# Patient Record
Sex: FEMALE | Race: WHITE | NOT HISPANIC OR LATINO | Employment: OTHER | URBAN - NONMETROPOLITAN AREA
[De-identification: names, ages, dates, MRNs, and addresses within clinical notes are randomized per-mention and may not be internally consistent; named-entity substitution may affect disease eponyms.]

---

## 2018-08-16 ENCOUNTER — TRANSCRIBE ORDERS (OUTPATIENT)
Dept: ADMINISTRATIVE | Facility: HOSPITAL | Age: 48
End: 2018-08-16

## 2018-08-16 DIAGNOSIS — R11.0 NAUSEA: Primary | ICD-10-CM

## 2018-08-16 DIAGNOSIS — R63.4 ABNORMAL WEIGHT LOSS: ICD-10-CM

## 2018-08-16 DIAGNOSIS — R10.10 UPPER ABDOMINAL PAIN: ICD-10-CM

## 2018-08-24 ENCOUNTER — HOSPITAL ENCOUNTER (OUTPATIENT)
Dept: ULTRASOUND IMAGING | Facility: HOSPITAL | Age: 48
Discharge: HOME | End: 2018-08-24
Attending: INTERNAL MEDICINE | Admitting: INTERNAL MEDICINE

## 2018-08-24 DIAGNOSIS — R63.4 ABNORMAL WEIGHT LOSS: ICD-10-CM

## 2018-08-24 DIAGNOSIS — R11.0 NAUSEA: ICD-10-CM

## 2018-08-24 DIAGNOSIS — R10.10 UPPER ABDOMINAL PAIN: ICD-10-CM

## 2018-08-24 PROCEDURE — 76705 ECHO EXAM OF ABDOMEN: CPT

## 2018-08-24 PROCEDURE — 76705 ECHO EXAM OF ABDOMEN: CPT | Performed by: RADIOLOGY

## 2018-10-12 ENCOUNTER — HOSPITAL ENCOUNTER (EMERGENCY)
Facility: HOSPITAL | Age: 48
Discharge: HOME | End: 2018-10-12
Attending: EMERGENCY MEDICINE | Admitting: EMERGENCY MEDICINE

## 2018-10-12 VITALS
BODY MASS INDEX: 23.7 KG/M2 | SYSTOLIC BLOOD PRESSURE: 154 MMHG | HEIGHT: 69 IN | RESPIRATION RATE: 16 BRPM | HEART RATE: 98 BPM | DIASTOLIC BLOOD PRESSURE: 91 MMHG | TEMPERATURE: 98.2 F | OXYGEN SATURATION: 98 % | WEIGHT: 160 LBS

## 2018-10-12 DIAGNOSIS — E87.6 HYPOKALEMIA: ICD-10-CM

## 2018-10-12 DIAGNOSIS — E86.0 DEHYDRATION: Primary | ICD-10-CM

## 2018-10-12 LAB
ALBUMIN SERPL-MCNC: 3.8 G/DL (ref 3.5–5)
ALBUMIN/GLOB SERPL: 1.2 G/DL (ref 1.5–2.5)
ALP SERPL-CCNC: 84 U/L (ref 35–104)
ALT SERPL W P-5'-P-CCNC: 25 U/L (ref 10–36)
ANION GAP SERPL CALCULATED.3IONS-SCNC: 16.6 MMOL/L (ref 3.6–11.2)
AST SERPL-CCNC: 26 U/L (ref 10–30)
BASOPHILS # BLD AUTO: 0.04 10*3/MM3 (ref 0–0.3)
BASOPHILS NFR BLD AUTO: 0.4 % (ref 0–2)
BILIRUB SERPL-MCNC: 0.8 MG/DL (ref 0.2–1.8)
BUN BLD-MCNC: 12 MG/DL (ref 7–21)
BUN/CREAT SERPL: 15.6 (ref 7–25)
CALCIUM SPEC-SCNC: 9.3 MG/DL (ref 7.7–10)
CHLORIDE SERPL-SCNC: 97 MMOL/L (ref 99–112)
CO2 SERPL-SCNC: 25.4 MMOL/L (ref 24.3–31.9)
CREAT BLD-MCNC: 0.77 MG/DL (ref 0.43–1.29)
DEPRECATED RDW RBC AUTO: 43.5 FL (ref 37–54)
EOSINOPHIL # BLD AUTO: 0.05 10*3/MM3 (ref 0–0.7)
EOSINOPHIL NFR BLD AUTO: 0.5 % (ref 0–5)
ERYTHROCYTE [DISTWIDTH] IN BLOOD BY AUTOMATED COUNT: 13.4 % (ref 11.5–14.5)
GFR SERPL CREATININE-BSD FRML MDRD: 80 ML/MIN/1.73
GLOBULIN UR ELPH-MCNC: 3.2 GM/DL
GLUCOSE BLD-MCNC: 115 MG/DL (ref 70–110)
HCT VFR BLD AUTO: 44.5 % (ref 37–47)
HGB BLD-MCNC: 15.4 G/DL (ref 12–16)
IMM GRANULOCYTES # BLD: 0.08 10*3/MM3 (ref 0–0.03)
IMM GRANULOCYTES NFR BLD: 0.8 % (ref 0–0.5)
LYMPHOCYTES # BLD AUTO: 2.13 10*3/MM3 (ref 1–3)
LYMPHOCYTES NFR BLD AUTO: 21.7 % (ref 21–51)
MCH RBC QN AUTO: 31.2 PG (ref 27–33)
MCHC RBC AUTO-ENTMCNC: 34.6 G/DL (ref 33–37)
MCV RBC AUTO: 90.3 FL (ref 80–94)
MONOCYTES # BLD AUTO: 0.9 10*3/MM3 (ref 0.1–0.9)
MONOCYTES NFR BLD AUTO: 9.2 % (ref 0–10)
NEUTROPHILS # BLD AUTO: 6.6 10*3/MM3 (ref 1.4–6.5)
NEUTROPHILS NFR BLD AUTO: 67.4 % (ref 30–70)
NRBC BLD MANUAL-RTO: 0 /100 WBC (ref 0–0)
OSMOLALITY SERPL CALC.SUM OF ELEC: 278.2 MOSM/KG (ref 273–305)
PLATELET # BLD AUTO: 305 10*3/MM3 (ref 130–400)
PMV BLD AUTO: 11.5 FL (ref 6–10)
POTASSIUM BLD-SCNC: 2.9 MMOL/L (ref 3.5–5.3)
PROT SERPL-MCNC: 7 G/DL (ref 6–8)
RBC # BLD AUTO: 4.93 10*6/MM3 (ref 4.2–5.4)
SODIUM BLD-SCNC: 139 MMOL/L (ref 135–153)
WBC NRBC COR # BLD: 9.8 10*3/MM3 (ref 4.5–12.5)

## 2018-10-12 PROCEDURE — 25010000002 ONDANSETRON PER 1 MG: Performed by: EMERGENCY MEDICINE

## 2018-10-12 PROCEDURE — 99284 EMERGENCY DEPT VISIT MOD MDM: CPT

## 2018-10-12 PROCEDURE — 96375 TX/PRO/DX INJ NEW DRUG ADDON: CPT

## 2018-10-12 PROCEDURE — 25010000002 PROCHLORPERAZINE EDISYLATE PER 10 MG: Performed by: EMERGENCY MEDICINE

## 2018-10-12 PROCEDURE — 96374 THER/PROPH/DIAG INJ IV PUSH: CPT

## 2018-10-12 PROCEDURE — 85025 COMPLETE CBC W/AUTO DIFF WBC: CPT | Performed by: EMERGENCY MEDICINE

## 2018-10-12 PROCEDURE — 80053 COMPREHEN METABOLIC PANEL: CPT | Performed by: EMERGENCY MEDICINE

## 2018-10-12 RX ORDER — POTASSIUM CHLORIDE 750 MG/1
10 TABLET, EXTENDED RELEASE ORAL 2 TIMES DAILY
Qty: 12 TABLET | Refills: 0 | Status: SHIPPED | OUTPATIENT
Start: 2018-10-12 | End: 2018-11-07 | Stop reason: HOSPADM

## 2018-10-12 RX ORDER — POTASSIUM CHLORIDE 20 MEQ/1
40 TABLET, EXTENDED RELEASE ORAL ONCE
Status: COMPLETED | OUTPATIENT
Start: 2018-10-12 | End: 2018-10-12

## 2018-10-12 RX ORDER — PROCHLORPERAZINE MALEATE 10 MG
10 TABLET ORAL EVERY 6 HOURS PRN
Qty: 16 TABLET | Refills: 0 | Status: SHIPPED | OUTPATIENT
Start: 2018-10-12 | End: 2018-11-07 | Stop reason: HOSPADM

## 2018-10-12 RX ORDER — ONDANSETRON 2 MG/ML
4 INJECTION INTRAMUSCULAR; INTRAVENOUS ONCE
Status: COMPLETED | OUTPATIENT
Start: 2018-10-12 | End: 2018-10-12

## 2018-10-12 RX ORDER — ONDANSETRON 4 MG/1
4 TABLET, FILM COATED ORAL EVERY 8 HOURS PRN
Qty: 12 TABLET | Refills: 0 | Status: SHIPPED | OUTPATIENT
Start: 2018-10-12

## 2018-10-12 RX ADMIN — SODIUM CHLORIDE 1000 ML: 9 INJECTION, SOLUTION INTRAVENOUS at 17:46

## 2018-10-12 RX ADMIN — ONDANSETRON 4 MG: 2 INJECTION, SOLUTION INTRAMUSCULAR; INTRAVENOUS at 17:46

## 2018-10-12 RX ADMIN — POTASSIUM CHLORIDE 40 MEQ: 1500 TABLET, EXTENDED RELEASE ORAL at 18:59

## 2018-10-12 RX ADMIN — PROCHLORPERAZINE EDISYLATE 5 MG: 5 INJECTION INTRAMUSCULAR; INTRAVENOUS at 19:24

## 2018-10-12 NOTE — ED NOTES
MD at bedside speaking to patient at this time, DAVEY [place]. Resps even and unlabored. Denies any needs. Updated on POC, spouse remains at bedside. Call light and fall precautions in place.      Aidee Escalante RN  10/12/18 1923

## 2018-10-12 NOTE — ED NOTES
Report received from CUONG Perez RN at bedside at this time. Updated patient on POC.      Aidee Escalante, GUERO  10/12/18 4505

## 2018-10-12 NOTE — ED NOTES
Patient states that she is unable to provide a urine specimen, states that she is not able to urinate, made patient aware that this is the only test that we are awaiting, labs to this point were WNL except for K, states that she forgot to urinate in cup, MD made aware.      Aidee Escalante, GUERO  10/12/18 1920

## 2018-10-12 NOTE — ED NOTES
Patient ambulatory to restroom at this time, requests to provide us with a urine specimen. Verbalizes understanding, cup and dana sani wipes provided at this time. Spouse accompanying patient.      Aidee Escalante RN  10/12/18 1920

## 2018-10-14 ENCOUNTER — HOSPITAL ENCOUNTER (INPATIENT)
Facility: HOSPITAL | Age: 48
LOS: 23 days | Discharge: NURSING, REHAB OR LONG-TERM CARE | End: 2018-11-07
Attending: EMERGENCY MEDICINE | Admitting: EMERGENCY MEDICINE

## 2018-10-14 DIAGNOSIS — E87.6 ACUTE HYPOKALEMIA: Primary | ICD-10-CM

## 2018-10-14 DIAGNOSIS — R11.15 INTRACTABLE CYCLICAL VOMITING WITH NAUSEA: ICD-10-CM

## 2018-10-14 DIAGNOSIS — E83.42 HYPOMAGNESEMIA: ICD-10-CM

## 2018-10-14 DIAGNOSIS — R63.4 UNINTENTIONAL WEIGHT LOSS: ICD-10-CM

## 2018-10-14 DIAGNOSIS — R93.3 ABNORMAL CT SCAN, STOMACH: ICD-10-CM

## 2018-10-14 DIAGNOSIS — Z78.9 IMPAIRED MOBILITY AND ADLS: ICD-10-CM

## 2018-10-14 DIAGNOSIS — Z74.09 IMPAIRED FUNCTIONAL MOBILITY, BALANCE, GAIT, AND ENDURANCE: ICD-10-CM

## 2018-10-14 DIAGNOSIS — R11.2 INTRACTABLE VOMITING WITH NAUSEA, UNSPECIFIED VOMITING TYPE: ICD-10-CM

## 2018-10-14 DIAGNOSIS — Z74.09 IMPAIRED MOBILITY AND ADLS: ICD-10-CM

## 2018-10-14 PROCEDURE — 96361 HYDRATE IV INFUSION ADD-ON: CPT

## 2018-10-14 PROCEDURE — 93005 ELECTROCARDIOGRAM TRACING: CPT

## 2018-10-14 PROCEDURE — 99284 EMERGENCY DEPT VISIT MOD MDM: CPT

## 2018-10-14 PROCEDURE — 93005 ELECTROCARDIOGRAM TRACING: CPT | Performed by: EMERGENCY MEDICINE

## 2018-10-14 PROCEDURE — 96365 THER/PROPH/DIAG IV INF INIT: CPT

## 2018-10-14 PROCEDURE — 96366 THER/PROPH/DIAG IV INF ADDON: CPT

## 2018-10-14 PROCEDURE — P9612 CATHETERIZE FOR URINE SPEC: HCPCS

## 2018-10-14 PROCEDURE — 96368 THER/DIAG CONCURRENT INF: CPT

## 2018-10-15 ENCOUNTER — APPOINTMENT (OUTPATIENT)
Dept: CT IMAGING | Facility: HOSPITAL | Age: 48
End: 2018-10-15

## 2018-10-15 ENCOUNTER — APPOINTMENT (OUTPATIENT)
Dept: GENERAL RADIOLOGY | Facility: HOSPITAL | Age: 48
End: 2018-10-15

## 2018-10-15 PROBLEM — E83.42 HYPOMAGNESEMIA: Status: ACTIVE | Noted: 2018-10-15

## 2018-10-15 PROBLEM — E78.5 HYPERLIPIDEMIA: Status: ACTIVE | Noted: 2018-10-15

## 2018-10-15 PROBLEM — F41.9 ANXIETY AND DEPRESSION: Status: ACTIVE | Noted: 2018-10-15

## 2018-10-15 PROBLEM — K21.9 GERD WITHOUT ESOPHAGITIS: Status: ACTIVE | Noted: 2018-10-15

## 2018-10-15 PROBLEM — E87.6 ACUTE HYPOKALEMIA: Status: ACTIVE | Noted: 2018-10-15

## 2018-10-15 PROBLEM — E87.6 HYPOKALEMIA: Status: ACTIVE | Noted: 2018-10-15

## 2018-10-15 PROBLEM — R11.2 INTRACTABLE NAUSEA AND VOMITING: Status: ACTIVE | Noted: 2018-10-15

## 2018-10-15 PROBLEM — I10 ESSENTIAL HYPERTENSION: Status: ACTIVE | Noted: 2018-10-15

## 2018-10-15 PROBLEM — G43.909 MIGRAINES: Status: ACTIVE | Noted: 2018-10-15

## 2018-10-15 PROBLEM — F32.A ANXIETY AND DEPRESSION: Status: ACTIVE | Noted: 2018-10-15

## 2018-10-15 PROBLEM — A69.20 LYME DISEASE: Status: ACTIVE | Noted: 2018-10-15

## 2018-10-15 LAB
ALBUMIN SERPL-MCNC: 3.82 G/DL (ref 3.2–4.8)
ALBUMIN/GLOB SERPL: 1.6 G/DL (ref 1.5–2.5)
ALP SERPL-CCNC: 78 U/L (ref 25–100)
ALT SERPL W P-5'-P-CCNC: 29 U/L (ref 7–40)
AMPHET+METHAMPHET UR QL: NEGATIVE
AMPHETAMINES UR QL: NEGATIVE
ANION GAP SERPL CALCULATED.3IONS-SCNC: 14 MMOL/L (ref 3–11)
ANION GAP SERPL CALCULATED.3IONS-SCNC: 15 MMOL/L (ref 3–11)
AST SERPL-CCNC: 20 U/L (ref 0–33)
BACTERIA UR QL AUTO: ABNORMAL /HPF
BARBITURATES UR QL SCN: NEGATIVE
BASOPHILS # BLD AUTO: 0.02 10*3/MM3 (ref 0–0.2)
BASOPHILS NFR BLD AUTO: 0.2 % (ref 0–1)
BENZODIAZ UR QL SCN: NEGATIVE
BILIRUB SERPL-MCNC: 0.8 MG/DL (ref 0.3–1.2)
BILIRUB UR QL STRIP: ABNORMAL
BUN BLD-MCNC: 13 MG/DL (ref 9–23)
BUN BLD-MCNC: 15 MG/DL (ref 9–23)
BUN/CREAT SERPL: 19.7 (ref 7–25)
BUN/CREAT SERPL: 21.4 (ref 7–25)
BUPRENORPHINE SERPL-MCNC: NEGATIVE NG/ML
CALCIUM SPEC-SCNC: 8.3 MG/DL (ref 8.7–10.4)
CALCIUM SPEC-SCNC: 9.3 MG/DL (ref 8.7–10.4)
CANNABINOIDS SERPL QL: NEGATIVE
CHLORIDE SERPL-SCNC: 102 MMOL/L (ref 99–109)
CHLORIDE SERPL-SCNC: 96 MMOL/L (ref 99–109)
CHLORIDE UR-SCNC: 124 MMOL/L
CK SERPL-CCNC: 34 U/L (ref 26–174)
CLARITY UR: ABNORMAL
CO2 SERPL-SCNC: 26 MMOL/L (ref 20–31)
CO2 SERPL-SCNC: 28 MMOL/L (ref 20–31)
COCAINE UR QL: NEGATIVE
COLOR UR: ABNORMAL
CORTIS SERPL-MCNC: 14.2 MCG/DL
CREAT BLD-MCNC: 0.66 MG/DL (ref 0.6–1.3)
CREAT BLD-MCNC: 0.7 MG/DL (ref 0.6–1.3)
CRP SERPL-MCNC: 0.22 MG/DL (ref 0–1)
D-LACTATE SERPL-SCNC: 0.8 MMOL/L (ref 0.5–2)
D-LACTATE SERPL-SCNC: 1.5 MMOL/L (ref 0.5–2)
DEPRECATED RDW RBC AUTO: 44 FL (ref 37–54)
DEPRECATED RDW RBC AUTO: 44.5 FL (ref 37–54)
EOSINOPHIL # BLD AUTO: 0.02 10*3/MM3 (ref 0–0.3)
EOSINOPHIL NFR BLD AUTO: 0.2 % (ref 0–3)
ERYTHROCYTE [DISTWIDTH] IN BLOOD BY AUTOMATED COUNT: 13.4 % (ref 11.3–14.5)
ERYTHROCYTE [DISTWIDTH] IN BLOOD BY AUTOMATED COUNT: 13.5 % (ref 11.3–14.5)
ERYTHROCYTE [SEDIMENTATION RATE] IN BLOOD: 8 MM/HR (ref 0–20)
GFR SERPL CREATININE-BSD FRML MDRD: 89 ML/MIN/1.73
GFR SERPL CREATININE-BSD FRML MDRD: 96 ML/MIN/1.73
GLOBULIN UR ELPH-MCNC: 2.4 GM/DL
GLUCOSE BLD-MCNC: 94 MG/DL (ref 70–100)
GLUCOSE BLD-MCNC: 97 MG/DL (ref 70–100)
GLUCOSE UR STRIP-MCNC: NEGATIVE MG/DL
HCT VFR BLD AUTO: 41.3 % (ref 34.5–44)
HCT VFR BLD AUTO: 41.5 % (ref 34.5–44)
HGB BLD-MCNC: 14.1 G/DL (ref 11.5–15.5)
HGB BLD-MCNC: 14.5 G/DL (ref 11.5–15.5)
HGB UR QL STRIP.AUTO: NEGATIVE
HYALINE CASTS UR QL AUTO: ABNORMAL /LPF
IMM GRANULOCYTES # BLD: 0.09 10*3/MM3 (ref 0–0.03)
IMM GRANULOCYTES NFR BLD: 0.9 % (ref 0–0.6)
KETONES UR QL STRIP: ABNORMAL
LEUKOCYTE ESTERASE UR QL STRIP.AUTO: ABNORMAL
LIPASE SERPL-CCNC: 21 U/L (ref 6–51)
LYMPHOCYTES # BLD AUTO: 2.02 10*3/MM3 (ref 0.6–4.8)
LYMPHOCYTES NFR BLD AUTO: 19.6 % (ref 24–44)
MAGNESIUM SERPL-MCNC: 1 MG/DL (ref 1.3–2.7)
MAGNESIUM SERPL-MCNC: 1.4 MG/DL (ref 1.3–2.7)
MCH RBC QN AUTO: 31.2 PG (ref 27–31)
MCH RBC QN AUTO: 31.2 PG (ref 27–31)
MCHC RBC AUTO-ENTMCNC: 34.1 G/DL (ref 32–36)
MCHC RBC AUTO-ENTMCNC: 34.9 G/DL (ref 32–36)
MCV RBC AUTO: 89.2 FL (ref 80–99)
MCV RBC AUTO: 91.4 FL (ref 80–99)
METHADONE UR QL SCN: NEGATIVE
MONOCYTES # BLD AUTO: 0.85 10*3/MM3 (ref 0–1)
MONOCYTES NFR BLD AUTO: 8.2 % (ref 0–12)
NEUTROPHILS # BLD AUTO: 7.42 10*3/MM3 (ref 1.5–8.3)
NEUTROPHILS NFR BLD AUTO: 71.8 % (ref 41–71)
NITRITE UR QL STRIP: NEGATIVE
OPIATES UR QL: POSITIVE
OSMOLALITY UR: 674 MOSM/KG (ref 300–1100)
OXYCODONE UR QL SCN: NEGATIVE
PCP UR QL SCN: NEGATIVE
PH UR STRIP.AUTO: 6 [PH] (ref 5–8)
PLATELET # BLD AUTO: 319 10*3/MM3 (ref 150–450)
PLATELET # BLD AUTO: 326 10*3/MM3 (ref 150–450)
PMV BLD AUTO: 10.8 FL (ref 6–12)
PMV BLD AUTO: 11.7 FL (ref 6–12)
POTASSIUM BLD-SCNC: 2.6 MMOL/L (ref 3.5–5.5)
POTASSIUM BLD-SCNC: 3.3 MMOL/L (ref 3.5–5.5)
POTASSIUM BLD-SCNC: 3.6 MMOL/L (ref 3.5–5.5)
POTASSIUM UR-SCNC: 42.9 MMOL/L
PROCALCITONIN SERPL-MCNC: <0.05 NG/ML
PROPOXYPH UR QL: NEGATIVE
PROT SERPL-MCNC: 6.2 G/DL (ref 5.7–8.2)
PROT UR QL STRIP: ABNORMAL
RBC # BLD AUTO: 4.52 10*6/MM3 (ref 3.89–5.14)
RBC # BLD AUTO: 4.65 10*6/MM3 (ref 3.89–5.14)
RBC # UR: ABNORMAL /HPF
REF LAB TEST METHOD: ABNORMAL
SODIUM BLD-SCNC: 139 MMOL/L (ref 132–146)
SODIUM BLD-SCNC: 142 MMOL/L (ref 132–146)
SODIUM UR-SCNC: 85 MMOL/L (ref 30–90)
SP GR UR STRIP: 1.02 (ref 1–1.03)
SQUAMOUS #/AREA URNS HPF: ABNORMAL /HPF
TRICYCLICS UR QL SCN: NEGATIVE
TSH SERPL DL<=0.05 MIU/L-ACNC: 2.66 MIU/ML (ref 0.35–5.35)
UROBILINOGEN UR QL STRIP: ABNORMAL
WBC NRBC COR # BLD: 10.33 10*3/MM3 (ref 3.5–10.8)
WBC NRBC COR # BLD: 9.42 10*3/MM3 (ref 3.5–10.8)
WBC UR QL AUTO: ABNORMAL /HPF

## 2018-10-15 PROCEDURE — 74177 CT ABD & PELVIS W/CONTRAST: CPT

## 2018-10-15 PROCEDURE — 25010000003 POTASSIUM CHLORIDE 10 MEQ/100ML SOLUTION

## 2018-10-15 PROCEDURE — 81001 URINALYSIS AUTO W/SCOPE: CPT | Performed by: NURSE PRACTITIONER

## 2018-10-15 PROCEDURE — 25010000002 METOCLOPRAMIDE PER 10 MG

## 2018-10-15 PROCEDURE — 96365 THER/PROPH/DIAG IV INF INIT: CPT

## 2018-10-15 PROCEDURE — 25010000003 POTASSIUM CHLORIDE 10 MEQ/100ML SOLUTION: Performed by: NURSE PRACTITIONER

## 2018-10-15 PROCEDURE — 25010000002 IOPAMIDOL 61 % SOLUTION: Performed by: INTERNAL MEDICINE

## 2018-10-15 PROCEDURE — 71045 X-RAY EXAM CHEST 1 VIEW: CPT

## 2018-10-15 PROCEDURE — 99220 PR INITIAL OBSERVATION CARE/DAY 70 MINUTES: CPT | Performed by: INTERNAL MEDICINE

## 2018-10-15 PROCEDURE — 82550 ASSAY OF CK (CPK): CPT | Performed by: NURSE PRACTITIONER

## 2018-10-15 PROCEDURE — 83690 ASSAY OF LIPASE: CPT | Performed by: NURSE PRACTITIONER

## 2018-10-15 PROCEDURE — 80050 GENERAL HEALTH PANEL: CPT | Performed by: NURSE PRACTITIONER

## 2018-10-15 PROCEDURE — 83935 ASSAY OF URINE OSMOLALITY: CPT | Performed by: EMERGENCY MEDICINE

## 2018-10-15 PROCEDURE — 96368 THER/DIAG CONCURRENT INF: CPT

## 2018-10-15 PROCEDURE — 82436 ASSAY OF URINE CHLORIDE: CPT | Performed by: INTERNAL MEDICINE

## 2018-10-15 PROCEDURE — G0378 HOSPITAL OBSERVATION PER HR: HCPCS

## 2018-10-15 PROCEDURE — 87040 BLOOD CULTURE FOR BACTERIA: CPT | Performed by: NURSE PRACTITIONER

## 2018-10-15 PROCEDURE — 25010000002 ONDANSETRON PER 1 MG: Performed by: PHYSICIAN ASSISTANT

## 2018-10-15 PROCEDURE — 84133 ASSAY OF URINE POTASSIUM: CPT | Performed by: INTERNAL MEDICINE

## 2018-10-15 PROCEDURE — 99254 IP/OBS CNSLTJ NEW/EST MOD 60: CPT | Performed by: PHYSICIAN ASSISTANT

## 2018-10-15 PROCEDURE — 84300 ASSAY OF URINE SODIUM: CPT | Performed by: INTERNAL MEDICINE

## 2018-10-15 PROCEDURE — 96366 THER/PROPH/DIAG IV INF ADDON: CPT

## 2018-10-15 PROCEDURE — 80048 BASIC METABOLIC PNL TOTAL CA: CPT | Performed by: PHYSICIAN ASSISTANT

## 2018-10-15 PROCEDURE — 83605 ASSAY OF LACTIC ACID: CPT | Performed by: INTERNAL MEDICINE

## 2018-10-15 PROCEDURE — 84145 PROCALCITONIN (PCT): CPT | Performed by: NURSE PRACTITIONER

## 2018-10-15 PROCEDURE — 83605 ASSAY OF LACTIC ACID: CPT | Performed by: NURSE PRACTITIONER

## 2018-10-15 PROCEDURE — 85027 COMPLETE CBC AUTOMATED: CPT | Performed by: PHYSICIAN ASSISTANT

## 2018-10-15 PROCEDURE — 85652 RBC SED RATE AUTOMATED: CPT | Performed by: NURSE PRACTITIONER

## 2018-10-15 PROCEDURE — 84132 ASSAY OF SERUM POTASSIUM: CPT | Performed by: INTERNAL MEDICINE

## 2018-10-15 PROCEDURE — 80306 DRUG TEST PRSMV INSTRMNT: CPT | Performed by: INTERNAL MEDICINE

## 2018-10-15 PROCEDURE — 86140 C-REACTIVE PROTEIN: CPT | Performed by: NURSE PRACTITIONER

## 2018-10-15 PROCEDURE — 25010000002 MAGNESIUM SULFATE 2 GM/50ML SOLUTION: Performed by: NURSE PRACTITIONER

## 2018-10-15 PROCEDURE — 25010000003 POTASSIUM CHLORIDE 10 MEQ/100ML SOLUTION: Performed by: PHYSICIAN ASSISTANT

## 2018-10-15 PROCEDURE — 82533 TOTAL CORTISOL: CPT | Performed by: INTERNAL MEDICINE

## 2018-10-15 PROCEDURE — 83735 ASSAY OF MAGNESIUM: CPT | Performed by: NURSE PRACTITIONER

## 2018-10-15 PROCEDURE — 0 DIATRIZOATE MEGLUMINE & SODIUM PER 1 ML: Performed by: INTERNAL MEDICINE

## 2018-10-15 PROCEDURE — 25010000002 MAGNESIUM SULFATE 2 GM/50ML SOLUTION: Performed by: PHYSICIAN ASSISTANT

## 2018-10-15 PROCEDURE — 96361 HYDRATE IV INFUSION ADD-ON: CPT

## 2018-10-15 PROCEDURE — 83735 ASSAY OF MAGNESIUM: CPT | Performed by: INTERNAL MEDICINE

## 2018-10-15 RX ORDER — DULOXETIN HYDROCHLORIDE 60 MG/1
60 CAPSULE, DELAYED RELEASE ORAL DAILY
Status: DISCONTINUED | OUTPATIENT
Start: 2018-10-15 | End: 2018-11-07 | Stop reason: HOSPADM

## 2018-10-15 RX ORDER — DOXYCYCLINE HYCLATE 100 MG/1
100 CAPSULE ORAL 2 TIMES DAILY
COMMUNITY
End: 2018-11-07 | Stop reason: HOSPADM

## 2018-10-15 RX ORDER — POTASSIUM CHLORIDE 7.45 MG/ML
10 INJECTION INTRAVENOUS ONCE
Status: COMPLETED | OUTPATIENT
Start: 2018-10-15 | End: 2018-10-15

## 2018-10-15 RX ORDER — ATORVASTATIN CALCIUM 10 MG/1
10 TABLET, FILM COATED ORAL DAILY
Status: DISCONTINUED | OUTPATIENT
Start: 2018-10-15 | End: 2018-11-07 | Stop reason: HOSPADM

## 2018-10-15 RX ORDER — MAGNESIUM SULFATE HEPTAHYDRATE 40 MG/ML
2 INJECTION, SOLUTION INTRAVENOUS
Status: COMPLETED | OUTPATIENT
Start: 2018-10-15 | End: 2018-10-16

## 2018-10-15 RX ORDER — ERGOCALCIFEROL 1.25 MG/1
50000 CAPSULE ORAL
COMMUNITY
End: 2018-11-07 | Stop reason: HOSPADM

## 2018-10-15 RX ORDER — METOPROLOL TARTRATE 5 MG/5ML
5 INJECTION INTRAVENOUS ONCE
Status: COMPLETED | OUTPATIENT
Start: 2018-10-15 | End: 2018-10-15

## 2018-10-15 RX ORDER — SODIUM CHLORIDE 0.9 % (FLUSH) 0.9 %
3-10 SYRINGE (ML) INJECTION AS NEEDED
Status: DISCONTINUED | OUTPATIENT
Start: 2018-10-15 | End: 2018-11-07 | Stop reason: HOSPADM

## 2018-10-15 RX ORDER — PROMETHAZINE HYDROCHLORIDE 25 MG/1
25 TABLET ORAL EVERY 6 HOURS PRN
Status: ON HOLD | COMMUNITY
End: 2023-03-01

## 2018-10-15 RX ORDER — PANTOPRAZOLE SODIUM 40 MG/1
40 TABLET, DELAYED RELEASE ORAL EVERY MORNING
Status: DISCONTINUED | OUTPATIENT
Start: 2018-10-15 | End: 2018-10-16

## 2018-10-15 RX ORDER — AMITRIPTYLINE HYDROCHLORIDE 10 MG/1
10 TABLET, FILM COATED ORAL NIGHTLY
COMMUNITY
End: 2018-11-07 | Stop reason: HOSPADM

## 2018-10-15 RX ORDER — ZONISAMIDE 25 MG/1
25 CAPSULE ORAL 2 TIMES DAILY
COMMUNITY
End: 2018-11-07 | Stop reason: HOSPADM

## 2018-10-15 RX ORDER — MAGNESIUM SULFATE HEPTAHYDRATE 40 MG/ML
4 INJECTION, SOLUTION INTRAVENOUS AS NEEDED
Status: DISCONTINUED | OUTPATIENT
Start: 2018-10-15 | End: 2018-11-07 | Stop reason: HOSPADM

## 2018-10-15 RX ORDER — SODIUM CHLORIDE 9 MG/ML
150 INJECTION, SOLUTION INTRAVENOUS CONTINUOUS
Status: DISCONTINUED | OUTPATIENT
Start: 2018-10-15 | End: 2018-10-15

## 2018-10-15 RX ORDER — MAGNESIUM SULFATE HEPTAHYDRATE 40 MG/ML
2 INJECTION, SOLUTION INTRAVENOUS AS NEEDED
Status: DISCONTINUED | OUTPATIENT
Start: 2018-10-15 | End: 2018-11-07 | Stop reason: HOSPADM

## 2018-10-15 RX ORDER — SODIUM CHLORIDE 0.9 % (FLUSH) 0.9 %
10 SYRINGE (ML) INJECTION AS NEEDED
Status: DISCONTINUED | OUTPATIENT
Start: 2018-10-15 | End: 2018-11-07 | Stop reason: HOSPADM

## 2018-10-15 RX ORDER — TIZANIDINE 4 MG/1
4 TABLET ORAL 3 TIMES DAILY
COMMUNITY
End: 2018-11-07 | Stop reason: HOSPADM

## 2018-10-15 RX ORDER — DOXYCYCLINE 100 MG/1
100 CAPSULE ORAL EVERY 12 HOURS SCHEDULED
Status: CANCELLED | OUTPATIENT
Start: 2018-10-15 | End: 2018-10-22

## 2018-10-15 RX ORDER — SODIUM CHLORIDE 0.9 % (FLUSH) 0.9 %
3 SYRINGE (ML) INJECTION EVERY 12 HOURS SCHEDULED
Status: DISCONTINUED | OUTPATIENT
Start: 2018-10-15 | End: 2018-11-07 | Stop reason: HOSPADM

## 2018-10-15 RX ORDER — POTASSIUM CHLORIDE 7.45 MG/ML
10 INJECTION INTRAVENOUS ONCE
Status: DISCONTINUED | OUTPATIENT
Start: 2018-10-15 | End: 2018-10-19

## 2018-10-15 RX ORDER — METOCLOPRAMIDE HYDROCHLORIDE 5 MG/ML
5 INJECTION INTRAMUSCULAR; INTRAVENOUS
Status: DISCONTINUED | OUTPATIENT
Start: 2018-10-15 | End: 2018-10-16

## 2018-10-15 RX ORDER — SUMATRIPTAN 50 MG/1
50 TABLET, FILM COATED ORAL
COMMUNITY
End: 2018-11-07 | Stop reason: HOSPADM

## 2018-10-15 RX ORDER — MAGNESIUM SULFATE HEPTAHYDRATE 40 MG/ML
2 INJECTION, SOLUTION INTRAVENOUS ONCE
Status: COMPLETED | OUTPATIENT
Start: 2018-10-15 | End: 2018-10-15

## 2018-10-15 RX ORDER — TIZANIDINE 4 MG/1
4 TABLET ORAL 3 TIMES DAILY
Status: DISCONTINUED | OUTPATIENT
Start: 2018-10-15 | End: 2018-10-16

## 2018-10-15 RX ORDER — SODIUM CHLORIDE 9 MG/ML
125 INJECTION, SOLUTION INTRAVENOUS CONTINUOUS
Status: DISCONTINUED | OUTPATIENT
Start: 2018-10-15 | End: 2018-10-17

## 2018-10-15 RX ORDER — PROMETHAZINE HYDROCHLORIDE 25 MG/ML
12.5 INJECTION, SOLUTION INTRAMUSCULAR; INTRAVENOUS EVERY 6 HOURS PRN
Status: DISCONTINUED | OUTPATIENT
Start: 2018-10-15 | End: 2018-11-07 | Stop reason: HOSPADM

## 2018-10-15 RX ORDER — POTASSIUM CHLORIDE 7.45 MG/ML
10 INJECTION INTRAVENOUS ONCE
Status: DISCONTINUED | OUTPATIENT
Start: 2018-10-15 | End: 2018-10-15

## 2018-10-15 RX ORDER — ATORVASTATIN CALCIUM 10 MG/1
10 TABLET, FILM COATED ORAL DAILY
Status: ON HOLD | COMMUNITY
End: 2023-03-01

## 2018-10-15 RX ORDER — OMEPRAZOLE 20 MG/1
20 CAPSULE, DELAYED RELEASE ORAL DAILY
Status: ON HOLD | COMMUNITY
End: 2023-03-01

## 2018-10-15 RX ORDER — POTASSIUM CHLORIDE 1.5 G/1.77G
40 POWDER, FOR SOLUTION ORAL AS NEEDED
Status: DISCONTINUED | OUTPATIENT
Start: 2018-10-15 | End: 2018-11-07 | Stop reason: HOSPADM

## 2018-10-15 RX ORDER — LEVOMEFOLATE CALCIUM 15 MG
15 TABLET ORAL DAILY
COMMUNITY
End: 2018-11-07 | Stop reason: HOSPADM

## 2018-10-15 RX ORDER — METOCLOPRAMIDE 5 MG/1
5 TABLET ORAL
COMMUNITY
End: 2018-11-07 | Stop reason: HOSPADM

## 2018-10-15 RX ORDER — AMITRIPTYLINE HYDROCHLORIDE 10 MG/1
10 TABLET, FILM COATED ORAL NIGHTLY
Status: DISCONTINUED | OUTPATIENT
Start: 2018-10-15 | End: 2018-10-19

## 2018-10-15 RX ORDER — CELECOXIB 200 MG/1
200 CAPSULE ORAL DAILY PRN
COMMUNITY
End: 2018-11-07 | Stop reason: HOSPADM

## 2018-10-15 RX ORDER — ACETAMINOPHEN 325 MG/1
650 TABLET ORAL EVERY 4 HOURS PRN
Status: DISCONTINUED | OUTPATIENT
Start: 2018-10-15 | End: 2018-11-07 | Stop reason: HOSPADM

## 2018-10-15 RX ORDER — ATENOLOL 25 MG/1
25 TABLET ORAL DAILY
COMMUNITY

## 2018-10-15 RX ORDER — POTASSIUM CHLORIDE 750 MG/1
40 CAPSULE, EXTENDED RELEASE ORAL AS NEEDED
Status: DISCONTINUED | OUTPATIENT
Start: 2018-10-15 | End: 2018-10-16

## 2018-10-15 RX ORDER — ONDANSETRON 2 MG/ML
4 INJECTION INTRAMUSCULAR; INTRAVENOUS EVERY 6 HOURS PRN
Status: DISCONTINUED | OUTPATIENT
Start: 2018-10-15 | End: 2018-11-07 | Stop reason: HOSPADM

## 2018-10-15 RX ORDER — LEVOCETIRIZINE DIHYDROCHLORIDE 5 MG/1
5 TABLET, FILM COATED ORAL DAILY PRN
COMMUNITY
End: 2018-11-07 | Stop reason: HOSPADM

## 2018-10-15 RX ORDER — ATENOLOL 50 MG/1
50 TABLET ORAL 2 TIMES DAILY
Status: DISCONTINUED | OUTPATIENT
Start: 2018-10-15 | End: 2018-10-16

## 2018-10-15 RX ORDER — METOCLOPRAMIDE 5 MG/1
5 TABLET ORAL
Status: DISCONTINUED | OUTPATIENT
Start: 2018-10-15 | End: 2018-10-15

## 2018-10-15 RX ORDER — DULOXETIN HYDROCHLORIDE 60 MG/1
60 CAPSULE, DELAYED RELEASE ORAL DAILY
Status: ON HOLD | COMMUNITY
End: 2023-03-02

## 2018-10-15 RX ORDER — POTASSIUM CHLORIDE 7.45 MG/ML
10 INJECTION INTRAVENOUS
Status: DISCONTINUED | OUTPATIENT
Start: 2018-10-15 | End: 2018-11-07 | Stop reason: HOSPADM

## 2018-10-15 RX ORDER — HYDROCODONE BITARTRATE AND ACETAMINOPHEN 5; 325 MG/1; MG/1
1 TABLET ORAL 2 TIMES DAILY PRN
COMMUNITY
End: 2018-11-07 | Stop reason: HOSPADM

## 2018-10-15 RX ORDER — POTASSIUM CHLORIDE 7.45 MG/ML
10 INJECTION INTRAVENOUS
Status: DISPENSED | OUTPATIENT
Start: 2018-10-15 | End: 2018-10-16

## 2018-10-15 RX ADMIN — Medication 15 ML: at 16:53

## 2018-10-15 RX ADMIN — POTASSIUM CHLORIDE 10 MEQ: 7.46 INJECTION, SOLUTION INTRAVENOUS at 05:57

## 2018-10-15 RX ADMIN — METOPROLOL TARTRATE 5 MG: 1 INJECTION, SOLUTION INTRAVENOUS at 06:57

## 2018-10-15 RX ADMIN — Medication 3 ML: at 20:48

## 2018-10-15 RX ADMIN — Medication 3 ML: at 08:06

## 2018-10-15 RX ADMIN — POTASSIUM CHLORIDE 10 MEQ: 7.46 INJECTION, SOLUTION INTRAVENOUS at 02:25

## 2018-10-15 RX ADMIN — POTASSIUM CHLORIDE 10 MEQ: 7.46 INJECTION, SOLUTION INTRAVENOUS at 20:28

## 2018-10-15 RX ADMIN — SODIUM CHLORIDE 100 ML/HR: 9 INJECTION, SOLUTION INTRAVENOUS at 16:41

## 2018-10-15 RX ADMIN — ATENOLOL 50 MG: 50 TABLET ORAL at 08:05

## 2018-10-15 RX ADMIN — ATORVASTATIN CALCIUM 10 MG: 10 TABLET, FILM COATED ORAL at 08:05

## 2018-10-15 RX ADMIN — POTASSIUM CHLORIDE 10 MEQ: 10 INJECTION, SOLUTION INTRAVENOUS at 18:27

## 2018-10-15 RX ADMIN — POTASSIUM CHLORIDE 10 MEQ: 7.46 INJECTION, SOLUTION INTRAVENOUS at 21:42

## 2018-10-15 RX ADMIN — POTASSIUM CHLORIDE 10 MEQ: 10 INJECTION, SOLUTION INTRAVENOUS at 22:51

## 2018-10-15 RX ADMIN — SODIUM CHLORIDE 1000 ML: 9 INJECTION, SOLUTION INTRAVENOUS at 12:00

## 2018-10-15 RX ADMIN — ONDANSETRON HYDROCHLORIDE 4 MG: 2 INJECTION, SOLUTION INTRAMUSCULAR; INTRAVENOUS at 06:56

## 2018-10-15 RX ADMIN — POTASSIUM CHLORIDE 10 MEQ: 7.46 INJECTION, SOLUTION INTRAVENOUS at 04:25

## 2018-10-15 RX ADMIN — MAGNESIUM SULFATE HEPTAHYDRATE 2 G: 40 INJECTION, SOLUTION INTRAVENOUS at 20:28

## 2018-10-15 RX ADMIN — MAGNESIUM SULFATE HEPTAHYDRATE 2 G: 40 INJECTION, SOLUTION INTRAVENOUS at 18:27

## 2018-10-15 RX ADMIN — TIZANIDINE 4 MG: 4 TABLET ORAL at 08:05

## 2018-10-15 RX ADMIN — IOPAMIDOL 85 ML: 612 INJECTION, SOLUTION INTRAVENOUS at 19:13

## 2018-10-15 RX ADMIN — POTASSIUM CHLORIDE 10 MEQ: 10 INJECTION, SOLUTION INTRAVENOUS at 10:01

## 2018-10-15 RX ADMIN — SODIUM CHLORIDE 1000 ML: 9 INJECTION, SOLUTION INTRAVENOUS at 10:40

## 2018-10-15 RX ADMIN — SODIUM CHLORIDE 150 ML/HR: 9 INJECTION, SOLUTION INTRAVENOUS at 03:39

## 2018-10-15 RX ADMIN — SODIUM CHLORIDE 1000 ML: 9 INJECTION, SOLUTION INTRAVENOUS at 00:46

## 2018-10-15 RX ADMIN — MAGNESIUM SULFATE IN WATER 2 G: 40 INJECTION, SOLUTION INTRAVENOUS at 02:31

## 2018-10-15 RX ADMIN — POTASSIUM CHLORIDE 10 MEQ: 10 INJECTION, SOLUTION INTRAVENOUS at 08:05

## 2018-10-15 RX ADMIN — SODIUM CHLORIDE 100 ML/HR: 9 INJECTION, SOLUTION INTRAVENOUS at 08:06

## 2018-10-15 RX ADMIN — SODIUM CHLORIDE 1000 ML: 9 INJECTION, SOLUTION INTRAVENOUS at 14:12

## 2018-10-15 RX ADMIN — METOCLOPRAMIDE 5 MG: 5 INJECTION, SOLUTION INTRAMUSCULAR; INTRAVENOUS at 08:05

## 2018-10-15 RX ADMIN — SODIUM CHLORIDE 1000 ML: 9 INJECTION, SOLUTION INTRAVENOUS at 02:02

## 2018-10-15 RX ADMIN — MAGNESIUM SULFATE HEPTAHYDRATE 2 G: 40 INJECTION, SOLUTION INTRAVENOUS at 22:47

## 2018-10-15 RX ADMIN — POTASSIUM CHLORIDE 10 MEQ: 10 INJECTION, SOLUTION INTRAVENOUS at 11:44

## 2018-10-15 RX ADMIN — DULOXETINE HYDROCHLORIDE 60 MG: 60 CAPSULE, DELAYED RELEASE ORAL at 08:05

## 2018-10-16 ENCOUNTER — ANESTHESIA EVENT (OUTPATIENT)
Dept: GASTROENTEROLOGY | Facility: HOSPITAL | Age: 48
End: 2018-10-16

## 2018-10-16 ENCOUNTER — ANESTHESIA (OUTPATIENT)
Dept: GASTROENTEROLOGY | Facility: HOSPITAL | Age: 48
End: 2018-10-16

## 2018-10-16 PROBLEM — R11.2 INTRACTABLE VOMITING WITH NAUSEA: Status: ACTIVE | Noted: 2018-10-14

## 2018-10-16 PROBLEM — R93.3 ABNORMAL CT SCAN, STOMACH: Status: ACTIVE | Noted: 2018-10-14

## 2018-10-16 LAB
ALBUMIN SERPL-MCNC: 2.94 G/DL (ref 3.2–4.8)
ALBUMIN/GLOB SERPL: 1.6 G/DL (ref 1.5–2.5)
ALP SERPL-CCNC: 61 U/L (ref 25–100)
ALT SERPL W P-5'-P-CCNC: 25 U/L (ref 7–40)
ANION GAP SERPL CALCULATED.3IONS-SCNC: 9 MMOL/L (ref 3–11)
AST SERPL-CCNC: 21 U/L (ref 0–33)
BASOPHILS # BLD AUTO: 0.02 10*3/MM3 (ref 0–0.2)
BASOPHILS NFR BLD AUTO: 0.3 % (ref 0–1)
BILIRUB SERPL-MCNC: 0.5 MG/DL (ref 0.3–1.2)
BUN BLD-MCNC: 9 MG/DL (ref 9–23)
BUN/CREAT SERPL: 20 (ref 7–25)
CALCIUM SPEC-SCNC: 7.1 MG/DL (ref 8.7–10.4)
CHLORIDE SERPL-SCNC: 106 MMOL/L (ref 99–109)
CO2 SERPL-SCNC: 23 MMOL/L (ref 20–31)
CREAT BLD-MCNC: 0.45 MG/DL (ref 0.6–1.3)
DEPRECATED RDW RBC AUTO: 46.8 FL (ref 37–54)
EOSINOPHIL # BLD AUTO: 0.05 10*3/MM3 (ref 0–0.3)
EOSINOPHIL NFR BLD AUTO: 0.6 % (ref 0–3)
ERYTHROCYTE [DISTWIDTH] IN BLOOD BY AUTOMATED COUNT: 13.9 % (ref 11.3–14.5)
GFR SERPL CREATININE-BSD FRML MDRD: 149 ML/MIN/1.73
GLOBULIN UR ELPH-MCNC: 1.9 GM/DL
GLUCOSE BLD-MCNC: 76 MG/DL (ref 70–100)
HCT VFR BLD AUTO: 34.2 % (ref 34.5–44)
HGB BLD-MCNC: 11.3 G/DL (ref 11.5–15.5)
IMM GRANULOCYTES # BLD: 0.09 10*3/MM3 (ref 0–0.03)
IMM GRANULOCYTES NFR BLD: 1.2 % (ref 0–0.6)
LYMPHOCYTES # BLD AUTO: 1.94 10*3/MM3 (ref 0.6–4.8)
LYMPHOCYTES NFR BLD AUTO: 24.8 % (ref 24–44)
MAGNESIUM SERPL-MCNC: 2.6 MG/DL (ref 1.3–2.7)
MCH RBC QN AUTO: 30.7 PG (ref 27–31)
MCHC RBC AUTO-ENTMCNC: 33 G/DL (ref 32–36)
MCV RBC AUTO: 92.9 FL (ref 80–99)
MONOCYTES # BLD AUTO: 0.54 10*3/MM3 (ref 0–1)
MONOCYTES NFR BLD AUTO: 6.9 % (ref 0–12)
NEUTROPHILS # BLD AUTO: 5.27 10*3/MM3 (ref 1.5–8.3)
NEUTROPHILS NFR BLD AUTO: 67.4 % (ref 41–71)
PLATELET # BLD AUTO: 245 10*3/MM3 (ref 150–450)
PMV BLD AUTO: 11.3 FL (ref 6–12)
POTASSIUM BLD-SCNC: 3.6 MMOL/L (ref 3.5–5.5)
POTASSIUM BLD-SCNC: 3.6 MMOL/L (ref 3.5–5.5)
PROT SERPL-MCNC: 4.8 G/DL (ref 5.7–8.2)
RBC # BLD AUTO: 3.68 10*6/MM3 (ref 3.89–5.14)
SODIUM BLD-SCNC: 138 MMOL/L (ref 132–146)
WBC NRBC COR # BLD: 7.82 10*3/MM3 (ref 3.5–10.8)

## 2018-10-16 PROCEDURE — 25010000002 PROPOFOL 10 MG/ML EMULSION: Performed by: ANESTHESIOLOGY

## 2018-10-16 PROCEDURE — G0378 HOSPITAL OBSERVATION PER HR: HCPCS

## 2018-10-16 PROCEDURE — 85025 COMPLETE CBC W/AUTO DIFF WBC: CPT | Performed by: INTERNAL MEDICINE

## 2018-10-16 PROCEDURE — 88305 TISSUE EXAM BY PATHOLOGIST: CPT | Performed by: INTERNAL MEDICINE

## 2018-10-16 PROCEDURE — 83735 ASSAY OF MAGNESIUM: CPT | Performed by: INTERNAL MEDICINE

## 2018-10-16 PROCEDURE — 80053 COMPREHEN METABOLIC PANEL: CPT | Performed by: PHYSICIAN ASSISTANT

## 2018-10-16 PROCEDURE — 0DB78ZX EXCISION OF STOMACH, PYLORUS, VIA NATURAL OR ARTIFICIAL OPENING ENDOSCOPIC, DIAGNOSTIC: ICD-10-PCS | Performed by: INTERNAL MEDICINE

## 2018-10-16 PROCEDURE — 0DB38ZX EXCISION OF LOWER ESOPHAGUS, VIA NATURAL OR ARTIFICIAL OPENING ENDOSCOPIC, DIAGNOSTIC: ICD-10-PCS | Performed by: INTERNAL MEDICINE

## 2018-10-16 PROCEDURE — 88342 IMHCHEM/IMCYTCHM 1ST ANTB: CPT | Performed by: INTERNAL MEDICINE

## 2018-10-16 PROCEDURE — 25010000002 METOCLOPRAMIDE PER 10 MG

## 2018-10-16 PROCEDURE — 93005 ELECTROCARDIOGRAM TRACING: CPT | Performed by: INTERNAL MEDICINE

## 2018-10-16 PROCEDURE — 93010 ELECTROCARDIOGRAM REPORT: CPT | Performed by: INTERNAL MEDICINE

## 2018-10-16 PROCEDURE — 99226 PR SBSQ OBSERVATION CARE/DAY 35 MINUTES: CPT | Performed by: INTERNAL MEDICINE

## 2018-10-16 PROCEDURE — 25010000003 POTASSIUM CHLORIDE 10 MEQ/100ML SOLUTION: Performed by: PHYSICIAN ASSISTANT

## 2018-10-16 PROCEDURE — 84132 ASSAY OF SERUM POTASSIUM: CPT | Performed by: INTERNAL MEDICINE

## 2018-10-16 PROCEDURE — 88312 SPECIAL STAINS GROUP 1: CPT | Performed by: INTERNAL MEDICINE

## 2018-10-16 RX ORDER — SODIUM CHLORIDE 0.9 % (FLUSH) 0.9 %
3-10 SYRINGE (ML) INJECTION AS NEEDED
Status: DISCONTINUED | OUTPATIENT
Start: 2018-10-16 | End: 2018-10-16 | Stop reason: HOSPADM

## 2018-10-16 RX ORDER — HYDROMORPHONE HYDROCHLORIDE 1 MG/ML
0.5 INJECTION, SOLUTION INTRAMUSCULAR; INTRAVENOUS; SUBCUTANEOUS
Status: DISCONTINUED | OUTPATIENT
Start: 2018-10-16 | End: 2018-10-16 | Stop reason: HOSPADM

## 2018-10-16 RX ORDER — LIDOCAINE HYDROCHLORIDE 10 MG/ML
0.5 INJECTION, SOLUTION EPIDURAL; INFILTRATION; INTRACAUDAL; PERINEURAL ONCE AS NEEDED
Status: DISCONTINUED | OUTPATIENT
Start: 2018-10-16 | End: 2018-10-16 | Stop reason: HOSPADM

## 2018-10-16 RX ORDER — LIDOCAINE HYDROCHLORIDE 10 MG/ML
INJECTION, SOLUTION INFILTRATION; PERINEURAL AS NEEDED
Status: DISCONTINUED | OUTPATIENT
Start: 2018-10-16 | End: 2018-10-16 | Stop reason: SURG

## 2018-10-16 RX ORDER — SODIUM CHLORIDE 0.9 % (FLUSH) 0.9 %
3 SYRINGE (ML) INJECTION EVERY 12 HOURS SCHEDULED
Status: DISCONTINUED | OUTPATIENT
Start: 2018-10-16 | End: 2018-10-16 | Stop reason: HOSPADM

## 2018-10-16 RX ORDER — PANTOPRAZOLE SODIUM 40 MG/10ML
40 INJECTION, POWDER, LYOPHILIZED, FOR SOLUTION INTRAVENOUS
Status: DISCONTINUED | OUTPATIENT
Start: 2018-10-16 | End: 2018-10-22

## 2018-10-16 RX ORDER — FENTANYL CITRATE 50 UG/ML
50 INJECTION, SOLUTION INTRAMUSCULAR; INTRAVENOUS
Status: DISCONTINUED | OUTPATIENT
Start: 2018-10-16 | End: 2018-10-16 | Stop reason: HOSPADM

## 2018-10-16 RX ORDER — SODIUM CHLORIDE, SODIUM LACTATE, POTASSIUM CHLORIDE, CALCIUM CHLORIDE 600; 310; 30; 20 MG/100ML; MG/100ML; MG/100ML; MG/100ML
9 INJECTION, SOLUTION INTRAVENOUS CONTINUOUS
Status: DISCONTINUED | OUTPATIENT
Start: 2018-10-16 | End: 2018-10-18

## 2018-10-16 RX ORDER — PROPOFOL 10 MG/ML
VIAL (ML) INTRAVENOUS AS NEEDED
Status: DISCONTINUED | OUTPATIENT
Start: 2018-10-16 | End: 2018-10-16 | Stop reason: SURG

## 2018-10-16 RX ADMIN — LIDOCAINE HYDROCHLORIDE 50 MG: 10 INJECTION, SOLUTION INFILTRATION; PERINEURAL at 10:30

## 2018-10-16 RX ADMIN — SODIUM CHLORIDE 1000 ML: 9 INJECTION, SOLUTION INTRAVENOUS at 17:19

## 2018-10-16 RX ADMIN — METOCLOPRAMIDE 5 MG: 5 INJECTION, SOLUTION INTRAMUSCULAR; INTRAVENOUS at 08:56

## 2018-10-16 RX ADMIN — PROPOFOL 25 MG: 10 INJECTION, EMULSION INTRAVENOUS at 10:34

## 2018-10-16 RX ADMIN — SODIUM CHLORIDE 100 ML/HR: 9 INJECTION, SOLUTION INTRAVENOUS at 15:48

## 2018-10-16 RX ADMIN — POTASSIUM CHLORIDE 10 MEQ: 10 INJECTION, SOLUTION INTRAVENOUS at 13:04

## 2018-10-16 RX ADMIN — SODIUM CHLORIDE, POTASSIUM CHLORIDE, SODIUM LACTATE AND CALCIUM CHLORIDE 9 ML/HR: 600; 310; 30; 20 INJECTION, SOLUTION INTRAVENOUS at 10:12

## 2018-10-16 RX ADMIN — SODIUM CHLORIDE 1000 ML: 9 INJECTION, SOLUTION INTRAVENOUS at 18:18

## 2018-10-16 RX ADMIN — TIZANIDINE 4 MG: 4 TABLET ORAL at 15:48

## 2018-10-16 RX ADMIN — POTASSIUM CHLORIDE 10 MEQ: 10 INJECTION, SOLUTION INTRAVENOUS at 08:57

## 2018-10-16 RX ADMIN — Medication 3 ML: at 21:39

## 2018-10-16 RX ADMIN — PANTOPRAZOLE SODIUM 40 MG: 40 TABLET, DELAYED RELEASE ORAL at 05:01

## 2018-10-16 RX ADMIN — PROPOFOL 25 MG: 10 INJECTION, EMULSION INTRAVENOUS at 10:32

## 2018-10-16 RX ADMIN — DULOXETINE HYDROCHLORIDE 60 MG: 60 CAPSULE, DELAYED RELEASE ORAL at 08:57

## 2018-10-16 RX ADMIN — POTASSIUM CHLORIDE 10 MEQ: 10 INJECTION, SOLUTION INTRAVENOUS at 06:04

## 2018-10-16 RX ADMIN — POTASSIUM CHLORIDE 10 MEQ: 10 INJECTION, SOLUTION INTRAVENOUS at 14:23

## 2018-10-16 RX ADMIN — PANTOPRAZOLE SODIUM 40 MG: 40 INJECTION, POWDER, FOR SOLUTION INTRAVENOUS at 16:48

## 2018-10-16 RX ADMIN — ATORVASTATIN CALCIUM 10 MG: 10 TABLET, FILM COATED ORAL at 08:57

## 2018-10-16 RX ADMIN — PROPOFOL 100 MG: 10 INJECTION, EMULSION INTRAVENOUS at 10:30

## 2018-10-16 RX ADMIN — SODIUM CHLORIDE 100 ML/HR: 9 INJECTION, SOLUTION INTRAVENOUS at 18:08

## 2018-10-16 NOTE — ANESTHESIA POSTPROCEDURE EVALUATION
Patient: Bryanna Schawrtz    Procedure Summary     Date:  10/16/18 Room / Location:   [facility] /  [facility]    Anesthesia Start:  1027 Anesthesia Stop:      Procedure:  ESOPHAGOGASTRODUODENOSCOPY (N/A ) Diagnosis:       Abnormal CT scan, stomach      Intractable vomiting with nausea, unspecified vomiting type      (Abnormal CT scan, stomach [R93.3])      (Intractable vomiting with nausea, unspecified vomiting type [R11.2])    Surgeon:  Brunner, Mark I, MD Provider:  Reese Veloz MD    Anesthesia Type:  general ASA Status:  2          Anesthesia Type: general  Last vitals  BP   176/95 (10/16/18 1009)   Temp   98.7 °F (37.1 °C) (10/16/18 1009)   Pulse   88 (10/16/18 1009)   Resp   16 (10/16/18 1009)     SpO2   98 % (10/16/18 1009)     Post Anesthesia Care and Evaluation    Patient location during evaluation: PACU  Patient participation: complete - patient participated  Level of consciousness: awake and alert  Pain score: 0  Pain management: adequate  Airway patency: patent  Anesthetic complications: No anesthetic complications  PONV Status: none  Cardiovascular status: hemodynamically stable and acceptable  Respiratory status: nonlabored ventilation, acceptable and nasal cannula  Hydration status: acceptable

## 2018-10-16 NOTE — ANESTHESIA PREPROCEDURE EVALUATION
Anesthesia Evaluation                  Airway   Mallampati: I  TM distance: >3 FB  Neck ROM: full  No difficulty expected  Dental      Pulmonary    Cardiovascular     ECG reviewed    (+) hypertension,       Neuro/Psych  (+) headaches, psychiatric history,     GI/Hepatic/Renal/Endo    (+)  GERD,      Musculoskeletal     Abdominal    Substance History      OB/GYN          Other                        Anesthesia Plan    ASA 2     general     intravenous induction   Anesthetic plan, all risks, benefits, and alternatives have been provided, discussed and informed consent has been obtained with: patient.

## 2018-10-17 LAB — POTASSIUM BLD-SCNC: 3.8 MMOL/L (ref 3.5–5.5)

## 2018-10-17 PROCEDURE — 84132 ASSAY OF SERUM POTASSIUM: CPT | Performed by: INTERNAL MEDICINE

## 2018-10-17 PROCEDURE — G0378 HOSPITAL OBSERVATION PER HR: HCPCS

## 2018-10-17 PROCEDURE — 97116 GAIT TRAINING THERAPY: CPT

## 2018-10-17 PROCEDURE — 97166 OT EVAL MOD COMPLEX 45 MIN: CPT

## 2018-10-17 PROCEDURE — 97110 THERAPEUTIC EXERCISES: CPT

## 2018-10-17 PROCEDURE — 99213 OFFICE O/P EST LOW 20 MIN: CPT | Performed by: PHYSICIAN ASSISTANT

## 2018-10-17 PROCEDURE — 25010000002 ONDANSETRON PER 1 MG: Performed by: PHYSICIAN ASSISTANT

## 2018-10-17 PROCEDURE — 99225 PR SBSQ OBSERVATION CARE/DAY 25 MINUTES: CPT | Performed by: INTERNAL MEDICINE

## 2018-10-17 PROCEDURE — 97162 PT EVAL MOD COMPLEX 30 MIN: CPT

## 2018-10-17 RX ORDER — HYDRALAZINE HYDROCHLORIDE 20 MG/ML
10 INJECTION INTRAMUSCULAR; INTRAVENOUS ONCE
Status: COMPLETED | OUTPATIENT
Start: 2018-10-17 | End: 2018-10-18

## 2018-10-17 RX ORDER — MAGNESIUM CARB/ALUMINUM HYDROX 105-160MG
296 TABLET,CHEWABLE ORAL ONCE
Status: COMPLETED | OUTPATIENT
Start: 2018-10-17 | End: 2018-10-17

## 2018-10-17 RX ORDER — ATENOLOL 25 MG/1
25 TABLET ORAL EVERY 12 HOURS SCHEDULED
Status: DISCONTINUED | OUTPATIENT
Start: 2018-10-17 | End: 2018-10-18

## 2018-10-17 RX ORDER — ATENOLOL 25 MG/1
25 TABLET ORAL EVERY 12 HOURS SCHEDULED
Status: DISCONTINUED | OUTPATIENT
Start: 2018-10-17 | End: 2018-10-17

## 2018-10-17 RX ORDER — BISACODYL 10 MG
10 SUPPOSITORY, RECTAL RECTAL ONCE
Status: COMPLETED | OUTPATIENT
Start: 2018-10-17 | End: 2018-10-17

## 2018-10-17 RX ADMIN — ONDANSETRON HYDROCHLORIDE 4 MG: 2 INJECTION, SOLUTION INTRAMUSCULAR; INTRAVENOUS at 10:58

## 2018-10-17 RX ADMIN — Medication 10 ML: at 21:10

## 2018-10-17 RX ADMIN — BISACODYL 10 MG: 10 SUPPOSITORY RECTAL at 22:47

## 2018-10-17 RX ADMIN — PANTOPRAZOLE SODIUM 40 MG: 40 INJECTION, POWDER, FOR SOLUTION INTRAVENOUS at 08:39

## 2018-10-17 RX ADMIN — PANTOPRAZOLE SODIUM 40 MG: 40 INJECTION, POWDER, FOR SOLUTION INTRAVENOUS at 05:21

## 2018-10-17 RX ADMIN — SODIUM CHLORIDE 125 ML/HR: 9 INJECTION, SOLUTION INTRAVENOUS at 08:16

## 2018-10-17 RX ADMIN — AMITRIPTYLINE HYDROCHLORIDE 10 MG: 10 TABLET, FILM COATED ORAL at 21:10

## 2018-10-17 RX ADMIN — PANTOPRAZOLE SODIUM 40 MG: 40 INJECTION, POWDER, FOR SOLUTION INTRAVENOUS at 18:15

## 2018-10-17 RX ADMIN — ACETAMINOPHEN 650 MG: 325 TABLET ORAL at 03:28

## 2018-10-17 RX ADMIN — Medication 3 ML: at 08:41

## 2018-10-17 RX ADMIN — DULOXETINE HYDROCHLORIDE 60 MG: 60 CAPSULE, DELAYED RELEASE ORAL at 08:40

## 2018-10-17 RX ADMIN — ATENOLOL 25 MG: 25 TABLET ORAL at 17:08

## 2018-10-17 RX ADMIN — ONDANSETRON HYDROCHLORIDE 4 MG: 2 INJECTION, SOLUTION INTRAMUSCULAR; INTRAVENOUS at 17:08

## 2018-10-17 RX ADMIN — Medication 296 ML: at 13:48

## 2018-10-17 RX ADMIN — ATORVASTATIN CALCIUM 10 MG: 10 TABLET, FILM COATED ORAL at 08:40

## 2018-10-18 ENCOUNTER — APPOINTMENT (OUTPATIENT)
Dept: MRI IMAGING | Facility: HOSPITAL | Age: 48
End: 2018-10-18

## 2018-10-18 LAB
BACTERIA UR QL AUTO: ABNORMAL /HPF
BILIRUB UR QL STRIP: NEGATIVE
CLARITY UR: CLEAR
COLOR UR: YELLOW
CREAT UR-MCNC: 48.6 MG/DL
CYTO UR: NORMAL
EOSINOPHIL SPEC QL MICRO: 0 % EOS/100 CELLS (ref 0–0)
GLUCOSE UR STRIP-MCNC: NEGATIVE MG/DL
HGB UR QL STRIP.AUTO: NEGATIVE
HYALINE CASTS UR QL AUTO: ABNORMAL /LPF
KETONES UR QL STRIP: ABNORMAL
LAB AP CASE REPORT: NORMAL
LAB AP CLINICAL INFORMATION: NORMAL
LEUKOCYTE ESTERASE UR QL STRIP.AUTO: ABNORMAL
NITRITE UR QL STRIP: NEGATIVE
PATH REPORT.ADDENDUM SPEC: NORMAL
PATH REPORT.FINAL DX SPEC: NORMAL
PATH REPORT.GROSS SPEC: NORMAL
PH UR STRIP.AUTO: 7 [PH] (ref 5–8)
PROT UR QL STRIP: ABNORMAL
PROT UR-MCNC: 28 MG/DL (ref 1–14)
RBC # UR: ABNORMAL /HPF
REF LAB TEST METHOD: ABNORMAL
SODIUM UR-SCNC: 177 MMOL/L (ref 30–90)
SP GR UR STRIP: 1.01 (ref 1–1.03)
SQUAMOUS #/AREA URNS HPF: ABNORMAL /HPF
UROBILINOGEN UR QL STRIP: ABNORMAL
WBC UR QL AUTO: ABNORMAL /HPF

## 2018-10-18 PROCEDURE — 97112 NEUROMUSCULAR REEDUCATION: CPT

## 2018-10-18 PROCEDURE — 99226 PR SBSQ OBSERVATION CARE/DAY 35 MINUTES: CPT | Performed by: INTERNAL MEDICINE

## 2018-10-18 PROCEDURE — 25010000002 HYDRALAZINE PER 20 MG: Performed by: NURSE PRACTITIONER

## 2018-10-18 PROCEDURE — 99213 OFFICE O/P EST LOW 20 MIN: CPT | Performed by: NURSE PRACTITIONER

## 2018-10-18 PROCEDURE — G0378 HOSPITAL OBSERVATION PER HR: HCPCS

## 2018-10-18 PROCEDURE — 87086 URINE CULTURE/COLONY COUNT: CPT | Performed by: INTERNAL MEDICINE

## 2018-10-18 PROCEDURE — 25010000002 CEFTRIAXONE PER 250 MG: Performed by: INTERNAL MEDICINE

## 2018-10-18 PROCEDURE — 87186 SC STD MICRODIL/AGAR DIL: CPT | Performed by: INTERNAL MEDICINE

## 2018-10-18 PROCEDURE — 87077 CULTURE AEROBIC IDENTIFY: CPT | Performed by: INTERNAL MEDICINE

## 2018-10-18 PROCEDURE — 84156 ASSAY OF PROTEIN URINE: CPT | Performed by: INTERNAL MEDICINE

## 2018-10-18 PROCEDURE — 81001 URINALYSIS AUTO W/SCOPE: CPT | Performed by: INTERNAL MEDICINE

## 2018-10-18 PROCEDURE — 72141 MRI NECK SPINE W/O DYE: CPT

## 2018-10-18 PROCEDURE — 87205 SMEAR GRAM STAIN: CPT | Performed by: INTERNAL MEDICINE

## 2018-10-18 PROCEDURE — 25010000002 PROMETHAZINE PER 50 MG: Performed by: INTERNAL MEDICINE

## 2018-10-18 PROCEDURE — 72146 MRI CHEST SPINE W/O DYE: CPT

## 2018-10-18 PROCEDURE — 84300 ASSAY OF URINE SODIUM: CPT | Performed by: INTERNAL MEDICINE

## 2018-10-18 PROCEDURE — 97110 THERAPEUTIC EXERCISES: CPT

## 2018-10-18 PROCEDURE — 99245 OFF/OP CONSLTJ NEW/EST HI 55: CPT | Performed by: PSYCHIATRY & NEUROLOGY

## 2018-10-18 PROCEDURE — 97116 GAIT TRAINING THERAPY: CPT

## 2018-10-18 PROCEDURE — 25010000002 ONDANSETRON PER 1 MG: Performed by: PHYSICIAN ASSISTANT

## 2018-10-18 PROCEDURE — 82570 ASSAY OF URINE CREATININE: CPT | Performed by: INTERNAL MEDICINE

## 2018-10-18 RX ORDER — HYDRALAZINE HYDROCHLORIDE 20 MG/ML
10 INJECTION INTRAMUSCULAR; INTRAVENOUS ONCE
Status: COMPLETED | OUTPATIENT
Start: 2018-10-18 | End: 2018-10-18

## 2018-10-18 RX ORDER — FOLIC ACID/VIT B COMPLEX AND C 0.8 MG
1 TABLET ORAL DAILY
Status: DISCONTINUED | OUTPATIENT
Start: 2018-10-19 | End: 2018-10-18

## 2018-10-18 RX ORDER — FOLIC ACID/VIT B COMPLEX AND C 0.8 MG
1 TABLET ORAL DAILY
Status: DISCONTINUED | OUTPATIENT
Start: 2018-10-19 | End: 2018-11-07 | Stop reason: HOSPADM

## 2018-10-18 RX ORDER — LISINOPRIL 10 MG/1
10 TABLET ORAL
Status: DISCONTINUED | OUTPATIENT
Start: 2018-10-18 | End: 2018-10-19

## 2018-10-18 RX ORDER — ATENOLOL 50 MG/1
50 TABLET ORAL EVERY 12 HOURS SCHEDULED
Status: DISCONTINUED | OUTPATIENT
Start: 2018-10-18 | End: 2018-11-07 | Stop reason: HOSPADM

## 2018-10-18 RX ORDER — CEFTRIAXONE SODIUM 1 G/50ML
1 INJECTION, SOLUTION INTRAVENOUS EVERY 24 HOURS
Status: COMPLETED | OUTPATIENT
Start: 2018-10-18 | End: 2018-10-22

## 2018-10-18 RX ADMIN — Medication 3 ML: at 08:30

## 2018-10-18 RX ADMIN — ATORVASTATIN CALCIUM 10 MG: 10 TABLET, FILM COATED ORAL at 08:26

## 2018-10-18 RX ADMIN — DULOXETINE HYDROCHLORIDE 60 MG: 60 CAPSULE, DELAYED RELEASE ORAL at 08:26

## 2018-10-18 RX ADMIN — ATENOLOL 25 MG: 25 TABLET ORAL at 08:26

## 2018-10-18 RX ADMIN — ONDANSETRON HYDROCHLORIDE 4 MG: 2 INJECTION, SOLUTION INTRAMUSCULAR; INTRAVENOUS at 13:27

## 2018-10-18 RX ADMIN — ATENOLOL 25 MG: 25 TABLET ORAL at 05:12

## 2018-10-18 RX ADMIN — Medication 3 ML: at 13:27

## 2018-10-18 RX ADMIN — PANTOPRAZOLE SODIUM 40 MG: 40 INJECTION, POWDER, FOR SOLUTION INTRAVENOUS at 17:18

## 2018-10-18 RX ADMIN — AMITRIPTYLINE HYDROCHLORIDE 10 MG: 10 TABLET, FILM COATED ORAL at 19:41

## 2018-10-18 RX ADMIN — Medication 3 ML: at 21:00

## 2018-10-18 RX ADMIN — CEFTRIAXONE SODIUM 1 G: 1 INJECTION, SOLUTION INTRAVENOUS at 17:18

## 2018-10-18 RX ADMIN — HYDRALAZINE HYDROCHLORIDE 10 MG: 20 INJECTION INTRAMUSCULAR; INTRAVENOUS at 00:09

## 2018-10-18 RX ADMIN — PANTOPRAZOLE SODIUM 40 MG: 40 INJECTION, POWDER, FOR SOLUTION INTRAVENOUS at 06:21

## 2018-10-18 RX ADMIN — Medication 3 ML: at 01:13

## 2018-10-18 RX ADMIN — HYDRALAZINE HYDROCHLORIDE 10 MG: 20 INJECTION INTRAMUSCULAR; INTRAVENOUS at 07:02

## 2018-10-18 RX ADMIN — LISINOPRIL 10 MG: 10 TABLET ORAL at 18:18

## 2018-10-18 RX ADMIN — ATENOLOL 50 MG: 50 TABLET ORAL at 19:41

## 2018-10-18 RX ADMIN — POLYETHYLENE GLYCOL 3350 17 G: 17 POWDER, FOR SOLUTION ORAL at 08:25

## 2018-10-18 RX ADMIN — Medication 3 ML: at 18:21

## 2018-10-18 RX ADMIN — ACETAMINOPHEN 650 MG: 325 TABLET ORAL at 10:52

## 2018-10-18 RX ADMIN — PROMETHAZINE HYDROCHLORIDE 12.5 MG: 25 INJECTION, SOLUTION INTRAMUSCULAR; INTRAVENOUS at 22:52

## 2018-10-19 ENCOUNTER — APPOINTMENT (OUTPATIENT)
Dept: NEUROLOGY | Facility: HOSPITAL | Age: 48
End: 2018-10-19
Attending: PSYCHIATRY & NEUROLOGY

## 2018-10-19 LAB
ALBUMIN SERPL-MCNC: 3.51 G/DL (ref 3.2–4.8)
ALBUMIN/GLOB SERPL: 1.7 G/DL (ref 1.5–2.5)
ALP SERPL-CCNC: 82 U/L (ref 25–100)
ALT SERPL W P-5'-P-CCNC: 27 U/L (ref 7–40)
ANION GAP SERPL CALCULATED.3IONS-SCNC: 16 MMOL/L (ref 3–11)
AST SERPL-CCNC: 25 U/L (ref 0–33)
BILIRUB SERPL-MCNC: 0.7 MG/DL (ref 0.3–1.2)
BUN BLD-MCNC: 5 MG/DL (ref 9–23)
BUN/CREAT SERPL: 8.9 (ref 7–25)
CALCIUM SPEC-SCNC: 8.9 MG/DL (ref 8.7–10.4)
CHLORIDE SERPL-SCNC: 98 MMOL/L (ref 99–109)
CO2 SERPL-SCNC: 24 MMOL/L (ref 20–31)
CREAT BLD-MCNC: 0.56 MG/DL (ref 0.6–1.3)
FOLATE SERPL-MCNC: 19.7 NG/ML (ref 3.2–20)
GFR SERPL CREATININE-BSD FRML MDRD: 116 ML/MIN/1.73
GLOBULIN UR ELPH-MCNC: 2.1 GM/DL
GLUCOSE BLD-MCNC: 92 MG/DL (ref 70–100)
POTASSIUM BLD-SCNC: 3.3 MMOL/L (ref 3.5–5.5)
POTASSIUM BLD-SCNC: 3.8 MMOL/L (ref 3.5–5.5)
PROT SERPL-MCNC: 5.6 G/DL (ref 5.7–8.2)
SODIUM BLD-SCNC: 138 MMOL/L (ref 132–146)
VIT B12 BLD-MCNC: 675 PG/ML (ref 211–911)

## 2018-10-19 PROCEDURE — 95912 NRV CNDJ TEST 11-12 STUDIES: CPT

## 2018-10-19 PROCEDURE — 80053 COMPREHEN METABOLIC PANEL: CPT | Performed by: PSYCHIATRY & NEUROLOGY

## 2018-10-19 PROCEDURE — G0378 HOSPITAL OBSERVATION PER HR: HCPCS

## 2018-10-19 PROCEDURE — 25010000002 CEFTRIAXONE PER 250 MG: Performed by: INTERNAL MEDICINE

## 2018-10-19 PROCEDURE — 84132 ASSAY OF SERUM POTASSIUM: CPT | Performed by: INTERNAL MEDICINE

## 2018-10-19 PROCEDURE — 95886 MUSC TEST DONE W/N TEST COMP: CPT

## 2018-10-19 PROCEDURE — 95911 NRV CNDJ TEST 9-10 STUDIES: CPT

## 2018-10-19 PROCEDURE — 25010000002 ONDANSETRON PER 1 MG: Performed by: PHYSICIAN ASSISTANT

## 2018-10-19 PROCEDURE — 86160 COMPLEMENT ANTIGEN: CPT | Performed by: INTERNAL MEDICINE

## 2018-10-19 PROCEDURE — 86225 DNA ANTIBODY NATIVE: CPT | Performed by: INTERNAL MEDICINE

## 2018-10-19 PROCEDURE — 25010000002 HYDRALAZINE PER 20 MG: Performed by: NURSE PRACTITIONER

## 2018-10-19 PROCEDURE — 99212 OFFICE O/P EST SF 10 MIN: CPT | Performed by: PSYCHIATRY & NEUROLOGY

## 2018-10-19 PROCEDURE — 99225 PR SBSQ OBSERVATION CARE/DAY 25 MINUTES: CPT | Performed by: INTERNAL MEDICINE

## 2018-10-19 PROCEDURE — 82607 VITAMIN B-12: CPT | Performed by: PSYCHIATRY & NEUROLOGY

## 2018-10-19 PROCEDURE — 82746 ASSAY OF FOLIC ACID SERUM: CPT | Performed by: PSYCHIATRY & NEUROLOGY

## 2018-10-19 PROCEDURE — 97530 THERAPEUTIC ACTIVITIES: CPT

## 2018-10-19 PROCEDURE — 97110 THERAPEUTIC EXERCISES: CPT

## 2018-10-19 PROCEDURE — 97116 GAIT TRAINING THERAPY: CPT

## 2018-10-19 RX ORDER — QUETIAPINE FUMARATE 25 MG/1
25 TABLET, FILM COATED ORAL NIGHTLY
Status: DISCONTINUED | OUTPATIENT
Start: 2018-10-19 | End: 2018-10-25

## 2018-10-19 RX ORDER — LISINOPRIL 10 MG/1
10 TABLET ORAL EVERY 12 HOURS SCHEDULED
Status: DISCONTINUED | OUTPATIENT
Start: 2018-10-19 | End: 2018-10-21

## 2018-10-19 RX ORDER — HYDRALAZINE HYDROCHLORIDE 20 MG/ML
10 INJECTION INTRAMUSCULAR; INTRAVENOUS ONCE
Status: COMPLETED | OUTPATIENT
Start: 2018-10-19 | End: 2018-10-19

## 2018-10-19 RX ADMIN — ATENOLOL 50 MG: 50 TABLET ORAL at 21:02

## 2018-10-19 RX ADMIN — LISINOPRIL 10 MG: 10 TABLET ORAL at 21:02

## 2018-10-19 RX ADMIN — LISINOPRIL 10 MG: 10 TABLET ORAL at 08:30

## 2018-10-19 RX ADMIN — PANTOPRAZOLE SODIUM 40 MG: 40 INJECTION, POWDER, FOR SOLUTION INTRAVENOUS at 05:35

## 2018-10-19 RX ADMIN — HYDRALAZINE HYDROCHLORIDE 10 MG: 20 INJECTION INTRAMUSCULAR; INTRAVENOUS at 00:12

## 2018-10-19 RX ADMIN — POTASSIUM CHLORIDE 40 MEQ: 1.5 POWDER, FOR SOLUTION ORAL at 17:10

## 2018-10-19 RX ADMIN — ATORVASTATIN CALCIUM 10 MG: 10 TABLET, FILM COATED ORAL at 08:30

## 2018-10-19 RX ADMIN — ONDANSETRON HYDROCHLORIDE 4 MG: 2 INJECTION, SOLUTION INTRAMUSCULAR; INTRAVENOUS at 05:35

## 2018-10-19 RX ADMIN — ACETAMINOPHEN 650 MG: 325 TABLET ORAL at 05:44

## 2018-10-19 RX ADMIN — POTASSIUM CHLORIDE 40 MEQ: 1.5 POWDER, FOR SOLUTION ORAL at 08:30

## 2018-10-19 RX ADMIN — Medication 3 ML: at 08:31

## 2018-10-19 RX ADMIN — ATENOLOL 50 MG: 50 TABLET ORAL at 08:30

## 2018-10-19 RX ADMIN — Medication 3 ML: at 21:04

## 2018-10-19 RX ADMIN — PANTOPRAZOLE SODIUM 40 MG: 40 INJECTION, POWDER, FOR SOLUTION INTRAVENOUS at 08:30

## 2018-10-19 RX ADMIN — Medication 1 TABLET: at 08:30

## 2018-10-19 RX ADMIN — ACETAMINOPHEN 650 MG: 325 TABLET ORAL at 22:00

## 2018-10-19 RX ADMIN — ACETAMINOPHEN 650 MG: 325 TABLET ORAL at 12:55

## 2018-10-19 RX ADMIN — Medication 5 MG: at 21:02

## 2018-10-19 RX ADMIN — CEFTRIAXONE SODIUM 1 G: 1 INJECTION, SOLUTION INTRAVENOUS at 17:10

## 2018-10-19 RX ADMIN — PANTOPRAZOLE SODIUM 40 MG: 40 INJECTION, POWDER, FOR SOLUTION INTRAVENOUS at 17:10

## 2018-10-19 RX ADMIN — POLYETHYLENE GLYCOL 3350 17 G: 17 POWDER, FOR SOLUTION ORAL at 08:30

## 2018-10-19 RX ADMIN — DULOXETINE HYDROCHLORIDE 60 MG: 60 CAPSULE, DELAYED RELEASE ORAL at 08:30

## 2018-10-19 RX ADMIN — QUETIAPINE FUMARATE 25 MG: 25 TABLET ORAL at 21:03

## 2018-10-20 LAB
BACTERIA SPEC AEROBE CULT: NORMAL
BACTERIA SPEC AEROBE CULT: NORMAL
C3 SERPL-MCNC: 98 MG/DL (ref 82–167)
C4 SERPL-MCNC: 25 MG/DL (ref 14–44)

## 2018-10-20 PROCEDURE — 25010000002 CEFTRIAXONE PER 250 MG: Performed by: INTERNAL MEDICINE

## 2018-10-20 PROCEDURE — G0378 HOSPITAL OBSERVATION PER HR: HCPCS

## 2018-10-20 PROCEDURE — 99226 PR SBSQ OBSERVATION CARE/DAY 35 MINUTES: CPT | Performed by: INTERNAL MEDICINE

## 2018-10-20 PROCEDURE — 97535 SELF CARE MNGMENT TRAINING: CPT

## 2018-10-20 RX ORDER — LATANOPROST 50 UG/ML
1 SOLUTION/ DROPS OPHTHALMIC NIGHTLY
Status: DISCONTINUED | OUTPATIENT
Start: 2018-10-20 | End: 2018-11-07 | Stop reason: HOSPADM

## 2018-10-20 RX ADMIN — DULOXETINE HYDROCHLORIDE 60 MG: 60 CAPSULE, DELAYED RELEASE ORAL at 08:58

## 2018-10-20 RX ADMIN — POLYETHYLENE GLYCOL 3350 17 G: 17 POWDER, FOR SOLUTION ORAL at 08:58

## 2018-10-20 RX ADMIN — Medication 3 ML: at 09:01

## 2018-10-20 RX ADMIN — LATANOPROST 1 DROP: 50 SOLUTION OPHTHALMIC at 21:50

## 2018-10-20 RX ADMIN — Medication 1 TABLET: at 09:01

## 2018-10-20 RX ADMIN — PANTOPRAZOLE SODIUM 40 MG: 40 INJECTION, POWDER, FOR SOLUTION INTRAVENOUS at 06:16

## 2018-10-20 RX ADMIN — LISINOPRIL 10 MG: 10 TABLET ORAL at 08:58

## 2018-10-20 RX ADMIN — QUETIAPINE FUMARATE 25 MG: 25 TABLET ORAL at 20:57

## 2018-10-20 RX ADMIN — ACETAMINOPHEN 650 MG: 325 TABLET ORAL at 13:00

## 2018-10-20 RX ADMIN — Medication 3 ML: at 21:50

## 2018-10-20 RX ADMIN — ATENOLOL 50 MG: 50 TABLET ORAL at 08:58

## 2018-10-20 RX ADMIN — LISINOPRIL 10 MG: 10 TABLET ORAL at 20:57

## 2018-10-20 RX ADMIN — ATORVASTATIN CALCIUM 10 MG: 10 TABLET, FILM COATED ORAL at 08:58

## 2018-10-20 RX ADMIN — ATENOLOL 50 MG: 50 TABLET ORAL at 20:57

## 2018-10-20 RX ADMIN — CEFTRIAXONE SODIUM 1 G: 1 INJECTION, SOLUTION INTRAVENOUS at 17:17

## 2018-10-20 RX ADMIN — Medication 5 MG: at 20:57

## 2018-10-20 RX ADMIN — PANTOPRAZOLE SODIUM 40 MG: 40 INJECTION, POWDER, FOR SOLUTION INTRAVENOUS at 17:17

## 2018-10-21 LAB
ANION GAP SERPL CALCULATED.3IONS-SCNC: 10 MMOL/L (ref 3–11)
BACTERIA SPEC AEROBE CULT: ABNORMAL
BACTERIA SPEC AEROBE CULT: ABNORMAL
BUN BLD-MCNC: 7 MG/DL (ref 9–23)
BUN/CREAT SERPL: 14 (ref 7–25)
CALCIUM SPEC-SCNC: 9.1 MG/DL (ref 8.7–10.4)
CHLORIDE SERPL-SCNC: 102 MMOL/L (ref 99–109)
CO2 SERPL-SCNC: 28 MMOL/L (ref 20–31)
CREAT BLD-MCNC: 0.5 MG/DL (ref 0.6–1.3)
GFR SERPL CREATININE-BSD FRML MDRD: 132 ML/MIN/1.73
GLUCOSE BLD-MCNC: 95 MG/DL (ref 70–100)
MAGNESIUM SERPL-MCNC: 1.4 MG/DL (ref 1.3–2.7)
PHOSPHATE SERPL-MCNC: 4.5 MG/DL (ref 2.4–5.1)
POTASSIUM BLD-SCNC: 3.4 MMOL/L (ref 3.5–5.5)
POTASSIUM BLD-SCNC: 4 MMOL/L (ref 3.5–5.5)
SODIUM BLD-SCNC: 140 MMOL/L (ref 132–146)

## 2018-10-21 PROCEDURE — 25010000002 CEFTRIAXONE PER 250 MG: Performed by: INTERNAL MEDICINE

## 2018-10-21 PROCEDURE — 80048 BASIC METABOLIC PNL TOTAL CA: CPT | Performed by: INTERNAL MEDICINE

## 2018-10-21 PROCEDURE — 82088 ASSAY OF ALDOSTERONE: CPT | Performed by: INTERNAL MEDICINE

## 2018-10-21 PROCEDURE — 84132 ASSAY OF SERUM POTASSIUM: CPT | Performed by: INTERNAL MEDICINE

## 2018-10-21 PROCEDURE — 25010000002 MAGNESIUM SULFATE 2 GM/50ML SOLUTION: Performed by: PHYSICIAN ASSISTANT

## 2018-10-21 PROCEDURE — G0378 HOSPITAL OBSERVATION PER HR: HCPCS

## 2018-10-21 PROCEDURE — 83735 ASSAY OF MAGNESIUM: CPT | Performed by: INTERNAL MEDICINE

## 2018-10-21 PROCEDURE — 84100 ASSAY OF PHOSPHORUS: CPT | Performed by: INTERNAL MEDICINE

## 2018-10-21 PROCEDURE — 99225 PR SBSQ OBSERVATION CARE/DAY 25 MINUTES: CPT | Performed by: INTERNAL MEDICINE

## 2018-10-21 PROCEDURE — 84244 ASSAY OF RENIN: CPT | Performed by: INTERNAL MEDICINE

## 2018-10-21 RX ORDER — AMLODIPINE BESYLATE 5 MG/1
5 TABLET ORAL
Status: DISCONTINUED | OUTPATIENT
Start: 2018-10-21 | End: 2018-10-21

## 2018-10-21 RX ORDER — LISINOPRIL 10 MG/1
10 TABLET ORAL ONCE
Status: COMPLETED | OUTPATIENT
Start: 2018-10-21 | End: 2018-10-21

## 2018-10-21 RX ORDER — LISINOPRIL 20 MG/1
20 TABLET ORAL EVERY 12 HOURS SCHEDULED
Status: DISCONTINUED | OUTPATIENT
Start: 2018-10-21 | End: 2018-10-22

## 2018-10-21 RX ADMIN — PANTOPRAZOLE SODIUM 40 MG: 40 INJECTION, POWDER, FOR SOLUTION INTRAVENOUS at 06:55

## 2018-10-21 RX ADMIN — PANTOPRAZOLE SODIUM 40 MG: 40 INJECTION, POWDER, FOR SOLUTION INTRAVENOUS at 18:00

## 2018-10-21 RX ADMIN — POTASSIUM CHLORIDE 40 MEQ: 1.5 POWDER, FOR SOLUTION ORAL at 18:19

## 2018-10-21 RX ADMIN — LATANOPROST 1 DROP: 50 SOLUTION OPHTHALMIC at 21:24

## 2018-10-21 RX ADMIN — ATORVASTATIN CALCIUM 10 MG: 10 TABLET, FILM COATED ORAL at 09:54

## 2018-10-21 RX ADMIN — Medication 1 TABLET: at 09:54

## 2018-10-21 RX ADMIN — POTASSIUM CHLORIDE 40 MEQ: 1.5 POWDER, FOR SOLUTION ORAL at 14:13

## 2018-10-21 RX ADMIN — DULOXETINE HYDROCHLORIDE 60 MG: 60 CAPSULE, DELAYED RELEASE ORAL at 09:54

## 2018-10-21 RX ADMIN — ATENOLOL 50 MG: 50 TABLET ORAL at 21:24

## 2018-10-21 RX ADMIN — QUETIAPINE FUMARATE 25 MG: 25 TABLET ORAL at 21:24

## 2018-10-21 RX ADMIN — ATENOLOL 50 MG: 50 TABLET ORAL at 05:37

## 2018-10-21 RX ADMIN — CEFTRIAXONE SODIUM 1 G: 1 INJECTION, SOLUTION INTRAVENOUS at 21:24

## 2018-10-21 RX ADMIN — LISINOPRIL 10 MG: 10 TABLET ORAL at 05:37

## 2018-10-21 RX ADMIN — LISINOPRIL 20 MG: 20 TABLET ORAL at 21:24

## 2018-10-21 RX ADMIN — MAGNESIUM SULFATE HEPTAHYDRATE 2 G: 40 INJECTION, SOLUTION INTRAVENOUS at 17:59

## 2018-10-21 RX ADMIN — POLYETHYLENE GLYCOL 3350 17 G: 17 POWDER, FOR SOLUTION ORAL at 09:54

## 2018-10-21 RX ADMIN — LISINOPRIL 10 MG: 10 TABLET ORAL at 09:54

## 2018-10-21 RX ADMIN — Medication 5 MG: at 21:24

## 2018-10-21 RX ADMIN — Medication 3 ML: at 21:50

## 2018-10-21 RX ADMIN — MAGNESIUM SULFATE HEPTAHYDRATE 2 G: 40 INJECTION, SOLUTION INTRAVENOUS at 16:03

## 2018-10-21 RX ADMIN — ACETAMINOPHEN 650 MG: 325 TABLET ORAL at 09:54

## 2018-10-21 RX ADMIN — MAGNESIUM SULFATE HEPTAHYDRATE 2 G: 40 INJECTION, SOLUTION INTRAVENOUS at 14:12

## 2018-10-22 LAB
DSDNA AB SER-ACNC: 3 IU/ML (ref 0–9)
MAGNESIUM SERPL-MCNC: 2 MG/DL (ref 1.3–2.7)

## 2018-10-22 PROCEDURE — 82384 ASSAY THREE CATECHOLAMINES: CPT | Performed by: INTERNAL MEDICINE

## 2018-10-22 PROCEDURE — 97530 THERAPEUTIC ACTIVITIES: CPT

## 2018-10-22 PROCEDURE — 99225 PR SBSQ OBSERVATION CARE/DAY 25 MINUTES: CPT | Performed by: INTERNAL MEDICINE

## 2018-10-22 PROCEDURE — 97116 GAIT TRAINING THERAPY: CPT

## 2018-10-22 PROCEDURE — 97110 THERAPEUTIC EXERCISES: CPT

## 2018-10-22 PROCEDURE — 99225 PR SBSQ OBSERVATION CARE/DAY 25 MINUTES: CPT | Performed by: PHYSICIAN ASSISTANT

## 2018-10-22 PROCEDURE — G0378 HOSPITAL OBSERVATION PER HR: HCPCS

## 2018-10-22 PROCEDURE — 99212 OFFICE O/P EST SF 10 MIN: CPT | Performed by: PSYCHIATRY & NEUROLOGY

## 2018-10-22 PROCEDURE — 83735 ASSAY OF MAGNESIUM: CPT | Performed by: INTERNAL MEDICINE

## 2018-10-22 PROCEDURE — 25010000002 CEFTRIAXONE PER 250 MG: Performed by: INTERNAL MEDICINE

## 2018-10-22 RX ORDER — PANTOPRAZOLE SODIUM 40 MG/1
40 TABLET, DELAYED RELEASE ORAL
Status: DISCONTINUED | OUTPATIENT
Start: 2018-10-22 | End: 2018-11-07 | Stop reason: HOSPADM

## 2018-10-22 RX ADMIN — ATORVASTATIN CALCIUM 10 MG: 10 TABLET, FILM COATED ORAL at 08:37

## 2018-10-22 RX ADMIN — ACETAMINOPHEN 650 MG: 325 TABLET ORAL at 16:06

## 2018-10-22 RX ADMIN — ACETAMINOPHEN 650 MG: 325 TABLET ORAL at 20:24

## 2018-10-22 RX ADMIN — Medication 3 ML: at 08:38

## 2018-10-22 RX ADMIN — Medication 1 TABLET: at 08:37

## 2018-10-22 RX ADMIN — QUETIAPINE FUMARATE 25 MG: 25 TABLET ORAL at 20:24

## 2018-10-22 RX ADMIN — PANTOPRAZOLE SODIUM 40 MG: 40 TABLET, DELAYED RELEASE ORAL at 18:18

## 2018-10-22 RX ADMIN — POLYETHYLENE GLYCOL 3350 17 G: 17 POWDER, FOR SOLUTION ORAL at 08:38

## 2018-10-22 RX ADMIN — LISINOPRIL 30 MG: 20 TABLET ORAL at 20:23

## 2018-10-22 RX ADMIN — ATENOLOL 50 MG: 50 TABLET ORAL at 05:48

## 2018-10-22 RX ADMIN — CEFTRIAXONE SODIUM 1 G: 1 INJECTION, SOLUTION INTRAVENOUS at 16:06

## 2018-10-22 RX ADMIN — PANTOPRAZOLE SODIUM 40 MG: 40 INJECTION, POWDER, FOR SOLUTION INTRAVENOUS at 06:42

## 2018-10-22 RX ADMIN — Medication 5 MG: at 20:23

## 2018-10-22 RX ADMIN — Medication 3 ML: at 21:45

## 2018-10-22 RX ADMIN — LATANOPROST 1 DROP: 50 SOLUTION OPHTHALMIC at 21:45

## 2018-10-22 RX ADMIN — LISINOPRIL 20 MG: 20 TABLET ORAL at 05:49

## 2018-10-22 RX ADMIN — ATENOLOL 50 MG: 50 TABLET ORAL at 20:24

## 2018-10-22 RX ADMIN — DULOXETINE HYDROCHLORIDE 60 MG: 60 CAPSULE, DELAYED RELEASE ORAL at 08:37

## 2018-10-23 PROCEDURE — 25010000002 ONDANSETRON PER 1 MG: Performed by: PHYSICIAN ASSISTANT

## 2018-10-23 PROCEDURE — G0378 HOSPITAL OBSERVATION PER HR: HCPCS

## 2018-10-23 PROCEDURE — 99225 PR SBSQ OBSERVATION CARE/DAY 25 MINUTES: CPT | Performed by: INTERNAL MEDICINE

## 2018-10-23 RX ORDER — LABETALOL HYDROCHLORIDE 5 MG/ML
10 INJECTION, SOLUTION INTRAVENOUS ONCE
Status: COMPLETED | OUTPATIENT
Start: 2018-10-23 | End: 2018-10-23

## 2018-10-23 RX ADMIN — POLYETHYLENE GLYCOL 3350 17 G: 17 POWDER, FOR SOLUTION ORAL at 08:55

## 2018-10-23 RX ADMIN — Medication 1 TABLET: at 08:54

## 2018-10-23 RX ADMIN — Medication 3 ML: at 08:54

## 2018-10-23 RX ADMIN — Medication 3 ML: at 20:39

## 2018-10-23 RX ADMIN — ATENOLOL 50 MG: 50 TABLET ORAL at 20:38

## 2018-10-23 RX ADMIN — PANTOPRAZOLE SODIUM 40 MG: 40 TABLET, DELAYED RELEASE ORAL at 06:32

## 2018-10-23 RX ADMIN — QUETIAPINE FUMARATE 25 MG: 25 TABLET ORAL at 20:37

## 2018-10-23 RX ADMIN — DULOXETINE HYDROCHLORIDE 60 MG: 60 CAPSULE, DELAYED RELEASE ORAL at 08:55

## 2018-10-23 RX ADMIN — Medication 5 MG: at 20:38

## 2018-10-23 RX ADMIN — LATANOPROST 1 DROP: 50 SOLUTION OPHTHALMIC at 20:38

## 2018-10-23 RX ADMIN — LISINOPRIL 30 MG: 20 TABLET ORAL at 20:37

## 2018-10-23 RX ADMIN — LABETALOL HYDROCHLORIDE 10 MG: 5 INJECTION, SOLUTION INTRAVENOUS at 05:08

## 2018-10-23 RX ADMIN — LISINOPRIL 30 MG: 20 TABLET ORAL at 08:55

## 2018-10-23 RX ADMIN — ATENOLOL 50 MG: 50 TABLET ORAL at 08:55

## 2018-10-23 RX ADMIN — PANTOPRAZOLE SODIUM 40 MG: 40 TABLET, DELAYED RELEASE ORAL at 17:23

## 2018-10-23 RX ADMIN — ONDANSETRON HYDROCHLORIDE 4 MG: 2 INJECTION, SOLUTION INTRAMUSCULAR; INTRAVENOUS at 21:58

## 2018-10-23 RX ADMIN — ATORVASTATIN CALCIUM 10 MG: 10 TABLET, FILM COATED ORAL at 08:54

## 2018-10-24 ENCOUNTER — APPOINTMENT (OUTPATIENT)
Dept: GENERAL RADIOLOGY | Facility: HOSPITAL | Age: 48
End: 2018-10-24

## 2018-10-24 PROBLEM — R29.6 FALLS: Status: ACTIVE | Noted: 2018-10-24
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
## 2018-10-24 PROCEDURE — 97530 THERAPEUTIC ACTIVITIES: CPT

## 2018-10-24 PROCEDURE — 25010000002 LORAZEPAM PER 2 MG: Performed by: NURSE PRACTITIONER

## 2018-10-24 PROCEDURE — G0378 HOSPITAL OBSERVATION PER HR: HCPCS

## 2018-10-24 PROCEDURE — 73630 X-RAY EXAM OF FOOT: CPT

## 2018-10-24 PROCEDURE — 97110 THERAPEUTIC EXERCISES: CPT

## 2018-10-24 PROCEDURE — 99226 PR SBSQ OBSERVATION CARE/DAY 35 MINUTES: CPT | Performed by: INTERNAL MEDICINE

## 2018-10-24 PROCEDURE — 71045 X-RAY EXAM CHEST 1 VIEW: CPT

## 2018-10-24 PROCEDURE — 97535 SELF CARE MNGMENT TRAINING: CPT

## 2018-10-24 RX ORDER — LORAZEPAM 2 MG/ML
0.5 INJECTION INTRAMUSCULAR ONCE
Status: COMPLETED | OUTPATIENT
Start: 2018-10-24 | End: 2018-10-24

## 2018-10-24 RX ADMIN — Medication 3 ML: at 20:01

## 2018-10-24 RX ADMIN — ACETAMINOPHEN 650 MG: 325 TABLET ORAL at 12:08

## 2018-10-24 RX ADMIN — LISINOPRIL 30 MG: 20 TABLET ORAL at 08:13

## 2018-10-24 RX ADMIN — Medication 3 ML: at 08:20

## 2018-10-24 RX ADMIN — PANTOPRAZOLE SODIUM 40 MG: 40 TABLET, DELAYED RELEASE ORAL at 17:55

## 2018-10-24 RX ADMIN — POLYETHYLENE GLYCOL 3350 17 G: 17 POWDER, FOR SOLUTION ORAL at 08:17

## 2018-10-24 RX ADMIN — LATANOPROST 1 DROP: 50 SOLUTION OPHTHALMIC at 20:01

## 2018-10-24 RX ADMIN — PANTOPRAZOLE SODIUM 40 MG: 40 TABLET, DELAYED RELEASE ORAL at 08:30

## 2018-10-24 RX ADMIN — DULOXETINE HYDROCHLORIDE 60 MG: 60 CAPSULE, DELAYED RELEASE ORAL at 08:13

## 2018-10-24 RX ADMIN — LORAZEPAM 0.5 MG: 2 INJECTION, SOLUTION INTRAMUSCULAR; INTRAVENOUS at 19:58

## 2018-10-24 RX ADMIN — ATENOLOL 50 MG: 50 TABLET ORAL at 08:17

## 2018-10-24 RX ADMIN — ATORVASTATIN CALCIUM 10 MG: 10 TABLET, FILM COATED ORAL at 08:13

## 2018-10-24 RX ADMIN — LISINOPRIL 30 MG: 20 TABLET ORAL at 20:00

## 2018-10-24 RX ADMIN — QUETIAPINE FUMARATE 25 MG: 25 TABLET ORAL at 17:54

## 2018-10-24 RX ADMIN — LORAZEPAM 0.5 MG: 2 INJECTION, SOLUTION INTRAMUSCULAR; INTRAVENOUS at 19:08

## 2018-10-24 RX ADMIN — Medication 1 TABLET: at 08:30

## 2018-10-24 RX ADMIN — ATENOLOL 50 MG: 50 TABLET ORAL at 20:01

## 2018-10-24 RX ADMIN — ACETAMINOPHEN 650 MG: 325 TABLET ORAL at 06:18

## 2018-10-24 RX ADMIN — Medication 5 MG: at 20:01

## 2018-10-25 LAB
ANION GAP SERPL CALCULATED.3IONS-SCNC: 9 MMOL/L (ref 3–11)
BILIRUB UR QL STRIP: NEGATIVE
BUN BLD-MCNC: 10 MG/DL (ref 9–23)
BUN/CREAT SERPL: 17.5 (ref 7–25)
CALCIUM SPEC-SCNC: 9.1 MG/DL (ref 8.7–10.4)
CHLORIDE SERPL-SCNC: 106 MMOL/L (ref 99–109)
CLARITY UR: CLEAR
CO2 SERPL-SCNC: 27 MMOL/L (ref 20–31)
COLOR UR: YELLOW
CREAT BLD-MCNC: 0.57 MG/DL (ref 0.6–1.3)
GFR SERPL CREATININE-BSD FRML MDRD: 113 ML/MIN/1.73
GLUCOSE BLD-MCNC: 128 MG/DL (ref 70–100)
GLUCOSE UR STRIP-MCNC: NEGATIVE MG/DL
HGB UR QL STRIP.AUTO: NEGATIVE
KETONES UR QL STRIP: NEGATIVE
LEUKOCYTE ESTERASE UR QL STRIP.AUTO: NEGATIVE
MAGNESIUM SERPL-MCNC: 1.5 MG/DL (ref 1.3–2.7)
NITRITE UR QL STRIP: NEGATIVE
PH UR STRIP.AUTO: 5.5 [PH] (ref 5–8)
POTASSIUM BLD-SCNC: 3.7 MMOL/L (ref 3.5–5.5)
PROT UR QL STRIP: NEGATIVE
RENIN PLAS-CCNC: 1.53 NG/ML/HR (ref 0.17–5.38)
SODIUM BLD-SCNC: 142 MMOL/L (ref 132–146)
SP GR UR STRIP: 1.02 (ref 1–1.03)
UROBILINOGEN UR QL STRIP: NORMAL

## 2018-10-25 PROCEDURE — G0378 HOSPITAL OBSERVATION PER HR: HCPCS

## 2018-10-25 PROCEDURE — 81003 URINALYSIS AUTO W/O SCOPE: CPT | Performed by: NURSE PRACTITIONER

## 2018-10-25 PROCEDURE — 97110 THERAPEUTIC EXERCISES: CPT

## 2018-10-25 PROCEDURE — 25010000002 MAGNESIUM SULFATE 2 GM/50ML SOLUTION: Performed by: PHYSICIAN ASSISTANT

## 2018-10-25 PROCEDURE — 83735 ASSAY OF MAGNESIUM: CPT | Performed by: NURSE PRACTITIONER

## 2018-10-25 PROCEDURE — 80048 BASIC METABOLIC PNL TOTAL CA: CPT | Performed by: NURSE PRACTITIONER

## 2018-10-25 PROCEDURE — 99225 PR SBSQ OBSERVATION CARE/DAY 25 MINUTES: CPT | Performed by: NURSE PRACTITIONER

## 2018-10-25 RX ORDER — QUETIAPINE FUMARATE 25 MG/1
50 TABLET, FILM COATED ORAL EVERY 12 HOURS SCHEDULED
Status: DISCONTINUED | OUTPATIENT
Start: 2018-10-25 | End: 2018-10-31

## 2018-10-25 RX ORDER — SODIUM CHLORIDE 9 MG/ML
75 INJECTION, SOLUTION INTRAVENOUS ONCE
Status: COMPLETED | OUTPATIENT
Start: 2018-10-25 | End: 2018-10-26

## 2018-10-25 RX ORDER — AMLODIPINE BESYLATE 5 MG/1
5 TABLET ORAL
Status: DISCONTINUED | OUTPATIENT
Start: 2018-10-25 | End: 2018-10-30

## 2018-10-25 RX ADMIN — SODIUM CHLORIDE 75 ML/HR: 9 INJECTION, SOLUTION INTRAVENOUS at 16:11

## 2018-10-25 RX ADMIN — ATENOLOL 50 MG: 50 TABLET ORAL at 20:43

## 2018-10-25 RX ADMIN — PANTOPRAZOLE SODIUM 40 MG: 40 TABLET, DELAYED RELEASE ORAL at 17:21

## 2018-10-25 RX ADMIN — LATANOPROST 1 DROP: 50 SOLUTION OPHTHALMIC at 20:42

## 2018-10-25 RX ADMIN — PANTOPRAZOLE SODIUM 40 MG: 40 TABLET, DELAYED RELEASE ORAL at 09:30

## 2018-10-25 RX ADMIN — ATENOLOL 50 MG: 50 TABLET ORAL at 09:31

## 2018-10-25 RX ADMIN — MAGNESIUM SULFATE HEPTAHYDRATE 2 G: 40 INJECTION, SOLUTION INTRAVENOUS at 12:05

## 2018-10-25 RX ADMIN — MAGNESIUM SULFATE HEPTAHYDRATE 2 G: 40 INJECTION, SOLUTION INTRAVENOUS at 16:11

## 2018-10-25 RX ADMIN — LISINOPRIL 30 MG: 20 TABLET ORAL at 20:43

## 2018-10-25 RX ADMIN — DULOXETINE HYDROCHLORIDE 60 MG: 60 CAPSULE, DELAYED RELEASE ORAL at 09:31

## 2018-10-25 RX ADMIN — MAGNESIUM SULFATE HEPTAHYDRATE 2 G: 40 INJECTION, SOLUTION INTRAVENOUS at 14:00

## 2018-10-25 RX ADMIN — POLYETHYLENE GLYCOL 3350 17 G: 17 POWDER, FOR SOLUTION ORAL at 09:30

## 2018-10-25 RX ADMIN — Medication 1 TABLET: at 09:31

## 2018-10-25 RX ADMIN — LISINOPRIL 30 MG: 20 TABLET ORAL at 09:30

## 2018-10-25 RX ADMIN — Medication 5 MG: at 20:43

## 2018-10-25 RX ADMIN — AMLODIPINE BESYLATE 5 MG: 5 TABLET ORAL at 11:59

## 2018-10-25 RX ADMIN — ATORVASTATIN CALCIUM 10 MG: 10 TABLET, FILM COATED ORAL at 09:30

## 2018-10-25 RX ADMIN — QUETIAPINE FUMARATE 50 MG: 25 TABLET ORAL at 20:43

## 2018-10-26 LAB
DOPAMINE SERPL-MCNC: 325 PG/ML (ref 0–48)
EPINEPH PLAS-MCNC: 77 PG/ML (ref 0–62)
MAGNESIUM SERPL-MCNC: 2.2 MG/DL (ref 1.3–2.7)
NOREPINEPH PLAS-MCNC: 2336 PG/ML (ref 0–874)

## 2018-10-26 PROCEDURE — 99225 PR SBSQ OBSERVATION CARE/DAY 25 MINUTES: CPT | Performed by: INTERNAL MEDICINE

## 2018-10-26 PROCEDURE — G0378 HOSPITAL OBSERVATION PER HR: HCPCS

## 2018-10-26 PROCEDURE — 83735 ASSAY OF MAGNESIUM: CPT | Performed by: INTERNAL MEDICINE

## 2018-10-26 RX ORDER — CETIRIZINE HYDROCHLORIDE 10 MG/1
10 TABLET ORAL DAILY
Status: DISCONTINUED | OUTPATIENT
Start: 2018-10-26 | End: 2018-11-07 | Stop reason: HOSPADM

## 2018-10-26 RX ORDER — MONTELUKAST SODIUM 5 MG/1
5 TABLET, CHEWABLE ORAL NIGHTLY
Status: DISCONTINUED | OUTPATIENT
Start: 2018-10-26 | End: 2018-11-07 | Stop reason: HOSPADM

## 2018-10-26 RX ADMIN — DULOXETINE HYDROCHLORIDE 60 MG: 60 CAPSULE, DELAYED RELEASE ORAL at 09:17

## 2018-10-26 RX ADMIN — ATENOLOL 50 MG: 50 TABLET ORAL at 20:52

## 2018-10-26 RX ADMIN — Medication 5 MG: at 20:52

## 2018-10-26 RX ADMIN — PANTOPRAZOLE SODIUM 40 MG: 40 TABLET, DELAYED RELEASE ORAL at 18:14

## 2018-10-26 RX ADMIN — ATENOLOL 50 MG: 50 TABLET ORAL at 09:18

## 2018-10-26 RX ADMIN — MONTELUKAST SODIUM 5 MG: 5 TABLET, CHEWABLE ORAL at 20:53

## 2018-10-26 RX ADMIN — AMLODIPINE BESYLATE 5 MG: 5 TABLET ORAL at 09:18

## 2018-10-26 RX ADMIN — CETIRIZINE HYDROCHLORIDE 10 MG: 10 TABLET, FILM COATED ORAL at 11:00

## 2018-10-26 RX ADMIN — LISINOPRIL 30 MG: 20 TABLET ORAL at 09:08

## 2018-10-26 RX ADMIN — Medication 3 ML: at 09:23

## 2018-10-26 RX ADMIN — LISINOPRIL 30 MG: 20 TABLET ORAL at 20:52

## 2018-10-26 RX ADMIN — ATORVASTATIN CALCIUM 10 MG: 10 TABLET, FILM COATED ORAL at 09:17

## 2018-10-26 RX ADMIN — PANTOPRAZOLE SODIUM 40 MG: 40 TABLET, DELAYED RELEASE ORAL at 09:17

## 2018-10-26 RX ADMIN — Medication 3 ML: at 20:55

## 2018-10-26 RX ADMIN — QUETIAPINE FUMARATE 50 MG: 25 TABLET ORAL at 09:18

## 2018-10-26 RX ADMIN — Medication 1 TABLET: at 09:18

## 2018-10-26 RX ADMIN — LATANOPROST 1 DROP: 50 SOLUTION OPHTHALMIC at 20:52

## 2018-10-26 RX ADMIN — QUETIAPINE FUMARATE 50 MG: 25 TABLET ORAL at 20:52

## 2018-10-27 PROBLEM — N39.0 ACUTE UTI (URINARY TRACT INFECTION): Status: ACTIVE | Noted: 2018-10-27

## 2018-10-27 PROCEDURE — 97116 GAIT TRAINING THERAPY: CPT

## 2018-10-27 PROCEDURE — G0378 HOSPITAL OBSERVATION PER HR: HCPCS

## 2018-10-27 PROCEDURE — 99225 PR SBSQ OBSERVATION CARE/DAY 25 MINUTES: CPT | Performed by: INTERNAL MEDICINE

## 2018-10-27 PROCEDURE — 97530 THERAPEUTIC ACTIVITIES: CPT

## 2018-10-27 RX ADMIN — QUETIAPINE FUMARATE 50 MG: 25 TABLET ORAL at 21:30

## 2018-10-27 RX ADMIN — MONTELUKAST SODIUM 5 MG: 5 TABLET, CHEWABLE ORAL at 21:31

## 2018-10-27 RX ADMIN — CETIRIZINE HYDROCHLORIDE 10 MG: 10 TABLET, FILM COATED ORAL at 08:02

## 2018-10-27 RX ADMIN — POLYETHYLENE GLYCOL 3350 17 G: 17 POWDER, FOR SOLUTION ORAL at 08:03

## 2018-10-27 RX ADMIN — Medication 3 ML: at 21:00

## 2018-10-27 RX ADMIN — PANTOPRAZOLE SODIUM 40 MG: 40 TABLET, DELAYED RELEASE ORAL at 17:10

## 2018-10-27 RX ADMIN — Medication 5 MG: at 21:30

## 2018-10-27 RX ADMIN — AMLODIPINE BESYLATE 5 MG: 5 TABLET ORAL at 08:01

## 2018-10-27 RX ADMIN — LISINOPRIL 30 MG: 20 TABLET ORAL at 21:30

## 2018-10-27 RX ADMIN — QUETIAPINE FUMARATE 50 MG: 25 TABLET ORAL at 08:02

## 2018-10-27 RX ADMIN — LATANOPROST 1 DROP: 50 SOLUTION OPHTHALMIC at 21:31

## 2018-10-27 RX ADMIN — LISINOPRIL 30 MG: 20 TABLET ORAL at 08:02

## 2018-10-27 RX ADMIN — Medication 3 ML: at 08:03

## 2018-10-27 RX ADMIN — Medication 1 TABLET: at 08:02

## 2018-10-27 RX ADMIN — ATENOLOL 50 MG: 50 TABLET ORAL at 21:30

## 2018-10-27 RX ADMIN — ATENOLOL 50 MG: 50 TABLET ORAL at 08:01

## 2018-10-27 RX ADMIN — PANTOPRAZOLE SODIUM 40 MG: 40 TABLET, DELAYED RELEASE ORAL at 08:02

## 2018-10-27 RX ADMIN — DULOXETINE HYDROCHLORIDE 60 MG: 60 CAPSULE, DELAYED RELEASE ORAL at 08:02

## 2018-10-27 RX ADMIN — ATORVASTATIN CALCIUM 10 MG: 10 TABLET, FILM COATED ORAL at 08:02

## 2018-10-28 LAB
ALDOST SERPL-MCNC: <1 NG/DL (ref 0–30)
ANION GAP SERPL CALCULATED.3IONS-SCNC: 8 MMOL/L (ref 3–11)
BUN BLD-MCNC: 6 MG/DL (ref 9–23)
BUN/CREAT SERPL: 10.3 (ref 7–25)
CALCIUM SPEC-SCNC: 9.3 MG/DL (ref 8.7–10.4)
CHLORIDE SERPL-SCNC: 103 MMOL/L (ref 99–109)
CO2 SERPL-SCNC: 30 MMOL/L (ref 20–31)
CREAT BLD-MCNC: 0.58 MG/DL (ref 0.6–1.3)
DEPRECATED RDW RBC AUTO: 49 FL (ref 37–54)
ERYTHROCYTE [DISTWIDTH] IN BLOOD BY AUTOMATED COUNT: 14.7 % (ref 11.3–14.5)
GFR SERPL CREATININE-BSD FRML MDRD: 111 ML/MIN/1.73
GLUCOSE BLD-MCNC: 107 MG/DL (ref 70–100)
HCT VFR BLD AUTO: 35.6 % (ref 34.5–44)
HGB BLD-MCNC: 11.8 G/DL (ref 11.5–15.5)
MCH RBC QN AUTO: 30.9 PG (ref 27–31)
MCHC RBC AUTO-ENTMCNC: 33.1 G/DL (ref 32–36)
MCV RBC AUTO: 93.2 FL (ref 80–99)
PLATELET # BLD AUTO: 280 10*3/MM3 (ref 150–450)
PMV BLD AUTO: 11 FL (ref 6–12)
POTASSIUM BLD-SCNC: 3 MMOL/L (ref 3.5–5.5)
RBC # BLD AUTO: 3.82 10*6/MM3 (ref 3.89–5.14)
SODIUM BLD-SCNC: 141 MMOL/L (ref 132–146)
WBC NRBC COR # BLD: 6.61 10*3/MM3 (ref 3.5–10.8)

## 2018-10-28 PROCEDURE — 97535 SELF CARE MNGMENT TRAINING: CPT

## 2018-10-28 PROCEDURE — 85027 COMPLETE CBC AUTOMATED: CPT | Performed by: INTERNAL MEDICINE

## 2018-10-28 PROCEDURE — 80048 BASIC METABOLIC PNL TOTAL CA: CPT | Performed by: INTERNAL MEDICINE

## 2018-10-28 PROCEDURE — 99225 PR SBSQ OBSERVATION CARE/DAY 25 MINUTES: CPT | Performed by: INTERNAL MEDICINE

## 2018-10-28 PROCEDURE — G0378 HOSPITAL OBSERVATION PER HR: HCPCS

## 2018-10-28 PROCEDURE — 97530 THERAPEUTIC ACTIVITIES: CPT

## 2018-10-28 PROCEDURE — 97164 PT RE-EVAL EST PLAN CARE: CPT

## 2018-10-28 RX ADMIN — Medication 3 ML: at 08:05

## 2018-10-28 RX ADMIN — CETIRIZINE HYDROCHLORIDE 10 MG: 10 TABLET, FILM COATED ORAL at 08:05

## 2018-10-28 RX ADMIN — PANTOPRAZOLE SODIUM 40 MG: 40 TABLET, DELAYED RELEASE ORAL at 17:43

## 2018-10-28 RX ADMIN — LISINOPRIL 30 MG: 20 TABLET ORAL at 08:05

## 2018-10-28 RX ADMIN — QUETIAPINE FUMARATE 50 MG: 25 TABLET ORAL at 20:00

## 2018-10-28 RX ADMIN — ATENOLOL 50 MG: 50 TABLET ORAL at 20:00

## 2018-10-28 RX ADMIN — ATORVASTATIN CALCIUM 10 MG: 10 TABLET, FILM COATED ORAL at 08:05

## 2018-10-28 RX ADMIN — LATANOPROST 1 DROP: 50 SOLUTION OPHTHALMIC at 23:14

## 2018-10-28 RX ADMIN — DULOXETINE HYDROCHLORIDE 60 MG: 60 CAPSULE, DELAYED RELEASE ORAL at 08:05

## 2018-10-28 RX ADMIN — POLYETHYLENE GLYCOL 3350 17 G: 17 POWDER, FOR SOLUTION ORAL at 08:04

## 2018-10-28 RX ADMIN — Medication 5 MG: at 19:59

## 2018-10-28 RX ADMIN — MONTELUKAST SODIUM 5 MG: 5 TABLET, CHEWABLE ORAL at 19:59

## 2018-10-28 RX ADMIN — QUETIAPINE FUMARATE 50 MG: 25 TABLET ORAL at 08:04

## 2018-10-28 RX ADMIN — AMLODIPINE BESYLATE 5 MG: 5 TABLET ORAL at 08:04

## 2018-10-28 RX ADMIN — Medication 1 TABLET: at 08:05

## 2018-10-28 RX ADMIN — Medication 3 ML: at 20:01

## 2018-10-28 RX ADMIN — PANTOPRAZOLE SODIUM 40 MG: 40 TABLET, DELAYED RELEASE ORAL at 08:05

## 2018-10-28 RX ADMIN — LISINOPRIL 30 MG: 20 TABLET ORAL at 20:03

## 2018-10-28 RX ADMIN — ATENOLOL 50 MG: 50 TABLET ORAL at 08:05

## 2018-10-29 ENCOUNTER — APPOINTMENT (OUTPATIENT)
Dept: MRI IMAGING | Facility: HOSPITAL | Age: 48
End: 2018-10-29

## 2018-10-29 LAB — POTASSIUM BLD-SCNC: 3.6 MMOL/L (ref 3.5–5.5)

## 2018-10-29 PROCEDURE — 0 GADOBENATE DIMEGLUMINE 529 MG/ML SOLUTION: Performed by: INTERNAL MEDICINE

## 2018-10-29 PROCEDURE — 74183 MRI ABD W/O CNTR FLWD CNTR: CPT

## 2018-10-29 PROCEDURE — G0378 HOSPITAL OBSERVATION PER HR: HCPCS

## 2018-10-29 PROCEDURE — 93005 ELECTROCARDIOGRAM TRACING: CPT | Performed by: INTERNAL MEDICINE

## 2018-10-29 PROCEDURE — 93010 ELECTROCARDIOGRAM REPORT: CPT | Performed by: INTERNAL MEDICINE

## 2018-10-29 PROCEDURE — 99225 PR SBSQ OBSERVATION CARE/DAY 25 MINUTES: CPT | Performed by: INTERNAL MEDICINE

## 2018-10-29 PROCEDURE — A9577 INJ MULTIHANCE: HCPCS | Performed by: INTERNAL MEDICINE

## 2018-10-29 PROCEDURE — 84132 ASSAY OF SERUM POTASSIUM: CPT | Performed by: INTERNAL MEDICINE

## 2018-10-29 RX ORDER — PHENOXYBENZAMINE HYDROCHLORIDE 10 MG/1
10 CAPSULE ORAL NIGHTLY
Status: DISCONTINUED | OUTPATIENT
Start: 2018-10-29 | End: 2018-10-31

## 2018-10-29 RX ADMIN — LISINOPRIL 30 MG: 20 TABLET ORAL at 09:03

## 2018-10-29 RX ADMIN — POTASSIUM CHLORIDE 40 MEQ: 1.5 POWDER, FOR SOLUTION ORAL at 10:46

## 2018-10-29 RX ADMIN — AMLODIPINE BESYLATE 5 MG: 5 TABLET ORAL at 09:04

## 2018-10-29 RX ADMIN — CETIRIZINE HYDROCHLORIDE 10 MG: 10 TABLET, FILM COATED ORAL at 09:03

## 2018-10-29 RX ADMIN — ATORVASTATIN CALCIUM 10 MG: 10 TABLET, FILM COATED ORAL at 09:01

## 2018-10-29 RX ADMIN — POLYETHYLENE GLYCOL 3350 17 G: 17 POWDER, FOR SOLUTION ORAL at 09:00

## 2018-10-29 RX ADMIN — GADOBENATE DIMEGLUMINE 15 ML: 529 INJECTION, SOLUTION INTRAVENOUS at 19:00

## 2018-10-29 RX ADMIN — ACETAMINOPHEN 650 MG: 325 TABLET ORAL at 09:51

## 2018-10-29 RX ADMIN — POTASSIUM CHLORIDE 40 MEQ: 1.5 POWDER, FOR SOLUTION ORAL at 00:02

## 2018-10-29 RX ADMIN — LATANOPROST 1 DROP: 50 SOLUTION OPHTHALMIC at 21:04

## 2018-10-29 RX ADMIN — DULOXETINE HYDROCHLORIDE 60 MG: 60 CAPSULE, DELAYED RELEASE ORAL at 09:04

## 2018-10-29 RX ADMIN — Medication 1 TABLET: at 09:01

## 2018-10-29 RX ADMIN — QUETIAPINE FUMARATE 50 MG: 25 TABLET ORAL at 09:04

## 2018-10-29 RX ADMIN — QUETIAPINE FUMARATE 50 MG: 25 TABLET ORAL at 21:03

## 2018-10-29 RX ADMIN — LIDOCAINE HYDROCHLORIDE: 20 SOLUTION ORAL; TOPICAL at 13:31

## 2018-10-29 RX ADMIN — MONTELUKAST SODIUM 5 MG: 5 TABLET, CHEWABLE ORAL at 21:03

## 2018-10-29 RX ADMIN — Medication 3 ML: at 09:04

## 2018-10-29 RX ADMIN — PANTOPRAZOLE SODIUM 40 MG: 40 TABLET, DELAYED RELEASE ORAL at 21:02

## 2018-10-29 RX ADMIN — LISINOPRIL 30 MG: 20 TABLET ORAL at 21:02

## 2018-10-29 RX ADMIN — PANTOPRAZOLE SODIUM 40 MG: 40 TABLET, DELAYED RELEASE ORAL at 09:02

## 2018-10-29 RX ADMIN — PHENOXYBENZAMINE HYDROCHLORIDE 10 MG: 10 CAPSULE ORAL at 21:03

## 2018-10-29 RX ADMIN — ATENOLOL 50 MG: 50 TABLET ORAL at 09:03

## 2018-10-29 RX ADMIN — Medication 5 MG: at 21:02

## 2018-10-29 RX ADMIN — POTASSIUM CHLORIDE 40 MEQ: 1.5 POWDER, FOR SOLUTION ORAL at 04:08

## 2018-10-29 RX ADMIN — Medication 3 ML: at 21:02

## 2018-10-30 LAB
ANION GAP SERPL CALCULATED.3IONS-SCNC: 8 MMOL/L (ref 3–11)
BUN BLD-MCNC: 7 MG/DL (ref 9–23)
BUN/CREAT SERPL: 10.3 (ref 7–25)
CALCIUM SPEC-SCNC: 9.7 MG/DL (ref 8.7–10.4)
CHLORIDE SERPL-SCNC: 101 MMOL/L (ref 99–109)
CO2 SERPL-SCNC: 30 MMOL/L (ref 20–31)
CREAT BLD-MCNC: 0.68 MG/DL (ref 0.6–1.3)
DEPRECATED RDW RBC AUTO: 49.1 FL (ref 37–54)
ERYTHROCYTE [DISTWIDTH] IN BLOOD BY AUTOMATED COUNT: 14.4 % (ref 11.3–14.5)
GFR SERPL CREATININE-BSD FRML MDRD: 92 ML/MIN/1.73
GLUCOSE BLD-MCNC: 92 MG/DL (ref 70–100)
HCT VFR BLD AUTO: 39.2 % (ref 34.5–44)
HGB BLD-MCNC: 13 G/DL (ref 11.5–15.5)
MAGNESIUM SERPL-MCNC: 1.8 MG/DL (ref 1.3–2.7)
MCH RBC QN AUTO: 31.3 PG (ref 27–31)
MCHC RBC AUTO-ENTMCNC: 33.2 G/DL (ref 32–36)
MCV RBC AUTO: 94.2 FL (ref 80–99)
PLATELET # BLD AUTO: 338 10*3/MM3 (ref 150–450)
PMV BLD AUTO: 11 FL (ref 6–12)
POTASSIUM BLD-SCNC: 4.2 MMOL/L (ref 3.5–5.5)
RBC # BLD AUTO: 4.16 10*6/MM3 (ref 3.89–5.14)
SODIUM BLD-SCNC: 139 MMOL/L (ref 132–146)
WBC NRBC COR # BLD: 8.1 10*3/MM3 (ref 3.5–10.8)

## 2018-10-30 PROCEDURE — 97530 THERAPEUTIC ACTIVITIES: CPT

## 2018-10-30 PROCEDURE — 99225 PR SBSQ OBSERVATION CARE/DAY 25 MINUTES: CPT | Performed by: NURSE PRACTITIONER

## 2018-10-30 PROCEDURE — G0378 HOSPITAL OBSERVATION PER HR: HCPCS

## 2018-10-30 PROCEDURE — 97535 SELF CARE MNGMENT TRAINING: CPT

## 2018-10-30 PROCEDURE — 83735 ASSAY OF MAGNESIUM: CPT | Performed by: INTERNAL MEDICINE

## 2018-10-30 PROCEDURE — 85027 COMPLETE CBC AUTOMATED: CPT | Performed by: INTERNAL MEDICINE

## 2018-10-30 PROCEDURE — 80048 BASIC METABOLIC PNL TOTAL CA: CPT | Performed by: INTERNAL MEDICINE

## 2018-10-30 RX ORDER — LISINOPRIL 10 MG/1
10 TABLET ORAL DAILY
Status: DISCONTINUED | OUTPATIENT
Start: 2018-10-31 | End: 2018-10-31

## 2018-10-30 RX ADMIN — Medication 3 ML: at 21:19

## 2018-10-30 RX ADMIN — CETIRIZINE HYDROCHLORIDE 10 MG: 10 TABLET, FILM COATED ORAL at 08:12

## 2018-10-30 RX ADMIN — PANTOPRAZOLE SODIUM 40 MG: 40 TABLET, DELAYED RELEASE ORAL at 08:12

## 2018-10-30 RX ADMIN — ATORVASTATIN CALCIUM 10 MG: 10 TABLET, FILM COATED ORAL at 08:12

## 2018-10-30 RX ADMIN — PHENOXYBENZAMINE HYDROCHLORIDE 10 MG: 10 CAPSULE ORAL at 21:19

## 2018-10-30 RX ADMIN — ACETAMINOPHEN 650 MG: 325 TABLET ORAL at 12:23

## 2018-10-30 RX ADMIN — MONTELUKAST SODIUM 5 MG: 5 TABLET, CHEWABLE ORAL at 21:18

## 2018-10-30 RX ADMIN — ATENOLOL 50 MG: 50 TABLET ORAL at 03:41

## 2018-10-30 RX ADMIN — DULOXETINE HYDROCHLORIDE 60 MG: 60 CAPSULE, DELAYED RELEASE ORAL at 08:12

## 2018-10-30 RX ADMIN — LATANOPROST 1 DROP: 50 SOLUTION OPHTHALMIC at 21:19

## 2018-10-30 RX ADMIN — MAGNESIUM SULFATE HEPTAHYDRATE 4 G: 40 INJECTION, SOLUTION INTRAVENOUS at 18:59

## 2018-10-30 RX ADMIN — Medication 5 MG: at 21:18

## 2018-10-30 RX ADMIN — PANTOPRAZOLE SODIUM 40 MG: 40 TABLET, DELAYED RELEASE ORAL at 17:54

## 2018-10-30 RX ADMIN — Medication 1 TABLET: at 12:23

## 2018-10-30 RX ADMIN — ATENOLOL 50 MG: 50 TABLET ORAL at 21:18

## 2018-10-30 RX ADMIN — QUETIAPINE FUMARATE 50 MG: 25 TABLET ORAL at 21:19

## 2018-10-31 LAB
ANION GAP SERPL CALCULATED.3IONS-SCNC: 12 MMOL/L (ref 3–11)
BUN BLD-MCNC: 13 MG/DL (ref 9–23)
BUN/CREAT SERPL: 18.8 (ref 7–25)
CALCIUM SPEC-SCNC: 8.9 MG/DL (ref 8.7–10.4)
CHLORIDE SERPL-SCNC: 101 MMOL/L (ref 99–109)
CO2 SERPL-SCNC: 26 MMOL/L (ref 20–31)
CREAT BLD-MCNC: 0.69 MG/DL (ref 0.6–1.3)
GFR SERPL CREATININE-BSD FRML MDRD: 91 ML/MIN/1.73
GLUCOSE BLD-MCNC: 103 MG/DL (ref 70–100)
POTASSIUM BLD-SCNC: 3.6 MMOL/L (ref 3.5–5.5)
SODIUM BLD-SCNC: 139 MMOL/L (ref 132–146)

## 2018-10-31 PROCEDURE — 25010000002 PROMETHAZINE PER 50 MG: Performed by: INTERNAL MEDICINE

## 2018-10-31 PROCEDURE — G0378 HOSPITAL OBSERVATION PER HR: HCPCS

## 2018-10-31 PROCEDURE — 99226 PR SBSQ OBSERVATION CARE/DAY 35 MINUTES: CPT | Performed by: INTERNAL MEDICINE

## 2018-10-31 PROCEDURE — 80048 BASIC METABOLIC PNL TOTAL CA: CPT | Performed by: INTERNAL MEDICINE

## 2018-10-31 RX ORDER — KETOROLAC TROMETHAMINE 30 MG/ML
15 INJECTION, SOLUTION INTRAMUSCULAR; INTRAVENOUS ONCE
Status: DISCONTINUED | OUTPATIENT
Start: 2018-10-31 | End: 2018-10-31

## 2018-10-31 RX ORDER — QUETIAPINE FUMARATE 25 MG/1
25 TABLET, FILM COATED ORAL ONCE
Status: COMPLETED | OUTPATIENT
Start: 2018-10-31 | End: 2018-11-01

## 2018-10-31 RX ORDER — LISINOPRIL 5 MG/1
2.5 TABLET ORAL DAILY
Status: DISCONTINUED | OUTPATIENT
Start: 2018-11-01 | End: 2018-11-04

## 2018-10-31 RX ORDER — SODIUM CHLORIDE 9 MG/ML
100 INJECTION, SOLUTION INTRAVENOUS CONTINUOUS
Status: DISCONTINUED | OUTPATIENT
Start: 2018-10-31 | End: 2018-11-07 | Stop reason: HOSPADM

## 2018-10-31 RX ADMIN — POTASSIUM CHLORIDE 40 MEQ: 1.5 POWDER, FOR SOLUTION ORAL at 09:54

## 2018-10-31 RX ADMIN — SODIUM CHLORIDE 1000 ML: 9 INJECTION, SOLUTION INTRAVENOUS at 11:54

## 2018-10-31 RX ADMIN — PROMETHAZINE HYDROCHLORIDE 12.5 MG: 25 INJECTION, SOLUTION INTRAMUSCULAR; INTRAVENOUS at 21:04

## 2018-10-31 RX ADMIN — DULOXETINE HYDROCHLORIDE 60 MG: 60 CAPSULE, DELAYED RELEASE ORAL at 09:53

## 2018-10-31 RX ADMIN — ACETAMINOPHEN 650 MG: 325 TABLET ORAL at 19:40

## 2018-10-31 RX ADMIN — PANTOPRAZOLE SODIUM 40 MG: 40 TABLET, DELAYED RELEASE ORAL at 09:53

## 2018-10-31 RX ADMIN — POLYETHYLENE GLYCOL 3350 17 G: 17 POWDER, FOR SOLUTION ORAL at 09:54

## 2018-10-31 RX ADMIN — ATORVASTATIN CALCIUM 10 MG: 10 TABLET, FILM COATED ORAL at 09:53

## 2018-10-31 RX ADMIN — Medication 5 MG: at 19:40

## 2018-10-31 RX ADMIN — QUETIAPINE FUMARATE 50 MG: 25 TABLET ORAL at 09:53

## 2018-10-31 RX ADMIN — ATENOLOL 50 MG: 50 TABLET ORAL at 19:40

## 2018-10-31 RX ADMIN — SODIUM CHLORIDE 1000 ML: 9 INJECTION, SOLUTION INTRAVENOUS at 14:27

## 2018-10-31 RX ADMIN — Medication 1 TABLET: at 09:53

## 2018-10-31 RX ADMIN — CETIRIZINE HYDROCHLORIDE 10 MG: 10 TABLET, FILM COATED ORAL at 09:53

## 2018-10-31 RX ADMIN — MONTELUKAST SODIUM 5 MG: 5 TABLET, CHEWABLE ORAL at 19:40

## 2018-10-31 RX ADMIN — PANTOPRAZOLE SODIUM 40 MG: 40 TABLET, DELAYED RELEASE ORAL at 17:26

## 2018-10-31 RX ADMIN — ATENOLOL 50 MG: 50 TABLET ORAL at 09:53

## 2018-10-31 RX ADMIN — Medication 3 ML: at 19:40

## 2018-11-01 LAB
ANION GAP SERPL CALCULATED.3IONS-SCNC: 7 MMOL/L (ref 3–11)
BUN BLD-MCNC: 9 MG/DL (ref 9–23)
BUN/CREAT SERPL: 14.8 (ref 7–25)
CALCIUM SPEC-SCNC: 8.9 MG/DL (ref 8.7–10.4)
CHLORIDE SERPL-SCNC: 107 MMOL/L (ref 99–109)
CO2 SERPL-SCNC: 25 MMOL/L (ref 20–31)
CREAT BLD-MCNC: 0.61 MG/DL (ref 0.6–1.3)
DEPRECATED RDW RBC AUTO: 48.7 FL (ref 37–54)
ERYTHROCYTE [DISTWIDTH] IN BLOOD BY AUTOMATED COUNT: 14.3 % (ref 11.3–14.5)
GFR SERPL CREATININE-BSD FRML MDRD: 105 ML/MIN/1.73
GLUCOSE BLD-MCNC: 96 MG/DL (ref 70–100)
HCT VFR BLD AUTO: 33.2 % (ref 34.5–44)
HGB BLD-MCNC: 11 G/DL (ref 11.5–15.5)
MCH RBC QN AUTO: 30.9 PG (ref 27–31)
MCHC RBC AUTO-ENTMCNC: 33.1 G/DL (ref 32–36)
MCV RBC AUTO: 93.3 FL (ref 80–99)
PLATELET # BLD AUTO: 336 10*3/MM3 (ref 150–450)
PMV BLD AUTO: 10.9 FL (ref 6–12)
POTASSIUM BLD-SCNC: 3.6 MMOL/L (ref 3.5–5.5)
RBC # BLD AUTO: 3.56 10*6/MM3 (ref 3.89–5.14)
SODIUM BLD-SCNC: 139 MMOL/L (ref 132–146)
WBC NRBC COR # BLD: 7.67 10*3/MM3 (ref 3.5–10.8)

## 2018-11-01 PROCEDURE — 97116 GAIT TRAINING THERAPY: CPT

## 2018-11-01 PROCEDURE — 99225 PR SBSQ OBSERVATION CARE/DAY 25 MINUTES: CPT | Performed by: HOSPITALIST

## 2018-11-01 PROCEDURE — 85027 COMPLETE CBC AUTOMATED: CPT | Performed by: INTERNAL MEDICINE

## 2018-11-01 PROCEDURE — 80048 BASIC METABOLIC PNL TOTAL CA: CPT | Performed by: INTERNAL MEDICINE

## 2018-11-01 PROCEDURE — G0378 HOSPITAL OBSERVATION PER HR: HCPCS

## 2018-11-01 RX ORDER — QUETIAPINE FUMARATE 25 MG/1
12.5 TABLET, FILM COATED ORAL ONCE
Status: COMPLETED | OUTPATIENT
Start: 2018-11-01 | End: 2018-11-01

## 2018-11-01 RX ORDER — LORAZEPAM 2 MG/ML
0.25 INJECTION INTRAMUSCULAR ONCE
Status: COMPLETED | OUTPATIENT
Start: 2018-11-02 | End: 2018-11-02

## 2018-11-01 RX ADMIN — Medication 3 ML: at 21:07

## 2018-11-01 RX ADMIN — DULOXETINE HYDROCHLORIDE 60 MG: 60 CAPSULE, DELAYED RELEASE ORAL at 09:28

## 2018-11-01 RX ADMIN — QUETIAPINE FUMARATE 25 MG: 25 TABLET ORAL at 01:50

## 2018-11-01 RX ADMIN — PANTOPRAZOLE SODIUM 40 MG: 40 TABLET, DELAYED RELEASE ORAL at 17:16

## 2018-11-01 RX ADMIN — POLYETHYLENE GLYCOL 3350 17 G: 17 POWDER, FOR SOLUTION ORAL at 09:28

## 2018-11-01 RX ADMIN — Medication 1 TABLET: at 11:12

## 2018-11-01 RX ADMIN — Medication 3 ML: at 09:29

## 2018-11-01 RX ADMIN — MONTELUKAST SODIUM 5 MG: 5 TABLET, CHEWABLE ORAL at 21:06

## 2018-11-01 RX ADMIN — ACETAMINOPHEN 650 MG: 325 TABLET ORAL at 01:50

## 2018-11-01 RX ADMIN — ATORVASTATIN CALCIUM 10 MG: 10 TABLET, FILM COATED ORAL at 09:29

## 2018-11-01 RX ADMIN — POTASSIUM CHLORIDE 40 MEQ: 1.5 POWDER, FOR SOLUTION ORAL at 17:15

## 2018-11-01 RX ADMIN — QUETIAPINE FUMARATE 12.5 MG: 25 TABLET ORAL at 16:19

## 2018-11-01 RX ADMIN — POTASSIUM CHLORIDE 40 MEQ: 1.5 POWDER, FOR SOLUTION ORAL at 09:28

## 2018-11-01 RX ADMIN — ATENOLOL 50 MG: 50 TABLET ORAL at 09:28

## 2018-11-01 RX ADMIN — PANTOPRAZOLE SODIUM 40 MG: 40 TABLET, DELAYED RELEASE ORAL at 09:29

## 2018-11-01 RX ADMIN — Medication 5 MG: at 21:06

## 2018-11-01 RX ADMIN — CETIRIZINE HYDROCHLORIDE 10 MG: 10 TABLET, FILM COATED ORAL at 09:29

## 2018-11-01 RX ADMIN — ATENOLOL 50 MG: 50 TABLET ORAL at 21:06

## 2018-11-01 RX ADMIN — LISINOPRIL 2.5 MG: 5 TABLET ORAL at 09:28

## 2018-11-01 NOTE — PROGRESS NOTES
"   LOS: 0 days    Patient Care Team:  Andrei Crisostomo MD as PCP - General (Gastroenterology)    Reason For Visit:  F/U HTN AND PROTEINURIA.  Subjective     She feels much better actually sounded a little paranoid as complaining that  is never here to see her     Review of Systems:    Pulm: No soa   CV:  No CP      Objective       atenolol 50 mg Oral Q12H   atorvastatin 10 mg Oral Daily   b complex-vitamin c-folic acid 1 tablet Oral Daily   cetirizine 10 mg Oral Daily   DULoxetine 60 mg Oral Daily   latanoprost 1 drop Both Eyes Nightly   lisinopril 2.5 mg Oral Daily   melatonin 5 mg Sublingual Nightly   montelukast 5 mg Oral Nightly   pantoprazole 40 mg Oral BID AC   polyethylene glycol 17 g Oral Daily   sodium chloride 3 mL Intravenous Q12H       sodium chloride 100 mL/hr         Vital Signs:  Blood pressure 123/73, pulse 110, temperature 98.7 °F (37.1 °C), temperature source Oral, resp. rate 18, height 175.3 cm (69\"), weight 75.5 kg (166 lb 6.4 oz), SpO2 99 %, not currently breastfeeding.    Flowsheet Rows      First Filed Value   Admission Height  175.3 cm (69\") Documented at 10/14/2018 2247   Admission Weight  72.6 kg (160 lb) Documented at 10/14/2018 2247          10/31 0701 - 11/01 0700  In: -   Out: 300 [Urine:300]    Physical Exam:    General Appearance: NAD, alert and cooperative, Ox3  Eyes: PER, conjunctivae and sclerae normal, no icterus  Lungs: respirations regular and unlabored, no crepitus, clear to auscultation  Heart/CV: regular rhythm & normal rate, no murmur, no gallop, no rub and no edema  Abdomen: not distended, soft, non-tender, no masses,  bowel sounds present  Skin: No rash, Warm and dry    Radiology:            Labs:    Results from last 7 days  Lab Units 11/01/18  0610 10/30/18  0732 10/28/18  0801   WBC 10*3/mm3 7.67 8.10 6.61   HEMOGLOBIN g/dL 11.0* 13.0 11.8   HEMATOCRIT % 33.2* 39.2 35.6   PLATELETS 10*3/mm3 336 338 280       Results from last 7 days  Lab Units 11/01/18  0610 " 10/31/18  0530 10/30/18  1333 10/30/18  0732 10/29/18  0835 10/28/18  0801  10/26/18  0703   SODIUM mmol/L 139 139  --  139  --  141  --   --    POTASSIUM mmol/L 3.6 3.6  --  4.2 3.6 3.0*  < >  --    CHLORIDE mmol/L 107 101  --  101  --  103  --   --    CO2 mmol/L 25.0 26.0  --  30.0  --  30.0  --   --    BUN mg/dL 9 13  --  7*  --  6*  --   --    CREATININE mg/dL 0.61 0.69  --  0.68  --  0.58*  --   --    CALCIUM mg/dL 8.9 8.9  --  9.7  --  9.3  --   --    MAGNESIUM mg/dL  --   --  1.8  --   --   --   --  2.2   < > = values in this interval not displayed.    Results from last 7 days  Lab Units 11/01/18  0610   GLUCOSE mg/dL 96                       Estimated Creatinine Clearance: 134.4 mL/min (by C-G formula based on SCr of 0.61 mg/dL).      Assessment       Intractable nausea and vomiting    GERD without esophagitis    Anxiety and depression    Hyperlipidemia    Essential hypertension    Migraines    Lyme disease    Hypokalemia    Hypomagnesemia    Acute hypokalemia    Abnormal CT scan, stomach    Intractable vomiting with nausea    Falls    Acute UTI (urinary tract infection)            Impression:     MILD PROTEINURIA.   HTN - good now but it dows go up and down-- very high metanephrine in urine- plan as under  CT abd does not say any thing about adrenals      Recommendations:   MRI- No adrenal adenoma- but did have high nor epinephrine on urin eassessment-   bp stable and better  If she is eating and drinking better ok to dc ivf       Daniel Casas MD  11/01/18  11:42 AM

## 2018-11-01 NOTE — DISCHARGE PLACEMENT REQUEST
"Rosa M Brown, RN    [phone]          Bryanna Merrill (48 y.o. Female)     Date of Birth Social Security Number Address Home Phone MRN    1970  [address] [facility] [phone] [id number]    Jain Marital Status          None Unknown       Admission Date Admission Type Admitting Provider Attending Provider Department, Room/Bed    10/14/18 Emergency Erin De La Torre MD Reddy, Mayuri V, MD [address], S455/1    Discharge Date Discharge Disposition Discharge Destination                       Attending Provider:  Erin De La Torre MD    Allergies:  No Known Allergies    Isolation:  None   Infection:  None   Code Status:  CPR    Ht:  175.3 cm (69\")   Wt:  75.5 kg (166 lb 6.4 oz)    Admission Cmt:  None   Principal Problem:  Intractable nausea and vomiting [R11.2]                 Active Insurance as of 10/14/2018     Primary Coverage     Payor Plan Insurance Group Employer/Plan Group    [employer] [place] [employer] [place]ICA[place]      Payor Plan Address Payor Plan Phone Number Effective From Effective To    [address] [address] [phone] 2018     [facility] [id number]       Subscriber Name Subscriber Birth Date Member ID       BRYANNA MERRILL 1970 [id number]                 Emergency Contacts      (Rel.) Home Phone Work Phone Mobile Phone    Michael Merrill (Spouse) [phone] -- --    Chanelle Caruso (Relative) [phone] -- --    Padma Villalta (Sister) [phone] -- --               History & Physical      Paul Plata MD at 10/15/2018  4:07 AM              [facility] Medicine Services  HISTORY AND PHYSICAL    Patient Name: Bryanna Merrill  : 1970  MRN: [id number]  Primary Care Physician: Andrei Crisostomo MD  Date of admission: 10/14/2018      Subjective   Subjective     Chief Complaint:   N/V    HPI:  Bryanna Merrill is a 48 y.o. female with a past medical history significant for GERD, " "anxiety/depression, HLD, HTN, and migraines who presents with complaints of intractable nausea and vomiting progressively worsening over the past 6 months. There is associated chronic lower abdominal pain, anorexia, and reported subsequent unintentional weight loss of 125 pounds since onset of symptoms. Patient volunteers that she is unable to keep medications down and is concerned she is malnourished. She denies illicit substance abuse but reports that her mother  in April at the near onset of symptoms. She has been evaluated multiple times, both inpatient and outpatient resulting in negative work up.    Patient has been receiving care at [facility] where in the past two months she has undergone negative EGD and evaluation per GI ( Faina BUTLER), cholecystectomy (Dr. Felipe Watt), and admission for intractable vomiting. She reports no change in symptoms s/p cholecystectomy. Patient reports that she was evaluated 10 days ago in ED where she was tested for both RMSF and Lyme disease. States these diagnostics were \"both positive as they have a bad tick problem in her area\". She was started on a round of Doxycycline which she had completed.    She is currently without complaints of bloody stool, fever, cough, congestion, chest pain, or SOB. Will admit for further evaluation and treatment.    Emergency Department Evaluation; hypertensive (168/101) and tachycardic (121). Potassium low at 2.6. Magnesium 1.0. EKG stable.    Review of Systems   Constitutional: Positive for unexpected weight change. Negative for chills and fever.   HENT: Negative for congestion and trouble swallowing.    Eyes: Negative for pain and redness.   Respiratory: Negative for cough and shortness of breath.    Cardiovascular: Negative for chest pain and leg swelling.   Gastrointestinal: Positive for abdominal pain, nausea and vomiting. Negative for diarrhea.   Endocrine: Negative for cold intolerance and heat intolerance. "   Genitourinary: Negative for dysuria and frequency.   Musculoskeletal: Negative for back pain and gait problem.   Skin: Negative for pallor and rash.   Allergic/Immunologic: Negative for immunocompromised state.   Neurological: Positive for weakness. Negative for light-headedness and numbness.   Hematological: Negative for adenopathy.   Psychiatric/Behavioral: Negative for agitation and confusion.          Otherwise 10-system ROS reviewed and is negative except as mentioned in the HPI.    Personal History     Past Medical History:   Diagnosis Date   • Arthritis    • Depression    • Environmental allergies    • GERD (gastroesophageal reflux disease)    • Hyperlipidemia    • Hypertension    • Lyme disease    • Migraine    • Kelvin Mountain spotted fever    • Vitamin B 12 deficiency        Past Surgical History:   Procedure Laterality Date   • ACHILLES TENDON SURGERY Right    • BREAST CYST EXCISION Left    • CHOLECYSTECTOMY     • HYSTERECTOMY         Family History: family history is not on file.     Social History:  reports that she has never smoked. She has never used smokeless tobacco. She reports that she does not drink alcohol or use drugs.  Social History     Social History Narrative   • No narrative on file       Medications:  Available home medication information reviewed     No Known Allergies    Objective   Objective     Vital Signs:   Temp:  [98 °F (36.7 °C)] 98 °F (36.7 °C)  Heart Rate:  [106-121] 112  Resp:  [18] 18  BP: (143-181)/() 168/101        Physical Exam   Constitutional: No acute distress, awake, alert  Eyes: PERRLA, sclerae anicteric, no conjunctival injection  HENT: NCAT, mucous membranes moist  Neck: Supple, no thyromegaly, no lymphadenopathy, trachea midline  Respiratory: Clear to auscultation bilaterally, nonlabored respirations   Cardiovascular: RRR, no murmurs, rubs, or gallops, palpable pedal pulses bilaterally  Gastrointestinal: Positive bowel sounds, soft, nontender,  nondistended  Musculoskeletal: No bilateral ankle edema, no clubbing or cyanosis to extremities  Psychiatric: Appropriate affect, cooperative  Neurologic: Oriented x 3, strength symmetric in all extremities, Cranial Nerves grossly intact to confrontation, speech clear  Skin: No rashes      Results Reviewed:  I have personally reviewed current lab, radiology, and data and agree.      Results from last 7 days  Lab Units 10/15/18  0029   WBC 10*3/mm3 10.33   HEMOGLOBIN g/dL 14.5   HEMATOCRIT % 41.5   PLATELETS 10*3/mm3 326       Results from last 7 days  Lab Units 10/15/18  0029   SODIUM mmol/L 139   POTASSIUM mmol/L 2.6*   CHLORIDE mmol/L 96*   CO2 mmol/L 28.0   BUN mg/dL 15   CREATININE mg/dL 0.70   GLUCOSE mg/dL 97   CALCIUM mg/dL 9.3   ALT (SGPT) U/L 29   AST (SGOT) U/L 20     Estimated Creatinine Clearance: 112.6 mL/min (by C-G formula based on SCr of 0.7 mg/dL).  Brief Urine Lab Results     None        No results found for: BNP  Imaging Results (last 24 hours)     ** No results found for the last 24 hours. **             Assessment/Plan   Assessment / Plan     Active Hospital Problems    Diagnosis   • **Intractable nausea and vomiting   • Hypokalemia   • Hypomagnesemia   • Essential hypertension   • GERD without esophagitis   • Anxiety and depression   • Hyperlipidemia   • Migraines   • Lyme disease         Assessment & Plan:  1. Intractable nausea and vomiting:  - ? Cyclical. Will obtain UDS, CT abdomen  - consider consult to GI  - supportive care with antiemetics and IVF  - repeat labs as needed    2. Hypokalemia and hypomagnesemia:  - EKG stable. Replace as needed per protocol    3. Hypertension:  - accelerated. Continue home meds. Add PRN as needed    4. Lyme/RMSF:  - obtained records from [facility]. Available in patient's chart  - western blot negative. Serology negative      DVT prophylaxis: mechanical    CODE STATUS:  Full code, full support  Code Status and Medical Interventions:   Ordered at:  10/15/18 0406     Level Of Support Discussed With:    Patient     Code Status:    CPR     Medical Interventions (Level of Support Prior to Arrest):    Full       Admission Status:  I believe this patient meets INPATIENT status due to the need for care which can only be reasonably provided in an hospital setting such as aggressive/expedited ancillary services and/or consultation services, the necessity for IV medications, close physician monitoring and/or the possible need for procedures.  In such, I feel patient’s risk for adverse outcomes and need for care warrant INPATIENT evaluation and predict the patient’s care encounter to likely last beyond 2 midnights.        Electronically signed by Jina Pro PA-C, 10/15/18, 4:07 AM.    PHYSICIAN NOTE(ADDENDUM)       Vitals:    10/15/18 0230   BP: (!) 168/101   Pulse: 110   Resp: 18    Temp: Afebrile    SpO2: 97% on room air        HPI.  48-year-old female presented to ED with intractable nausea and vomiting-going on since April 2018, had EGD and colonoscopy done which were negative.  Patient had cholecystectomy done on 9/29 secondary to abdominal pain, post cholecystectomy patient again was admitted to [facility] on 10/2 with negative CT abdomen.  Do not complain of overt abdominal pain, no BM for last 2 weeks, passes gas, has poor appetite, weight loss significant over past few months.  Do not complain of any overt reflux symptoms, do not complain of headache, no new medications.  Patient also complained of the bites as a result of which Lyme titers/RMSF titers were done-Western blot for Lyme titers were negative, IgM for Kelvin Mountain spotted fever were also negative-on records on [facility] paperwork.  Patient was given empirically doxycycline but she hadn't taken the medication consistently.  Patient do not complain of specifically any joint pain does complain of generalized aches, myalgia, some numbness and tingling in both arms and both feet.  Patient  does have significant electrolyte abnormalities-with heavy admission.    In ED she got potassium replacement, magnesium 2 g, IV fluids 2 L.  Exam  RS- CTA-BL, ,  No wheezing , no crackles, good effort.  CVS- s1s2 Regular, no murmur.  ABD- soft, mild epigastric tenderness not distended, no organomegaly.  EXT- no edema.  NEURO- AAO-3, no focal defecit.      Old records reviewed and summarized in PM hx.      PM hx  Hypertension-atenolol 25 mg by mouth daily  Hyperlipidemia-Lipitor 10 mg by mouth daily  Mood disorder-Cymbalta 60 mg by mouth daily  Chronic pain-hydrocodone 5 mg by mouth every 12,   Migraine headache-Imitrex 50 mg EO every 12, zonesamide-patient has been taking for her migraine for 2 years-one of the side effect is nausea and vomiting.  Recently diagnosed with Lyme disease-started on doxycycline 100 mg by mouth every 12-did not complete the course.    LABS  CPK of 34, BUN/creatinine 15/0.7, bicarbonate of 28, potential of 2.6, LFTs within normal limit, mentation 1, lactic acid of 1.5, CRP of 0.22, TSH of 2.6, pro-calcitonin of less than 0.05, albumin of 3.8  WBC of 10, hemoglobin of 14, platelet count-326, ESR of 8.      A/P  Intractable nausea and vomiting without abdominal pain  -Known diagnosis of fibromyalgia, symptoms are worse since patient lost her mother in April-but patient does not think she is depressed.  -We'll start with CT abdomen, GI consult, continue IV hydration.    Electrolyte abnormalities  -Replace the electrolytes, urine electrolytes sent.      I have independently seen and examined the patient with ARNP and the note above reflects my changes and contributions. I have discussed with findings, diagnosis and plans with the patient and family.     Paul Plata MD 10/15/18 3:33 AM               Electronically signed by Paul Plata MD at 10/15/2018  6:21 AM          Physician Progress Notes (most recent note)      Erin De La Torre MD at 11/1/2018  2:30 PM              [facility] Medicine Services  PROGRESS NOTE    Patient Name: Bryanna Schwartz  : 1970  MRN: [id number]    Date of Admission: 10/14/2018  Length of Stay: 0  Primary Care Physician: Andrei Crisostomo MD    Subjective   Subjective     CC: F/U N/V and bilateral LE weakness    HPI:  Patient seen this morning. Awake, alert. Wants to go home with her sister.     Review of Systems  Gen-no fevers, no chills  CV-no chest pain, no palpitations  Resp-no cough, no dyspnea  GI-no N/V/D, no abd pain      Otherwise ROS is negative except as mentioned in the HPI.    Objective   Objective     Vital Signs:   Temp:  [97.6 °F (36.4 °C)-98.7 °F (37.1 °C)] 98.7 °F (37.1 °C)  Heart Rate:  [] 104  Resp:  [16-18] 18  BP: ()/() 138/107        Physical Exam:  Constitutional: No acute distress, awake, alert, sitting up in bed  HENT: NCAT, mucous membranes moist  Respiratory: Clear to auscultation bilaterally, respiratory effort normal  Cardiovascular: RRR, no murmurs, rubs, or gallops, palpable pedal pulses bilaterally  Gastrointestinal: Positive bowel sounds, soft, nontender, nondistended  Musculoskeletal: No bilateral ankle edema   Psychiatric: appropriate mood  Neurologic: Oriented x 3, BLE weakness  Skin: No rashes    Results Reviewed:  I have personally reviewed current lab, radiology, and data and agree.      Results from last 7 days  Lab Units 18  0610 10/30/18  0732 10/28/18  0801   WBC 10*3/mm3 7.67 8.10 6.61   HEMOGLOBIN g/dL 11.0* 13.0 11.8   HEMATOCRIT % 33.2* 39.2 35.6   PLATELETS 10*3/mm3 336 338 280       Results from last 7 days  Lab Units 18  0610 10/31/18  0530 10/30/18  0732   SODIUM mmol/L 139 139 139   POTASSIUM mmol/L 3.6 3.6 4.2   CHLORIDE mmol/L 107 101 101   CO2 mmol/L 25.0 26.0 30.0   BUN mg/dL 9 13 7*   CREATININE mg/dL 0.61 0.69 0.68   GLUCOSE mg/dL 96 103* 92   CALCIUM mg/dL 8.9 8.9 9.7     Estimated Creatinine Clearance: 134.4 mL/min (by C-G formula based on  SCr of 0.61 mg/dL).  No results found for: BNP    Microbiology Results Abnormal     Procedure Component Value - Date/Time    Urine Culture - Urine, [[id number]]  (Abnormal)  (Susceptibility) Collected:  10/18/18 1603    Lab Status:  Final result Specimen:  Urine from Urine, Clean Catch Updated:  10/21/18 1055     Urine Culture >100,000 CFU/mL Proteus mirabilis (A)      40,000-50,000 CFU/mL Escherichia coli (A)    Susceptibility      Proteus mirabilis    Escherichia coli       ARELI    ARELI       Ampicillin <=8 ug/ml Susceptible    <=8 ug/ml Susceptible       Ampicillin + Sulbactam <=8/4 ug/ml Susceptible    <=8/4 ug/ml Susceptible       Aztreonam <=8 ug/ml Susceptible    <=8 ug/ml Susceptible       Cefepime <=8 ug/ml Susceptible    <=8 ug/ml Susceptible       Cefotaxime <=2 ug/ml Susceptible    <=2 ug/ml Susceptible       Ceftriaxone <=8 ug/ml Susceptible    <=8 ug/ml Susceptible       Cefuroxime sodium <=4 ug/ml Susceptible    <=4 ug/ml Susceptible       Cephalothin <=8 ug/ml Susceptible    <=8 ug/ml Susceptible       Ertapenem <=1 ug/ml Susceptible    <=1 ug/ml Susceptible       Gentamicin <=4 ug/ml Susceptible    <=4 ug/ml Susceptible       Levofloxacin <=2 ug/ml Susceptible    <=2 ug/ml Susceptible       Meropenem <=1 ug/ml Susceptible    <=1 ug/ml Susceptible       Nitrofurantoin       <=32 ug/ml Susceptible       Piperacillin + Tazobactam <=16 ug/ml Susceptible    <=16 ug/ml Susceptible       Tetracycline       <=4 ug/ml Susceptible       Tobramycin <=4 ug/ml Susceptible    <=4 ug/ml Susceptible       Trimethoprim + Sulfamethoxazole <=2/38 ug/ml Susceptible    <=2/38 ug/ml Susceptible                      Blood Culture - Blood, [[id number]]  (Normal) Collected:  10/15/18 0029    Lab Status:  Final result Specimen:  Blood from Arm, Right Updated:  10/20/18 0130     Blood Culture No growth at 5 days    Blood Culture - Blood, [[id number]]  (Normal) Collected:  10/15/18 0029    Lab Status:  Final result Specimen:  Blood  from Arm, Right Updated:  10/20/18 0130     Blood Culture No growth at 5 days    Eosinophil Smear - Urine, Urine, Clean Catch [[id number]]  (Normal) Collected:  10/18/18 1603    Lab Status:  Final result Specimen:  Urine from Urine, Clean Catch Updated:  10/18/18 1802     Eosinophil Smear 0 % EOS/100 Cells     Narrative:       No eosinophil seen          Imaging Results (last 24 hours)     ** No results found for the last 24 hours. **             I have reviewed the medications.      atenolol 50 mg Oral Q12H   atorvastatin 10 mg Oral Daily   b complex-vitamin c-folic acid 1 tablet Oral Daily   cetirizine 10 mg Oral Daily   DULoxetine 60 mg Oral Daily   latanoprost 1 drop Both Eyes Nightly   lisinopril 2.5 mg Oral Daily   melatonin 5 mg Sublingual Nightly   montelukast 5 mg Oral Nightly   pantoprazole 40 mg Oral BID AC   polyethylene glycol 17 g Oral Daily   sodium chloride 3 mL Intravenous Q12H         Assessment/Plan   Assessment / Plan     Active Hospital Problems    Diagnosis   • **Intractable nausea and vomiting   • Acute UTI (urinary tract infection)   • Falls   • GERD without esophagitis   • Anxiety and depression   • Hyperlipidemia   • Essential hypertension   • Migraines   • Lyme disease   • Hypokalemia   • Hypomagnesemia   • Acute hypokalemia   • Abnormal CT scan, stomach     Added automatically from request for surgery [id number]     • Intractable vomiting with nausea     Added automatically from request for surgery [id number]          Brief Hospital Course to date:  Bryanna Schwartz is a 48 y.o. female with a past medical history of anxiety, depression, hypertension, hyperlipidemia, migraines. Recent rx for RMSF and lyme. She presented with what she describes as 6 months of intractable nausea and vomiting that has been progressively worsening with reported weight loss of 120 lbs.     Assessment & Plan:    --Nausea/Vomiting-She has had significant workup at OSH this has included EGD, CCY. Her symptoms of anorexia,  early satiety with n/v and 120lb weight loss( she does not look like she has lost that much weight ).  CT abd/pelvis showed thickening of the proximal gastric mucosa, she is s/p EGD 10/16 showed antral gastritis with reflux esophagitis, currently on protonix 40 mg bid. Symptoms have improved markedly and now patient tolerates PO diet. Suspect functional etiology of her nausea.     --Multiple electrolyte abnormalities -hypokalemia, hypomagnesemia, etiology unknown? Adrenal insufficieny, however random cortisol is wnl, suspect from prolonged n/v, continue to monitor and replete.    --UTI- likely contributed to N/V on admission.  UCx with >100K proteus and 40-50K E coli,both of which were susceptible to Rocephin. Pt completed 5 total days of Rocephin (last dose 10/22).      --Chronic illness malnutrition, nutrition/dietecian following.    --HTN- not well controlled, concerning for Pheochromocytoma. Nephrology following.  Reviewed workup and is concerning for Pheo. Pt has had CT A/P on admission with no finding of adrenal incidentaloma. BP remains difficult to control with lows and extreme highs.  Abnormal urine metanephrines.  MRI unremarkable.  BP med adjustments made by NAL yesterday. Now hypotensive again despite Lisinopril being held. Reviewed earlier admission event when she was hypotensive and, at that time, she had taken her home dose of muscle relaxer and RN today is concerned that this is the case now. Pt eventually improved (during last event) with 4x 1L boluses. Continue fluids. Improved.    --- Significant bilateral LE weakness with ataxia, has had some ataxia in the past, previously seen by Dr. Syed at - PT/OT. Neurology has seen the patient here on this admission. Rehab placement PENDING.     --- Encephalopathy- likely multifactorial due to UTI, malnutrition, issues with BP.  Hold Seroquel.  I discussed non medication related treatments- keep busy during day.  No napping.  Up in chair, wheelchair  walks in hallway etc. IMPROVED.    DISPO: Pending home vs rehab.     DVT Prophylaxis: mechanical    CODE STATUS:   Code Status and Medical Interventions:   Ordered at: 10/15/18 0406     Level Of Support Discussed With:    Patient     Code Status:    CPR     Medical Interventions (Level of Support Prior to Arrest):    Full     Dispo: rehab placement PENDING    Electronically signed by Erin De La Torre MD, 11/01/18, 2:33 PM.          Electronically signed by Erin De La Torre MD at 11/1/2018  2:33 PM          Consult Notes (most recent note)      Riley Rangel MD at 10/19/2018  1:11 PM      Consult Orders:    1. Inpatient Nephrology Consult [[id number]] ordered by Ronny Cardozo MD at 10/18/18 1412                  Referring Provider: Dr. Velasquez  Reason for Consultation: Proteinuria    Subjective     Chief complaint : Nausea vomiting.    History of present illness:    Patient is a 48-year-old  female with no previous history of kidney disease, normal kidney function creatinine 0.5, has a UA done which was negative for blood but positive for protein.  Protein creatinine ratio 500 mg.  Renal is being consulted at this time.  Patient history includes loss of 120 pounds over the last 6 months due to intractable nausea vomiting, she complains of falling, with generalized weakness.  CT scan showed a T12 compression fracture.  Endoscopy showed gastric ulcer.  Patient denies any use of nonsteroidal, no epistaxis, hemoptysis, hematemesis.  She does state that she has noticed one time of hematuria recent UA is negative for any blood.    History  Past Medical History:   Diagnosis Date   • Arthritis    • Depression    • Environmental allergies    • GERD (gastroesophageal reflux disease)    • Hyperlipidemia    • Hypertension    • Lyme disease    • Migraine    • Kelvin Mountain spotted fever    • Vitamin B 12 deficiency    ,   Past Surgical History:   Procedure Laterality Date   • ACHILLES TENDON SURGERY Right    •  BREAST CYST EXCISION Left    • CHOLECYSTECTOMY     • ENDOSCOPY N/A 10/16/2018    Procedure: ESOPHAGOGASTRODUODENOSCOPY;  Surgeon: Brunner, Mark I, MD;  Location: [facility] ENDOSCOPY;  Service: Gastroenterology   • HYSTERECTOMY     , History reviewed. No pertinent family history.,   Social History   Substance Use Topics   • Smoking status: Never Smoker   • Smokeless tobacco: Never Used   • Alcohol use No   ,   Prescriptions Prior to Admission   Medication Sig Dispense Refill Last Dose   • amitriptyline (ELAVIL) 10 MG tablet Take 10 mg by mouth Every Night.      • atenolol (TENORMIN) 25 MG tablet Take 50 mg by mouth 2 (Two) Times a Day.      • atorvastatin (LIPITOR) 10 MG tablet Take 10 mg by mouth Daily.      • celecoxib (CeleBREX) 200 MG capsule Take 200 mg by mouth Daily As Needed for Mild Pain .      • doxycycline (VIBRAMYCIN) 100 MG capsule Take 100 mg by mouth 2 (Two) Times a Day.      • DULoxetine (CYMBALTA) 60 MG capsule Take 60 mg by mouth Daily.      • HYDROcodone-acetaminophen (NORCO) 5-325 MG per tablet Take 1 tablet by mouth 2 (Two) Times a Day As Needed.      • l-methylfolate 15 MG tablet tablet Take 15 mg by mouth Daily.      • levocetirizine (XYZAL) 5 MG tablet Take 5 mg by mouth Daily As Needed for Allergies.      • magnesium oxide (MAGOX) 400 (241.3 Mg) MG tablet tablet Take 400 mg by mouth 2 (Two) Times a Day.      • metoclopramide (REGLAN) 5 MG tablet Take 5 mg by mouth 3 (Three) Times a Day Before Meals.      • omeprazole (priLOSEC) 20 MG capsule Take 20 mg by mouth Daily.      • potassium chloride (K-DUR,KLOR-CON) 10 MEQ CR tablet Take 1 tablet by mouth 2 (Two) Times a Day. (Patient taking differently: Take 20 mEq by mouth Daily.) 12 tablet 0    • promethazine (PHENERGAN) 25 MG tablet Take 25 mg by mouth Every 6 (Six) Hours As Needed for Nausea or Vomiting.      • SUMAtriptan (IMITREX) 50 MG tablet Take 50 mg by mouth Every 2 (Two) Hours As Needed for Migraine. Take one tablet at onset of headache.  May repeat dose one time in 2 hours if headache not relieved.      • tiZANidine (ZANAFLEX) 4 MG tablet Take 4 mg by mouth 3 (Three) Times a Day.      • vitamin D (ERGOCALCIFEROL) 70570 units capsule capsule Take 50,000 Units by mouth Every 7 (Seven) Days.      • zonisamide (ZONEGRAN) 25 MG capsule Take 25 mg by mouth 2 (Two) Times a Day.      • ondansetron (ZOFRAN) 4 MG tablet Take 1 tablet by mouth Every 8 (Eight) Hours As Needed for Nausea or Vomiting. 12 tablet 0    • prochlorperazine (COMPAZINE) 10 MG tablet Take 1 tablet by mouth Every 6 (Six) Hours As Needed for Nausea or Vomiting. 16 tablet 0    , Scheduled Meds:    atenolol 50 mg Oral Q12H   atorvastatin 10 mg Oral Daily   b complex-vitamin c-folic acid 1 tablet Oral Daily   ceftriaxone 1 g Intravenous Q24H   DULoxetine 60 mg Oral Daily   lisinopril 10 mg Oral Q24H   melatonin 5 mg Sublingual Nightly   pantoprazole 40 mg Intravenous BID AC   polyethylene glycol 17 g Oral Daily   QUEtiapine 25 mg Oral Nightly   sodium chloride 3 mL Intravenous Q12H   , Continuous Infusions:   , PRN Meds:  •  acetaminophen  •  influenza vaccine  •  magnesium sulfate **OR** magnesium sulfate **OR** magnesium sulfate  •  ondansetron  •  [DISCONTINUED] potassium chloride **OR** potassium chloride **OR** potassium chloride  •  promethazine  •  Insert peripheral IV **AND** sodium chloride  •  sodium chloride and Allergies:  Patient has no known allergies.    Review of Systems  Pertinent items are noted in HPI, all other systems reviewed and negative    Objective     Vital Signs  Temp:  [97.3 °F (36.3 °C)-98.2 °F (36.8 °C)] 97.3 °F (36.3 °C)  Heart Rate:  [] 89  Resp:  [15-17] 16  BP: (153-191)/() 165/106    No intake/output data recorded.  I/O last 3 completed shifts:  In: 120 [P.O.:120]  Out: 2075 [Urine:2075]    Physical Exam:     General Appearance:    Alert, cooperative, in no acute distress   Head:    Normocephalic, without obvious abnormality, atraumatic   Eyes:             PERR EOMI sclerae normal, no   icterus, no pallor, corneas clear,    Ears:    Ears appear intact with no abnormalities noted   Throat:   No oral lesions, no thrush, oral mucosa moist   Neck:   No adenopathy, supple, trachea midline, no thyromegaly, no     carotid bruit, no JVD   Back:     No kyphosis present, no scoliosis present,    Lungs:     Clear to auscultation,respirations regular, even and                   unlabored    Heart:    Regular rhythm and normal rate, normal S1 and S2, no            murmur, no gallop, no rub, no click   Breast Exam:    Deferred   Abdomen:     Normal bowel sounds, no masses, no organomegaly, soft        non-tender, non-distended, no guarding, no rebound                 tenderness, morbid obesity    Genitalia:    Deferred   Extremities:   Moves all extremities well, no edema, no cyanosis, no              redness   Pulses:   Pulses palpable and equal bilaterally   Skin:   No bleeding, bruising or rash   Lymph nodes:   No palpable adenopathy   Neurologic:   Grossly intact, generalized weakness, alert 20×3.          Results Review:   I reviewed the patient's new clinical results.    WBC No results found for: WBC   HGB No results found for: HGB   HCT No results found for: HCT   Platlets No results found for: LABPLAT   MCV No results found for: MCV       Sodium Sodium   Date Value Ref Range Status   10/19/2018 138 132 - 146 mmol/L Final      Potassium Potassium   Date Value Ref Range Status   10/19/2018 3.3 (L) 3.5 - 5.5 mmol/L Final   10/17/2018 3.8 3.5 - 5.5 mmol/L Final   10/16/2018 3.6 3.5 - 5.5 mmol/L Final      Chloride Chloride   Date Value Ref Range Status   10/19/2018 98 (L) 99 - 109 mmol/L Final      CO2 CO2   Date Value Ref Range Status   10/19/2018 24.0 20.0 - 31.0 mmol/L Final      BUN BUN   Date Value Ref Range Status   10/19/2018 5 (L) 9 - 23 mg/dL Final      Creatinine Creatinine   Date Value Ref Range Status   10/19/2018 0.56 (L) 0.60 - 1.30 mg/dL Final       Calcium Calcium   Date Value Ref Range Status   10/19/2018 8.9 8.7 - 10.4 mg/dL Final      PO4 No results found for: CAPO4   Albumin Albumin   Date Value Ref Range Status   10/19/2018 3.51 3.20 - 4.80 g/dL Final      Magnesium No results found for: MG   Uric Acid No results found for: URICACID           atenolol 50 mg Oral Q12H   atorvastatin 10 mg Oral Daily   b complex-vitamin c-folic acid 1 tablet Oral Daily   ceftriaxone 1 g Intravenous Q24H   DULoxetine 60 mg Oral Daily   lisinopril 10 mg Oral Q24H   melatonin 5 mg Sublingual Nightly   pantoprazole 40 mg Intravenous BID AC   polyethylene glycol 17 g Oral Daily   potassium chloride 10 mEq Intravenous Once   QUEtiapine 25 mg Oral Nightly   sodium chloride 3 mL Intravenous Q12H          Assessment/Plan       Intractable nausea and vomiting    GERD without esophagitis    Anxiety and depression    Hyperlipidemia    Essential hypertension    Migraines    Lyme disease    Hypokalemia    Hypomagnesemia    Acute hypokalemia    Abnormal CT scan, stomach    Intractable vomiting with nausea    1.  Mild proteinuria without hematuria noted on the UA.  Patient does give a history of hematuria sometime ago, this happened only once.  She has no history of kidney stones, recent UA has been negative for blood.  She does have a history of hypertension of 4-5 years.  He has a loss of weight more than 100 when he pounds.  She has been obese.  2.  Hypokalemia secondary to decreased oral intake.  3.  Compression fracture.  Plan:  I have ordered the serologies revealed for the results.  There is no indication for kidney biopsy at this time.  Controlled blood pressure.  Avoid nephrotoxic medications.  Keep systolic blood pressure greater than 100.  Check volume status.  Check labs in the morning.       I discussed the patients findings and my recommendations with patient and family    Riley Rangel MD  10/19/18  @NOW             Electronically signed by Riley Rangel MD at  10/20/2018 10:31 AM          Physical Therapy Notes (most recent note)      Rosa M March, PT at 2018 11:52 AM  Version 1 of 1         Acute Care - Physical Therapy Treatment Note  [facility]     Patient Name: Bryanna Schwartz  : 1970  MRN: [id number]  Today's Date: 2018  Onset of Illness/Injury or Date of Surgery: 10/14/18  Date of Referral to PT: 10/16/18  Referring Physician: MD Velasquez    Admit Date: 10/14/2018    Visit Dx:    ICD-10-CM ICD-9-CM   1. Acute hypokalemia E87.6 276.8   2. Hypomagnesemia E83.42 275.2   3. Unintentional weight loss R63.4 783.21   4. Intractable cyclical vomiting with nausea G43.A1 536.2   5. Abnormal CT scan, stomach R93.3 793.4   6. Intractable vomiting with nausea, unspecified vomiting type R11.2 536.2   7. Impaired functional mobility, balance, gait, and endurance Z74.09 V49.89   8. Impaired mobility and ADLs Z74.09 799.89     Patient Active Problem List   Diagnosis   • GERD without esophagitis   • Anxiety and depression   • Hyperlipidemia   • Essential hypertension   • Migraines   • Lyme disease   • Intractable nausea and vomiting   • Hypokalemia   • Hypomagnesemia   • Acute hypokalemia   • Abnormal CT scan, stomach   • Intractable vomiting with nausea   • Falls   • Acute UTI (urinary tract infection)       Therapy Treatment          Rehabilitation Treatment Summary     Row Name 18 1105             Treatment Time/Intention    Discipline physical therapist  -SJ      Document Type therapy note (daily note)  -SJ      Subjective Information no complaints  -SJ      Mode of Treatment individual therapy  -SJ      Patient/Family Observations Pt seated EOB, awake, alert  -SJ      Care Plan Review care plan/treatment goals reviewed;risks/benefits reviewed;current/potential barriers reviewed;patient/other agree to care plan  -SJ      Therapy Frequency (PT Clinical Impression) daily  -SJ      Patient Effort excellent  -      Existing Precautions/Restrictions fall   -SJ      Treatment Considerations/Comments per report, feet hypersensitive to touch  -SJ      Recorded by [SJ] Rosa M March, PT 11/01/18 1150      Row Name 11/01/18 1105             Vital Signs    Pre Systolic BP Rehab --   VSS  -SJ      Recorded by [SJ] Rosa M March, PT 11/01/18 1150      Row Name 11/01/18 1105             Cognitive Assessment/Intervention- PT/OT    Affect/Mental Status (Cognitive) confused;confabulatory  -SJ      Orientation Status (Cognition) oriented to;person  -SJ      Follows Commands (Cognition) follows one step commands;75-90% accuracy  -SJ      Cognitive Function (Cognitive) safety deficit  -SJ      Personal Safety Interventions fall prevention program maintained;gait belt;muscle strengthening facilitated;nonskid shoes/slippers when out of bed  -SJ      Recorded by [SJ] Rosa M March, PT 11/01/18 1150      Row Name 11/01/18 1105             Safety Issues, Functional Mobility    Safety Issues Affecting Function (Mobility) awareness of need for assistance;insight into deficits/self awareness;sequencing abilities;positioning of assistive device  -SJ      Impairments Affecting Function (Mobility) balance;coordination;cognition;muscle tone abnormal;strength  -SJ      Comment, Safety Issues/Impairments (Mobility) ataxia  -SJ      Recorded by [SJ] Rosa M March, PT 11/01/18 1150      Row Name 11/01/18 1105             Bed Mobility Assessment/Treatment    Supine-Sit [place] (Bed Mobility) conditional independence  -SJ      Recorded by [SJ] Rosa M March, PT 11/01/18 1150      Row Name 11/01/18 1105             Transfer Assessment/Treatment    Comment (Transfers) cues for hand/foot placement and sequencing  -SJ      Recorded by [SJ] Rosa M March, PT 11/01/18 1150      Row Name 11/01/18 1105             Bed-Chair Transfer    Bed-Chair [place] (Transfers) moderate assist (50% patient effort);2 person assist;verbal cues  -SJ      Assistive Device (Bed-Chair Transfers)  walker, front-wheeled  -SJ      Recorded by [SJ] Rosa M March, PT 11/01/18 1150      Row Name 11/01/18 1105             Sit-Stand Transfer    Sit-Stand [place] (Transfers) moderate assist (50% patient effort);2 person assist  -SJ      Assistive Device (Sit-Stand Transfers) walker, front-wheeled  -SJ      Recorded by [SJ] Rosa M March, PT 11/01/18 1150      Row Name 11/01/18 1105             Stand-Sit Transfer    Stand-Sit [place] (Transfers) moderate assist (50% patient effort);2 person assist  -SJ      Assistive Device (Stand-Sit Transfers) walker, front-wheeled  -SJ      Recorded by [SJ] Rosa M March, PT 11/01/18 1150      Row Name 11/01/18 1105             Gait/Stairs Assessment/Training    51915 - Gait Training Minutes  23  -SJ      Gait/Stairs Assessment/Training gait/ambulation independence  -SJ      [place] Level (Gait) moderate assist (50% patient effort);1 person assist;1 person to manage equipment  -SJ      Assistive Device (Gait) walker, front-wheeled  -SJ      Distance in Feet (Gait) 55  -SJ      Pattern (Gait) step-to  -SJ      Deviations/Abnormal Patterns (Gait) ataxic;scissoring  -SJ      Bilateral Gait Deviations knee buckling, bilateral  -SJ      Comment (Gait/Stairs) Pt has very ataxic gait req max cues for safety and sequencing, cues for advancing RW. THerapist gave support throught gait belt directly behind patient and recliner in tow pushed by tech.   -SJ      Recorded by [SJ] Rosa M March, PT 11/01/18 1150      Row Name 11/01/18 1105             Therapeutic Exercise    Lower Extremity (Therapeutic Exercise) marching while seated;LAQ (long arc quad), bilateral  -SJ      Exercise Type (Therapeutic Exercise) AROM (active range of motion)  -SJ      Position (Therapeutic Exercise) seated  -SJ      Sets/Reps (Therapeutic Exercise) 15  -SJ      Recorded by [SJ] Rosa M March, PT 11/01/18 1150      Row Name 11/01/18 1105             Static Sitting Balance    Level of  [place] (Unsupported Sitting, Static Balance) supervision  -SJ      Sitting Position (Unsupported Sitting, Static Balance) sitting on edge of bed  -SJ      Recorded by [MICHAEL] Rosa M March, PT 11/01/18 1150      Row Name 11/01/18 1105             Static Standing Balance    Level of [place] (Supported Standing, Static Balance) moderate assist, 50 to 74% patient effort  -SJ      Assistive Device Utilized (Supported Standing, Static Balance) rolling walker  -SJ      Recorded by [MICHAEL] Rosa M March, PT 11/01/18 1150      Row Name 11/01/18 1105             Positioning and Restraints    Pre-Treatment Position in bed  -SJ      Post Treatment Position chair  -SJ      In Chair notified nsg;reclined;call light within reach;encouraged to call for assist;exit alarm on;waffle cushion;heels elevated  -SJ      Recorded by [MICHAEL] Rosa M March, PT 11/01/18 1150      Row Name 11/01/18 1105             Pain Scale: Numbers Pre/Post-Treatment    Pain Scale: Numbers, Pretreatment 0/10 - no pain  -SJ      Pain Scale: Numbers, Post-Treatment 0/10 - no pain  -SJ      Recorded by [MICHAEL] Rosa M March, PT 11/01/18 1150      Row Name 11/01/18 1105             Coping    Observed Emotional State calm;cooperative  -SJ      Verbalized Emotional State anxiety  -SJ      Recorded by [MICHAEL] Rosa M March, PT 11/01/18 1150      Row Name 11/01/18 1105             Plan of Care Review    Plan of Care Reviewed With patient  -SJ      Recorded by [MICHAEL] Rosa M March, PT 11/01/18 1150      Row Name 11/01/18 1105             Outcome Summary/Treatment Plan (PT)    Daily Summary of Progress (PT) progress toward functional goals is good  -SJ      Recorded by [MICHAEL] Rosa M March, PT 11/01/18 1150        User Key  (r) = Recorded By, (t) = Taken By, (c) = Cosigned By    Initials Name Effective Dates Discipline    Rosa M Prestno, PT 06/19/15 -  PT                     Physical Therapy Education     Title: PT OT SLP Therapies (Active)      Topic: Physical Therapy (Active)     Point: Mobility training (Active)    Learning Progress Summary     Learner Status Readiness Method Response Comment Documented by    Patient Active Acceptance E,D NR  SJ 11/01/18 1151     Active Eager E,D,H NR  DM 10/30/18 1759     Active Acceptance E NR  DEBRA 10/28/18 1310     Active Acceptance E NR  DEBRA 10/27/18 1330     Active Acceptance E NR  KR 10/25/18 1444     Active Acceptance E NR  AS 10/22/18 1012     Active Acceptance E NR  DEBRA 10/19/18 1515     Done Acceptance E,TB VU  LC 10/18/18 1738     Done Acceptance E,D NR,DU  BD 10/18/18 1049     Active Acceptance E,D NR  BD 10/17/18 1332    Family Done Acceptance E,TB VU  LC 10/18/18 1738     Active Acceptance E,D NR  BD 10/17/18 1332    Significant Other Active Eager E,D,H NR  DM 10/30/18 1759     Active Acceptance E NR  KR 10/25/18 1444     Active Acceptance E NR  DEBRA 10/19/18 1515     Done Acceptance E,D NR,DU  BD 10/18/18 1049          Point: Home exercise program (Active)    Learning Progress Summary     Learner Status Readiness Method Response Comment Documented by    Patient Active Acceptance E,D NR  SJ 11/01/18 1151     Active Eager E,D,H NR  DM 10/30/18 1759     Active Acceptance E NR  DEBRA 10/28/18 1310     Active Acceptance E NR  DEBRA 10/27/18 1330     Active Acceptance E NR  KR 10/25/18 1444     Active Acceptance E NR  AS 10/22/18 1012     Active Acceptance E NR  DEBRA 10/19/18 1515     Done Acceptance E,TB VU  LC 10/18/18 1738     Done Acceptance E,D NR,DU  BD 10/18/18 1049     Active Acceptance E,D NR  BD 10/17/18 1332    Family Done Acceptance E,TB VU  LC 10/18/18 1738     Active Acceptance E,D NR  BD 10/17/18 1332    Significant Other Active Eager E,D,H NR  DM 10/30/18 1759     Active Acceptance E NR  KR 10/25/18 1444     Active Acceptance E NR  DEBRA 10/19/18 1515     Done Acceptance E,D NR,DU  BD 10/18/18 1049          Point: Body mechanics (Active)    Learning Progress Summary     Learner Status Readiness Method Response  Comment Documented by    Patient Active Acceptance E,D NR  SJ 11/01/18 1151     Active Eager E,D,H NR  DM 10/30/18 1759     Active Acceptance E NR  DEBRA 10/28/18 1310     Active Acceptance E NR  DBERA 10/27/18 1330     Active Acceptance E NR  KR 10/25/18 1444     Active Acceptance E NR  AS 10/22/18 1012     Active Acceptance E NR  DEBRA 10/19/18 1515     Done Acceptance E,TB VU  LC 10/18/18 1738     Done Acceptance E,D NR,DU  BD 10/18/18 1049     Active Acceptance E,D NR  BD 10/17/18 1332    Family Done Acceptance E,TB VU  LC 10/18/18 1738     Active Acceptance E,D NR  BD 10/17/18 1332    Significant Other Active Eager E,D,H NR  DM 10/30/18 1759     Active Acceptance E NR  KR 10/25/18 1444     Active Acceptance E NR  DEBRA 10/19/18 1515     Done Acceptance E,D NR,DU  BD 10/18/18 1049          Point: Precautions (Active)    Learning Progress Summary     Learner Status Readiness Method Response Comment Documented by    Patient Active Acceptance E,D NR  SJ 11/01/18 1151     Active Eager E,D,H NR  DM 10/30/18 1759     Active Acceptance E NR  DEBRA 10/28/18 1310     Active Acceptance E NR  DEBRA 10/27/18 1330     Active Acceptance E NR  KR 10/25/18 1444     Active Acceptance E NR  AS 10/22/18 1012     Active Acceptance E NR  DEBRA 10/19/18 1515     Done Acceptance E,TB VU  LC 10/18/18 1738     Done Acceptance E,D NR,DU  BD 10/18/18 1049     Active Acceptance E,D NR  BD 10/17/18 1332    Family Done Acceptance E,TB VU  LC 10/18/18 1738     Active Acceptance E,D NR  BD 10/17/18 1332    Significant Other Active Eager E,D,H NR  DM 10/30/18 1759     Active Acceptance E NR  KR 10/25/18 1444     Active Acceptance E NR  DEBRA 10/19/18 1515     Done Acceptance E,D NR,DU  BD 10/18/18 1049                      User Key     Initials Effective Dates Name Provider Type Discipline    DEBRA 06/19/15 -  Cady Clemons PT Physical Therapist PT    SJ 06/19/15 -  Rosa M March, PT Physical Therapist PT    DM 06/19/15 -  Jaqui Jin, PT Physical Therapist  PT    AS 06/22/15 -  Vanessa Espino, PTA Physical Therapy Assistant PT    LC 06/16/16 -  Lori Kumari RN Registered Nurse Nurse    BD 06/08/18 -  Vanessa Greene, PT Physical Therapist PT    KR 04/03/18 -  Shayy Luo, PT Physical Therapist PT                    PT Recommendation and Plan  Therapy Frequency (PT Clinical Impression): daily  Outcome Summary/Treatment Plan (PT)  Daily Summary of Progress (PT): progress toward functional goals is good  Plan of Care Reviewed With: patient  Progress: improving  Outcome Summary: Pt able to t/f with modA x2, and ambulated 55ft with modA x1 +1 for recliner in tow. Pt continues to be limited by ataxia. Continue POC.          Outcome Measures     Row Name 11/01/18 1105 10/30/18 1703 10/30/18 1340       How much help from another person do you currently need...    Turning from your back to your side while in flat bed without using bedrails? 4  -SJ 3  -DM  --    Moving from lying on back to sitting on the side of a flat bed without bedrails? 4  -SJ 3  -DM  --    Moving to and from a bed to a chair (including a wheelchair)? 2  -SJ 3  -DM  --    Standing up from a chair using your arms (e.g., wheelchair, bedside chair)? 2  -SJ 2  -DM  --    Climbing 3-5 steps with a railing? 2  -SJ 1  -DM  --    To walk in hospital room? 2  -SJ 2  -DM  --    AM-PAC 6 Clicks Score 16  -SJ 14  -DM  --       How much help from another is currently needed...    Putting on and taking off regular lower body clothing?  --  -- 2  -DEBRA    Bathing (including washing, rinsing, and drying)  --  -- 2  -DEBRA    Toileting (which includes using toilet bed pan or urinal)  --  -- 2  -DEBRA    Putting on and taking off regular upper body clothing  --  -- 3  -DEBRA    Taking care of personal grooming (such as brushing teeth)  --  -- 3  -DEBRA    Eating meals  --  -- 3  -DEBRA    Score  --  -- 15  -DEBRA       Functional Assessment    Outcome Measure Options AM-PAC 6 Clicks Basic Mobility (PT)  - AM-PAC 6 Clicks Basic  Mobility (PT)  -DM AM-PAC 6 Clicks Daily Activity (OT)  -DEBRA      User Key  (r) = Recorded By, (t) = Taken By, (c) = Cosigned By    Initials Name Provider Type    Vania Sierra, OT Occupational Therapist    SJ Rosa M March, PT Physical Therapist    Jaqui Carlin, PT Physical Therapist           Time Calculation:         PT Charges     Row Name 11/01/18 1151 11/01/18 1105          Time Calculation    Start Time 1105  -SJ  --     PT Received On 11/01/18  -  --     PT Goal Re-Cert Due Date 11/07/18  -  --        Time Calculation- PT    Total Timed Code Minutes- PT 23 minute(s)  -  --        Timed Charges    39812 - Gait Training Minutes   -- 23  -SJ       User Key  (r) = Recorded By, (t) = Taken By, (c) = Cosigned By    Initials Name Provider Type    SJ Rosa M March, PT Physical Therapist        Therapy Suggested Charges     Code   Minutes Charges    04649 (CPT®) Hc Pt Neuromusc Re Education Ea 15 Min      84638 (CPT®) Hc Pt Ther Proc Ea 15 Min      04715 (CPT®) Hc Gait Training Ea 15 Min 23 2    42340 (CPT®) Hc Pt Therapeutic Act Ea 15 Min      88565 (CPT®) Hc Pt Manual Therapy Ea 15 Min      60000 (CPT®) Hc Pt Iontophoresis Ea 15 Min      86961 (CPT®) Hc Pt Elec Stim Ea-Per 15 Min      27291 (CPT®) Hc Pt Ultrasound Ea 15 Min      60371 (CPT®) Hc Pt Self Care/Mgmt/Train Ea 15 Min      13192 (CPT®) Hc Pt Prosthetic (S) Train Initial Encounter, Each 15 Min      67350 (CPT®) Hc Pt Orthotic(S)/Prosthetic(S) Encounter, Each 15 Min      45183 (CPT®) Hc Orthotic(S) Mgmt/Train Initial Encounter, Each 15min      Total  23 2        Therapy Charges for Today     Code Description Service Date Service Provider Modifiers Qty    88053664798 HC GAIT TRAINING EA 15 MIN 11/1/2018 Rosa M March, PT GP 2    44572133021 HC PT THER SUPP EA 15 MIN 11/1/2018 Rosa M March, PT GP 2          PT G-Codes  Outcome Measure Options: AM-PAC 6 Clicks Basic Mobility (PT)  AM-PAC 6 Clicks Score: 16  Score: 15    Rosa M  Joaquim, PT  2018         Electronically signed by Rosa M March, PT at 2018 11:52 AM          Occupational Therapy Notes (most recent note)      Vania Arceo, OT at 10/30/2018  2:38 PM          Acute Care - Occupational Therapy Treatment Note   [place]     Patient Name: Bryanna Schwartz  : 1970  MRN: [id number]  Today's Date: 10/30/2018  Onset of Illness/Injury or Date of Surgery: 10/14/18  Date of Referral to OT: 10/14/18  Referring Physician: MD Velasquez    Admit Date: 10/14/2018       ICD-10-CM ICD-9-CM   1. Acute hypokalemia E87.6 276.8   2. Hypomagnesemia E83.42 275.2   3. Unintentional weight loss R63.4 783.21   4. Intractable cyclical vomiting with nausea G43.A1 536.2   5. Abnormal CT scan, stomach R93.3 793.4   6. Intractable vomiting with nausea, unspecified vomiting type R11.2 536.2   7. Impaired functional mobility, balance, gait, and endurance Z74.09 V49.89   8. Impaired mobility and ADLs Z74.09 799.89     Patient Active Problem List   Diagnosis   • GERD without esophagitis   • Anxiety and depression   • Hyperlipidemia   • Essential hypertension   • Migraines   • Lyme disease   • Intractable nausea and vomiting   • Hypokalemia   • Hypomagnesemia   • Acute hypokalemia   • Abnormal CT scan, stomach   • Intractable vomiting with nausea   • Falls   • Acute UTI (urinary tract infection)     Past Medical History:   Diagnosis Date   • Arthritis    • Depression    • Environmental allergies    • Falls 10/24/2018   • GERD (gastroesophageal reflux disease)    • Hyperlipidemia    • Hypertension    • Lyme disease    • Migraine    • [place] spotted fever    • Vitamin B 12 deficiency      Past Surgical History:   Procedure Laterality Date   • ACHILLES TENDON SURGERY Right    • BREAST CYST EXCISION Left    • CHOLECYSTECTOMY     • ENDOSCOPY N/A 10/16/2018    Procedure: ESOPHAGOGASTRODUODENOSCOPY;  Surgeon: Brunner, Mark I, MD;  Location: [facility] ENDOSCOPY;  Service: Gastroenterology   •  HYSTERECTOMY         Therapy Treatment          Rehabilitation Treatment Summary     Row Name 10/30/18 1340             Treatment Time/Intention    Discipline occupational therapist  -DEBRA      Document Type therapy note (daily note)  -DEBRA      Subjective Information complains of;dizziness   dizziness like vertigo standing, burning hands feet with WB  -DEBRA      Mode of Treatment individual therapy;occupational therapy  -DEBRA      Patient/Family Observations Pt. in recliner on tele, spouse arrived mid session.  -DEBRA      Care Plan Review care plan/treatment goals reviewed;risks/benefits reviewed;current/potential barriers reviewed  -DEBRA      Patient Effort good  -DEBRA      Comment Pt. limited by burning in feet with WB and in hands with certain task.  Also vertigo in standing with room spining.   -DEBRA      Existing Precautions/Restrictions fall   Limited coordination of extremities, nueopathy extremities.  -DEBRA      Recorded by [DEBRA] Vania Arceo, OT 10/30/18 1432      Row Name 10/30/18 1340             Vital Signs    Pre Systolic BP Rehab 97  -DEBRA      Pre Treatment Diastolic BP 76  -DEBRA      Intra Systolic BP Rehab 93  -DEBRA      Intra Treatment Diastolic BP 72   pt. feeling like room spinning, but no BP drop.  -DEBRA      Pretreatment Heart Rate (beats/min) 105  -DEBRA      Intratreatment Heart Rate (beats/min) 117  -DEBRA      Posttreatment Heart Rate (beats/min) 107  -DEBRA      O2 Delivery Pre Treatment room air  -DEBRA      O2 Delivery Intra Treatment room air  -DEBRA      O2 Delivery Post Treatment room air  -DEBRA      Pre Patient Position Sitting  -DEBRA      Intra Patient Position Standing  -DEBRA      Post Patient Position Sitting  -DEBRA      Recorded by [DEBRA] Vania Arceo, OT 10/30/18 1432      Row Name 10/30/18 1340             Cognitive Assessment/Intervention- PT/OT    Affect/Mental Status (Cognitive) confused   per  limbs jerking all over and limited sleep.  -DEBRA      Behavioral Issues (Cognitive) --   question if confusion partially  from lack of sleep   -DEBRA      Orientation Status (Cognition) oriented to;person   Pt. stated Nov. 1, 2019 for date.  -DEBRA      Follows Commands (Cognition) follows one step commands;75-90% accuracy  -DEBRA      Cognitive Function (Cognitive) safety deficit;memory deficit  -DEBRA      Memory Deficit (Cognitive) moderate deficit  -DEBRA      Safety Deficit (Cognitive) severe deficit  -DEBRA      Personal Safety Interventions fall prevention program maintained;gait belt;nonskid shoes/slippers when out of bed  -DEBRA      Recorded by [DEBRA] Vania Arceo, OT 10/30/18 1432      Row Name 10/30/18 1340             Safety Issues, Functional Mobility    Safety Issues Affecting Function (Mobility) safety precautions follow-through/compliance;problem solving;judgment  -DEBRA      Impairments Affecting Function (Mobility) balance;cognition;coordination;pain   pain from neuropathy feet and hands.  -DEBRA      Comment, Safety Issues/Impairments (Mobility) ataxia  -DEBRA      Recorded by [DEBRA] Vania Arceo, OT 10/30/18 1432      Row Name 10/30/18 1340             Bed Mobility Assessment/Treatment    Comment (Bed Mobility) UIC  -DEBRA      Recorded by [DEBRA] Vania Arceo, OT 10/30/18 1432      Row Name 10/30/18 1340             Functional Mobility    Functional Mobility- Comment Focused on balance and standing tolerance.  -DEBRA      Recorded by [DEBRA] Vania Arceo, OT 10/30/18 1432      Row Name 10/30/18 1340             Transfer Assessment/Treatment    Transfer Assessment/Treatment sit-stand transfer;stand-sit transfer  -DEBRA      Recorded by [DEBRA] Vania Arceo, OT 10/30/18 1432      Row Name 10/30/18 1340             Sit-Stand Transfer    Sit-Stand [place] (Transfers) minimum assist (75% patient effort);2 person assist;verbal cues;nonverbal cues (demo/gesture)  -DEBRA      Assistive Device (Sit-Stand Transfers) other (see comments)   gait belt and UE support due limited hand control on walker  -DEBRA      Recorded by [DEBRA] Vania Arceo, OT 10/30/18 1434       Row Name 10/30/18 1349             Stand-Sit Transfer    Stand-Sit [place] (Transfers) moderate assist (50% patient effort);2 person assist;nonverbal cues (demo/gesture);verbal cues;maximum assist (25% patient effort)   first time pt. got nervous and max, mod second dizzy.  -DEBRA      Assistive Device (Stand-Sit Transfers) other (see comments)   UE support and gait belt.  -DEBRA      Recorded by [DEBRA] Vania Arceo, OT 10/30/18 1432      Row Name 10/30/18 1340             ADL Assessment/Intervention    52027 - OT Self Care/Mgmt Minutes 12  -DEBRA      BADL Assessment/Intervention upper body dressing;grooming  -DEBRA      Recorded by [DEBRA] Vania Arceo, OT 10/30/18 1432      Row Name 10/30/18 1340             Upper Body Dressing Assessment/Training    Upper Body Dressing [place] Level doff;don;pull-over garment;supervision;verbal cues  -DEBRA      Upper Body Dressing Position supported sitting  -DEBRA      Comment (Upper Body Dressing) pt. was able to turn shirt right side out minus assist one sleeve.  Pt. able to ramiro shirt with extra time and then doff with cues for technique and encouragment after having hand pain.  -DEBRA      Recorded by [DEBRA] Vania Arceo, OT 10/30/18 1432      Row Name 10/30/18 1340             Lower Body Dressing Assessment/Training    Comment (Lower Body Dressing) pt. declined.  -DEBRA      Recorded by [DEBRA] Vania Arceo, OT 10/30/18 1432      Row Name 10/30/18 1340             Grooming Assessment/Training    [place] Level (Grooming) oral care regimen;minimum assist (75% patient effort)  -DEBRA      Grooming Position supported sitting  -DEBRA      Comment (Grooming) Pt. was unable to open tooth paste lid even after loosened.  Pt. stating it burned her fingers to try.  Pt. placed toothpaste on brush.  OT encouraged pt. to hold UE's close to trunk for increased control.  Pt. brushed minimally feeling like toothpaste burning mouth.  Pt. rinsed mouth, put spilt water sitting cup back on table.  -DEBRA       Recorded by [DEBRA] Vania Arceo, OT 10/30/18 1432      Row Name 10/30/18 1340             BADL Safety/Performance    Impairments, BADL Safety/Performance balance;cognition;coordination;pain   burning from neuropathy  -DEBRA      Skilled BADL Treatment/Intervention cognitive/safety deficit modifications;compensatory training  -DEBRA      Progress in BADL Status use of compensatory strategies  -DEBRA      Recorded by [DEBRA] Vania Arceo, OT 10/30/18 1432      Row Name 10/30/18 1340             Therapeutic Exercise    36653 - OT Therapeutic Exercise Minutes 5  -DEBRA      06906 - OT Therapeutic Activity Minutes 16  -DEBRA      Recorded by [DEBRA] Vania Arceo, OT 10/30/18 1432      Row Name 10/30/18 1340             Upper Extremity Seated Therapeutic Exercise    Comment, Seated Upper Extremity (Therapeutic Exercise) worked with pt. turning her shirt right side out and using paper towel to wipe water from table.  Min assist with each.  -DEBRA      Recorded by [DEBRA] Vania Arceo, OT 10/30/18 1432      Row Name 10/30/18 1340             Static Sitting Balance    Level of [place] (Unsupported Sitting, Static Balance) supervision  -DEBRA      Sitting Position (Unsupported Sitting, Static Balance) --   edge of chair  -DEBRA      Time Able to Maintain Position (Unsupported Sitting, Static Balance) more than 5 minutes  -DEBRA      Recorded by [DEBRA] Vania Arceo, OT 10/30/18 1432      Row Name 10/30/18 1340             Dynamic Sitting Balance    Level of [place], Reaches Outside Midline (Sitting, Dynamic Balance) supervision  -DEBRA      Sitting Position, Reaches Outside Midline (Sitting, Dynamic Balance) --   edge of chair with grooming and ther. exer.   -DEBRA      Recorded by [DEBRA] Vania Arceo, OT 10/30/18 1432      Row Name 10/30/18 1340             Static Standing Balance    Level of [place] (Supported Standing, Static Balance) minimal assist, 75% patient effort   of 2.  -DEBRA      Time Able to Maintain Position (Supported  Standing, Static Balance) 2 to 3 minutes  -DEBRA      Assistive Device Utilized (Supported Standing, Static Balance) other (see comments)   UE support and gait belt use.  -DEBRA      Comment (Unsupported Standing, Static Balance) stood twice, second time got BP.  worked on pt. standing upright, looking at one object and keeping entire foot on floor.  -DEBRA      Recorded by [DEBRA] Vania Arceo, OT 10/30/18 2902      Row Name 10/30/18 7322             Positioning and Restraints    Pre-Treatment Position sitting in chair/recliner  -DEBRA      Post Treatment Position chair  -DEBRA      In Chair reclined;call light within reach;encouraged to call for assist;exit alarm on;with family/caregiver  -DEBRA      Recorded by [DEBRA] Vania Arceo, OT 10/30/18 1432      Row Name 10/30/18 2058             Pain Scale: Numbers Pre/Post-Treatment    Pain Scale: Numbers, Pretreatment 0/10 - no pain   up to 7-9 with certain touches or mvmt in hands and feet.  -DEBRA      Pain Scale: Numbers, Post-Treatment 0/10 - no pain  -DEBRA      Recorded by [DEBRA] Vania Arceo, OT 10/30/18 1432      Row Name 10/30/18 4717             Plan of Care Review    Plan of Care Reviewed With patient;spouse  -DEBRA      Recorded by [DEBRA] Vania Arceo, OT 10/30/18 3146      Row Name 10/30/18 3742             Outcome Summary/Treatment Plan (OT)    Daily Summary of Progress (OT) progress toward functional goals is good;progress towards functional goals is fair  -DEBRA      Barriers to Overall Progress (OT) Limited by ataxia, confusion, neuropathy and vertigo  -DEBRA      Plan for Continued Treatment (OT) cont OT POC  -DEBRA      Recorded by [DEBRA] Vania Arceo, OT 10/30/18 1229        User Key  (r) = Recorded By, (t) = Taken By, (c) = Cosigned By    Initials Name Effective Dates Discipline    Vania Sierra, OT 06/08/18 -  OT                   OT Rehab Goals     Row Name 10/30/18 7373             Transfer Goal 1 (OT)    Progress/Outcome (Transfer Goal 1, OT) goal partially met   min to  stand, but mod to max to sit today.  -DEBRA         Toileting Goal 1 (OT)    Progress/Outcome (Toileting Goal 1, OT) goal partially met   clothing only sim. EOB  -DEBRA         Grooming Goal 1 (OT)    Progress/Outcome (Grooming Goal 1, OT) progress slower than expected   pt. min to brush teeth, but fair thoroughness  -DEBRA        User Key  (r) = Recorded By, (t) = Taken By, (c) = Cosigned By    Initials Name Provider Type Discipline    Vania Sierra, OT Occupational Therapist OT        Occupational Therapy Education     Title: PT OT SLP Therapies (Active)     Topic: Occupational Therapy (Active)     Point: ADL training (Active)     Description: Instruct learner(s) on proper safety adaptation and remediation techniques during self care or transfers.   Instruct in proper use of assistive devices.   Learning Progress Summary     Learner Status Readiness Method Response Comment Documented by    Patient Active Acceptance E,D NR positioning of UE with task to help decrease ataxia, placement of eyes to help with vertigo, transfer and standing safety. DEBRA 10/30/18 1340     Done Acceptance E,D VU,NR sliding board use, transfer safety, toilet techniques. DEBRA 10/28/18 1254     Done Acceptance E,D VU,NR coordination FMGM can completed, safety. DEBRA 10/22/18 1311     Active Acceptance E NR Pt educated on AE to improve self feeding, compensatory strategies d/t poor sensation/GMC, role of OT, and benefits of therapy. CL 10/20/18 1536     Done Acceptance E,D VU,NR Educated on therapeutic activites to increase functional I. AN 10/19/18 1416     Done Acceptance E,TB VU   10/18/18 1738     Done Acceptance E VU   10/17/18 0940     Done Acceptance E NR,VU Pt educated on ADL retraining with LBD, safety precautions, and appropriate body mechanics. HK 10/17/18 0940    Family Active Acceptance E,D NR positioning of UE with task to help decrease ataxia, placement of eyes to help with vertigo, transfer and standing safety. DEBRA 10/30/18 1340      Done Acceptance E,D VU,NR coordination FM can completed, safety. DEBRA 10/22/18 1311     Done Acceptance E,D VU,NR Educated on therapeutic activites to increase functional I. AN 10/19/18 1416     Done Acceptance E,TB VU   10/18/18 1738          Point: Home exercise program (Active)     Description: Instruct learner(s) on appropriate technique for monitoring, assisting and/or progressing therapeutic exercises/activities.   Learning Progress Summary     Learner Status Readiness Method Response Comment Documented by    Patient Active Acceptance E,D NR positioning of UE with task to help decrease ataxia, placement of eyes to help with vertigo, transfer and standing safety. DEBRA 10/30/18 1340     Done Acceptance E,H,D VU,DU issued handout and pt [facility] [place] [place] 10/24/18 1113     Done Acceptance E,D VU,NR coordination FM can completed, safety. DEBRA 10/22/18 1311     Done Acceptance E,TB VU   10/18/18 1738    Family Active Acceptance E,D NR positioning of UE with task to help decrease ataxia, placement of eyes to help with vertigo, transfer and standing safety. DEBRA 10/30/18 1340     Done Acceptance E,H,D VU,DU issued handout and pt gave return demo  [facility] [place] [place] 10/24/18 1113     Done Acceptance E,D VU,NR coordination FM can completed, safety. DEBRA 10/22/18 1311     Done Acceptance E,TB VU   10/18/18 1738          Point: Precautions (Active)     Description: Instruct learner(s) on prescribed precautions during self-care and functional transfers.   Learning Progress Summary     Learner Status Readiness Method Response Comment Documented by    Patient Active Acceptance E,D NR positioning of UE with task to help decrease ataxia, placement of eyes to help with vertigo, transfer and standing safety. DEBRA 10/30/18 1340     Done Acceptance E,D VU,NR sliding board use, transfer safety, toilet techniques. DEBRA 10/28/18 1254     Done Acceptance E,D VU,NR coordination FM can completed, safety. DEBRA 10/22/18 1311      Active Acceptance E NR Pt educated on AE to improve self feeding, compensatory strategies d/t poor sensation/GMC, role of OT, and benefits of therapy. CL 10/20/18 1536     Done Acceptance E,TB VU  LC 10/18/18 1738     Done Acceptance E VU   10/17/18 0940     Done Acceptance E NR,VU Pt educated on ADL retraining with LBD, safety precautions, and appropriate body mechanics.  10/17/18 0940    Family Active Acceptance E,D NR positioning of UE with task to help decrease ataxia, placement of eyes to help with vertigo, transfer and standing safety. DEBRA 10/30/18 1340     Done Acceptance E,D VU,NR coordination FMGM can completed, safety. DEBRA 10/22/18 1311     Done Acceptance E,TB VU   10/18/18 1738          Point: Body mechanics (Active)     Description: Instruct learner(s) on proper positioning and spine alignment during self-care, functional mobility activities and/or exercises.   Learning Progress Summary     Learner Status Readiness Method Response Comment Documented by    Patient Active Acceptance E,D NR positioning of UE with task to help decrease ataxia, placement of eyes to help with vertigo, transfer and standing safety. DEBRA 10/30/18 1340     Done Acceptance E,TB VU   10/18/18 1738     Done Acceptance E VU   10/17/18 0940     Done Acceptance E NR,VU Pt educated on ADL retraining with LBD, safety precautions, and appropriate body mechanics.  10/17/18 0940    Family Active Acceptance E,D NR positioning of UE with task to help decrease ataxia, placement of eyes to help with vertigo, transfer and standing safety. DEBRA 10/30/18 1340     Done Acceptance E,TB VU   10/18/18 1738                      User Key     Initials Effective Dates Name Provider Type Discipline    DEBRA 06/08/18 -  Vania Arceo, OT Occupational Therapist OT    AC 06/23/15 -  Yas Lawrence, OT Occupational Therapist OT    AN 06/22/15 -  Bren Garcia, OT Occupational Therapist OT     06/16/16 -  Lori Kumari, RN Registered Nurse Nurse     CL 04/03/18 -  Cecy Tompkins, OT Occupational Therapist OT    HK 03/07/18 -  Rita Smith OT Occupational Therapist OT                OT Recommendation and Plan  Outcome Summary/Treatment Plan (OT)  Daily Summary of Progress (OT): progress toward functional goals is good, progress towards functional goals is fair  Barriers to Overall Progress (OT): Limited by ataxia, confusion, neuropathy and vertigo  Plan for Continued Treatment (OT): cont OT POC  Daily Summary of Progress (OT): progress toward functional goals is good, progress towards functional goals is fair  Plan of Care Review  Plan of Care Reviewed With: patient, spouse  Plan of Care Reviewed With: patient, spouse  Outcome Summary: Pt. more confused this OT session compaired to last session.  Pt. talking about switching flats with other couple and how theirs was being built.  Attempted focues on compensatory techniques and safety with standing and transfers. Limited by confusion and pain in feet and hands.  Cont. with vertigo in standing.  BP stable in standing today.        Outcome Measures     Row Name 10/30/18 1340 10/28/18 1310 10/28/18 1254       How much help from another person do you currently need...    Turning from your back to your side while in flat bed without using bedrails?  -- 3  -DEBRA  --    Moving from lying on back to sitting on the side of a flat bed without bedrails?  -- 3  -DEBRA  --    Moving to and from a bed to a chair (including a wheelchair)?  -- 3  -DEBRA  --    Standing up from a chair using your arms (e.g., wheelchair, bedside chair)?  -- 3  -DEBRA  --    Climbing 3-5 steps with a railing?  -- 1  -DEBRA  --    To walk in hospital room?  -- 2  -DEBRA  --    AM-PAC 6 Clicks Score  -- 15  -DEBRA  --       How much help from another is currently needed...    Putting on and taking off regular lower body clothing? 2  -JBA  -- 2  -JBA    Bathing (including washing, rinsing, and drying) 2  -JBA  -- 2  -JBA    Toileting (which includes using toilet bed pan  or urinal) 2  -JBA  -- 2  -JBA    Putting on and taking off regular upper body clothing 3  -JBA  -- 3  -JBA    Taking care of personal grooming (such as brushing teeth) 3  -JBA  -- 3  -JBA    Eating meals 3  -JBA  -- 3  -JBA    Score 15  -JBA  -- 15  -JBA       Functional Assessment    Outcome Measure Options AM-PAC 6 Clicks Daily Activity (OT)  -JBA  -- AM-PAC 6 Clicks Daily Activity (OT)  -JBA      User Key  (r) = Recorded By, (t) = Taken By, (c) = Cosigned By    Initials Name Provider Type    Cady Simon, PT Physical Therapist    Vania Watkins, OT Occupational Therapist           Time Calculation:         Time Calculation- OT     Row Name 10/30/18 1340             Time Calculation- OT    OT Start Time 1340  -DEBRA      OT Received On 10/30/18  -DEBRA      OT Goal Re-Cert Due Date 11/07/18  -DEBRA         Timed Charges    01676 - OT Therapeutic Exercise Minutes 5  -DEBRA      83616 - OT Therapeutic Activity Minutes 16  -DEBRA      33183 - OT Self Care/Mgmt Minutes 12  -DEBRA        User Key  (r) = Recorded By, (t) = Taken By, (c) = Cosigned By    Initials Name Provider Type    Vania Sierra, OT Occupational Therapist           Therapy Suggested Charges     Code   Minutes Charges    46750 (CPT®) Hc Ot Neuromusc Re Education Ea 15 Min      51821 (CPT®) Hc Ot Ther Proc Ea 15 Min 5     00505 (CPT®) Hc Ot Therapeutic Act Ea 15 Min 16 1    57114 (CPT®) Hc Ot Manual Therapy Ea 15 Min      81650 (CPT®) Hc Ot Iontophoresis Ea 15 Min      31761 (CPT®) Hc Ot Elec Stim Ea-Per 15 Min      09209 (CPT®) Hc Ot Ultrasound Ea 15 Min      90959 (CPT®) Hc Ot Self Care/Mgmt/Train Ea 15 Min 12 1    Total  33 2        Therapy Charges for Today     Code Description Service Date Service Provider Modifiers Qty    75383607740 HC OT THERAPEUTIC ACT EA 15 MIN 10/30/2018 Vania Arceo, OT GO 1    65746508131 HC OT SELF CARE/MGMT/TRAIN EA 15 MIN 10/30/2018 Vania Arceo OT GO 1               Vania Arceo  OT  10/30/2018    Electronically signed by Vania Arceo, OT at 10/30/2018  2:38 PM

## 2018-11-01 NOTE — PLAN OF CARE
Problem: Patient Care Overview  Goal: Plan of Care Review  Outcome: Ongoing (interventions implemented as appropriate)   11/01/18 4604   Coping/Psychosocial   Plan of Care Reviewed With patient   Plan of Care Review   Progress declining   OTHER   Outcome Summary pt upset, confused, asking to leave, frequently re-directed but continues to forget she is in [place]. sinus tach on monitor. fall risk, bed and chair alarms on at all times.very weak and unsteady gait, unbalanced, hand tremors .     Goal: Individualization and Mutuality  Outcome: Ongoing (interventions implemented as appropriate)    Goal: Discharge Needs Assessment  Outcome: Ongoing (interventions implemented as appropriate)    Goal: Interprofessional Rounds/Family Conf  Outcome: Ongoing (interventions implemented as appropriate)      Problem: Nutrition, Imbalanced: Excessive Oral Intake (Adult)  Goal: Acknowledges the Importance of Weight Loss/Maintenance and Overall Health  Outcome: Ongoing (interventions implemented as appropriate)    Goal: Expresses Desire and Motivation to Change  Outcome: Ongoing (interventions implemented as appropriate)

## 2018-11-01 NOTE — PLAN OF CARE
Problem: Patient Care Overview  Goal: Plan of Care Review  Outcome: Ongoing (interventions implemented as appropriate)   11/01/18 1150   Coping/Psychosocial   Plan of Care Reviewed With patient   Plan of Care Review   Progress improving   OTHER   Outcome Summary Pt able to t/f with modA x2, and ambulated 55ft with modA x1 +1 for recliner in Counselor. Pt continues to be limited by ataxia. Continue POC.

## 2018-11-01 NOTE — PROGRESS NOTES
[facility] Medicine Services  PROGRESS NOTE    Patient Name: Bryanna Schwartz  : 1970  MRN: [id number]    Date of Admission: 10/14/2018  Length of Stay: 0  Primary Care Physician: Andrei Crisostomo MD    Subjective   Subjective     CC: F/U N/V and bilateral LE weakness    HPI:  Patient seen this morning. Awake, alert. Wants to go home with her sister.     Review of Systems  Gen-no fevers, no chills  CV-no chest pain, no palpitations  Resp-no cough, no dyspnea  GI-no N/V/D, no abd pain      Otherwise ROS is negative except as mentioned in the HPI.    Objective   Objective     Vital Signs:   Temp:  [97.6 °F (36.4 °C)-98.7 °F (37.1 °C)] 98.7 °F (37.1 °C)  Heart Rate:  [] 104  Resp:  [16-18] 18  BP: ()/() 138/107        Physical Exam:  Constitutional: No acute distress, awake, alert, sitting up in bed  HENT: NCAT, mucous membranes moist  Respiratory: Clear to auscultation bilaterally, respiratory effort normal  Cardiovascular: RRR, no murmurs, rubs, or gallops, palpable pedal pulses bilaterally  Gastrointestinal: Positive bowel sounds, soft, nontender, nondistended  Musculoskeletal: No bilateral ankle edema   Psychiatric: appropriate mood  Neurologic: Oriented x 3, BLE weakness  Skin: No rashes    Results Reviewed:  I have personally reviewed current lab, radiology, and data and agree.      Results from last 7 days  Lab Units 18  0610 10/30/18  0732 10/28/18  0801   WBC 10*3/mm3 7.67 8.10 6.61   HEMOGLOBIN g/dL 11.0* 13.0 11.8   HEMATOCRIT % 33.2* 39.2 35.6   PLATELETS 10*3/mm3 336 338 280       Results from last 7 days  Lab Units 18  0610 10/31/18  0530 10/30/18  0732   SODIUM mmol/L 139 139 139   POTASSIUM mmol/L 3.6 3.6 4.2   CHLORIDE mmol/L 107 101 101   CO2 mmol/L 25.0 26.0 30.0   BUN mg/dL 9 13 7*   CREATININE mg/dL 0.61 0.69 0.68   GLUCOSE mg/dL 96 103* 92   CALCIUM mg/dL 8.9 8.9 9.7     Estimated Creatinine Clearance: 134.4 mL/min (by C-G  formula based on SCr of 0.61 mg/dL).  No results found for: BNP    Microbiology Results Abnormal     Procedure Component Value - Date/Time    Urine Culture - Urine, [[id number]]  (Abnormal)  (Susceptibility) Collected:  10/18/18 1603    Lab Status:  Final result Specimen:  Urine from Urine, Clean Catch Updated:  10/21/18 1055     Urine Culture >100,000 CFU/mL Proteus mirabilis (A)      40,000-50,000 CFU/mL Escherichia coli (A)    Susceptibility      Proteus mirabilis    Escherichia coli       ARELI    ARELI       Ampicillin <=8 ug/ml Susceptible    <=8 ug/ml Susceptible       Ampicillin + Sulbactam <=8/4 ug/ml Susceptible    <=8/4 ug/ml Susceptible       Aztreonam <=8 ug/ml Susceptible    <=8 ug/ml Susceptible       Cefepime <=8 ug/ml Susceptible    <=8 ug/ml Susceptible       Cefotaxime <=2 ug/ml Susceptible    <=2 ug/ml Susceptible       Ceftriaxone <=8 ug/ml Susceptible    <=8 ug/ml Susceptible       Cefuroxime sodium <=4 ug/ml Susceptible    <=4 ug/ml Susceptible       Cephalothin <=8 ug/ml Susceptible    <=8 ug/ml Susceptible       Ertapenem <=1 ug/ml Susceptible    <=1 ug/ml Susceptible       Gentamicin <=4 ug/ml Susceptible    <=4 ug/ml Susceptible       Levofloxacin <=2 ug/ml Susceptible    <=2 ug/ml Susceptible       Meropenem <=1 ug/ml Susceptible    <=1 ug/ml Susceptible       Nitrofurantoin       <=32 ug/ml Susceptible       Piperacillin + Tazobactam <=16 ug/ml Susceptible    <=16 ug/ml Susceptible       Tetracycline       <=4 ug/ml Susceptible       Tobramycin <=4 ug/ml Susceptible    <=4 ug/ml Susceptible       Trimethoprim + Sulfamethoxazole <=2/38 ug/ml Susceptible    <=2/38 ug/ml Susceptible                      Blood Culture - Blood, [[id number]]  (Normal) Collected:  10/15/18 0029    Lab Status:  Final result Specimen:  Blood from Arm, Right Updated:  10/20/18 0130     Blood Culture No growth at 5 days    Blood Culture - Blood, [[id number]]  (Normal) Collected:  10/15/18 0029    Lab Status:  Final result  Specimen:  Blood from Arm, Right Updated:  10/20/18 0130     Blood Culture No growth at 5 days    Eosinophil Smear - Urine, Urine, Clean Catch [[id number]]  (Normal) Collected:  10/18/18 1603    Lab Status:  Final result Specimen:  Urine from Urine, Clean Catch Updated:  10/18/18 1802     Eosinophil Smear 0 % EOS/100 Cells     Narrative:       No eosinophil seen          Imaging Results (last 24 hours)     ** No results found for the last 24 hours. **             I have reviewed the medications.      atenolol 50 mg Oral Q12H   atorvastatin 10 mg Oral Daily   b complex-vitamin c-folic acid 1 tablet Oral Daily   cetirizine 10 mg Oral Daily   DULoxetine 60 mg Oral Daily   latanoprost 1 drop Both Eyes Nightly   lisinopril 2.5 mg Oral Daily   melatonin 5 mg Sublingual Nightly   montelukast 5 mg Oral Nightly   pantoprazole 40 mg Oral BID AC   polyethylene glycol 17 g Oral Daily   sodium chloride 3 mL Intravenous Q12H         Assessment/Plan   Assessment / Plan     Active Hospital Problems    Diagnosis   • **Intractable nausea and vomiting   • Acute UTI (urinary tract infection)   • Falls   • GERD without esophagitis   • Anxiety and depression   • Hyperlipidemia   • Essential hypertension   • Migraines   • Lyme disease   • Hypokalemia   • Hypomagnesemia   • Acute hypokalemia   • Abnormal CT scan, stomach     Added automatically from request for surgery [id number]     • Intractable vomiting with nausea     Added automatically from request for surgery [id number]          Brief Hospital Course to date:  Bryanna Schwartz is a 48 y.o. female with a past medical history of anxiety, depression, hypertension, hyperlipidemia, migraines. Recent rx for RMSF and lyme. She presented with what she describes as 6 months of intractable nausea and vomiting that has been progressively worsening with reported weight loss of 120 lbs.     Assessment & Plan:    --Nausea/Vomiting-She has had significant workup at OSH this has included EGD, CCY. Her  symptoms of anorexia, early satiety with n/v and 120lb weight loss( she does not look like she has lost that much weight ).  CT abd/pelvis showed thickening of the proximal gastric mucosa, she is s/p EGD 10/16 showed antral gastritis with reflux esophagitis, currently on protonix 40 mg bid. Symptoms have improved markedly and now patient tolerates PO diet. Suspect functional etiology of her nausea.     --Multiple electrolyte abnormalities -hypokalemia, hypomagnesemia, etiology unknown? Adrenal insufficieny, however random cortisol is wnl, suspect from prolonged n/v, continue to monitor and replete.    --UTI- likely contributed to N/V on admission.  UCx with >100K proteus and 40-50K E coli,both of which were susceptible to Rocephin. Pt completed 5 total days of Rocephin (last dose 10/22).      --Chronic illness malnutrition, nutrition/dietecian following.    --HTN- not well controlled, concerning for Pheochromocytoma. Nephrology following.  Reviewed workup and is concerning for Pheo. Pt has had CT A/P on admission with no finding of adrenal incidentaloma. BP remains difficult to control with lows and extreme highs.  Abnormal urine metanephrines.  MRI unremarkable.  BP med adjustments made by NAL yesterday. Now hypotensive again despite Lisinopril being held. Reviewed earlier admission event when she was hypotensive and, at that time, she had taken her home dose of muscle relaxer and RN today is concerned that this is the case now. Pt eventually improved (during last event) with 4x 1L boluses. Continue fluids. Improved.    --- Significant bilateral LE weakness with ataxia, has had some ataxia in the past, previously seen by Dr. Syed at - [facility]. Neurology has seen the patient here on this admission. Rehab placement PENDING.     --- Encephalopathy- likely multifactorial due to UTI, malnutrition, issues with BP.  Hold Seroquel.  I discussed non medication related treatments- keep busy during day.  No napping.  Up in  chair, wheelchair walks in hallway etc. IMPROVED.    DISPO: Pending home vs rehab.     DVT Prophylaxis: mechanical    CODE STATUS:   Code Status and Medical Interventions:   Ordered at: 10/15/18 0406     Level Of Support Discussed With:    Patient     Code Status:    CPR     Medical Interventions (Level of Support Prior to Arrest):    Full     Dispo: rehab placement PENDING    Electronically signed by Erin De La Torre MD, 11/01/18, 2:33 PM.

## 2018-11-01 NOTE — PLAN OF CARE
Problem: Patient Care Overview  Goal: Plan of Care Review  Outcome: Ongoing (interventions implemented as appropriate)   11/01/18 0339   Coping/Psychosocial   Plan of Care Reviewed With patient   Plan of Care Review   Progress no change   OTHER   Outcome Summary Patient awake most of the night. BP better. Having episodes of agitation and crying. Confused       Problem: Nutrition, Imbalanced: Excessive Oral Intake (Adult)  Goal: Identify Related Risk Factors and Signs and Symptoms  Outcome: Ongoing (interventions implemented as appropriate)

## 2018-11-01 NOTE — THERAPY TREATMENT NOTE
Acute Care - Physical Therapy Treatment Note  [facility]     Patient Name: Bryanna Schwartz  : 1970  MRN: [id number]  Today's Date: 2018  Onset of Illness/Injury or Date of Surgery: 10/14/18  Date of Referral to PT: 10/16/18  Referring Physician: MD Velasquez    Admit Date: 10/14/2018    Visit Dx:    ICD-10-CM ICD-9-CM   1. Acute hypokalemia E87.6 276.8   2. Hypomagnesemia E83.42 275.2   3. Unintentional weight loss R63.4 783.21   4. Intractable cyclical vomiting with nausea G43.A1 536.2   5. Abnormal CT scan, stomach R93.3 793.4   6. Intractable vomiting with nausea, unspecified vomiting type R11.2 536.2   7. Impaired functional mobility, balance, gait, and endurance Z74.09 V49.89   8. Impaired mobility and ADLs Z74.09 799.89     Patient Active Problem List   Diagnosis   • GERD without esophagitis   • Anxiety and depression   • Hyperlipidemia   • Essential hypertension   • Migraines   • Lyme disease   • Intractable nausea and vomiting   • Hypokalemia   • Hypomagnesemia   • Acute hypokalemia   • Abnormal CT scan, stomach   • Intractable vomiting with nausea   • Falls   • Acute UTI (urinary tract infection)       Therapy Treatment          Rehabilitation Treatment Summary     Row Name 18 1105             Treatment Time/Intention    Discipline physical therapist  -SJ      Document Type therapy note (daily note)  -SJ      Subjective Information no complaints  -SJ      Mode of Treatment individual therapy  -SJ      Patient/Family Observations Pt seated EOB, awake, alert  -SJ      Care Plan Review care plan/treatment goals reviewed;risks/benefits reviewed;current/potential barriers reviewed;patient/other agree to care plan  -SJ      Therapy Frequency (PT Clinical Impression) daily  -SJ      Patient Effort excellent  -SJ      Existing Precautions/Restrictions fall  -SJ      Treatment Considerations/Comments per report, feet hypersensitive to touch  -SJ      Recorded by [SJ] Rosa M March, PT 18  1150      Row Name 11/01/18 1105             Vital Signs    Pre Systolic BP Rehab --   VSS  -SJ      Recorded by [SJ] Rosa M March, PT 11/01/18 1150      Row Name 11/01/18 1105             Cognitive Assessment/Intervention- PT/OT    Affect/Mental Status (Cognitive) confused;confabulatory  -SJ      Orientation Status (Cognition) oriented to;person  -SJ      Follows Commands (Cognition) follows one step commands;75-90% accuracy  -SJ      Cognitive Function (Cognitive) safety deficit  -SJ      Personal Safety Interventions fall prevention program maintained;gait belt;muscle strengthening facilitated;nonskid shoes/slippers when out of bed  -SJ      Recorded by [SJ] Rosa M March, PT 11/01/18 1150      Row Name 11/01/18 1105             Safety Issues, Functional Mobility    Safety Issues Affecting Function (Mobility) awareness of need for assistance;insight into deficits/self awareness;sequencing abilities;positioning of assistive device  -SJ      Impairments Affecting Function (Mobility) balance;coordination;cognition;muscle tone abnormal;strength  -SJ      Comment, Safety Issues/Impairments (Mobility) ataxia  -SJ      Recorded by [SJ] Rosa M March, PT 11/01/18 1150      Row Name 11/01/18 1105             Bed Mobility Assessment/Treatment    Supine-Sit [place] (Bed Mobility) conditional independence  -SJ      Recorded by [SJ] Rosa M March, PT 11/01/18 1150      Row Name 11/01/18 1105             Transfer Assessment/Treatment    Comment (Transfers) cues for hand/foot placement and sequencing  -SJ      Recorded by [SJ] Rosa M March, PT 11/01/18 1150      Row Name 11/01/18 1105             Bed-Chair Transfer    Bed-Chair [place] (Transfers) moderate assist (50% patient effort);2 person assist;verbal cues  -SJ      Assistive Device (Bed-Chair Transfers) walker, front-wheeled  -SJ      Recorded by [SJ] Rosa M March, PT 11/01/18 1150      Row Name 11/01/18 1105             Sit-Stand Transfer     Sit-Stand [place] (Transfers) moderate assist (50% patient effort);2 person assist  -SJ      Assistive Device (Sit-Stand Transfers) walker, front-wheeled  -SJ      Recorded by [SJ] Rosa M March, PT 11/01/18 1150      Row Name 11/01/18 1105             Stand-Sit Transfer    Stand-Sit [place] (Transfers) moderate assist (50% patient effort);2 person assist  -SJ      Assistive Device (Stand-Sit Transfers) walker, front-wheeled  -SJ      Recorded by [SJ] Rosa M March, PT 11/01/18 1150      Row Name 11/01/18 1105             Gait/Stairs Assessment/Training    85618 - Gait Training Minutes  23  -SJ      Gait/Stairs Assessment/Training gait/ambulation independence  -SJ      [place] Level (Gait) moderate assist (50% patient effort);1 person assist;1 person to manage equipment  -SJ      Assistive Device (Gait) walker, front-wheeled  -SJ      Distance in Feet (Gait) 55  -SJ      Pattern (Gait) step-to  -SJ      Deviations/Abnormal Patterns (Gait) ataxic;scissoring  -SJ      Bilateral Gait Deviations knee buckling, bilateral  -SJ      Comment (Gait/Stairs) Pt has very ataxic gait req max cues for safety and sequencing, cues for advancing RW. THerapist gave support throught gait belt directly behind patient and recliner in tow pushed by tech.   -SJ      Recorded by [SJ] Rosa M March, PT 11/01/18 1150      Row Name 11/01/18 1105             Therapeutic Exercise    Lower Extremity (Therapeutic Exercise) marching while seated;LAQ (long arc quad), bilateral  -SJ      Exercise Type (Therapeutic Exercise) AROM (active range of motion)  -SJ      Position (Therapeutic Exercise) seated  -SJ      Sets/Reps (Therapeutic Exercise) 15  -SJ      Recorded by [SJ] Rosa M March, PT 11/01/18 1150      Row Name 11/01/18 1105             Static Sitting Balance    Level of [place] (Unsupported Sitting, Static Balance) supervision  -SJ      Sitting Position (Unsupported Sitting, Static Balance) sitting on edge  of bed  -SJ      Recorded by [MICHAEL] Rosa M March, PT 11/01/18 1150      Row Name 11/01/18 1105             Static Standing Balance    Level of [place] (Supported Standing, Static Balance) moderate assist, 50 to 74% patient effort  -SJ      Assistive Device Utilized (Supported Standing, Static Balance) rolling walker  -SJ      Recorded by [MICHAEL] Rosa M March, PT 11/01/18 1150      Row Name 11/01/18 1105             Positioning and Restraints    Pre-Treatment Position in bed  -SJ      Post Treatment Position chair  -SJ      In Chair notified nsg;reclined;call light within reach;encouraged to call for assist;exit alarm on;waffle cushion;heels elevated  -SJ      Recorded by [MICHAEL] Rosa M March, PT 11/01/18 1150      Row Name 11/01/18 1105             Pain Scale: Numbers Pre/Post-Treatment    Pain Scale: Numbers, Pretreatment 0/10 - no pain  -SJ      Pain Scale: Numbers, Post-Treatment 0/10 - no pain  -SJ      Recorded by [MICHAEL] Rosa M March, PT 11/01/18 1150      Row Name 11/01/18 1105             Coping    Observed Emotional State calm;cooperative  -SJ      Verbalized Emotional State anxiety  -SJ      Recorded by [MICHAEL] Rosa M March, PT 11/01/18 1150      Row Name 11/01/18 1105             Plan of Care Review    Plan of Care Reviewed With patient  -SJ      Recorded by [MICHAEL] Rosa M March, PT 11/01/18 1150      Row Name 11/01/18 1105             Outcome Summary/Treatment Plan (PT)    Daily Summary of Progress (PT) progress toward functional goals is good  -SJ      Recorded by [MICHAEL] Rosa M March, PT 11/01/18 1150        User Key  (r) = Recorded By, (t) = Taken By, (c) = Cosigned By    Initials Name Effective Dates Discipline    Rosa M Preston, PT 06/19/15 -  PT                     Physical Therapy Education     Title: PT OT SLP Therapies (Active)     Topic: Physical Therapy (Active)     Point: Mobility training (Active)    Learning Progress Summary     Learner Status Readiness Method Response  Comment Documented by    Patient Active Acceptance E,D NR  SJ 11/01/18 1151     Active Eager E,D,H NR  DM 10/30/18 1759     Active Acceptance E NR  DEBRA 10/28/18 1310     Active Acceptance E NR  DEBRA 10/27/18 1330     Active Acceptance E NR  KR 10/25/18 1444     Active Acceptance E NR  AS 10/22/18 1012     Active Acceptance E NR  DEBRA 10/19/18 1515     Done Acceptance E,TB VU  LC 10/18/18 1738     Done Acceptance E,D NR,DU  BD 10/18/18 1049     Active Acceptance E,D NR  BD 10/17/18 1332    Family Done Acceptance E,TB VU  LC 10/18/18 1738     Active Acceptance E,D NR  BD 10/17/18 1332    Significant Other Active Eager E,D,H NR  DM 10/30/18 1759     Active Acceptance E NR  KR 10/25/18 1444     Active Acceptance E NR  DEBRA 10/19/18 1515     Done Acceptance E,D NR,DU  BD 10/18/18 1049          Point: Home exercise program (Active)    Learning Progress Summary     Learner Status Readiness Method Response Comment Documented by    Patient Active Acceptance E,D NR   11/01/18 1151     Active Eager E,D,H NR  DM 10/30/18 1759     Active Acceptance E NR  DEBRA 10/28/18 1310     Active Acceptance E NR  DEBRA 10/27/18 1330     Active Acceptance E NR  KR 10/25/18 1444     Active Acceptance E NR  AS 10/22/18 1012     Active Acceptance E NR  DEBRA 10/19/18 1515     Done Acceptance E,TB VU  LC 10/18/18 1738     Done Acceptance E,D NR,DU  BD 10/18/18 1049     Active Acceptance E,D NR  BD 10/17/18 1332    Family Done Acceptance E,TB VU  LC 10/18/18 1738     Active Acceptance E,D NR  BD 10/17/18 1332    Significant Other Active Eager E,D,H NR  DM 10/30/18 1759     Active Acceptance E NR  KR 10/25/18 1444     Active Acceptance E NR  DEBRA 10/19/18 1515     Done Acceptance E,D NR,DU  BD 10/18/18 1049          Point: Body mechanics (Active)    Learning Progress Summary     Learner Status Readiness Method Response Comment Documented by    Patient Active Acceptance E,D NR   11/01/18 1151     Active Eager E,D,H NR  DM 10/30/18 1759     Active Acceptance E NR   DEBRA 10/28/18 1310     Active Acceptance E NR  DEBRA 10/27/18 1330     Active Acceptance E NR  KR 10/25/18 1444     Active Acceptance E NR  AS 10/22/18 1012     Active Acceptance E NR  DERBA 10/19/18 1515     Done Acceptance E,TB VU  LC 10/18/18 1738     Done Acceptance E,D NR,DU  BD 10/18/18 1049     Active Acceptance E,D NR  BD 10/17/18 1332    Family Done Acceptance E,TB VU  LC 10/18/18 1738     Active Acceptance E,D NR  BD 10/17/18 1332    Significant Other Active Eager E,D,H NR  DM 10/30/18 1759     Active Acceptance E NR  KR 10/25/18 1444     Active Acceptance E NR  DEBRA 10/19/18 1515     Done Acceptance E,D NR,DU  BD 10/18/18 1049          Point: Precautions (Active)    Learning Progress Summary     Learner Status Readiness Method Response Comment Documented by    Patient Active Acceptance E,D NR  SJ 11/01/18 1151     Active Eager E,D,H NR  DM 10/30/18 1759     Active Acceptance E NR  DEBRA 10/28/18 1310     Active Acceptance E NR  DEBRA 10/27/18 1330     Active Acceptance E NR  KR 10/25/18 1444     Active Acceptance E NR  AS 10/22/18 1012     Active Acceptance E NR  DEBRA 10/19/18 1515     Done Acceptance E,TB VU  LC 10/18/18 1738     Done Acceptance E,D NR,DU  BD 10/18/18 1049     Active Acceptance E,D NR  BD 10/17/18 1332    Family Done Acceptance E,TB VU  LC 10/18/18 1738     Active Acceptance E,D NR  BD 10/17/18 1332    Significant Other Active Eager E,D,H NR  DM 10/30/18 1759     Active Acceptance E NR  KR 10/25/18 1444     Active Acceptance E NR  DEBRA 10/19/18 1515     Done Acceptance E,D NR,DU  BD 10/18/18 1049                      User Key     Initials Effective Dates Name Provider Type Discipline    DEBRA 06/19/15 -  Cady Clemons, PT Physical Therapist PT    SJ 06/19/15 -  Rosa M March, PT Physical Therapist PT    DM 06/19/15 -  Jaqui Jin, PT Physical Therapist PT    AS 06/22/15 -  Vanessa Espino, PTA Physical Therapy Assistant PT     06/16/16 -  Lori Kumari, RN Registered Nurse Nurse    BD  06/08/18 -  Vanessa Greene, PT Physical Therapist PT    KR 04/03/18 -  Shayy Luo, PT Physical Therapist PT                    PT Recommendation and Plan  Therapy Frequency (PT Clinical Impression): daily  Outcome Summary/Treatment Plan (PT)  Daily Summary of Progress (PT): progress toward functional goals is good  Plan of Care Reviewed With: patient  Progress: improving  Outcome Summary: Pt able to t/f with modA x2, and ambulated 55ft with modA x1 +1 for recliner in tow. Pt continues to be limited by ataxia. Continue POC.          Outcome Measures     Row Name 11/01/18 1105 10/30/18 1703 10/30/18 1340       How much help from another person do you currently need...    Turning from your back to your side while in flat bed without using bedrails? 4  -SJ 3  -DM  --    Moving from lying on back to sitting on the side of a flat bed without bedrails? 4  -SJ 3  -DM  --    Moving to and from a bed to a chair (including a wheelchair)? 2  -SJ 3  -DM  --    Standing up from a chair using your arms (e.g., wheelchair, bedside chair)? 2  -SJ 2  -DM  --    Climbing 3-5 steps with a railing? 2  -SJ 1  -DM  --    To walk in hospital room? 2  -SJ 2  -DM  --    AM-PAC 6 Clicks Score 16  -SJ 14  -DM  --       How much help from another is currently needed...    Putting on and taking off regular lower body clothing?  --  -- 2  -DEBRA    Bathing (including washing, rinsing, and drying)  --  -- 2  -DEBRA    Toileting (which includes using toilet bed pan or urinal)  --  -- 2  -DEBRA    Putting on and taking off regular upper body clothing  --  -- 3  -DEBRA    Taking care of personal grooming (such as brushing teeth)  --  -- 3  -DEBRA    Eating meals  --  -- 3  -DEBRA    Score  --  -- 15  -DEBRA       Functional Assessment    Outcome Measure Options AM-PAC 6 Clicks Basic Mobility (PT)  -SJ AM-PAC 6 Clicks Basic Mobility (PT)  -DM AM-PAC 6 Clicks Daily Activity (OT)  -DEBRA      User Key  (r) = Recorded By, (t) = Taken By, (c) = Cosigned By    Initials  Name Provider Type    Vania Sierra, OT Occupational Therapist    SJ Rosa M March, PT Physical Therapist    Jaqui Carlin, PT Physical Therapist           Time Calculation:         PT Charges     Row Name 11/01/18 1151 11/01/18 1105          Time Calculation    Start Time 1105  -SJ  --     PT Received On 11/01/18  -  --     PT Goal Re-Cert Due Date 11/07/18  -  --        Time Calculation- PT    Total Timed Code Minutes- PT 23 minute(s)  -  --        Timed Charges    13723 - Gait Training Minutes   -- 23  -SJ       User Key  (r) = Recorded By, (t) = Taken By, (c) = Cosigned By    Initials Name Provider Type    Rosa M Preston, PT Physical Therapist        Therapy Suggested Charges     Code   Minutes Charges    42257 (CPT®) Hc Pt Neuromusc Re Education Ea 15 Min      05655 (CPT®) Hc Pt Ther Proc Ea 15 Min      89394 (CPT®) Hc Gait Training Ea 15 Min 23 2    06532 (CPT®) Hc Pt Therapeutic Act Ea 15 Min      41770 (CPT®) Hc Pt Manual Therapy Ea 15 Min      89697 (CPT®) Hc Pt Iontophoresis Ea 15 Min      31398 (CPT®) Hc Pt Elec Stim Ea-Per 15 Min      67841 (CPT®) Hc Pt Ultrasound Ea 15 Min      43198 (CPT®) Hc Pt Self Care/Mgmt/Train Ea 15 Min      89170 (CPT®) Hc Pt Prosthetic (S) Train Initial Encounter, Each 15 Min      50839 (CPT®) Hc Pt Orthotic(S)/Prosthetic(S) Encounter, Each 15 Min      43414 (CPT®) Hc Orthotic(S) Mgmt/Train Initial Encounter, Each 15min      Total  23 2        Therapy Charges for Today     Code Description Service Date Service Provider Modifiers Qty    23352152860 HC GAIT TRAINING EA 15 MIN 11/1/2018 Rosa M March, PT GP 2    20534671869 HC PT THER SUPP EA 15 MIN 11/1/2018 Rosa M March, PT GP 2          PT G-Codes  Outcome Measure Options: AM-PAC 6 Clicks Basic Mobility (PT)  AM-PAC 6 Clicks Score: 16  Score: 15    Rosa M March PT  11/1/2018

## 2018-11-01 NOTE — PROGRESS NOTES
Continued Stay Note  [facility]     Patient Name: Bryanna Schwartz  MRN: [id number]  Today's Date: 11/1/2018    Admit Date: 10/14/2018          Discharge Plan     Row Name 11/01/18 1634       Plan    Plan ONGOING    Patient/Family in Agreement with Plan yes    Plan Comments CM left VM with Darya at [facility] acute rehab to f/u given pt's progress with PT today.  Also faxed referral to [facility].  Will need insurance precert.  CM will cont to follow.                Discharge Codes    No documentation.           Rosa M Brown

## 2018-11-02 LAB
ANION GAP SERPL CALCULATED.3IONS-SCNC: 10 MMOL/L (ref 3–11)
BUN BLD-MCNC: 6 MG/DL (ref 9–23)
BUN/CREAT SERPL: 9.8 (ref 7–25)
CALCIUM SPEC-SCNC: 9 MG/DL (ref 8.7–10.4)
CHLORIDE SERPL-SCNC: 105 MMOL/L (ref 99–109)
CO2 SERPL-SCNC: 24 MMOL/L (ref 20–31)
CREAT BLD-MCNC: 0.61 MG/DL (ref 0.6–1.3)
GFR SERPL CREATININE-BSD FRML MDRD: 105 ML/MIN/1.73
GLUCOSE BLD-MCNC: 95 MG/DL (ref 70–100)
POTASSIUM BLD-SCNC: 3.8 MMOL/L (ref 3.5–5.5)
SODIUM BLD-SCNC: 139 MMOL/L (ref 132–146)

## 2018-11-02 PROCEDURE — 97530 THERAPEUTIC ACTIVITIES: CPT

## 2018-11-02 PROCEDURE — 99225 PR SBSQ OBSERVATION CARE/DAY 25 MINUTES: CPT | Performed by: HOSPITALIST

## 2018-11-02 PROCEDURE — 25010000002 LORAZEPAM PER 2 MG: Performed by: NURSE PRACTITIONER

## 2018-11-02 PROCEDURE — 80048 BASIC METABOLIC PNL TOTAL CA: CPT | Performed by: INTERNAL MEDICINE

## 2018-11-02 PROCEDURE — G0378 HOSPITAL OBSERVATION PER HR: HCPCS

## 2018-11-02 RX ORDER — QUETIAPINE FUMARATE 25 MG/1
12.5 TABLET, FILM COATED ORAL EVERY 12 HOURS SCHEDULED
Status: DISCONTINUED | OUTPATIENT
Start: 2018-11-02 | End: 2018-11-07 | Stop reason: HOSPADM

## 2018-11-02 RX ADMIN — ATENOLOL 50 MG: 50 TABLET ORAL at 20:25

## 2018-11-02 RX ADMIN — QUETIAPINE FUMARATE 12.5 MG: 25 TABLET ORAL at 20:25

## 2018-11-02 RX ADMIN — QUETIAPINE FUMARATE 12.5 MG: 25 TABLET ORAL at 10:04

## 2018-11-02 RX ADMIN — Medication 3 ML: at 20:24

## 2018-11-02 RX ADMIN — ATENOLOL 50 MG: 50 TABLET ORAL at 10:04

## 2018-11-02 RX ADMIN — Medication 1 TABLET: at 10:03

## 2018-11-02 RX ADMIN — PANTOPRAZOLE SODIUM 40 MG: 40 TABLET, DELAYED RELEASE ORAL at 10:04

## 2018-11-02 RX ADMIN — POLYETHYLENE GLYCOL 3350 17 G: 17 POWDER, FOR SOLUTION ORAL at 10:05

## 2018-11-02 RX ADMIN — DULOXETINE HYDROCHLORIDE 60 MG: 60 CAPSULE, DELAYED RELEASE ORAL at 10:05

## 2018-11-02 RX ADMIN — LORAZEPAM 0.25 MG: 2 INJECTION INTRAMUSCULAR; INTRAVENOUS at 00:14

## 2018-11-02 RX ADMIN — LISINOPRIL 2.5 MG: 5 TABLET ORAL at 10:04

## 2018-11-02 RX ADMIN — ATORVASTATIN CALCIUM 10 MG: 10 TABLET, FILM COATED ORAL at 10:04

## 2018-11-02 RX ADMIN — Medication 5 MG: at 20:25

## 2018-11-02 RX ADMIN — CETIRIZINE HYDROCHLORIDE 10 MG: 10 TABLET, FILM COATED ORAL at 10:04

## 2018-11-02 RX ADMIN — MONTELUKAST SODIUM 5 MG: 5 TABLET, CHEWABLE ORAL at 20:25

## 2018-11-02 NOTE — THERAPY TREATMENT NOTE
Acute Care - Occupational Therapy Treatment Note  [facility]     Patient Name: Bryanna Schwartz  : 1970  MRN: [id number]  Today's Date: 2018  Onset of Illness/Injury or Date of Surgery: 10/14/18  Date of Referral to OT: 10/14/18  Referring Physician: MD Velasquez    Admit Date: 10/14/2018       ICD-10-CM ICD-9-CM   1. Acute hypokalemia E87.6 276.8   2. Hypomagnesemia E83.42 275.2   3. Unintentional weight loss R63.4 783.21   4. Intractable cyclical vomiting with nausea G43.A1 536.2   5. Abnormal CT scan, stomach R93.3 793.4   6. Intractable vomiting with nausea, unspecified vomiting type R11.2 536.2   7. Impaired functional mobility, balance, gait, and endurance Z74.09 V49.89   8. Impaired mobility and ADLs Z74.09 799.89     Patient Active Problem List   Diagnosis   • GERD without esophagitis   • Anxiety and depression   • Hyperlipidemia   • Essential hypertension   • Migraines   • Lyme disease   • Intractable nausea and vomiting   • Hypokalemia   • Hypomagnesemia   • Acute hypokalemia   • Abnormal CT scan, stomach   • Intractable vomiting with nausea   • Falls   • Acute UTI (urinary tract infection)     Past Medical History:   Diagnosis Date   • Arthritis    • Depression    • Environmental allergies    • Falls 10/24/2018   • GERD (gastroesophageal reflux disease)    • Hyperlipidemia    • Hypertension    • Lyme disease    • Migraine    • [place] spotted fever    • Vitamin B 12 deficiency      Past Surgical History:   Procedure Laterality Date   • ACHILLES TENDON SURGERY Right    • BREAST CYST EXCISION Left    • CHOLECYSTECTOMY     • ENDOSCOPY N/A 10/16/2018    Procedure: ESOPHAGOGASTRODUODENOSCOPY;  Surgeon: Brunner, Mark I, MD;  Location: [facility] ENDOSCOPY;  Service: Gastroenterology   • HYSTERECTOMY         Therapy Treatment          Rehabilitation Treatment Summary     Row Name 18 1515             Treatment Time/Intention    Discipline occupational therapist  -      Document Type therapy  note (daily note)  -KF      Subjective Information no complaints  -KF      Mode of Treatment individual therapy;occupational therapy  -KF      Patient/Family Observations seated EOB between R bottom rail and end of bed, demonstrates confusion, no family present  -KF      Care Plan Review evaluation/treatment results reviewed;care plan/treatment goals reviewed;risks/benefits reviewed;current/potential barriers reviewed;patient/other agree to care plan  -KF      Patient Effort good  -KF      Comment Pt demonstrates ataxia, increased confusion, disoriented to place with cues frequent  -KF      Existing Precautions/Restrictions fall  -KF      Treatment Considerations/Comments exit alarms on x2  -KF      Patient Response to Treatment no dizziness in standing today, tolerated  -KF      Recorded by [KF] Sola Pradhan, OT 11/02/18 1546      Row Name 11/02/18 1515             Vital Signs    Pre Systolic BP Rehab 145  -KF      Pre Treatment Diastolic BP 99   RN cleared vitals stable   -KF      Pretreatment Heart Rate (beats/min) 114  -KF      Posttreatment Heart Rate (beats/min) 108  -KF      O2 Delivery Pre Treatment room air  -KF      O2 Delivery Post Treatment room air  -KF      Pre Patient Position Sitting  -KF      Intra Patient Position Standing  -KF      Post Patient Position Supine  -KF      Recorded by [KF] Sola Pradhan, OT 11/02/18 1546      Row Name 11/02/18 1515             Cognitive Assessment/Intervention    Additional Documentation Cognitive Assessment/Intervention (Group)  -KF      Recorded by [KF] Sola Pradhan, OT 11/02/18 1546      Row Name 11/02/18 1515             Cognitive Assessment/Intervention- PT/OT    Affect/Mental Status (Cognitive) confused;confabulatory  -KF      Behavioral Issues (Cognitive) overwhelmed easily  -KF      Orientation Status (Cognition) oriented to;person;disoriented to;place;time  -KF      Follows Commands (Cognition) follows one step commands;50-74%  accuracy;repetition of directions required;verbal cues/prompting required  -KF      Cognitive Function (Cognitive) safety deficit  -KF      Memory Deficit (Cognitive) moderate deficit  -KF      Safety Deficit (Cognitive) severe deficit;insight into deficits/self awareness;awareness of need for assistance;judgment;safety precautions awareness;safety precautions follow-through/compliance  -KF      Cognitive Interventions (Cognitive) occupation/activity based interventions;process/task specific training  -KF      Personal Safety Interventions muscle strengthening facilitated;fall prevention program maintained;gait belt;nonskid shoes/slippers when out of bed  -KF      Recorded by [KF] Sola Pradhan, OT 11/02/18 1546      Row Name 11/02/18 1515             Safety Issues, Functional Mobility    Safety Issues Affecting Function (Mobility) awareness of need for assistance;impulsivity;insight into deficits/self awareness;judgment;safety precaution awareness;safety precautions follow-through/compliance  -KF      Impairments Affecting Function (Mobility) balance;coordination;cognition;strength  -KF      Comment, Safety Issues/Impairments (Mobility) ataxia  -KF      Recorded by [KF] Sola Pradhan OT 11/02/18 1546      Row Name 11/02/18 1515             Bed Mobility Assessment/Treatment    Bed Mobility Assessment/Treatment sit-supine  -KF      Sit-Supine [place] (Bed Mobility) moderate assist (50% patient effort);2 person assist  -KF      Bed Mobility, Safety Issues decreased use of arms for pushing/pulling;decreased use of legs for bridging/pushing  -KF      Assistive Device (Bed Mobility) bed rails;head of bed elevated  -KF      Recorded by [KF] Sola Pradhan OT 11/02/18 1546      Row Name 11/02/18 1515             Functional Mobility    Functional Mobility- Ind. Level moderate assist (50% patient effort);2 person assist required  -KF      Functional Mobility- Device rolling walker  -KF      Functional  Mobility-Distance (Feet) 18  -KF      Functional Mobility- Safety Issues step length decreased;weight-shifting ability decreased;balance decreased during turns;sequencing ability decreased  -KF      Recorded by [KF] Sola Pradhan, OT 11/02/18 1546      Row Name 11/02/18 1515             Transfer Assessment/Treatment    Transfer Assessment/Treatment sit-stand transfer;stand-sit transfer  -KF      Comment (Transfers) very limited in safety awareness and coordination, requires chair follow and two assist for functional mobility   -KF      Recorded by [KF] Sola Pradhan, OT 11/02/18 1546      Row Name 11/02/18 1515             Bed-Chair Transfer    Bed-Chair [place] (Transfers) moderate assist (50% patient effort);2 person assist;verbal cues  -KF      Assistive Device (Bed-Chair Transfers) walker, front-wheeled  -KF      Recorded by [KF] Sola Pradhan OT 11/02/18 1546      Row Name 11/02/18 1515             Sit-Stand Transfer    Sit-Stand [place] (Transfers) moderate assist (50% patient effort);2 person assist;verbal cues  -KF      Assistive Device (Sit-Stand Transfers) walker, front-wheeled  -KF      Recorded by [KF] Sola Pradhan, OT 11/02/18 1546      Row Name 11/02/18 1515             Stand-Sit Transfer    Stand-Sit [place] (Transfers) moderate assist (50% patient effort);2 person assist;verbal cues  -KF      Assistive Device (Stand-Sit Transfers) walker, front-wheeled  -KF      Recorded by [KF] Sola Pradhan OT 11/02/18 1546      Row Name 11/02/18 1515             ADL Assessment/Intervention    83063 - OT Self Care/Mgmt Minutes 4  -KF      BADL Assessment/Intervention upper body dressing  -KF      Recorded by [KF] Sola Pradhan OT 11/02/18 1546      Row Name 11/02/18 1515             Upper Body Dressing Assessment/Training    Upper Body Dressing [place] Level don;front opening garment;contact guard assist  -KF      Upper Body Dressing Position edge of bed  sitting;unsupported sitting  -KF      Comment (Upper Body Dressing) able to thread arms throughout sleeves with set up, CGA for balance in sitting  -KF      Recorded by [KF] Sola Pradhan, OT 11/02/18 1546      Row Name 11/02/18 1515             BADL Safety/Performance    Impairments, BADL Safety/Performance balance;cognition;coordination;endurance/activity tolerance;strength  -KF      Skilled BADL Treatment/Intervention cognitive/safety deficit modifications  -KF      Progress in BADL Status use of compensatory strategies;level of supervision required  -KF      Recorded by [KF] Sola Pradhan, OT 11/02/18 1546      Row Name 11/02/18 1515             Motor Skills Assessment/Interventions    Additional Documentation Balance (Group);Balance Interventions (Group);Therapeutic Exercise (Group);Therapeutic Exercise Interventions (Group)  -KF      Recorded by [KF] Sola Pradhan, OT 11/02/18 1546      Row Name 11/02/18 1515             Therapeutic Exercise    Therapeutic Exercise seated, upper extremities  -KF      Additional Documentation Therapeutic Exercise (Row)  -KF      61316 - OT Therapeutic Exercise Minutes 5  -KF      Recorded by [KF] Sola Pradhan, OT 11/02/18 1546      Row Name 11/02/18 1515             Upper Extremity Seated Therapeutic Exercise    Performed, Seated Upper Extremity (Therapeutic Exercise) shoulder flexion/extension;elbow flexion/extension  -KF      Exercise Type, Seated Upper Extremity (Therapeutic Exercise) AROM (active range of motion)  -KF      Sets/Reps Detail, Seated Upper Extremity (Therapeutic Exercise) 1/20  -KF      Recorded by [KF] Sola Pradhan, OT 11/02/18 1546      Row Name 11/02/18 1515             Balance    Balance static sitting balance;static standing balance;dynamic sitting balance;dynamic standing balance  -KF      Recorded by [KF] Sola Pradhan, OT 11/02/18 1546      Row Name 11/02/18 1515             Static Sitting Balance    Level of  [place] (Unsupported Sitting, Static Balance) supervision  -KF      Sitting Position (Unsupported Sitting, Static Balance) sitting on edge of bed  -KF      Time Able to Maintain Position (Unsupported Sitting, Static Balance) 3 to 4 minutes  -KF      Comment (Unsupported Sitting, Static Balance) sitting EOB between rails   -KF      Recorded by [KF] Sola Pradhan, OT 11/02/18 1546      Row Name 11/02/18 1515             Dynamic Sitting Balance    Level of [place], Reaches Outside Midline (Sitting, Dynamic Balance) contact guard assist  -KF      Sitting Position, Reaches Outside Midline (Sitting, Dynamic Balance) sitting on edge of bed  -KF      Comment, Reaches Outside Midline (Sitting, Dynamic Balance) GM coordination deficits, CGA for UB dressing EOB  -KF      Recorded by [KF] Sola Pradhan, OT 11/02/18 1546      Row Name 11/02/18 1515             Static Standing Balance    Level of [place] (Supported Standing, Static Balance) minimal assist, 75% patient effort   x2  -KF      Time Able to Maintain Position (Supported Standing, Static Balance) 1 to 2 minutes  -KF      Assistive Device Utilized (Supported Standing, Static Balance) rolling walker  -KF      Recorded by [KF] Sola Pradhan, OT 11/02/18 1546      Row Name 11/02/18 1515             Dynamic Standing Balance    Level of [place], Reaches Outside Midline (Standing, Dynamic Balance) moderate assist, 50 to 74% patient effort   x2  -KF      Time Able to Maintain Position, Reaches Outside Midline (Standing, Dynamic Balance) 1 to 2 minutes  -KF      Comment, Reaches Outside Midline (Standing, Dynamic Balance) at RW  -KF      Recorded by [KF] Sola Pradhan, OT 11/02/18 1546      Row Name 11/02/18 1515             Positioning and Restraints    Pre-Treatment Position in bed  -KF      Post Treatment Position bed  -KF      In Bed notified nsg;supine;fowlers;exit alarm on;encouraged to call for assist;side rails up x3;legs  elevated;patient within staff view   exit alarm x2  -KF      Recorded by [] Sola Pradhan, OT 11/02/18 1546      Row Name 11/02/18 1515             Pain Assessment    Additional Documentation Pain Scale: Numbers Pre/Post-Treatment (Group)  -KF      Recorded by [MIKHAIL] Sola Pradhan, OT 11/02/18 1546      Row Name 11/02/18 1515             Pain Scale: Numbers Pre/Post-Treatment    Pain Scale: Numbers, Pretreatment 0/10 - no pain  -KF      Pain Scale: Numbers, Post-Treatment 0/10 - no pain  -KF      Pain Intervention(s) Repositioned;Ambulation/increased activity  -KF      Recorded by [] Sola Pradhan, OT 11/02/18 1546      Row Name 11/02/18 1515             Plan of Care Review    Plan of Care Reviewed With patient  -KF      Recorded by [] Sola Pradhan, OT 11/02/18 1546      Row Name 11/02/18 1515             Outcome Summary/Treatment Plan (OT)    Daily Summary of Progress (OT) progress towards functional goals is fair  -KF      Recorded by [] Sola Pradhan, OT 11/02/18 1546        User Key  (r) = Recorded By, (t) = Taken By, (c) = Cosigned By    Initials Name Effective Dates Discipline    Sola Bliss, OT 04/03/18 -  OT               Occupational Therapy Education     Title: PT OT SLP Therapies (Active)     Topic: Occupational Therapy (Active)     Point: ADL training (Active)     Description: Instruct learner(s) on proper safety adaptation and remediation techniques during self care or transfers.   Instruct in proper use of assistive devices.   Learning Progress Summary     Learner Status Readiness Method Response Comment Documented by    Patient Active Acceptance E NR Safety awareness with functional transfers and mobility, UB dressing EOB, orientation to place KF 11/02/18 1515     Active Acceptance E,D NR positioning of UE with task to help decrease ataxia, placement of eyes to help with vertigo, transfer and standing safety. DEBRA 10/30/18 1340     Done Acceptance E,D VU,NR  sliding board use, transfer safety, toilet techniques. DEBRA 10/28/18 1254     Done Acceptance E,D VU,NR coordination FM can completed, safety. DEBRA 10/22/18 1311     Active Acceptance E NR Pt educated on AE to improve self feeding, compensatory strategies d/t poor sensation/GMC, role of OT, and benefits of therapy. CL 10/20/18 1536     Done Acceptance E,D VU,NR Educated on therapeutic activites to increase functional I. AN 10/19/18 1416     Done Acceptance E,TB VU   10/18/18 1738     Done Acceptance E VU  HK 10/17/18 0940     Done Acceptance E NR,VU Pt educated on ADL retraining with LBD, safety precautions, and appropriate body mechanics. HK 10/17/18 0940    Family Active Acceptance E,D NR positioning of UE with task to help decrease ataxia, placement of eyes to help with vertigo, transfer and standing safety. DEBRA 10/30/18 1340     Done Acceptance E,D VU,NR coordination FM can completed, safety. DEBRA 10/22/18 1311     Done Acceptance E,D VU,NR Educated on therapeutic activites to increase functional I. AN 10/19/18 1416     Done Acceptance E,TB VU   10/18/18 1738          Point: Home exercise program (Active)     Description: Instruct learner(s) on appropriate technique for monitoring, assisting and/or progressing therapeutic exercises/activities.   Learning Progress Summary     Learner Status Readiness Method Response Comment Documented by    Patient Active Acceptance E NR Safety awareness with functional transfers and mobility, UB dressing EOB, orientation to place KF 11/02/18 1515     Active Acceptance E,D NR positioning of UE with task to help decrease ataxia, placement of eyes to help with vertigo, transfer and standing safety. DEBRA 10/30/18 1340     Done Acceptance E,H,D VU,DU issued handout and pt gave return demo  GMC/FMC HEP AC 10/24/18 1113     Done Acceptance E,D VU,NR coordination FM can completed, safety. DEBRA 10/22/18 1311     Done Acceptance E,TB VU  LC 10/18/18 1738    Family Active Acceptance E,D NR  positioning of UE with task to help decrease ataxia, placement of eyes to help with vertigo, transfer and standing safety. DEBRA 10/30/18 1340     Done Acceptance E,H,D VU,NICOLASA issued handout and pt gave return demo  GMC/FMC HEP AC 10/24/18 1113     Done Acceptance E,D VU,NR coordination FMGM can completed, safety. DEBRA 10/22/18 1311     Done Acceptance E,TB VU  LC 10/18/18 1738          Point: Precautions (Active)     Description: Instruct learner(s) on prescribed precautions during self-care and functional transfers.   Learning Progress Summary     Learner Status Readiness Method Response Comment Documented by    Patient Active Acceptance E NR Safety awareness with functional transfers and mobility, UB dressing EOB, orientation to place KF 11/02/18 1515     Active Acceptance E,D NR positioning of UE with task to help decrease ataxia, placement of eyes to help with vertigo, transfer and standing safety. DEBRA 10/30/18 1340     Done Acceptance E,D VU,NR sliding board use, transfer safety, toilet techniques. DEBRA 10/28/18 1254     Done Acceptance E,D VU,NR coordination FMGM can completed, safety. DEBRA 10/22/18 1311     Active Acceptance E NR Pt educated on AE to improve self feeding, compensatory strategies d/t poor sensation/GMC, role of OT, and benefits of therapy. CL 10/20/18 1536     Done Acceptance E,TB VU  LC 10/18/18 1738     Done Acceptance E VU   10/17/18 0940     Done Acceptance E NR,VU Pt educated on ADL retraining with LBD, safety precautions, and appropriate body mechanics. HK 10/17/18 0940    Family Active Acceptance E,D NR positioning of UE with task to help decrease ataxia, placement of eyes to help with vertigo, transfer and standing safety. DEBRA 10/30/18 1340     Done Acceptance E,D VU,NR coordination FM can completed, safety. DEBRA 10/22/18 1311     Done Acceptance E,TB VU   10/18/18 1738          Point: Body mechanics (Active)     Description: Instruct learner(s) on proper positioning and spine alignment  during self-care, functional mobility activities and/or exercises.   Learning Progress Summary     Learner Status Readiness Method Response Comment Documented by    Patient Active Acceptance E NR Safety awareness with functional transfers and mobility, UB dressing EOB, orientation to place  11/02/18 1515     Active Acceptance E,D NR positioning of UE with task to help decrease ataxia, placement of eyes to help with vertigo, transfer and standing safety.  10/30/18 1340     Done Acceptance E,TB [facility] 10/18/18 1738     Done Acceptance E VU   10/17/18 0940     Done Acceptance E NR,VU Pt educated on ADL retraining with LBD, safety precautions, and appropriate body mechanics.  10/17/18 0940    Family Active Acceptance E,D NR positioning of UE with task to help decrease ataxia, placement of eyes to help with vertigo, transfer and standing safety.  10/30/18 1340     Done Acceptance E,TB [facility] 10/18/18 1738                      User Key     Initials Effective Dates Name Provider Type Discipline     06/08/18 -  Vania Arceo, OT Occupational Therapist OT    AC 06/23/15 -  Yas Lawrence, OT Occupational Therapist OT     06/22/15 -  Bren Garcia, OT Occupational Therapist OT     06/16/16 -  Lori Kumari, RN Registered Nurse Nurse     04/03/18 -  Cecy Tompkins, OT Occupational Therapist OT     03/07/18 -  Rita Smith, OT Occupational Therapist OT     04/03/18 -  Sola Pradhan, OT Occupational Therapist OT                OT Recommendation and Plan  Outcome Summary/Treatment Plan (OT)  Daily Summary of Progress (OT): progress towards functional goals is fair  Daily Summary of Progress (OT): progress towards functional goals is fair  Plan of Care Review  Plan of Care Reviewed With: patient  Plan of Care Reviewed With: patient  Outcome Summary: Pt mod Ax2 for functional mobility and requires chair follow for safety; mod A x2 for balance at RW, demonstrates deficits in coordination, CGA for donning  gown, oriented to self. Cont IPOT per POC        Outcome Measures     Row Name 11/02/18 1515 11/01/18 1105 10/30/18 1703       How much help from another person do you currently need...    Turning from your back to your side while in flat bed without using bedrails?  -- 4  -SJ 3  -DM    Moving from lying on back to sitting on the side of a flat bed without bedrails?  -- 4  -SJ 3  -DM    Moving to and from a bed to a chair (including a wheelchair)?  -- 2  -SJ 3  -DM    Standing up from a chair using your arms (e.g., wheelchair, bedside chair)?  -- 2  -SJ 2  -DM    Climbing 3-5 steps with a railing?  -- 2  -SJ 1  -DM    To walk in hospital room?  -- 2  -SJ 2  -DM    AM-PAC 6 Clicks Score  -- 16  -SJ 14  -DM       How much help from another is currently needed...    Putting on and taking off regular lower body clothing? 2  -KF  --  --    Bathing (including washing, rinsing, and drying) 2  -KF  --  --    Toileting (which includes using toilet bed pan or urinal) 2  -KF  --  --    Putting on and taking off regular upper body clothing 3  -KF  --  --    Taking care of personal grooming (such as brushing teeth) 3  -KF  --  --    Eating meals 3  -KF  --  --    Score 15  -KF  --  --       Functional Assessment    Outcome Measure Options AM-PAC 6 Clicks Daily Activity (OT)  - AM-PAC 6 Clicks Basic Mobility (PT)  - AM-PAC 6 Clicks Basic Mobility (PT)  -DM      User Key  (r) = Recorded By, (t) = Taken By, (c) = Cosigned By    Initials Name Provider Type    SJ Rosa M March, PT Physical Therapist    DM Jaqui Jin, PT Physical Therapist    KF Sola Pradhan, OT Occupational Therapist           Time Calculation:         Time Calculation- OT     Row Name 11/02/18 1515             Time Calculation- OT    OT Start Time 1515  -KF      Total Timed Code Minutes- OT 19 minute(s)  -KF      OT Received On 11/02/18  -KF      OT Goal Re-Cert Due Date 11/07/18  -KF         Timed Charges    34473 - OT Therapeutic Exercise  Minutes 5  -KF      70870 - OT Therapeutic Activity Minutes 10  -KF      43360 - OT Self Care/Mgmt Minutes 4  -KF        User Key  (r) = Recorded By, (t) = Taken By, (c) = Cosigned By    Initials Name Provider Type    Sola Bliss, OT Occupational Therapist           Therapy Suggested Charges     Code   Minutes Charges    62689 (CPT®) Hc Ot Neuromusc Re Education Ea 15 Min      30700 (CPT®) Hc Ot Ther Proc Ea 15 Min 5     25323 (CPT®) Hc Ot Therapeutic Act Ea 15 Min 10 1    49043 (CPT®) Hc Ot Manual Therapy Ea 15 Min      66040 (CPT®) Hc Ot Iontophoresis Ea 15 Min      12345 (CPT®) Hc Ot Elec Stim Ea-Per 15 Min      54654 (CPT®) Hc Ot Ultrasound Ea 15 Min      80785 (CPT®) Hc Ot Self Care/Mgmt/Train Ea 15 Min 4     Total  19 1        Therapy Charges for Today     Code Description Service Date Service Provider Modifiers Qty    [id number] HC OT THERAPEUTIC ACT EA 15 MIN 11/2/2018 Sola Pradhan OT GO 1               Sola Pradhan OT  11/2/2018

## 2018-11-02 NOTE — PROGRESS NOTES
"   LOS: 0 days    Patient Care Team:  Andrei Crisostomo MD as PCP - General (Gastroenterology)    Reason For Visit:  F/U HTN AND PROTEINURIA.  Subjective     Crying  Not sure where she will go from here    Review of Systems:    Pulm: No soa   CV:  No CP      Objective       atenolol 50 mg Oral Q12H   atorvastatin 10 mg Oral Daily   b complex-vitamin c-folic acid 1 tablet Oral Daily   cetirizine 10 mg Oral Daily   DULoxetine 60 mg Oral Daily   latanoprost 1 drop Both Eyes Nightly   lisinopril 2.5 mg Oral Daily   melatonin 5 mg Sublingual Nightly   montelukast 5 mg Oral Nightly   pantoprazole 40 mg Oral BID AC   polyethylene glycol 17 g Oral Daily   QUEtiapine 12.5 mg Oral Q12H   sodium chloride 3 mL Intravenous Q12H       sodium chloride 100 mL/hr         Vital Signs:  Blood pressure 145/85, pulse 109, temperature 98.3 °F (36.8 °C), temperature source Oral, resp. rate 18, height 175.3 cm (69\"), weight 75.5 kg (166 lb 6.4 oz), SpO2 98 %, not currently breastfeeding.    Flowsheet Rows      First Filed Value   Admission Height  175.3 cm (69\") Documented at 10/14/2018 2247   Admission Weight  72.6 kg (160 lb) Documented at 10/14/2018 2247          11/01 0701 - 11/02 0700  In: 480 [P.O.:480]  Out: 2100 [Urine:2100]    Physical Exam:    General Appearance: NAD, alert and cooperative, Ox3  Eyes: PER, conjunctivae and sclerae normal, no icterus  Lungs: respirations regular and unlabored, no crepitus, clear to auscultation  Heart/CV: regular rhythm & normal rate, no murmur, no gallop, no rub and no edema  Abdomen: not distended, soft, non-tender, no masses,  bowel sounds present  Skin: No rash, Warm and dry    Radiology:            Labs:    Results from last 7 days  Lab Units 11/01/18  0610 10/30/18  0732 10/28/18  0801   WBC 10*3/mm3 7.67 8.10 6.61   HEMOGLOBIN g/dL 11.0* 13.0 11.8   HEMATOCRIT % 33.2* 39.2 35.6   PLATELETS 10*3/mm3 336 338 280       Results from last 7 days  Lab Units 11/02/18  0354 11/01/18  0610 " 10/31/18  0530 10/30/18  1333 10/30/18  0732   SODIUM mmol/L 139 139 139  --  139   POTASSIUM mmol/L 3.8 3.6 3.6  --  4.2   CHLORIDE mmol/L 105 107 101  --  101   CO2 mmol/L 24.0 25.0 26.0  --  30.0   BUN mg/dL 6* 9 13  --  7*   CREATININE mg/dL 0.61 0.61 0.69  --  0.68   CALCIUM mg/dL 9.0 8.9 8.9  --  9.7   MAGNESIUM mg/dL  --   --   --  1.8  --        Results from last 7 days  Lab Units 11/02/18  0354   GLUCOSE mg/dL 95                       Estimated Creatinine Clearance: 134.4 mL/min (by C-G formula based on SCr of 0.61 mg/dL).      Assessment       Intractable nausea and vomiting    GERD without esophagitis    Anxiety and depression    Hyperlipidemia    Essential hypertension    Migraines    Lyme disease    Hypokalemia    Hypomagnesemia    Acute hypokalemia    Abnormal CT scan, stomach    Intractable vomiting with nausea    Falls    Acute UTI (urinary tract infection)            Impression:     MILD PROTEINURIA.   HTN - good now but it dows go up and down-- very high metanephrine in urine- plan as under  CT abd does not say any thing about adrenals      Recommendations:   MRI- No adrenal adenoma- but did have high nor epinephrine on urin eassessment-   Will dc phenoxybenzamine due to very low bp  Lower lisinopril  Stable bp  From my end she can be dced- will see her in clinic in 3-4 weeks with labs amanda Casas MD  11/02/18  10:16 AM

## 2018-11-02 NOTE — PROGRESS NOTES
"Clinical Nutrition   Reason For Visit: Follow-up protocol    Patient Name: Bryanna Schwartz  YOB: 1970  MRN: [id number]  Date of Encounter: 11/02/18 10:34 AM  Admission date: 10/14/2018    Nutrition Assessment     Admission Problem List:  Intractable nausea/vomiting  Acute UTI  Anxiety/depression  Falls  Recent rx for jaleesa mountain spotted fever and lyme disease    Applicable Nutrition-Related Information:  Chronic Illness Malnutrition per RD note (10/19)      PMH: She  has a past medical history of Arthritis; Depression; Environmental allergies; Falls (10/24/2018); GERD (gastroesophageal reflux disease); Hyperlipidemia; Hypertension; Lyme disease; Migraine; Jaleesa Mountain spotted fever; and Vitamin B 12 deficiency.   PSxH: She  has a past surgical history that includes Cholecystectomy; Achilles tendon surgery (Right); Hysterectomy; Breast cyst excision (Left); and Esophagogastroduodenoscopy (N/A, 10/16/2018).        Reported/Observed/Food/Nutrition Related History     Per charting overnight (11/2),  RN charting, \"continued confusion and behavior issues overnight. Visual and auditory hallucinations, convinced is seeing dead people and dead babies in the hallway.\" Night shift nurse reports patient not able to have a normal conversation, hallucinating.       Anthropometrics   Height: 175.3 cm (69\")  Weight: 75.5 kg (166 lb 6.4 oz) -standing scale weight per charting (10/19)  BMI: 24.57 kg/m²  BMI classification: Normal: 18.5-24.9kg/m2     Labs reviewed   Labs reviewed: Yes    Results from last 7 days  Lab Units 11/02/18  0354 11/01/18  0610 10/31/18  0530 10/30/18  1333   SODIUM mmol/L 139 139 139  --    POTASSIUM mmol/L 3.8 3.6 3.6  --    CHLORIDE mmol/L 105 107 101  --    CO2 mmol/L 24.0 25.0 26.0  --    BUN mg/dL 6* 9 13  --    CREATININE mg/dL 0.61 0.61 0.69  --    GLUCOSE mg/dL 95 96 103*  --    CALCIUM mg/dL 9.0 8.9 8.9  --    MAGNESIUM mg/dL  --   --   --  1.8     Medications reviewed "   Medications reviewed: Yes  Pertinent:    Current Nutrition Prescription   PO: Diet Regular  Oral Nutrition Supplement:    Evaluation of Received Nutrient/Fluid Intake:  4 Days: 33% of 6 meals from (10/29 - 11/1)       Nutrition Diagnosis     (10/19) updated (11/2)  Problem Inadequate oral intake   Etiology Altered mental status; clinical condition   Signs/Symptoms PO Intake (33% of 6 meals)   Status: ongoing    (10/19)  Problem Malnutrition (Severe, Chronic)   Etiology Medical dx   Signs/Symptoms Weight/intake hx and moderate muscle wasting   Status: ongoing    Intervention   Intervention: Follow treatment progress, Care plan reviewed      Goal:   General: Nutrition support treatment  PO: Increase intake      Monitoring/Evaluation:       Monitoring/Evaluation: Per protocol, PO intake, Supplement intake, Symptoms  Will Continue to follow per protocol  Marsha Becerril  Time Spent: 20 minutes

## 2018-11-02 NOTE — DISCHARGE PLACEMENT REQUEST
"Bryanna Merrill (48 y.o. Female)     Date of Birth Social Security Number Address Home Phone MRN    1970  [address] [facility] [phone] [id number]    Gnosticist Marital Status          None Unknown       Admission Date Admission Type Admitting Provider Attending Provider Department, Room/Bed    10/14/18 Emergency Erin De La Torre MD Reddy, Mayuri V, MD [address], [address]/1    Discharge Date Discharge Disposition Discharge Destination                       Attending Provider:  Erin De La Torre MD    Allergies:  No Known Allergies    Isolation:  None   Infection:  None   Code Status:  CPR    Ht:  175.3 cm (69\")   Wt:  75.5 kg (166 lb 6.4 oz)    Admission Cmt:  None   Principal Problem:  Intractable nausea and vomiting [R11.2]                 Active Insurance as of 10/14/2018     Primary Coverage     Payor Plan Insurance Group Employer/Plan Group    [employer] [place] [employer] [place]      Payor Plan Address Payor Plan Phone Number Effective From Effective To    [address] [address] [phone] 2018     [facility] [id number]       Subscriber Name Subscriber Birth Date Member ID       BRYANNA MERRILL 1970 [id number]                 Emergency Contacts      (Rel.) Home Phone Work Phone Mobile Phone    Michael Merrill (Spouse) [phone] -- --    Chanelle Caruso (Relative) [phone] -- --    Padma Villalta (Sister) [phone] -- --            Insurance Information                [facility]/WELLCARE MEDICAID Phone: [phone]    Subscriber: Bryanna Merrill Subscriber#: [id number]    Group#:  Precert#: [id number]             Physician Progress Notes (last 24 hours) (Notes from 2018  2:04 PM through 2018  2:04 PM)      Erin De La Torre MD at 2018 12:15 PM              [facility] Medicine Services  PROGRESS NOTE    Patient Name: Bryanna Merrill  : 1970  MRN: [id number]    Date of Admission: " 10/14/2018  Length of Stay: 0  Primary Care Physician: Andrei Crisostomo MD    Subjective   Subjective     CC: F/U N/V and bilateral LE weakness    HPI:  Patient seen this morning. Awake, alert. Nurse reports hallucinations overnight.     Review of Systems  Gen-no fevers, no chills  CV-no chest pain, no palpitations  Resp-no cough, no dyspnea  GI-no N/V/D, no abd pain      Otherwise ROS is negative except as mentioned in the HPI.    Objective   Objective     Vital Signs:   Temp:  [98.1 °F (36.7 °C)-98.3 °F (36.8 °C)] 98.3 °F (36.8 °C)  Heart Rate:  [] 109  Resp:  [18] 18  BP: (123-145)/(80-96) 145/85        Physical Exam:  Constitutional: No acute distress, awake, alert, sitting up in bed  HENT: NCAT, mucous membranes moist  Respiratory: Clear to auscultation bilaterally, respiratory effort normal  Cardiovascular: RRR, no murmurs, rubs, or gallops, palpable pedal pulses bilaterally  Gastrointestinal: Positive bowel sounds, soft, nontender, nondistended  Musculoskeletal: No bilateral ankle edema   Psychiatric: appropriate mood  Neurologic: Oriented x 3, BLE weakness  Skin: No rashes    Results Reviewed:  I have personally reviewed current lab, radiology, and data and agree.      Results from last 7 days  Lab Units 11/01/18  0610 10/30/18  0732 10/28/18  0801   WBC 10*3/mm3 7.67 8.10 6.61   HEMOGLOBIN g/dL 11.0* 13.0 11.8   HEMATOCRIT % 33.2* 39.2 35.6   PLATELETS 10*3/mm3 336 338 280       Results from last 7 days  Lab Units 11/02/18  0354 11/01/18  0610 10/31/18  0530   SODIUM mmol/L 139 139 139   POTASSIUM mmol/L 3.8 3.6 3.6   CHLORIDE mmol/L 105 107 101   CO2 mmol/L 24.0 25.0 26.0   BUN mg/dL 6* 9 13   CREATININE mg/dL 0.61 0.61 0.69   GLUCOSE mg/dL 95 96 103*   CALCIUM mg/dL 9.0 8.9 8.9     Estimated Creatinine Clearance: 134.4 mL/min (by C-G formula based on SCr of 0.61 mg/dL).  No results found for: BNP    Microbiology Results Abnormal     Procedure Component Value - Date/Time    Urine Culture -  Urine, [[id number]]  (Abnormal)  (Susceptibility) Collected:  10/18/18 1603    Lab Status:  Final result Specimen:  Urine from Urine, Clean Catch Updated:  10/21/18 1055     Urine Culture >100,000 CFU/mL Proteus mirabilis (A)      40,000-50,000 CFU/mL Escherichia coli (A)    Susceptibility      Proteus mirabilis    Escherichia coli       ARELI    ARELI       Ampicillin <=8 ug/ml Susceptible    <=8 ug/ml Susceptible       Ampicillin + Sulbactam <=8/4 ug/ml Susceptible    <=8/4 ug/ml Susceptible       Aztreonam <=8 ug/ml Susceptible    <=8 ug/ml Susceptible       Cefepime <=8 ug/ml Susceptible    <=8 ug/ml Susceptible       Cefotaxime <=2 ug/ml Susceptible    <=2 ug/ml Susceptible       Ceftriaxone <=8 ug/ml Susceptible    <=8 ug/ml Susceptible       Cefuroxime sodium <=4 ug/ml Susceptible    <=4 ug/ml Susceptible       Cephalothin <=8 ug/ml Susceptible    <=8 ug/ml Susceptible       Ertapenem <=1 ug/ml Susceptible    <=1 ug/ml Susceptible       Gentamicin <=4 ug/ml Susceptible    <=4 ug/ml Susceptible       Levofloxacin <=2 ug/ml Susceptible    <=2 ug/ml Susceptible       Meropenem <=1 ug/ml Susceptible    <=1 ug/ml Susceptible       Nitrofurantoin       <=32 ug/ml Susceptible       Piperacillin + Tazobactam <=16 ug/ml Susceptible    <=16 ug/ml Susceptible       Tetracycline       <=4 ug/ml Susceptible       Tobramycin <=4 ug/ml Susceptible    <=4 ug/ml Susceptible       Trimethoprim + Sulfamethoxazole <=2/38 ug/ml Susceptible    <=2/38 ug/ml Susceptible                      Blood Culture - Blood, [[id number]]  (Normal) Collected:  10/15/18 0029    Lab Status:  Final result Specimen:  Blood from Arm, Right Updated:  10/20/18 0130     Blood Culture No growth at 5 days    Blood Culture - Blood, [[id number]]  (Normal) Collected:  10/15/18 0029    Lab Status:  Final result Specimen:  Blood from Arm, Right Updated:  10/20/18 0130     Blood Culture No growth at 5 days    Eosinophil Smear - Urine, Urine, Clean Catch [[id number]]   (Normal) Collected:  10/18/18 1603    Lab Status:  Final result Specimen:  Urine from Urine, Clean Catch Updated:  10/18/18 1802     Eosinophil Smear 0 % EOS/100 Cells     Narrative:       No eosinophil seen          Imaging Results (last 24 hours)     ** No results found for the last 24 hours. **             I have reviewed the medications.      atenolol 50 mg Oral Q12H   atorvastatin 10 mg Oral Daily   b complex-vitamin c-folic acid 1 tablet Oral Daily   cetirizine 10 mg Oral Daily   DULoxetine 60 mg Oral Daily   latanoprost 1 drop Both Eyes Nightly   lisinopril 2.5 mg Oral Daily   melatonin 5 mg Sublingual Nightly   montelukast 5 mg Oral Nightly   pantoprazole 40 mg Oral BID AC   polyethylene glycol 17 g Oral Daily   QUEtiapine 12.5 mg Oral Q12H   sodium chloride 3 mL Intravenous Q12H         Assessment/Plan   Assessment / Plan     Active Hospital Problems    Diagnosis   • **Intractable nausea and vomiting   • Acute UTI (urinary tract infection)   • Falls   • GERD without esophagitis   • Anxiety and depression   • Hyperlipidemia   • Essential hypertension   • Migraines   • Lyme disease   • Hypokalemia   • Hypomagnesemia   • Acute hypokalemia   • Abnormal CT scan, stomach     Added automatically from request for surgery [id number]     • Intractable vomiting with nausea     Added automatically from request for surgery [id number]          Brief Hospital Course to date:  Bryanna Schwartz is a 48 y.o. female with a past medical history of anxiety, depression, hypertension, hyperlipidemia, migraines. Recent rx for RMSF and lyme. She presented with what she describes as 6 months of intractable nausea and vomiting that has been progressively worsening with reported weight loss of 120 lbs.     Assessment & Plan:    --Nausea/Vomiting-She has had significant workup at OSH this has included EGD, CCY. Her symptoms of anorexia, early satiety with N/V and 120 lb weight loss (she does not look like she has lost that much weight).  CT  abd/pelvis showed thickening of the proximal gastric mucosa, she is s/p EGD 10/16 showed antral gastritis with reflux esophagitis, currently on Protonix 40 mg BID. Symptoms have improved markedly and now patient tolerates PO diet. Suspect functional etiology of her nausea.     --Multiple electrolyte abnormalities -hypokalemia, hypomagnesemia, etiology unknown? Adrenal insufficiency, however random cortisol is wnl, suspect from prolonged N/V. Now resolved.    --UTI- likely contributed to N/V on admission.  UCx with >100K Proteus and 40-50K E coli, both of which were susceptible to Rocephin. Pt completed 5 total days of Rocephin (last dose 10/22).      --Chronic illness malnutrition, nutrition/dietecian following.    --HTN- not well controlled, concerning for Pheochromocytoma. Nephrology following.  Reviewed workup and is concerning for Pheo. Pt has had CT A/P on admission with no finding of adrenal incidentaloma. BP remains difficult to control with lows and extreme highs.  Abnormal urine metanephrines.  MRI unremarkable.  BP med adjustments made by NAL.     --Significant bilateral LE weakness with ataxia, has had some ataxia in the past, previously seen by Dr. Syed at - PT/OT. Neurology has seen the patient here on this admission, likely peripheral neuropathy. Rehab placement PENDING.     --- Encephalopathy- likely multifactorial due to UTI, malnutrition, issues with intermittent low BP.  Discussed non medication related treatments- keep busy during day.  No napping.  Up in chair, wheelchair walks in hallway etc. Improved overall but continues to have some hallucinations. Will resume low dose Seroquel 12.5 mg BID and monitor. Needs outpatient Psych evaluation.    DISPO: Pending rehab placement.     DVT Prophylaxis: mechanical    CODE STATUS:   Code Status and Medical Interventions:   Ordered at: 10/15/18 0406     Level Of Support Discussed With:    Patient     Code Status:    CPR     Medical Interventions  "(Level of Support Prior to Arrest):    Full     Dispo: rehab placement PENDING, medically ready anytime    Electronically signed by Erin De La Torre MD, 11/02/18, 12:20 PM.          Electronically signed by Erin De La Torre MD at 11/2/2018 12:20 PM     Daniel Casas MD at 11/2/2018 10:16 AM             LOS: 0 days    Patient Care Team:  Andrei Crisostomo MD as PCP - General (Gastroenterology)    Reason For Visit:  F/U HTN AND PROTEINURIA.  Subjective     Crying  Not sure where she will go from here    Review of Systems:    Pulm: No soa   CV:  No CP      Objective       atenolol 50 mg Oral Q12H   atorvastatin 10 mg Oral Daily   b complex-vitamin c-folic acid 1 tablet Oral Daily   cetirizine 10 mg Oral Daily   DULoxetine 60 mg Oral Daily   latanoprost 1 drop Both Eyes Nightly   lisinopril 2.5 mg Oral Daily   melatonin 5 mg Sublingual Nightly   montelukast 5 mg Oral Nightly   pantoprazole 40 mg Oral BID AC   polyethylene glycol 17 g Oral Daily   QUEtiapine 12.5 mg Oral Q12H   sodium chloride 3 mL Intravenous Q12H       sodium chloride 100 mL/hr         Vital Signs:  Blood pressure 145/85, pulse 109, temperature 98.3 °F (36.8 °C), temperature source Oral, resp. rate 18, height 175.3 cm (69\"), weight 75.5 kg (166 lb 6.4 oz), SpO2 98 %, not currently breastfeeding.    Flowsheet Rows      First Filed Value   Admission Height  175.3 cm (69\") Documented at 10/14/2018 2247   Admission Weight  72.6 kg (160 lb) Documented at 10/14/2018 2247          11/01 0701 - 11/02 0700  In: 480 [P.O.:480]  Out: 2100 [Urine:2100]    Physical Exam:    General Appearance: NAD, alert and cooperative, Ox3  Eyes: PER, conjunctivae and sclerae normal, no icterus  Lungs: respirations regular and unlabored, no crepitus, clear to auscultation  Heart/CV: regular rhythm & normal rate, no murmur, no gallop, no rub and no edema  Abdomen: not distended, soft, non-tender, no masses,  bowel sounds present  Skin: No rash, Warm and " dry    Radiology:            Labs:    Results from last 7 days  Lab Units 18  0610 10/30/18  0732 10/28/18  0801   WBC 10*3/mm3 7.67 8.10 6.61   HEMOGLOBIN g/dL 11.0* 13.0 11.8   HEMATOCRIT % 33.2* 39.2 35.6   PLATELETS 10*3/mm3 336 338 280       Results from last 7 days  Lab Units 18  0354 18  0610 10/31/18  0530 10/30/18  1333 10/30/18  0732   SODIUM mmol/L 139 139 139  --  139   POTASSIUM mmol/L 3.8 3.6 3.6  --  4.2   CHLORIDE mmol/L 105 107 101  --  101   CO2 mmol/L 24.0 25.0 26.0  --  30.0   BUN mg/dL 6* 9 13  --  7*   CREATININE mg/dL 0.61 0.61 0.69  --  0.68   CALCIUM mg/dL 9.0 8.9 8.9  --  9.7   MAGNESIUM mg/dL  --   --   --  1.8  --        Results from last 7 days  Lab Units 18  0354   GLUCOSE mg/dL 95                       Estimated Creatinine Clearance: 134.4 mL/min (by C-G formula based on SCr of 0.61 mg/dL).      Assessment       Intractable nausea and vomiting    GERD without esophagitis    Anxiety and depression    Hyperlipidemia    Essential hypertension    Migraines    Lyme disease    Hypokalemia    Hypomagnesemia    Acute hypokalemia    Abnormal CT scan, stomach    Intractable vomiting with nausea    Falls    Acute UTI (urinary tract infection)            Impression:     MILD PROTEINURIA.   HTN - good now but it dows go up and down-- very high metanephrine in urine- plan as under  CT abd does not say any thing about adrenals      Recommendations:   MRI- No adrenal adenoma- but did have high nor epinephrine on urin eassessment-   Will dc phenoxybenzamine due to very low bp  Lower lisinopril  Stable bp  From my end she can be dced- will see her in clinic in 3-4 weeks with labs plz      Daniel Casas MD  18  10:16 AM          Electronically signed by Daniel Casas MD at 2018 10:17 AM     Erin De La Torre MD at 2018  2:30 PM              [facility] Medicine Services  PROGRESS NOTE    Patient Name: Bryanna Schwartz  : 1970  MRN:  [id number]    Date of Admission: 10/14/2018  Length of Stay: 0  Primary Care Physician: Andrei Crisostomo MD    Subjective   Subjective     CC: F/U N/V and bilateral LE weakness    HPI:  Patient seen this morning. Awake, alert. Wants to go home with her sister.     Review of Systems  Gen-no fevers, no chills  CV-no chest pain, no palpitations  Resp-no cough, no dyspnea  GI-no N/V/D, no abd pain      Otherwise ROS is negative except as mentioned in the HPI.    Objective   Objective     Vital Signs:   Temp:  [97.6 °F (36.4 °C)-98.7 °F (37.1 °C)] 98.7 °F (37.1 °C)  Heart Rate:  [] 104  Resp:  [16-18] 18  BP: ()/() 138/107        Physical Exam:  Constitutional: No acute distress, awake, alert, sitting up in bed  HENT: NCAT, mucous membranes moist  Respiratory: Clear to auscultation bilaterally, respiratory effort normal  Cardiovascular: RRR, no murmurs, rubs, or gallops, palpable pedal pulses bilaterally  Gastrointestinal: Positive bowel sounds, soft, nontender, nondistended  Musculoskeletal: No bilateral ankle edema   Psychiatric: appropriate mood  Neurologic: Oriented x 3, BLE weakness  Skin: No rashes    Results Reviewed:  I have personally reviewed current lab, radiology, and data and agree.      Results from last 7 days  Lab Units 11/01/18  0610 10/30/18  0732 10/28/18  0801   WBC 10*3/mm3 7.67 8.10 6.61   HEMOGLOBIN g/dL 11.0* 13.0 11.8   HEMATOCRIT % 33.2* 39.2 35.6   PLATELETS 10*3/mm3 336 338 280       Results from last 7 days  Lab Units 11/01/18  0610 10/31/18  0530 10/30/18  0732   SODIUM mmol/L 139 139 139   POTASSIUM mmol/L 3.6 3.6 4.2   CHLORIDE mmol/L 107 101 101   CO2 mmol/L 25.0 26.0 30.0   BUN mg/dL 9 13 7*   CREATININE mg/dL 0.61 0.69 0.68   GLUCOSE mg/dL 96 103* 92   CALCIUM mg/dL 8.9 8.9 9.7     Estimated Creatinine Clearance: 134.4 mL/min (by C-G formula based on SCr of 0.61 mg/dL).  No results found for: BNP    Microbiology Results Abnormal     Procedure Component Value -  Date/Time    Urine Culture - Urine, [[id number]]  (Abnormal)  (Susceptibility) Collected:  10/18/18 1603    Lab Status:  Final result Specimen:  Urine from Urine, Clean Catch Updated:  10/21/18 1055     Urine Culture >100,000 CFU/mL Proteus mirabilis (A)      40,000-50,000 CFU/mL Escherichia coli (A)    Susceptibility      Proteus mirabilis    Escherichia coli       ARELI    ARELI       Ampicillin <=8 ug/ml Susceptible    <=8 ug/ml Susceptible       Ampicillin + Sulbactam <=8/4 ug/ml Susceptible    <=8/4 ug/ml Susceptible       Aztreonam <=8 ug/ml Susceptible    <=8 ug/ml Susceptible       Cefepime <=8 ug/ml Susceptible    <=8 ug/ml Susceptible       Cefotaxime <=2 ug/ml Susceptible    <=2 ug/ml Susceptible       Ceftriaxone <=8 ug/ml Susceptible    <=8 ug/ml Susceptible       Cefuroxime sodium <=4 ug/ml Susceptible    <=4 ug/ml Susceptible       Cephalothin <=8 ug/ml Susceptible    <=8 ug/ml Susceptible       Ertapenem <=1 ug/ml Susceptible    <=1 ug/ml Susceptible       Gentamicin <=4 ug/ml Susceptible    <=4 ug/ml Susceptible       Levofloxacin <=2 ug/ml Susceptible    <=2 ug/ml Susceptible       Meropenem <=1 ug/ml Susceptible    <=1 ug/ml Susceptible       Nitrofurantoin       <=32 ug/ml Susceptible       Piperacillin + Tazobactam <=16 ug/ml Susceptible    <=16 ug/ml Susceptible       Tetracycline       <=4 ug/ml Susceptible       Tobramycin <=4 ug/ml Susceptible    <=4 ug/ml Susceptible       Trimethoprim + Sulfamethoxazole <=2/38 ug/ml Susceptible    <=2/38 ug/ml Susceptible                      Blood Culture - Blood, [[id number]]  (Normal) Collected:  10/15/18 0029    Lab Status:  Final result Specimen:  Blood from Arm, Right Updated:  10/20/18 0130     Blood Culture No growth at 5 days    Blood Culture - Blood, [[id number]]  (Normal) Collected:  10/15/18 0029    Lab Status:  Final result Specimen:  Blood from Arm, Right Updated:  10/20/18 0130     Blood Culture No growth at 5 days    Eosinophil Smear - Urine,  Urine, Clean Catch [[id number]]  (Normal) Collected:  10/18/18 1603    Lab Status:  Final result Specimen:  Urine from Urine, Clean Catch Updated:  10/18/18 1802     Eosinophil Smear 0 % EOS/100 Cells     Narrative:       No eosinophil seen          Imaging Results (last 24 hours)     ** No results found for the last 24 hours. **             I have reviewed the medications.      atenolol 50 mg Oral Q12H   atorvastatin 10 mg Oral Daily   b complex-vitamin c-folic acid 1 tablet Oral Daily   cetirizine 10 mg Oral Daily   DULoxetine 60 mg Oral Daily   latanoprost 1 drop Both Eyes Nightly   lisinopril 2.5 mg Oral Daily   melatonin 5 mg Sublingual Nightly   montelukast 5 mg Oral Nightly   pantoprazole 40 mg Oral BID AC   polyethylene glycol 17 g Oral Daily   sodium chloride 3 mL Intravenous Q12H         Assessment/Plan   Assessment / Plan     Active Hospital Problems    Diagnosis   • **Intractable nausea and vomiting   • Acute UTI (urinary tract infection)   • Falls   • GERD without esophagitis   • Anxiety and depression   • Hyperlipidemia   • Essential hypertension   • Migraines   • Lyme disease   • Hypokalemia   • Hypomagnesemia   • Acute hypokalemia   • Abnormal CT scan, stomach     Added automatically from request for surgery [id number]     • Intractable vomiting with nausea     Added automatically from request for surgery [id number]          Brief Hospital Course to date:  Bryanna Schwartz is a 48 y.o. female with a past medical history of anxiety, depression, hypertension, hyperlipidemia, migraines. Recent rx for RMSF and lyme. She presented with what she describes as 6 months of intractable nausea and vomiting that has been progressively worsening with reported weight loss of 120 lbs.     Assessment & Plan:    --Nausea/Vomiting-She has had significant workup at OSH this has included EGD, CCY. Her symptoms of anorexia, early satiety with n/v and 120lb weight loss( she does not look like she has lost that much weight ).  CT  abd/pelvis showed thickening of the proximal gastric mucosa, she is s/p EGD 10/16 showed antral gastritis with reflux esophagitis, currently on protonix 40 mg bid. Symptoms have improved markedly and now patient tolerates PO diet. Suspect functional etiology of her nausea.     --Multiple electrolyte abnormalities -hypokalemia, hypomagnesemia, etiology unknown? Adrenal insufficieny, however random cortisol is wnl, suspect from prolonged n/v, continue to monitor and replete.    --UTI- likely contributed to N/V on admission.  UCx with >100K proteus and 40-50K E coli,both of which were susceptible to Rocephin. Pt completed 5 total days of Rocephin (last dose 10/22).      --Chronic illness malnutrition, nutrition/dietecian following.    --HTN- not well controlled, concerning for Pheochromocytoma. Nephrology following.  Reviewed workup and is concerning for Pheo. Pt has had CT A/P on admission with no finding of adrenal incidentaloma. BP remains difficult to control with lows and extreme highs.  Abnormal urine metanephrines.  MRI unremarkable.  BP med adjustments made by NAL yesterday. Now hypotensive again despite Lisinopril being held. Reviewed earlier admission event when she was hypotensive and, at that time, she had taken her home dose of muscle relaxer and RN today is concerned that this is the case now. Pt eventually improved (during last event) with 4x 1L boluses. Continue fluids. Improved.    --- Significant bilateral LE weakness with ataxia, has had some ataxia in the past, previously seen by Dr. Syed at - PT/OT. Neurology has seen the patient here on this admission. Rehab placement PENDING.     --- Encephalopathy- likely multifactorial due to UTI, malnutrition, issues with BP.  Hold Seroquel.  I discussed non medication related treatments- keep busy during day.  No napping.  Up in chair, wheelchair walks in hallway etc. IMPROVED.    DISPO: Pending home vs rehab.     DVT Prophylaxis: mechanical    CODE  STATUS:   Code Status and Medical Interventions:   Ordered at: 10/15/18 0406     Level Of Support Discussed With:    Patient     Code Status:    CPR     Medical Interventions (Level of Support Prior to Arrest):    Full     Dispo: rehab placement PENDING    Electronically signed by Erin De La Torre MD, 11/01/18, 2:33 PM.          Electronically signed by Erin De La Torre MD at 11/1/2018  2:33 PM       Consult Notes (last 24 hours) (Notes from 11/1/2018  2:04 PM through 11/2/2018  2:04 PM)     No notes of this type exist for this encounter.        Physical Therapy Notes (last 24 hours) (Notes from 11/1/2018  2:04 PM through 11/2/2018  2:04 PM)     No notes of this type exist for this encounter.        Occupational Therapy Notes (last 24 hours) (Notes from 11/1/2018  2:04 PM through 11/2/2018  2:04 PM)     No notes of this type exist for this encounter.

## 2018-11-02 NOTE — PLAN OF CARE
Problem: Patient Care Overview  Goal: Plan of Care Review  Outcome: Ongoing (interventions implemented as appropriate)   11/02/18 0214   Coping/Psychosocial   Plan of Care Reviewed With patient   Plan of Care Review   Progress no change   OTHER   Outcome Summary Pt mod Ax2 for functional mobility and requires chair follow for safety; mod A x2 for balance at RW, demonstrates deficits in coordination, CGA for donning gown, oriented to self. Cont IPOT per POC

## 2018-11-02 NOTE — PROGRESS NOTES
[facility] Medicine Services  PROGRESS NOTE    Patient Name: Bryanna Schwartz  : 1970  MRN: [id number]    Date of Admission: 10/14/2018  Length of Stay: 0  Primary Care Physician: Andrei Crisostomo MD    Subjective   Subjective     CC: F/U N/V and bilateral LE weakness    HPI:  Patient seen this morning. Awake, alert. Nurse reports hallucinations overnight.     Review of Systems  Gen-no fevers, no chills  CV-no chest pain, no palpitations  Resp-no cough, no dyspnea  GI-no N/V/D, no abd pain      Otherwise ROS is negative except as mentioned in the HPI.    Objective   Objective     Vital Signs:   Temp:  [98.1 °F (36.7 °C)-98.3 °F (36.8 °C)] 98.3 °F (36.8 °C)  Heart Rate:  [] 109  Resp:  [18] 18  BP: (123-145)/(80-96) 145/85        Physical Exam:  Constitutional: No acute distress, awake, alert, sitting up in bed  HENT: NCAT, mucous membranes moist  Respiratory: Clear to auscultation bilaterally, respiratory effort normal  Cardiovascular: RRR, no murmurs, rubs, or gallops, palpable pedal pulses bilaterally  Gastrointestinal: Positive bowel sounds, soft, nontender, nondistended  Musculoskeletal: No bilateral ankle edema   Psychiatric: appropriate mood  Neurologic: Oriented x 3, BLE weakness  Skin: No rashes    Results Reviewed:  I have personally reviewed current lab, radiology, and data and agree.      Results from last 7 days  Lab Units 18  0610 10/30/18  0732 10/28/18  0801   WBC 10*3/mm3 7.67 8.10 6.61   HEMOGLOBIN g/dL 11.0* 13.0 11.8   HEMATOCRIT % 33.2* 39.2 35.6   PLATELETS 10*3/mm3 336 338 280       Results from last 7 days  Lab Units 18  0354 18  0610 10/31/18  0530   SODIUM mmol/L 139 139 139   POTASSIUM mmol/L 3.8 3.6 3.6   CHLORIDE mmol/L 105 107 101   CO2 mmol/L 24.0 25.0 26.0   BUN mg/dL 6* 9 13   CREATININE mg/dL 0.61 0.61 0.69   GLUCOSE mg/dL 95 96 103*   CALCIUM mg/dL 9.0 8.9 8.9     Estimated Creatinine Clearance: 134.4 mL/min (by C-G  formula based on SCr of 0.61 mg/dL).  No results found for: BNP    Microbiology Results Abnormal     Procedure Component Value - Date/Time    Urine Culture - Urine, [[id number]]  (Abnormal)  (Susceptibility) Collected:  10/18/18 1603    Lab Status:  Final result Specimen:  Urine from Urine, Clean Catch Updated:  10/21/18 1055     Urine Culture >100,000 CFU/mL Proteus mirabilis (A)      40,000-50,000 CFU/mL Escherichia coli (A)    Susceptibility      Proteus mirabilis    Escherichia coli       ARELI    ARELI       Ampicillin <=8 ug/ml Susceptible    <=8 ug/ml Susceptible       Ampicillin + Sulbactam <=8/4 ug/ml Susceptible    <=8/4 ug/ml Susceptible       Aztreonam <=8 ug/ml Susceptible    <=8 ug/ml Susceptible       Cefepime <=8 ug/ml Susceptible    <=8 ug/ml Susceptible       Cefotaxime <=2 ug/ml Susceptible    <=2 ug/ml Susceptible       Ceftriaxone <=8 ug/ml Susceptible    <=8 ug/ml Susceptible       Cefuroxime sodium <=4 ug/ml Susceptible    <=4 ug/ml Susceptible       Cephalothin <=8 ug/ml Susceptible    <=8 ug/ml Susceptible       Ertapenem <=1 ug/ml Susceptible    <=1 ug/ml Susceptible       Gentamicin <=4 ug/ml Susceptible    <=4 ug/ml Susceptible       Levofloxacin <=2 ug/ml Susceptible    <=2 ug/ml Susceptible       Meropenem <=1 ug/ml Susceptible    <=1 ug/ml Susceptible       Nitrofurantoin       <=32 ug/ml Susceptible       Piperacillin + Tazobactam <=16 ug/ml Susceptible    <=16 ug/ml Susceptible       Tetracycline       <=4 ug/ml Susceptible       Tobramycin <=4 ug/ml Susceptible    <=4 ug/ml Susceptible       Trimethoprim + Sulfamethoxazole <=2/38 ug/ml Susceptible    <=2/38 ug/ml Susceptible                      Blood Culture - Blood, [[id number]]  (Normal) Collected:  10/15/18 0029    Lab Status:  Final result Specimen:  Blood from Arm, Right Updated:  10/20/18 0130     Blood Culture No growth at 5 days    Blood Culture - Blood, [[id number]]  (Normal) Collected:  10/15/18 0029    Lab Status:  Final result  Specimen:  Blood from Arm, Right Updated:  10/20/18 0130     Blood Culture No growth at 5 days    Eosinophil Smear - Urine, Urine, Clean Catch [[id number]]  (Normal) Collected:  10/18/18 1603    Lab Status:  Final result Specimen:  Urine from Urine, Clean Catch Updated:  10/18/18 1802     Eosinophil Smear 0 % EOS/100 Cells     Narrative:       No eosinophil seen          Imaging Results (last 24 hours)     ** No results found for the last 24 hours. **             I have reviewed the medications.      atenolol 50 mg Oral Q12H   atorvastatin 10 mg Oral Daily   b complex-vitamin c-folic acid 1 tablet Oral Daily   cetirizine 10 mg Oral Daily   DULoxetine 60 mg Oral Daily   latanoprost 1 drop Both Eyes Nightly   lisinopril 2.5 mg Oral Daily   melatonin 5 mg Sublingual Nightly   montelukast 5 mg Oral Nightly   pantoprazole 40 mg Oral BID AC   polyethylene glycol 17 g Oral Daily   QUEtiapine 12.5 mg Oral Q12H   sodium chloride 3 mL Intravenous Q12H         Assessment/Plan   Assessment / Plan     Active Hospital Problems    Diagnosis   • **Intractable nausea and vomiting   • Acute UTI (urinary tract infection)   • Falls   • GERD without esophagitis   • Anxiety and depression   • Hyperlipidemia   • Essential hypertension   • Migraines   • Lyme disease   • Hypokalemia   • Hypomagnesemia   • Acute hypokalemia   • Abnormal CT scan, stomach     Added automatically from request for surgery [id number]     • Intractable vomiting with nausea     Added automatically from request for surgery [id number]          Brief Hospital Course to date:  Bryanna Schwartz is a 48 y.o. female with a past medical history of anxiety, depression, hypertension, hyperlipidemia, migraines. Recent rx for RMSF and lyme. She presented with what she describes as 6 months of intractable nausea and vomiting that has been progressively worsening with reported weight loss of 120 lbs.     Assessment & Plan:    --Nausea/Vomiting-She has had significant workup at OSH this  has included EGD, CCY. Her symptoms of anorexia, early satiety with N/V and 120 lb weight loss (she does not look like she has lost that much weight).  CT abd/pelvis showed thickening of the proximal gastric mucosa, she is s/p EGD 10/16 showed antral gastritis with reflux esophagitis, currently on Protonix 40 mg BID. Symptoms have improved markedly and now patient tolerates PO diet. Suspect functional etiology of her nausea.     --Multiple electrolyte abnormalities -hypokalemia, hypomagnesemia, etiology unknown? Adrenal insufficiency, however random cortisol is wnl, suspect from prolonged N/V. Now resolved.    --UTI- likely contributed to N/V on admission.  UCx with >100K Proteus and 40-50K E coli, both of which were susceptible to Rocephin. Pt completed 5 total days of Rocephin (last dose 10/22).      --Chronic illness malnutrition, nutrition/dietecian following.    --HTN- not well controlled, concerning for Pheochromocytoma. Nephrology following.  Reviewed workup and is concerning for Pheo. Pt has had CT A/P on admission with no finding of adrenal incidentaloma. BP remains difficult to control with lows and extreme highs.  Abnormal urine metanephrines.  MRI unremarkable.  BP med adjustments made by NAL.     --Significant bilateral LE weakness with ataxia, has had some ataxia in the past, previously seen by Dr. Syed at - [facility]. Neurology has seen the patient here on this admission, likely peripheral neuropathy. Rehab placement PENDING.     --- Encephalopathy- likely multifactorial due to UTI, malnutrition, issues with intermittent low BP.  Discussed non medication related treatments- keep busy during day.  No napping.  Up in chair, wheelchair walks in hallway etc. Improved overall but continues to have some hallucinations. Will resume low dose Seroquel 12.5 mg BID and monitor. Needs outpatient Psych evaluation.    DISPO: Pending rehab placement.     DVT Prophylaxis: mechanical    CODE STATUS:   Code Status and  Medical Interventions:   Ordered at: 10/15/18 0406     Level Of Support Discussed With:    Patient     Code Status:    CPR     Medical Interventions (Level of Support Prior to Arrest):    Full     Dispo: rehab placement PENDING, medically ready anytime    Electronically signed by Erin De La Torre MD, 11/02/18, 12:20 PM.

## 2018-11-02 NOTE — PAYOR COMM NOTE
"Attention: Saira Sahu RN Utilization Review [phone]  Fax # [phone]    Ref # [id number]      This case has been denied as inpt, please review most recent clinicals for acute changes for authorization for inpatient days.  Please call or fax.       Bryanna Merrill (48 y.o. Female)     Date of Birth Social Security Number Address Home Phone MRN    1970  [address] [facility] [phone] [id number]    Orthodoxy Marital Status          None Unknown       Admission Date Admission Type Admitting Provider Attending Provider Department, Room/Bed    10/14/18 Emergency rEin De La Torre MD Reddy, Mayuri V, MD [address], S[address]/1    Discharge Date Discharge Disposition Discharge Destination                       Attending Provider:  Erin De La Torre MD    Allergies:  No Known Allergies    Isolation:  None   Infection:  None   Code Status:  CPR    Ht:  175.3 cm (69\")   Wt:  75.5 kg (166 lb 6.4 oz)    Admission Cmt:  None   Principal Problem:  Intractable nausea and vomiting [R11.2]                 Active Insurance as of 10/14/2018     Primary Coverage     Payor Plan Insurance Group Employer/Plan Group    [employer] [place] [employer] [place]      Payor Plan Address Payor Plan Phone Number Effective From Effective To    [address] [address] [phone] 8/16/2018     [facility] [address]       Subscriber Name Subscriber Birth Date Member ID       BRYANNA MERRILL 1970 [id number]                 Emergency Contacts      (Rel.) Home Phone Work Phone Mobile Phone    Michael Merrill (Spouse) [phone] -- --    Chanelle Caruso (Relative) [phone] -- --    Padma Villalta (Sister) [phone] -- --                  ICU Vital Signs     Row Name 11/02/18 0714 11/02/18 0600 11/02/18 0500 11/02/18 0450 11/02/18 0400       Vitals    Temp 98.3 °F (36.8 °C)  --  -- 98.1 °F (36.7 °C)  --    Temp src Oral  --  -- Oral  --    Pulse 109  -- 99 101  --    Heart " Rate Source Monitor  --  -- Monitor  --    Resp 18  --  -- 18  --    Resp Rate Source Visual  --  -- Visual  --    /85  --  -- 123/80  --    Noninvasive MAP (mmHg)  --  --  -- 96  --    BP Location Right arm  --  --  --  --    BP Method Automatic  --  --  --  --    Patient Position Lying  --  --  --  --       Oxygen Therapy    SpO2  --  --  -- 98 %  --    Pulse Oximetry Type  --  --  -- Intermittent  --    Device (Oxygen Therapy) room air room air  --  -- room air    Row Name 11/02/18 0200 11/02/18 0000 11/01/18 2200 11/01/18 2000 11/01/18 1850       Vitals    Temp  --  --  --  -- 98.1 °F (36.7 °C)    Temp src  --  --  --  -- Oral    Pulse  --  --  --  -- 118    Heart Rate Source  --  --  --  -- Monitor    Resp  --  --  --  -- 18    Resp Rate Source  --  --  --  -- Visual    BP  --  --  --  -- 139/96    Noninvasive MAP (mmHg)  --  --  --  -- 116    BP Location  --  --  --  -- Right arm    BP Method  --  --  --  -- Automatic    Patient Position  --  --  --  -- Sitting       Oxygen Therapy    Device (Oxygen Therapy) room air room air room air room air  --    Row Name 11/01/18 1600 11/01/18 1155 11/01/18 1100             Vitals    Pulse  -- 104  --      BP  -- (!)  138/107  --      BP Location  -- Left arm  --      BP Method  -- Automatic  --      Patient Position  -- Sitting  --         Oxygen Therapy    SpO2  -- 98 %  --      Device (Oxygen Therapy) room air  -- room air          Hospital Medications (active)       Dose Frequency Start End    acetaminophen (TYLENOL) tablet 650 mg 650 mg Every 4 Hours PRN 10/15/2018     Sig - Route: Take 2 tablets by mouth Every 4 (Four) Hours As Needed for Mild Pain . - Oral    atenolol (TENORMIN) tablet 50 mg 50 mg Every 12 Hours Scheduled 10/18/2018     Sig - Route: Take 1 tablet by mouth Every 12 (Twelve) Hours. - Oral    atorvastatin (LIPITOR) tablet 10 mg 10 mg Daily 10/15/2018     Sig - Route: Take 1 tablet by mouth Daily. - Oral    b complex-vitamin c-folic acid  "(NEPHRO-ELADIA) tablet 1 tablet 1 tablet Daily 10/19/2018     Sig - Route: Take 1 tablet by mouth Daily. - Oral    cetirizine (zyrTEC) tablet 10 mg 10 mg Daily 10/26/2018     Sig - Route: Take 1 tablet by mouth Daily. - Oral    DULoxetine (CYMBALTA) DR capsule 60 mg 60 mg Daily 10/15/2018     Sig - Route: Take 1 capsule by mouth Daily. - Oral    Influenza Vac Subunit Quad (FLUCELVAX) injection 0.5 mL 0.5 mL During Hospitalization 10/18/2018     Sig - Route: Inject 0.5 mL into the appropriate muscle as directed by prescriber During Hospitalization for Immunization. - Intramuscular    latanoprost (XALATAN) 0.005 % ophthalmic solution 1 drop 1 drop Nightly 10/20/2018     Sig - Route: Administer 1 drop to both eyes Every Night. - Both Eyes    lisinopril (PRINIVIL,ZESTRIL) tablet 2.5 mg 2.5 mg Daily 11/1/2018     Sig - Route: Take 0.5 tablets by mouth Daily. - Oral    LORazepam (ATIVAN) injection 0.25 mg 0.25 mg Once 11/2/2018 11/2/2018    Sig - Route: Infuse 0.125 mL into a venous catheter 1 (One) Time. - Intravenous    Magnesium Sulfate 2 gram / 50mL Infusion (GIVE X 3 BAGS TO EQUAL 6GM TOTAL DOSE) - Mg 1.1 - 1.5 mg/dl 2 g As Needed 10/15/2018     Sig - Route: Infuse 50 mL into a venous catheter As Needed (See Administration Instructions). - Intravenous    Linked Group 1:  \"Or\" Linked Group Details        Magnesium Sulfate 2 gram Bolus, followed by 8 gram infusion (total Mg dose 10 grams)- Mg less than or equal to 1mg/dL 2 g As Needed 10/15/2018     Sig - Route: Infuse 50 mL into a venous catheter As Needed (See Administration Instructions). - Intravenous    Linked Group 1:  \"Or\" Linked Group Details        Magnesium Sulfate 4 gram infusion- Mg 1.6-1.9 mg/dL 4 g As Needed 10/15/2018     Sig - Route: Infuse 100 mL into a venous catheter As Needed (See Administration Instructions). - Intravenous    Linked Group 1:  \"Or\" Linked Group Details        melatonin sublingual tablet 5 mg 5 mg Nightly 10/19/2018     Sig - Route: " "Place 1 tablet under the tongue Every Night. - Sublingual    montelukast (SINGULAIR) chewable tablet 5 mg 5 mg Nightly 10/26/2018     Sig - Route: Chew 1 tablet Every Night. - Oral    ondansetron (ZOFRAN) injection 4 mg 4 mg Every 6 Hours PRN 10/15/2018     Sig - Route: Infuse 2 mL into a venous catheter Every 6 (Six) Hours As Needed for Nausea or Vomiting. - Intravenous    pantoprazole (PROTONIX) EC tablet 40 mg 40 mg 2 Times Daily Before Meals 10/22/2018     Sig - Route: Take 1 tablet by mouth 2 (Two) Times a Day Before Meals. - Oral    polyethylene glycol 3350 powder (packet) 17 g Daily 10/18/2018     Sig - Route: Take 17 g by mouth Daily. - Oral    potassium chloride (KLOR-CON) packet 40 mEq 40 mEq As Needed 10/15/2018     Sig - Route: Take 40 mEq by mouth As Needed (potassium replacement, see admin instructions). - Oral    Linked Group 2:  \"Or\" Linked Group Details        potassium chloride 10 mEq in 100 mL IVPB 10 mEq Every 1 Hour PRN 10/15/2018     Sig - Route: Infuse 100 mL into a venous catheter Every 1 (One) Hour As Needed (potassium protocol PERIPHERAL - see admin instructions). - Intravenous    Linked Group 2:  \"Or\" Linked Group Details        promethazine (PHENERGAN) injection 12.5 mg 12.5 mg Every 6 Hours PRN 10/15/2018     Sig - Route: Infuse 0.5 mL into a venous catheter Every 6 (Six) Hours As Needed for Nausea or Vomiting. - Intravenous    QUEtiapine (SEROquel) tablet 12.5 mg 12.5 mg Once 11/1/2018 11/1/2018    Sig - Route: Take 0.5 tablets by mouth 1 (One) Time. - Oral    QUEtiapine (SEROquel) tablet 12.5 mg 12.5 mg Every 12 Hours Scheduled 11/2/2018     Sig - Route: Take 0.5 tablets by mouth Every 12 (Twelve) Hours. - Oral    sodium chloride 0.9 % flush 10 mL 10 mL As Needed 10/15/2018     Sig - Route: Infuse 10 mL into a venous catheter As Needed for Line Care. - Intravenous    Linked Group 3:  \"And\" Linked Group Details        sodium chloride 0.9 % flush 3 mL 3 mL Every 12 Hours Scheduled " 10/15/2018     Sig - Route: Infuse 3 mL into a venous catheter Every 12 (Twelve) Hours. - Intravenous    sodium chloride 0.9 % flush 3-10 mL 3-10 mL As Needed 10/15/2018     Sig - Route: Infuse 3-10 mL into a venous catheter As Needed for Line Care. - Intravenous    sodium chloride 0.9 % infusion 100 mL/hr Continuous 10/31/2018     Sig - Route: Infuse 100 mL/hr into a venous catheter Continuous. - Intravenous          Operative/Procedure Notes (last 7 days) (Notes from 10/26/2018  9:23 AM through 2018  9:23 AM)     No notes of this type exist for this encounter.          Veronica Roque, APRN Nurse Practitioner Signed Medicine Significant Event Date of Service: 2018 11:47 PM           RN states continued confusion and behavior issues overnight. Visual and auditory hallucinations, convinced is seeing dead people and dead babies in the hallway. Unsafe mobility and cannot walk, is taking off her equipment and turning off bed alarms and trying to go places. RN states talking out of her head, rambling, unable to calm or control her or get her to follow ANY directions.     May need neuro back to eval. Strongly likely needs a psych eval as well, RNs state since has been here many concerns.     Seroquel stopped r/t concern for making confusion worse.     Will try ativan x1. No staff for sitter. Will try to avoid restraints, may have to try more meds to avoid this. Unclear who she lives with or how she could go home, considering rehab and CM working on progress.     PLEASE F/U FURTHER IN AM                 Physician Progress Notes (last 7 days) (Notes from 10/26/2018  9:23 AM through 2018  9:23 AM)      Erin De La Torre MD at 2018  2:30 PM              [facility] Medicine Services  PROGRESS NOTE    Patient Name: Bryanna Schwartz  : 1970  MRN: [id number]    Date of Admission: 10/14/2018  Length of Stay: 0  Primary Care Physician: Andrei Crisostomo MD    Subjective    Subjective     CC: F/U N/V and bilateral LE weakness    HPI:  Patient seen this morning. Awake, alert. Wants to go home with her sister.     Review of Systems  Gen-no fevers, no chills  CV-no chest pain, no palpitations  Resp-no cough, no dyspnea  GI-no N/V/D, no abd pain      Otherwise ROS is negative except as mentioned in the HPI.    Objective   Objective     Vital Signs:   Temp:  [97.6 °F (36.4 °C)-98.7 °F (37.1 °C)] 98.7 °F (37.1 °C)  Heart Rate:  [] 104  Resp:  [16-18] 18  BP: ()/() 138/107        Physical Exam:  Constitutional: No acute distress, awake, alert, sitting up in bed  HENT: NCAT, mucous membranes moist  Respiratory: Clear to auscultation bilaterally, respiratory effort normal  Cardiovascular: RRR, no murmurs, rubs, or gallops, palpable pedal pulses bilaterally  Gastrointestinal: Positive bowel sounds, soft, nontender, nondistended  Musculoskeletal: No bilateral ankle edema   Psychiatric: appropriate mood  Neurologic: Oriented x 3, BLE weakness  Skin: No rashes    Results Reviewed:  I have personally reviewed current lab, radiology, and data and agree.      Results from last 7 days  Lab Units 11/01/18  0610 10/30/18  0732 10/28/18  0801   WBC 10*3/mm3 7.67 8.10 6.61   HEMOGLOBIN g/dL 11.0* 13.0 11.8   HEMATOCRIT % 33.2* 39.2 35.6   PLATELETS 10*3/mm3 336 338 280       Results from last 7 days  Lab Units 11/01/18  0610 10/31/18  0530 10/30/18  0732   SODIUM mmol/L 139 139 139   POTASSIUM mmol/L 3.6 3.6 4.2   CHLORIDE mmol/L 107 101 101   CO2 mmol/L 25.0 26.0 30.0   BUN mg/dL 9 13 7*   CREATININE mg/dL 0.61 0.69 0.68   GLUCOSE mg/dL 96 103* 92   CALCIUM mg/dL 8.9 8.9 9.7     Estimated Creatinine Clearance: 134.4 mL/min (by C-G formula based on SCr of 0.61 mg/dL).  No results found for: BNP    Microbiology Results Abnormal     Procedure Component Value - Date/Time    Urine Culture - Urine, [[id number]]  (Abnormal)  (Susceptibility) Collected:  10/18/18 7483    Lab Status:  Final  result Specimen:  Urine from Urine, Clean Catch Updated:  10/21/18 1055     Urine Culture >100,000 CFU/mL Proteus mirabilis (A)      40,000-50,000 CFU/mL Escherichia coli (A)    Susceptibility      Proteus mirabilis    Escherichia coli       ARELI    ARELI       Ampicillin <=8 ug/ml Susceptible    <=8 ug/ml Susceptible       Ampicillin + Sulbactam <=8/4 ug/ml Susceptible    <=8/4 ug/ml Susceptible       Aztreonam <=8 ug/ml Susceptible    <=8 ug/ml Susceptible       Cefepime <=8 ug/ml Susceptible    <=8 ug/ml Susceptible       Cefotaxime <=2 ug/ml Susceptible    <=2 ug/ml Susceptible       Ceftriaxone <=8 ug/ml Susceptible    <=8 ug/ml Susceptible       Cefuroxime sodium <=4 ug/ml Susceptible    <=4 ug/ml Susceptible       Cephalothin <=8 ug/ml Susceptible    <=8 ug/ml Susceptible       Ertapenem <=1 ug/ml Susceptible    <=1 ug/ml Susceptible       Gentamicin <=4 ug/ml Susceptible    <=4 ug/ml Susceptible       Levofloxacin <=2 ug/ml Susceptible    <=2 ug/ml Susceptible       Meropenem <=1 ug/ml Susceptible    <=1 ug/ml Susceptible       Nitrofurantoin       <=32 ug/ml Susceptible       Piperacillin + Tazobactam <=16 ug/ml Susceptible    <=16 ug/ml Susceptible       Tetracycline       <=4 ug/ml Susceptible       Tobramycin <=4 ug/ml Susceptible    <=4 ug/ml Susceptible       Trimethoprim + Sulfamethoxazole <=2/38 ug/ml Susceptible    <=2/38 ug/ml Susceptible                      Blood Culture - Blood, [[id number]]  (Normal) Collected:  10/15/18 0029    Lab Status:  Final result Specimen:  Blood from Arm, Right Updated:  10/20/18 0130     Blood Culture No growth at 5 days    Blood Culture - Blood, [[id number]]  (Normal) Collected:  10/15/18 0029    Lab Status:  Final result Specimen:  Blood from Arm, Right Updated:  10/20/18 0130     Blood Culture No growth at 5 days    Eosinophil Smear - Urine, Urine, Clean Catch [[id number]]  (Normal) Collected:  10/18/18 1603    Lab Status:  Final result Specimen:  Urine from Urine, Clean  Catch Updated:  10/18/18 1802     Eosinophil Smear 0 % EOS/100 Cells     Narrative:       No eosinophil seen          Imaging Results (last 24 hours)     ** No results found for the last 24 hours. **             I have reviewed the medications.      atenolol 50 mg Oral Q12H   atorvastatin 10 mg Oral Daily   b complex-vitamin c-folic acid 1 tablet Oral Daily   cetirizine 10 mg Oral Daily   DULoxetine 60 mg Oral Daily   latanoprost 1 drop Both Eyes Nightly   lisinopril 2.5 mg Oral Daily   melatonin 5 mg Sublingual Nightly   montelukast 5 mg Oral Nightly   pantoprazole 40 mg Oral BID AC   polyethylene glycol 17 g Oral Daily   sodium chloride 3 mL Intravenous Q12H         Assessment/Plan   Assessment / Plan     Active Hospital Problems    Diagnosis   • **Intractable nausea and vomiting   • Acute UTI (urinary tract infection)   • Falls   • GERD without esophagitis   • Anxiety and depression   • Hyperlipidemia   • Essential hypertension   • Migraines   • Lyme disease   • Hypokalemia   • Hypomagnesemia   • Acute hypokalemia   • Abnormal CT scan, stomach     Added automatically from request for surgery [id number]     • Intractable vomiting with nausea     Added automatically from request for surgery [id number]          Brief Hospital Course to date:  Bryanna Schwartz is a 48 y.o. female with a past medical history of anxiety, depression, hypertension, hyperlipidemia, migraines. Recent rx for RMSF and lyme. She presented with what she describes as 6 months of intractable nausea and vomiting that has been progressively worsening with reported weight loss of 120 lbs.     Assessment & Plan:    --Nausea/Vomiting-She has had significant workup at OSH this has included EGD, CCY. Her symptoms of anorexia, early satiety with n/v and 120lb weight loss( she does not look like she has lost that much weight ).  CT abd/pelvis showed thickening of the proximal gastric mucosa, she is s/p EGD 10/16 showed antral gastritis with reflux esophagitis,  currently on protonix 40 mg bid. Symptoms have improved markedly and now patient tolerates PO diet. Suspect functional etiology of her nausea.     --Multiple electrolyte abnormalities -hypokalemia, hypomagnesemia, etiology unknown? Adrenal insufficieny, however random cortisol is wnl, suspect from prolonged n/v, continue to monitor and replete.    --UTI- likely contributed to N/V on admission.  UCx with >100K proteus and 40-50K E coli,both of which were susceptible to Rocephin. Pt completed 5 total days of Rocephin (last dose 10/22).      --Chronic illness malnutrition, nutrition/dietecian following.    --HTN- not well controlled, concerning for Pheochromocytoma. Nephrology following.  Reviewed workup and is concerning for Pheo. Pt has had CT A/P on admission with no finding of adrenal incidentaloma. BP remains difficult to control with lows and extreme highs.  Abnormal urine metanephrines.  MRI unremarkable.  BP med adjustments made by NAL yesterday. Now hypotensive again despite Lisinopril being held. Reviewed earlier admission event when she was hypotensive and, at that time, she had taken her home dose of muscle relaxer and RN today is concerned that this is the case now. Pt eventually improved (during last event) with 4x 1L boluses. Continue fluids. Improved.    --- Significant bilateral LE weakness with ataxia, has had some ataxia in the past, previously seen by Dr. Syed at - PT/OT. Neurology has seen the patient here on this admission. Rehab placement PENDING.     --- Encephalopathy- likely multifactorial due to UTI, malnutrition, issues with BP.  Hold Seroquel.  I discussed non medication related treatments- keep busy during day.  No napping.  Up in chair, wheelchair walks in hallway etc. IMPROVED.    DISPO: Pending home vs rehab.     DVT Prophylaxis: mechanical    CODE STATUS:   Code Status and Medical Interventions:   Ordered at: 10/15/18 3789     Level Of Support Discussed With:    Patient     Code  "Status:    CPR     Medical Interventions (Level of Support Prior to Arrest):    Full     Dispo: rehab placement PENDING    Electronically signed by Erin De La Torre MD, 11/01/18, 2:33 PM.          Electronically signed by Erin De La Torre MD at 11/1/2018  2:33 PM     Daniel Casas MD at 11/1/2018 11:42 AM             LOS: 0 days    Patient Care Team:  nAdrei Crisostomo MD as PCP - General (Gastroenterology)    Reason For Visit:  F/U HTN AND PROTEINURIA.  Subjective     She feels much better actually sounded a little paranoid as complaining that  is never here to see her     Review of Systems:    Pulm: No soa   CV:  No CP      Objective       atenolol 50 mg Oral Q12H   atorvastatin 10 mg Oral Daily   b complex-vitamin c-folic acid 1 tablet Oral Daily   cetirizine 10 mg Oral Daily   DULoxetine 60 mg Oral Daily   latanoprost 1 drop Both Eyes Nightly   lisinopril 2.5 mg Oral Daily   melatonin 5 mg Sublingual Nightly   montelukast 5 mg Oral Nightly   pantoprazole 40 mg Oral BID AC   polyethylene glycol 17 g Oral Daily   sodium chloride 3 mL Intravenous Q12H       sodium chloride 100 mL/hr         Vital Signs:  Blood pressure 123/73, pulse 110, temperature 98.7 °F (37.1 °C), temperature source Oral, resp. rate 18, height 175.3 cm (69\"), weight 75.5 kg (166 lb 6.4 oz), SpO2 99 %, not currently breastfeeding.    Flowsheet Rows      First Filed Value   Admission Height  175.3 cm (69\") Documented at 10/14/2018 2247   Admission Weight  72.6 kg (160 lb) Documented at 10/14/2018 2247          10/31 0701 - 11/01 0700  In: -   Out: 300 [Urine:300]    Physical Exam:    General Appearance: NAD, alert and cooperative, Ox3  Eyes: PER, conjunctivae and sclerae normal, no icterus  Lungs: respirations regular and unlabored, no crepitus, clear to auscultation  Heart/CV: regular rhythm & normal rate, no murmur, no gallop, no rub and no edema  Abdomen: not distended, soft, non-tender, no masses,  bowel sounds present  Skin: No " rash, Warm and dry    Radiology:            Labs:    Results from last 7 days  Lab Units 11/01/18  0610 10/30/18  0732 10/28/18  0801   WBC 10*3/mm3 7.67 8.10 6.61   HEMOGLOBIN g/dL 11.0* 13.0 11.8   HEMATOCRIT % 33.2* 39.2 35.6   PLATELETS 10*3/mm3 336 338 280       Results from last 7 days  Lab Units 11/01/18  0610 10/31/18  0530 10/30/18  1333 10/30/18  0732 10/29/18  0835 10/28/18  0801  10/26/18  0703   SODIUM mmol/L 139 139  --  139  --  141  --   --    POTASSIUM mmol/L 3.6 3.6  --  4.2 3.6 3.0*  < >  --    CHLORIDE mmol/L 107 101  --  101  --  103  --   --    CO2 mmol/L 25.0 26.0  --  30.0  --  30.0  --   --    BUN mg/dL 9 13  --  7*  --  6*  --   --    CREATININE mg/dL 0.61 0.69  --  0.68  --  0.58*  --   --    CALCIUM mg/dL 8.9 8.9  --  9.7  --  9.3  --   --    MAGNESIUM mg/dL  --   --  1.8  --   --   --   --  2.2   < > = values in this interval not displayed.    Results from last 7 days  Lab Units 11/01/18  0610   GLUCOSE mg/dL 96                       Estimated Creatinine Clearance: 134.4 mL/min (by C-G formula based on SCr of 0.61 mg/dL).      Assessment       Intractable nausea and vomiting    GERD without esophagitis    Anxiety and depression    Hyperlipidemia    Essential hypertension    Migraines    Lyme disease    Hypokalemia    Hypomagnesemia    Acute hypokalemia    Abnormal CT scan, stomach    Intractable vomiting with nausea    Falls    Acute UTI (urinary tract infection)            Impression:     MILD PROTEINURIA.   HTN - good now but it dows go up and down-- very high metanephrine in urine- plan as under  CT abd does not say any thing about adrenals      Recommendations:   MRI- No adrenal adenoma- but did have high nor epinephrine on urin eassessment-   bp stable and better  If she is eating and drinking better ok to dc ivf       Daniel Casas MD  11/01/18  11:42 AM          Electronically signed by Daniel Casas MD at 11/1/2018 11:43 AM     Daniel Casas MD at 10/31/2018 12:12 PM      "        LOS: 0 days    Patient Care Team:  Andrei Crisostomo MD as PCP - General (Gastroenterology)    Reason For Visit:  F/U HTN AND PROTEINURIA.  Subjective     Sleepy today    Review of Systems:    Pulm: No soa   CV:  No CP      Objective       atenolol 50 mg Oral Q12H   atorvastatin 10 mg Oral Daily   b complex-vitamin c-folic acid 1 tablet Oral Daily   cetirizine 10 mg Oral Daily   DULoxetine 60 mg Oral Daily   latanoprost 1 drop Both Eyes Nightly   [START ON 11/1/2018] lisinopril 2.5 mg Oral Daily   melatonin 5 mg Sublingual Nightly   montelukast 5 mg Oral Nightly   pantoprazole 40 mg Oral BID AC   polyethylene glycol 17 g Oral Daily   QUEtiapine 50 mg Oral Q12H   sodium chloride 1,000 mL Intravenous Once   sodium chloride 3 mL Intravenous Q12H            Vital Signs:  Blood pressure (!) 84/48, pulse 92, temperature 97.9 °F (36.6 °C), temperature source Axillary, resp. rate 14, height 175.3 cm (69\"), weight 75.5 kg (166 lb 6.4 oz), SpO2 93 %, not currently breastfeeding.    Flowsheet Rows      First Filed Value   Admission Height  175.3 cm (69\") Documented at 10/14/2018 2247   Admission Weight  72.6 kg (160 lb) Documented at 10/14/2018 2247          10/30 0701 - 10/31 0700  In: 220 [P.O.:220]  Out: -     Physical Exam:    General Appearance: NAD, alert and cooperative, Ox3  Eyes: PER, conjunctivae and sclerae normal, no icterus  Lungs: respirations regular and unlabored, no crepitus, clear to auscultation  Heart/CV: regular rhythm & normal rate, no murmur, no gallop, no rub and no edema  Abdomen: not distended, soft, non-tender, no masses,  bowel sounds present  Skin: No rash, Warm and dry    Radiology:            Labs:    Results from last 7 days  Lab Units 10/30/18  0732 10/28/18  0801   WBC 10*3/mm3 8.10 6.61   HEMOGLOBIN g/dL 13.0 11.8   HEMATOCRIT % 39.2 35.6   PLATELETS 10*3/mm3 338 280       Results from last 7 days  Lab Units 10/31/18  0530 10/30/18  1333 10/30/18  0732 10/29/18  0835 " 10/28/18  0801 10/26/18  0703 10/25/18  0943   SODIUM mmol/L 139  --  139  --  141  --  142   POTASSIUM mmol/L 3.6  --  4.2 3.6 3.0*  --  3.7   CHLORIDE mmol/L 101  --  101  --  103  --  106   CO2 mmol/L 26.0  --  30.0  --  30.0  --  27.0   BUN mg/dL 13  --  7*  --  6*  --  10   CREATININE mg/dL 0.69  --  0.68  --  0.58*  --  0.57*   CALCIUM mg/dL 8.9  --  9.7  --  9.3  --  9.1   MAGNESIUM mg/dL  --  1.8  --   --   --  2.2 1.5       Results from last 7 days  Lab Units 10/31/18  0530   GLUCOSE mg/dL 103*                   Results from last 7 days  Lab Units 10/25/18  0720   COLOR UA  Yellow   CLARITY UA  Clear   PH, URINE  5.5   SPECIFIC GRAVITY, URINE  1.024   GLUCOSE UA  Negative   KETONES UA  Negative   BILIRUBIN UA  Negative   PROTEIN UA  Negative   BLOOD UA  Negative   LEUKOCYTES UA  Negative   NITRITE UA  Negative       Estimated Creatinine Clearance: 118.8 mL/min (by C-G formula based on SCr of 0.69 mg/dL).      Assessment       Intractable nausea and vomiting    GERD without esophagitis    Anxiety and depression    Hyperlipidemia    Essential hypertension    Migraines    Lyme disease    Hypokalemia    Hypomagnesemia    Acute hypokalemia    Abnormal CT scan, stomach    Intractable vomiting with nausea    Falls    Acute UTI (urinary tract infection)            Impression:     MILD PROTEINURIA.   HTN - good now but it dows go up and down-- very high metanephrine in urine- plan as under  CT abd does not say any thing about adrenals      Recommendations:   MRI- No adrenal adenoma- but did have high nor epinephrine on urin eassessment-   Will dc phenoxybenzamine due to very low bp  Lower lisinopril  plz dont dc pt yet- still have to work on her bp meds       Daniel Casas MD  10/31/18  12:12 PM          Electronically signed by Daniel Casas MD at 10/31/2018 12:14 PM     Adriana Kirkpatrick MD at 10/31/2018  9:21 AM              [facility] Medicine Services  PROGRESS NOTE    Patient  Name: Bryanna Schwartz  : 1970  MRN: [id number]    Date of Admission: 10/14/2018  Length of Stay: 0  Primary Care Physician: Andrei Crisostomo MD    Subjective   Subjective     CC:n/v and bilateral LE weakness    HPI:    Pt lethargic this am, per RN, pt got Seroquel last evening and this am.  Pt also became hypotensive later this am.  RN concerned that pt took her home supply of muscle relaxer.     Review of Systems  Gen- No fevers, chills  CV- No chest pain, palpitations  Resp- No cough, dyspnea  GI- No N/V/D, abd pain    - Limited due to waxing and waning mental status-    Otherwise ROS is negative except as mentioned in the HPI.    Objective   Objective     Vital Signs:   Temp:  [97.3 °F (36.3 °C)-97.9 °F (36.6 °C)] 97.9 °F (36.6 °C)  Heart Rate:  [] 90  Resp:  [14-18] 14  BP: ()/(40-71) 83/40        Physical Exam:  Constitutional: No acute distress, lethargic  HENT: NCAT, mucous membranes moist  Respiratory: Clear to auscultation bilaterally, respiratory effort normal  Cardiovascular: RRR, no murmurs, rubs, or gallops, palpable pedal pulses bilaterally  Gastrointestinal: Positive bowel sounds, soft, nontender, nondistended  Musculoskeletal: No bilateral ankle edema   Psychiatric: lethargic this am  Neurologic: Oriented x 3, BLE weakness  Skin: No rashes    Results Reviewed:  I have personally reviewed current lab, radiology, and data and agree.      Results from last 7 days  Lab Units 10/30/18  0732 10/28/18  0801   WBC 10*3/mm3 8.10 6.61   HEMOGLOBIN g/dL 13.0 11.8   HEMATOCRIT % 39.2 35.6   PLATELETS 10*3/mm3 338 280       Results from last 7 days  Lab Units 10/31/18  0530 10/30/18  0732 10/29/18  0835 10/28/18  0801   SODIUM mmol/L 139 139  --  141   POTASSIUM mmol/L 3.6 4.2 3.6 3.0*   CHLORIDE mmol/L 101 101  --  103   CO2 mmol/L 26.0 30.0  --  30.0   BUN mg/dL 13 7*  --  6*   CREATININE mg/dL 0.69 0.68  --  0.58*   GLUCOSE mg/dL 103* 92  --  107*   CALCIUM mg/dL 8.9 9.7  --  9.3      Estimated Creatinine Clearance: 118.8 mL/min (by C-G formula based on SCr of 0.69 mg/dL).  No results found for: BNP    Microbiology Results Abnormal     Procedure Component Value - Date/Time    Urine Culture - Urine, [[id number]]  (Abnormal)  (Susceptibility) Collected:  10/18/18 1603    Lab Status:  Final result Specimen:  Urine from Urine, Clean Catch Updated:  10/21/18 1055     Urine Culture >100,000 CFU/mL Proteus mirabilis (A)      40,000-50,000 CFU/mL Escherichia coli (A)    Susceptibility      Proteus mirabilis    Escherichia coli       ARELI    ARELI       Ampicillin <=8 ug/ml Susceptible    <=8 ug/ml Susceptible       Ampicillin + Sulbactam <=8/4 ug/ml Susceptible    <=8/4 ug/ml Susceptible       Aztreonam <=8 ug/ml Susceptible    <=8 ug/ml Susceptible       Cefepime <=8 ug/ml Susceptible    <=8 ug/ml Susceptible       Cefotaxime <=2 ug/ml Susceptible    <=2 ug/ml Susceptible       Ceftriaxone <=8 ug/ml Susceptible    <=8 ug/ml Susceptible       Cefuroxime sodium <=4 ug/ml Susceptible    <=4 ug/ml Susceptible       Cephalothin <=8 ug/ml Susceptible    <=8 ug/ml Susceptible       Ertapenem <=1 ug/ml Susceptible    <=1 ug/ml Susceptible       Gentamicin <=4 ug/ml Susceptible    <=4 ug/ml Susceptible       Levofloxacin <=2 ug/ml Susceptible    <=2 ug/ml Susceptible       Meropenem <=1 ug/ml Susceptible    <=1 ug/ml Susceptible       Nitrofurantoin       <=32 ug/ml Susceptible       Piperacillin + Tazobactam <=16 ug/ml Susceptible    <=16 ug/ml Susceptible       Tetracycline       <=4 ug/ml Susceptible       Tobramycin <=4 ug/ml Susceptible    <=4 ug/ml Susceptible       Trimethoprim + Sulfamethoxazole <=2/38 ug/ml Susceptible    <=2/38 ug/ml Susceptible                      Blood Culture - Blood, [[id number]]  (Normal) Collected:  10/15/18 0029    Lab Status:  Final result Specimen:  Blood from Arm, Right Updated:  10/20/18 0130     Blood Culture No growth at 5 days    Blood Culture - Blood, [[id number]]   (Normal) Collected:  10/15/18 0029    Lab Status:  Final result Specimen:  Blood from Arm, Right Updated:  10/20/18 0130     Blood Culture No growth at 5 days    Eosinophil Smear - Urine, Urine, Clean Catch [[id number]]  (Normal) Collected:  10/18/18 1603    Lab Status:  Final result Specimen:  Urine from Urine, Clean Catch Updated:  10/18/18 1802     Eosinophil Smear 0 % EOS/100 Cells     Narrative:       No eosinophil seen          Imaging Results (last 24 hours)     ** No results found for the last 24 hours. **             I have reviewed the medications.      atenolol 50 mg Oral Q12H   atorvastatin 10 mg Oral Daily   b complex-vitamin c-folic acid 1 tablet Oral Daily   cetirizine 10 mg Oral Daily   DULoxetine 60 mg Oral Daily   latanoprost 1 drop Both Eyes Nightly   [START ON 11/1/2018] lisinopril 2.5 mg Oral Daily   melatonin 5 mg Sublingual Nightly   montelukast 5 mg Oral Nightly   pantoprazole 40 mg Oral BID AC   polyethylene glycol 17 g Oral Daily   sodium chloride 1,000 mL Intravenous Once   sodium chloride 3 mL Intravenous Q12H         Assessment/Plan   Assessment / Plan     Active Hospital Problems    Diagnosis   • **Intractable nausea and vomiting   • Acute UTI (urinary tract infection)   • Lyme disease   • Hypokalemia   • Hypomagnesemia   • GERD without esophagitis   • Anxiety and depression   • Hyperlipidemia   • Essential hypertension   • Migraines   • Falls   • Acute hypokalemia   • Abnormal CT scan, stomach     Added automatically from request for surgery [id number]     • Intractable vomiting with nausea     Added automatically from request for surgery [id number]          Brief Hospital Course to date:  Bryanna Schwartz is a 48 y.o. female with a past medical history of anxiety, depression, hypertension, hyperlipidemia, migraines. Recent rx for RMSF and lyme. She presented with what she describes as 6 months of intractable nausea and vomiting that has been progressively worsening with reported weight loss  of 120 lbs.     Assessment & Plan:    --Nausea/Vomiting-She has had significant workup at OSH this has included EGD, CCY. Her symptoms of anorexia, early satiety with n/v and 120lb weight loss( she does not look like she has lost that much weight )  .  CT abd/pelvis showed thickening of the proximal gastric mucosa, she is s/p EGD 10/16 showed antral gastritis with reflux esophagitis, currently on protonix 40 mg bid. Symptoms have improved markedly and now patient tolerates PO diet. Suspect functional etiology of her nausea.     --Multiple electrolyte abnormalities -hypokalemia, hypomagnesemia, etiology unknown? Adrenal insufficieny, however random cortisol is wnl, suspect from prolonged n/v, continue to monitor and replete.    --UTI- likely contributed to N/V on admission.  UCx with >100K proteus and 40-50K E coli,both of which were susceptible to Rocephin. Pt completed 5 total days of Rocephin (last dose 10/22).      --Chronic illness malnutrition, nutrition/dietecian following.      --HTN- not well controlled, concerning for Pheochromocytoma. Nephrology following.  Reviewed workup and is concerning for Pheo. Pt has had CT A/P on admission with no finding of adrenal incidentaloma. BP remains difficult to control with lows and extreme highs.  Abnormal urine metanephrines.  MRI unremarkable.  BP med adjustments made by NAL yesterday. Now hypotensive again despite Lisinopril being held. Reviewed earlier admission event when she was hypotensive and, at that time, she had taken her home dose of muscle relaxer and RN today is concerned that this is the case now. Pt eventually improved (during last event) with 4x 1L boluses. Will continue with another bolus now, then start at 100cc/hr.     --- Significant bilateral LE weakness with ataxia, has had some ataxia in the past, previously seen by Dr. Syed at - PT/OT. Neurology has seen the patient here on this admission. Rehab placement PENDING.     --- Encephalopathy-  "likely multifactorial due to UTI, malnutrition, issues with BP.  Hold Seroquel.  I discussed non medication related treatments- keep busy during day.  No napping.  Up in chair, wheelchair walks in hallway etc.             DVT Prophylaxis: mechanical    CODE STATUS:   Code Status and Medical Interventions:   Ordered at: 10/15/18 0406     Level Of Support Discussed With:    Patient     Code Status:    CPR     Medical Interventions (Level of Support Prior to Arrest):    Full     Dispo: rehab placement PENDING  Electronically signed by Adriana Kirkpatrick MD, 10/31/18, 1:21 PM.          Electronically signed by Adriana Kirkpatrick MD at 10/31/2018  1:25 PM     Daniel Casas MD at 10/30/2018 10:52 AM             LOS: 0 days    Patient Care Team:  Andrei Crisostomo MD as PCP - General (Gastroenterology)    Reason For Visit:  F/U HTN AND PROTEINURIA.  Subjective     Dropped BP after start of phenoxybenzamine  MRI done but results pending      Review of Systems:    Pulm: No soa   CV:  No CP      Objective       atenolol 50 mg Oral Q12H   atorvastatin 10 mg Oral Daily   b complex-vitamin c-folic acid 1 tablet Oral Daily   cetirizine 10 mg Oral Daily   DULoxetine 60 mg Oral Daily   latanoprost 1 drop Both Eyes Nightly   [START ON 10/31/2018] lisinopril 10 mg Oral Daily   melatonin 5 mg Sublingual Nightly   montelukast 5 mg Oral Nightly   pantoprazole 40 mg Oral BID AC   phenoxybenzamine 10 mg Oral Nightly   polyethylene glycol 17 g Oral Daily   QUEtiapine 50 mg Oral Q12H   sodium chloride 3 mL Intravenous Q12H            Vital Signs:  Blood pressure (!) 82/50, pulse 103, temperature 97.9 °F (36.6 °C), temperature source Oral, resp. rate 16, height 175.3 cm (69\"), weight 75.5 kg (166 lb 6.4 oz), SpO2 95 %, not currently breastfeeding.    Flowsheet Rows      First Filed Value   Admission Height  175.3 cm (69\") Documented at 10/14/2018 2247   Admission Weight  72.6 kg (160 lb) Documented at 10/14/2018 2247    "       10/29 0701 - 10/30 0700  In: 240 [P.O.:240]  Out: 300 [Urine:300]    Physical Exam:    General Appearance: NAD, alert and cooperative, Ox3  Eyes: PER, conjunctivae and sclerae normal, no icterus  Lungs: respirations regular and unlabored, no crepitus, clear to auscultation  Heart/CV: regular rhythm & normal rate, no murmur, no gallop, no rub and no edema  Abdomen: not distended, soft, non-tender, no masses,  bowel sounds present  Skin: No rash, Warm and dry    Radiology:            Labs:    Results from last 7 days  Lab Units 10/30/18  0732 10/28/18  0801   WBC 10*3/mm3 8.10 6.61   HEMOGLOBIN g/dL 13.0 11.8   HEMATOCRIT % 39.2 35.6   PLATELETS 10*3/mm3 338 280       Results from last 7 days  Lab Units 10/30/18  0732 10/29/18  0835 10/28/18  0801 10/26/18  0703 10/25/18  0943   SODIUM mmol/L 139  --  141  --  142   POTASSIUM mmol/L 4.2 3.6 3.0*  --  3.7   CHLORIDE mmol/L 101  --  103  --  106   CO2 mmol/L 30.0  --  30.0  --  27.0   BUN mg/dL 7*  --  6*  --  10   CREATININE mg/dL 0.68  --  0.58*  --  0.57*   CALCIUM mg/dL 9.7  --  9.3  --  9.1   MAGNESIUM mg/dL  --   --   --  2.2 1.5       Results from last 7 days  Lab Units 10/30/18  0732   GLUCOSE mg/dL 92                   Results from last 7 days  Lab Units 10/25/18  0720   COLOR UA  Yellow   CLARITY UA  Clear   PH, URINE  5.5   SPECIFIC GRAVITY, URINE  1.024   GLUCOSE UA  Negative   KETONES UA  Negative   BILIRUBIN UA  Negative   PROTEIN UA  Negative   BLOOD UA  Negative   LEUKOCYTES UA  Negative   NITRITE UA  Negative       Estimated Creatinine Clearance: 120.6 mL/min (by C-G formula based on SCr of 0.68 mg/dL).      Assessment       Intractable nausea and vomiting    GERD without esophagitis    Anxiety and depression    Hyperlipidemia    Essential hypertension    Migraines    Lyme disease    Hypokalemia    Hypomagnesemia    Acute hypokalemia    Abnormal CT scan, stomach    Intractable vomiting with nausea    Falls    Acute UTI (urinary tract  infection)            Impression:     MILD PROTEINURIA.   HTN - good now but it dows go up and down-- very high metanephrine in urine- plan as under  CT abd does not say any thing about adrenals      Recommendations:   Pending MRI results  Will dc amlodipine as bp dropped significantly  Lower lisinopril  Continue phenoxybenzamine  plz dont dc pt yet- still have to work on her bp meds       Daniel Casas MD  10/30/18  10:52 AM          Electronically signed by Daniel Casas MD at 10/30/2018 10:53 AM     Hallie Bonner APRN at 10/30/2018 10:35 AM              [facility] Medicine Services  PROGRESS NOTE    Patient Name: Bryanna Schwartz  : 1970  MRN: [id number]    Date of Admission: 10/14/2018  Length of Stay: 0  Primary Care Physician: Andrei Crisostomo MD    Subjective   Subjective     CC:n/v and bilateral LE weakness    HPI:    Patient resting in chair with  at bedside this morning.  Per , her confusion seems to worsen at night, and he thinks it may be some medication she is receiving at night.  Patient continues with intermittent confusion but is alert and answering questions (not always appropriately).  Patient reports that she really wants to go home.      Review of Systems  Gen- No fevers, chills  CV- No chest pain, palpitations  Resp- No cough, dyspnea  GI- No N/V/D, abd pain    - Limited due to waxing and waning mental status-    Otherwise ROS is negative except as mentioned in the HPI.    Objective   Objective     Vital Signs:   Temp:  [97.6 °F (36.4 °C)-98.5 °F (36.9 °C)] 97.6 °F (36.4 °C)  Heart Rate:  [] 106  Resp:  [16-20] 16  BP: ()/(50-78) 97/76        Physical Exam:  Constitutional: No acute distress, awake, alert, up in chair  HENT: NCAT, mucous membranes moist  Respiratory: Clear to auscultation bilaterally, respiratory effort normal  Cardiovascular: RRR, no murmurs, rubs, or gallops, palpable pedal pulses bilaterally  Gastrointestinal:  Positive bowel sounds, soft, nontender, nondistended  Musculoskeletal: No bilateral ankle edema   Psychiatric: Alert, cooperative  Neurologic: Oriented x 3, BLE weakness  Skin: No rashes    Results Reviewed:  I have personally reviewed current lab, radiology, and data and agree.      Results from last 7 days  Lab Units 10/30/18  0732 10/28/18  0801   WBC 10*3/mm3 8.10 6.61   HEMOGLOBIN g/dL 13.0 11.8   HEMATOCRIT % 39.2 35.6   PLATELETS 10*3/mm3 338 280       Results from last 7 days  Lab Units 10/30/18  0732 10/29/18  0835 10/28/18  0801 10/25/18  0943   SODIUM mmol/L 139  --  141 142   POTASSIUM mmol/L 4.2 3.6 3.0* 3.7   CHLORIDE mmol/L 101  --  103 106   CO2 mmol/L 30.0  --  30.0 27.0   BUN mg/dL 7*  --  6* 10   CREATININE mg/dL 0.68  --  0.58* 0.57*   GLUCOSE mg/dL 92  --  107* 128*   CALCIUM mg/dL 9.7  --  9.3 9.1     Estimated Creatinine Clearance: 120.6 mL/min (by C-G formula based on SCr of 0.68 mg/dL).  No results found for: BNP    Microbiology Results Abnormal     Procedure Component Value - Date/Time    Urine Culture - Urine, [[id number]]  (Abnormal)  (Susceptibility) Collected:  10/18/18 1603    Lab Status:  Final result Specimen:  Urine from Urine, Clean Catch Updated:  10/21/18 1055     Urine Culture >100,000 CFU/mL Proteus mirabilis (A)      40,000-50,000 CFU/mL Escherichia coli (A)    Susceptibility      Proteus mirabilis    Escherichia coli       ARELI    ARELI       Ampicillin <=8 ug/ml Susceptible    <=8 ug/ml Susceptible       Ampicillin + Sulbactam <=8/4 ug/ml Susceptible    <=8/4 ug/ml Susceptible       Aztreonam <=8 ug/ml Susceptible    <=8 ug/ml Susceptible       Cefepime <=8 ug/ml Susceptible    <=8 ug/ml Susceptible       Cefotaxime <=2 ug/ml Susceptible    <=2 ug/ml Susceptible       Ceftriaxone <=8 ug/ml Susceptible    <=8 ug/ml Susceptible       Cefuroxime sodium <=4 ug/ml Susceptible    <=4 ug/ml Susceptible       Cephalothin <=8 ug/ml Susceptible    <=8 ug/ml Susceptible       Ertapenem <=1  ug/ml Susceptible    <=1 ug/ml Susceptible       Gentamicin <=4 ug/ml Susceptible    <=4 ug/ml Susceptible       Levofloxacin <=2 ug/ml Susceptible    <=2 ug/ml Susceptible       Meropenem <=1 ug/ml Susceptible    <=1 ug/ml Susceptible       Nitrofurantoin       <=32 ug/ml Susceptible       Piperacillin + Tazobactam <=16 ug/ml Susceptible    <=16 ug/ml Susceptible       Tetracycline       <=4 ug/ml Susceptible       Tobramycin <=4 ug/ml Susceptible    <=4 ug/ml Susceptible       Trimethoprim + Sulfamethoxazole <=2/38 ug/ml Susceptible    <=2/38 ug/ml Susceptible                      Blood Culture - Blood, [[id number]]  (Normal) Collected:  10/15/18 0029    Lab Status:  Final result Specimen:  Blood from Arm, Right Updated:  10/20/18 0130     Blood Culture No growth at 5 days    Blood Culture - Blood, [[id number]]  (Normal) Collected:  10/15/18 0029    Lab Status:  Final result Specimen:  Blood from Arm, Right Updated:  10/20/18 0130     Blood Culture No growth at 5 days    Eosinophil Smear - Urine, Urine, Clean Catch [[id number]]  (Normal) Collected:  10/18/18 1603    Lab Status:  Final result Specimen:  Urine from Urine, Clean Catch Updated:  10/18/18 1802     Eosinophil Smear 0 % EOS/100 Cells     Narrative:       No eosinophil seen          Imaging Results (last 24 hours)     Procedure Component Value Units Date/Time    MRI Abdomen With & Without Contrast [[id number]] Collected:  10/30/18 1151     Updated:  10/30/18 1238    Narrative:       EXAMINATION: MRI ABDOMEN W WO CONTRAST-10/29/2018:     INDICATION: Pheochromocytoma; E87.6-Hypokalemia; E83.42-Hypomagnesemia;  R63.4-Abnormal weight loss; G43.A1-Cyclical vomiting, intractable;  R93.3-Abnormal findings on diagnostic imaging of other parts of  digestive tract; R11.2-Nausea with vomiting, unspecified; Z74.09-Other  reduced mobility; Z74.09-Other reduced mobility.      TECHNIQUE: Multiplanar MRI of the abdomen with and without intravenous  contrast administration.      COMPARISON: NONE.     FINDINGS: The lung bases are grossly clear. Asymmetric elevation of the  left hemidiaphragm noted. Small subcentimeter areas of T2 hyperintense  signal within the right hepatic lobe without abnormal enhancement  associated consistent with hepatic cysts. No abnormal enhancing lesion  within the liver parenchyma. Gallbladder surgically absent. No  intrahepatic or extrahepatic biliary dilatation with normal caliber  smooth tapering common bile duct to the level of the ampulla. No  pancreas divisum anatomy. Pancreas and spleen unremarkable without  abnormal enhancing soft tissue lesion. Adrenals without distinct nodule  or abnormal thickening/enhancement. Kidneys without hydronephrosis or  hydroureter. No abnormal enhancing paraspinal mass lesion. No bulky  retroperitoneal or upper abdominal adenopathy. Patent nonaneurysmal  abdominal aorta. Portal veins and IVC patent. Partially visualized GI  tract grossly unremarkable without focal thickening or disproportionate  dilatation. No ascites. No aggressive bone marrow signal abnormality or  soft tissue body wall findings.       Impression:       1.  No discrete adrenal mass lesion or abnormal thickening/enhancement  of the adrenal glands or paraspinal soft tissues identified.  2.  Small T2 hyperintense foci within the liver without associated  enhancement consistent with fluid signal hepatic cysts. No abnormal  enhancing lesion within the liver parenchyma is identified.     D:  10/30/2018  E:  10/30/2018            This report was finalized on 10/30/2018 12:35 PM by Dr. Loi Velasco.                I have reviewed the medications.      atenolol 50 mg Oral Q12H   atorvastatin 10 mg Oral Daily   b complex-vitamin c-folic acid 1 tablet Oral Daily   cetirizine 10 mg Oral Daily   DULoxetine 60 mg Oral Daily   latanoprost 1 drop Both Eyes Nightly   [START ON 10/31/2018] lisinopril 10 mg Oral Daily   melatonin 5 mg Sublingual Nightly   montelukast 5 mg  Oral Nightly   pantoprazole 40 mg Oral BID AC   phenoxybenzamine 10 mg Oral Nightly   polyethylene glycol 17 g Oral Daily   QUEtiapine 50 mg Oral Q12H   sodium chloride 3 mL Intravenous Q12H         Assessment/Plan   Assessment / Plan     Active Hospital Problems    Diagnosis   • **Intractable nausea and vomiting   • Acute UTI (urinary tract infection)   • Falls   • GERD without esophagitis   • Anxiety and depression   • Hyperlipidemia   • Essential hypertension   • Migraines   • Lyme disease   • Hypokalemia   • Hypomagnesemia   • Acute hypokalemia   • Abnormal CT scan, stomach     Added automatically from request for surgery [id number]     • Intractable vomiting with nausea     Added automatically from request for surgery [id number]          Brief Hospital Course to date:  Bryanna Schwartz is a 48 y.o. female with a past medical history of anxiety, depression, hypertension, hyperlipidemia, migraines. Recent rx for RMSF and lyme. She presented with what she describes as 6 months of intractable nausea and vomiting that has been progressively worsening with reported weight loss of 120 lbs.     Assessment & Plan:    --Nausea/Vomiting-She has had significant workup at OSH this has included EGD, CCY. Her symptoms of anorexia, early satiety with n/v and 120lb weight loss( she does not look like she has lost that much weight )  .  CT abd/pelvis showed thickening of the proximal gastric mucosa, she is s/p EGD 10/16 showed antral gastritis with reflux esophagitis, currently on protonix 40 mg bid. Symptoms have improved markedly and now patient tolerates PO diet. Suspect functional etiology of her nausea.     --Multiple electrolyte abnormalities -hypokalemia, hypomagnesemia, etiology unknown? Adrenal insufficieny, however random cortisol is wnl, suspect from prolonged n/v, continue to monitor and replete.    --UTI- likely contributed to N/V on admission.  UCx with >100K proteus and 40-50K E coli,both of which were susceptible to  Rocephin. Pt completed 5 total days of Rocephin (last dose 10/22).      --Chronic illness malnutrition, nutrition/dietecian following.      --HTN- not well controlled, concerning for Pheochromocytoma. Nephrology following.  Reviewed workup and is concerning for Pheo. Pt has had CT A/P on admission with no finding of adrenal incidentaloma. BP remains difficult to control with lows and extreme highs.  Abnormal urine metanephrines.  MRI results (adrenal glands) pending.  BP med adjustments made by NAL today.    --- Significant bilateral LE weakness with ataxia, has had some ataxia in the past, previously seen by Dr. Syed at - PT/OT. Neurology has seen the patient here on this admission. Rehab placement PENDING.     --- Encephalopathy- likely multifactorial due to UTI, malnutrition, issues with BP.  Continue BID seroquel.  I discussed non medication related treatments- keep busy during day.  No napping.  Up in chair, wheelchair walks in hallway etc.             DVT Prophylaxis: mechanical    CODE STATUS:   Code Status and Medical Interventions:   Ordered at: 10/15/18 0406     Level Of Support Discussed With:    Patient     Code Status:    CPR     Medical Interventions (Level of Support Prior to Arrest):    Full     Dispo: rehab placement PENDING  Electronically signed by CARRIE Cottrell, 10/30/18, 2:44 PM.          Electronically signed by Hallie Bonner APRN at 10/30/2018  2:45 PM     Daniel Casas MD at 10/29/2018  2:17 PM             LOS: 0 days    Patient Care Team:  Andrei Crisostomo MD as PCP - General (Gastroenterology)    Reason For Visit:  F/U HTN AND PROTEINURIA.  Subjective     No soa or cp  Does have hx of sweating and palpitations and quiet significant bp variation      Review of Systems:    Pulm: No soa   CV:  No CP      Objective       amLODIPine 5 mg Oral Q24H   atenolol 50 mg Oral Q12H   atorvastatin 10 mg Oral Daily   b complex-vitamin c-folic acid 1 tablet Oral Daily   cetirizine 10 mg  "Oral Daily   DULoxetine 60 mg Oral Daily   latanoprost 1 drop Both Eyes Nightly   lisinopril 30 mg Oral Q12H   melatonin 5 mg Sublingual Nightly   montelukast 5 mg Oral Nightly   pantoprazole 40 mg Oral BID AC   polyethylene glycol 17 g Oral Daily   QUEtiapine 50 mg Oral Q12H   sodium chloride 3 mL Intravenous Q12H            Vital Signs:  Blood pressure 110/83, pulse 105, temperature 97.3 °F (36.3 °C), temperature source Oral, resp. rate 20, height 175.3 cm (69\"), weight 75.5 kg (166 lb 6.4 oz), SpO2 94 %, not currently breastfeeding.    Flowsheet Rows      First Filed Value   Admission Height  175.3 cm (69\") Documented at 10/14/2018 2247   Admission Weight  72.6 kg (160 lb) Documented at 10/14/2018 2247          10/28 0701 - 10/29 0700  In: 1200 [P.O.:1200]  Out: 950 [Urine:950]    Physical Exam:    General Appearance: NAD, alert and cooperative, Ox3  Eyes: PER, conjunctivae and sclerae normal, no icterus  Lungs: respirations regular and unlabored, no crepitus, clear to auscultation  Heart/CV: regular rhythm & normal rate, no murmur, no gallop, no rub and no edema  Abdomen: not distended, soft, non-tender, no masses,  bowel sounds present  Skin: No rash, Warm and dry    Radiology:            Labs:    Results from last 7 days  Lab Units 10/28/18  0801   WBC 10*3/mm3 6.61   HEMOGLOBIN g/dL 11.8   HEMATOCRIT % 35.6   PLATELETS 10*3/mm3 280       Results from last 7 days  Lab Units 10/29/18  0835 10/28/18  0801 10/26/18  0703 10/25/18  0943   SODIUM mmol/L  --  141  --  142   POTASSIUM mmol/L 3.6 3.0*  --  3.7   CHLORIDE mmol/L  --  103  --  106   CO2 mmol/L  --  30.0  --  27.0   BUN mg/dL  --  6*  --  10   CREATININE mg/dL  --  0.58*  --  0.57*   CALCIUM mg/dL  --  9.3  --  9.1   MAGNESIUM mg/dL  --   --  2.2 1.5       Results from last 7 days  Lab Units 10/28/18  0801   GLUCOSE mg/dL 107*                   Results from last 7 days  Lab Units 10/25/18  0720   COLOR UA  Yellow   CLARITY UA  Clear   PH, URINE  5.5 "   SPECIFIC GRAVITY, URINE  1.024   GLUCOSE UA  Negative   KETONES UA  Negative   BILIRUBIN UA  Negative   PROTEIN UA  Negative   BLOOD UA  Negative   LEUKOCYTES UA  Negative   NITRITE UA  Negative       Estimated Creatinine Clearance: 141.4 mL/min (A) (by C-G formula based on SCr of 0.58 mg/dL (L)).      Assessment       Intractable nausea and vomiting    GERD without esophagitis    Anxiety and depression    Hyperlipidemia    Essential hypertension    Migraines    Lyme disease    Hypokalemia    Hypomagnesemia    Acute hypokalemia    Abnormal CT scan, stomach    Intractable vomiting with nausea    Falls    Acute UTI (urinary tract infection)            Impression:   MILD PROTEINURIA.   HTN - good now but it dows go up and down-- very high metanephrine in urine  CT abd does not say any thing about adrenals      Recommendations:     As discussed with Dr Kirkpatrick  Will need MRI for adrenals  Will start prazosin at low dose     Daniel Casas MD  10/29/18  2:17 PM          Electronically signed by Daniel Casas MD at 10/29/2018  2:20 PM     Adriana Kirkpatrick MD at 10/29/2018  8:17 AM              [facility] Medicine Services  PROGRESS NOTE    Patient Name: Bryanna Schwartz  : 1970  MRN: [id number]    Date of Admission: 10/14/2018  Length of Stay: 0  Primary Care Physician: Andrei Crisostomo MD    Subjective   Subjective     CC:n/v and bilateral LE weakness    HPI:  Pt unable to work well with PT yesterday due to hypotension.  This am, she is doing okay, still complains of bilateral foot pain at base of all toes.     Review of Systems  Gen- No fevers, chills  CV- No chest pain, palpitations  Resp- No cough, dyspnea  GI- No N/V/D, abd pain    Otherwise ROS is negative except as mentioned in the HPI.    Objective   Objective     Vital Signs:   Temp:  [98 °F (36.7 °C)-98.5 °F (36.9 °C)] 98.3 °F (36.8 °C)  Heart Rate:  [100-110] 102  Resp:  [16-18] 18  BP: (116-158)/() 135/96         Physical Exam:  Constitutional: No acute distress, awake, alert  HENT: NCAT, mucous membranes moist  Respiratory: Clear to auscultation bilaterally, respiratory effort normal   Cardiovascular: RRR, no murmurs, rubs, or gallops, palpable pedal pulses bilaterally  Gastrointestinal: Positive bowel sounds, soft, nontender, nondistended  Musculoskeletal: No bilateral ankle edema, no erythema or edema of any toes, mild tenderness to palpation at base of first toe on left.   Psychiatric: Odd affect, cooperative  Neurologic: Oriented x 3, BLE weakness, ataxic gait .  Skin: No rashes    Results Reviewed:  I have personally reviewed current lab, radiology, and data and agree.      Results from last 7 days  Lab Units 10/28/18  0801   WBC 10*3/mm3 6.61   HEMOGLOBIN g/dL 11.8   HEMATOCRIT % 35.6   PLATELETS 10*3/mm3 280       Results from last 7 days  Lab Units 10/29/18  0835 10/28/18  0801 10/25/18  0943   SODIUM mmol/L  --  141 142   POTASSIUM mmol/L 3.6 3.0* 3.7   CHLORIDE mmol/L  --  103 106   CO2 mmol/L  --  30.0 27.0   BUN mg/dL  --  6* 10   CREATININE mg/dL  --  0.58* 0.57*   GLUCOSE mg/dL  --  107* 128*   CALCIUM mg/dL  --  9.3 9.1     Estimated Creatinine Clearance: 141.4 mL/min (A) (by C-G formula based on SCr of 0.58 mg/dL (L)).  No results found for: BNP    Microbiology Results Abnormal     Procedure Component Value - Date/Time    Urine Culture - Urine, [[id number]]  (Abnormal)  (Susceptibility) Collected:  10/18/18 1603    Lab Status:  Final result Specimen:  Urine from Urine, Clean Catch Updated:  10/21/18 1055     Urine Culture >100,000 CFU/mL Proteus mirabilis (A)      40,000-50,000 CFU/mL Escherichia coli (A)    Susceptibility      Proteus mirabilis    Escherichia coli       ARELI    ARELI       Ampicillin <=8 ug/ml Susceptible    <=8 ug/ml Susceptible       Ampicillin + Sulbactam <=8/4 ug/ml Susceptible    <=8/4 ug/ml Susceptible       Aztreonam <=8 ug/ml Susceptible    <=8 ug/ml Susceptible       Cefepime <=8  ug/ml Susceptible    <=8 ug/ml Susceptible       Cefotaxime <=2 ug/ml Susceptible    <=2 ug/ml Susceptible       Ceftriaxone <=8 ug/ml Susceptible    <=8 ug/ml Susceptible       Cefuroxime sodium <=4 ug/ml Susceptible    <=4 ug/ml Susceptible       Cephalothin <=8 ug/ml Susceptible    <=8 ug/ml Susceptible       Ertapenem <=1 ug/ml Susceptible    <=1 ug/ml Susceptible       Gentamicin <=4 ug/ml Susceptible    <=4 ug/ml Susceptible       Levofloxacin <=2 ug/ml Susceptible    <=2 ug/ml Susceptible       Meropenem <=1 ug/ml Susceptible    <=1 ug/ml Susceptible       Nitrofurantoin       <=32 ug/ml Susceptible       Piperacillin + Tazobactam <=16 ug/ml Susceptible    <=16 ug/ml Susceptible       Tetracycline       <=4 ug/ml Susceptible       Tobramycin <=4 ug/ml Susceptible    <=4 ug/ml Susceptible       Trimethoprim + Sulfamethoxazole <=2/38 ug/ml Susceptible    <=2/38 ug/ml Susceptible                      Blood Culture - Blood, [[id number]]  (Normal) Collected:  10/15/18 0029    Lab Status:  Final result Specimen:  Blood from Arm, Right Updated:  10/20/18 0130     Blood Culture No growth at 5 days    Blood Culture - Blood, [[id number]]  (Normal) Collected:  10/15/18 0029    Lab Status:  Final result Specimen:  Blood from Arm, Right Updated:  10/20/18 0130     Blood Culture No growth at 5 days    Eosinophil Smear - Urine, Urine, Clean Catch [[id number]]  (Normal) Collected:  10/18/18 1603    Lab Status:  Final result Specimen:  Urine from Urine, Clean Catch Updated:  10/18/18 1802     Eosinophil Smear 0 % EOS/100 Cells     Narrative:       No eosinophil seen          Imaging Results (last 24 hours)     ** No results found for the last 24 hours. **             I have reviewed the medications.      amLODIPine 5 mg Oral Q24H   atenolol 50 mg Oral Q12H   atorvastatin 10 mg Oral Daily   b complex-vitamin c-folic acid 1 tablet Oral Daily   cetirizine 10 mg Oral Daily   DULoxetine 60 mg Oral Daily   latanoprost 1 drop Both Eyes  Nightly   lisinopril 30 mg Oral Q12H   melatonin 5 mg Sublingual Nightly   montelukast 5 mg Oral Nightly   pantoprazole 40 mg Oral BID AC   polyethylene glycol 17 g Oral Daily   QUEtiapine 50 mg Oral Q12H   sodium chloride 3 mL Intravenous Q12H         Assessment/Plan   Assessment / Plan     Active Hospital Problems    Diagnosis   • **Intractable nausea and vomiting   • Acute UTI (urinary tract infection)   • Lyme disease   • Hypokalemia   • Hypomagnesemia   • GERD without esophagitis   • Anxiety and depression   • Hyperlipidemia   • Essential hypertension   • Migraines   • Falls   • Acute hypokalemia   • Abnormal CT scan, stomach     Added automatically from request for surgery [id number]     • Intractable vomiting with nausea     Added automatically from request for surgery [id number]          Brief Hospital Course to date:  Bryanna Schwartz is a 48 y.o. female with a past medical history of anxiety, depression, hypertension, hyperlipidemia, migraines. Recent rx for RMSF and lyme. She presented with what she describes as 6 months of intractable nausea and vomiting that has been progressively worsening with reported weight loss of 120 lbs.     Assessment & Plan:    --Nausea/Vomiting-She has had significant workup at OSH this has included EGD, CCY. Her symptoms of anorexia, early satiety with n/v and 120lb weight loss( she does not look like she has lost that much weight )  .  CT abd/pelvis showed thickening of the proximal gastric mucosa, she is s/p EGD 10/16 showed antral gastritis with reflux esophagitis, currently on protonix 40 mg bid. Symptoms have improved markedly and now patient tolerates PO diet. Suspect functional etiology of her nausea.     --Multiple electrolyte abnormalities -hypokalemia, hypomagnesemia, etiology unknown? Adrenal insufficieny, however random cortisol is wnl, suspect from prolonged n/v, continue to monitor and replete.    --UTI- likely contributed to N/V on admission.  UCx with >100K proteus  and 40-50K E coli,both of which were susceptible to Rocephin. Pt completed 5 total days of Rocephin (last dose 10/22).      --Chronic illness malnutrition, nutrition/dietecian following.      --HTN- not well controlled, concerning for Pheochromocytoma. Consulted NAL who have now signed off.  Reviewed workup and is concerning for Pheo. Pt has had CT A/P on admission with no finding of adrenal incidentaloma. BP remains difficult to control with lows and extreme highs.  Will ask NAL to stop back by and evaluate especially in light of abnormal urine metanephrines, etc. I have attempted to page Dr. Casas but have yet to receive call back.     --- Significant bilateral LE weakness with ataxia, has had some ataxia in the past, previously seen by Dr. Syed at - PT/OT. Neurology has seen the patient here on this admission. Rehab placement PENDING.              DVT Prophylaxis: mechanical    CODE STATUS:   Code Status and Medical Interventions:   Ordered at: 10/15/18 0406     Level Of Support Discussed With:    Patient     Code Status:    CPR     Medical Interventions (Level of Support Prior to Arrest):    Full     Dispo: rehab placement PENDING,awaiting NAL re-evaluation today.   Electronically signed by Adriana Kirkpatrick MD, 10/29/18, 11:17 AM.          Electronically signed by Adriana Kirkpatrick MD at 10/29/2018 11:20 AM     Adriana Kirkpatrick MD at 10/28/2018  9:20 AM              [facility] Medicine Services  PROGRESS NOTE    Patient Name: Bryanna Schwartz  : 1970  MRN: [id number]    Date of Admission: 10/14/2018  Length of Stay: 0  Primary Care Physician: Andrei Crisostomo MD    Subjective   Subjective     CC:n/v and bilateral LE weakness    HPI:  No issues overnight, bilateral foot pain is better.     Review of Systems  Gen- No fevers, chills  CV- No chest pain, palpitations  Resp- No cough, dyspnea  GI- No N/V/D, abd pain    Otherwise ROS is negative except as  mentioned in the HPI.    Objective   Objective     Vital Signs:   Temp:  [98.1 °F (36.7 °C)-98.5 °F (36.9 °C)] 98.1 °F (36.7 °C)  Heart Rate:  [] 109  Resp:  [16] 16  BP: (131-137)/(78-88) 137/88        Physical Exam:  Constitutional: No acute distress, awake, alert  HENT: NCAT, mucous membranes moist  Respiratory: Clear to auscultation bilaterally, respiratory effort normal   Cardiovascular: RRR, no murmurs, rubs, or gallops, palpable pedal pulses bilaterally  Gastrointestinal: Positive bowel sounds, soft, nontender, nondistended  Musculoskeletal: No bilateral ankle edema, no erythema or edema of any toes, mild tenderness to palpation at base of first toe on left.   Psychiatric: Odd affect, cooperative  Neurologic: Oriented x 3, BLE weakness, ataxic gait .  Skin: No rashes    Results Reviewed:  I have personally reviewed current lab, radiology, and data and agree.      Results from last 7 days  Lab Units 10/28/18  0801   WBC 10*3/mm3 6.61   HEMOGLOBIN g/dL 11.8   HEMATOCRIT % 35.6   PLATELETS 10*3/mm3 280       Results from last 7 days  Lab Units 10/28/18  0801 10/25/18  0943 10/21/18  2207   SODIUM mmol/L 141 142  --    POTASSIUM mmol/L 3.0* 3.7 4.0   CHLORIDE mmol/L 103 106  --    CO2 mmol/L 30.0 27.0  --    BUN mg/dL 6* 10  --    CREATININE mg/dL 0.58* 0.57*  --    GLUCOSE mg/dL 107* 128*  --    CALCIUM mg/dL 9.3 9.1  --      Estimated Creatinine Clearance: 141.4 mL/min (A) (by C-G formula based on SCr of 0.58 mg/dL (L)).  No results found for: BNP    Microbiology Results Abnormal     Procedure Component Value - Date/Time    Urine Culture - Urine, [[id number]]  (Abnormal)  (Susceptibility) Collected:  10/18/18 1603    Lab Status:  Final result Specimen:  Urine from Urine, Clean Catch Updated:  10/21/18 1055     Urine Culture >100,000 CFU/mL Proteus mirabilis (A)      40,000-50,000 CFU/mL Escherichia coli (A)    Susceptibility      Proteus mirabilis    Escherichia coli       ARELI    ARELI       Ampicillin <=8  ug/ml Susceptible    <=8 ug/ml Susceptible       Ampicillin + Sulbactam <=8/4 ug/ml Susceptible    <=8/4 ug/ml Susceptible       Aztreonam <=8 ug/ml Susceptible    <=8 ug/ml Susceptible       Cefepime <=8 ug/ml Susceptible    <=8 ug/ml Susceptible       Cefotaxime <=2 ug/ml Susceptible    <=2 ug/ml Susceptible       Ceftriaxone <=8 ug/ml Susceptible    <=8 ug/ml Susceptible       Cefuroxime sodium <=4 ug/ml Susceptible    <=4 ug/ml Susceptible       Cephalothin <=8 ug/ml Susceptible    <=8 ug/ml Susceptible       Ertapenem <=1 ug/ml Susceptible    <=1 ug/ml Susceptible       Gentamicin <=4 ug/ml Susceptible    <=4 ug/ml Susceptible       Levofloxacin <=2 ug/ml Susceptible    <=2 ug/ml Susceptible       Meropenem <=1 ug/ml Susceptible    <=1 ug/ml Susceptible       Nitrofurantoin       <=32 ug/ml Susceptible       Piperacillin + Tazobactam <=16 ug/ml Susceptible    <=16 ug/ml Susceptible       Tetracycline       <=4 ug/ml Susceptible       Tobramycin <=4 ug/ml Susceptible    <=4 ug/ml Susceptible       Trimethoprim + Sulfamethoxazole <=2/38 ug/ml Susceptible    <=2/38 ug/ml Susceptible                      Blood Culture - Blood, [[id number]]  (Normal) Collected:  10/15/18 0029    Lab Status:  Final result Specimen:  Blood from Arm, Right Updated:  10/20/18 0130     Blood Culture No growth at 5 days    Blood Culture - Blood, [[id number]]  (Normal) Collected:  10/15/18 0029    Lab Status:  Final result Specimen:  Blood from Arm, Right Updated:  10/20/18 0130     Blood Culture No growth at 5 days    Eosinophil Smear - Urine, Urine, Clean Catch [[id number]]  (Normal) Collected:  10/18/18 1603    Lab Status:  Final result Specimen:  Urine from Urine, Clean Catch Updated:  10/18/18 1802     Eosinophil Smear 0 % EOS/100 Cells     Narrative:       No eosinophil seen          Imaging Results (last 24 hours)     ** No results found for the last 24 hours. **             I have reviewed the medications.      amLODIPine 5 mg Oral  Q24H   atenolol 50 mg Oral Q12H   atorvastatin 10 mg Oral Daily   b complex-vitamin c-folic acid 1 tablet Oral Daily   cetirizine 10 mg Oral Daily   DULoxetine 60 mg Oral Daily   latanoprost 1 drop Both Eyes Nightly   lisinopril 30 mg Oral Q12H   melatonin 5 mg Sublingual Nightly   montelukast 5 mg Oral Nightly   pantoprazole 40 mg Oral BID AC   polyethylene glycol 17 g Oral Daily   QUEtiapine 50 mg Oral Q12H   sodium chloride 3 mL Intravenous Q12H         Assessment/Plan   Assessment / Plan     Active Hospital Problems    Diagnosis   • **Intractable nausea and vomiting   • Acute UTI (urinary tract infection)   • Lyme disease   • Hypokalemia   • Hypomagnesemia   • GERD without esophagitis   • Anxiety and depression   • Hyperlipidemia   • Essential hypertension   • Migraines   • Falls   • Acute hypokalemia   • Abnormal CT scan, stomach     Added automatically from request for surgery [id number]     • Intractable vomiting with nausea     Added automatically from request for surgery [id number]          Brief Hospital Course to date:  Bryanna Schwartz is a 48 y.o. female with a past medical history of anxiety, depression, hypertension, hyperlipidemia, migraines. Recent rx for RMSF and lyme. She presented with what she describes as 6 months of intractable nausea and vomiting that has been progressively worsening with reported weight loss of 120 lbs.     Assessment & Plan:    --Nausea/Vomiting-She has had significant workup at OSH this has included EGD, CCY. Her symptoms of anorexia, early satiety with n/v and 120lb weight loss( she does not look like she has lost that much weight )  .  CT abd/pelvis showed thickening of the proximal gastric mucosa, she is s/p EGD 10/16 showed antral gastritis with reflux esophagitis, currently on protonix 40 mg bid. Symptoms have improved markedly and now patient tolerates PO diet. Suspect functional etiology of her nausea.     --Multiple electrolyte abnormalities -hypokalemia, hypomagnesemia,  etiology unknown? Adrenal insufficieny, however random cortisol is wnl, suspect from prolonged n/v, continue to monitor and replete.    --UTI- likely contributed to N/V on admission.  UCx with >100K proteus and 40-50K E coli,both of which were susceptible to Rocephin. Pt completed 5 total days of Rocephin (last dose 10/22).      --Chronic illness malnutrition, nutrition/dietecian following.      --HTN- not well controlled, concerning for Pheochromocytoma. Consulted NAL who have now signed off.  Reviewed workup and is concerning for Pheo. Pt has had CT A/P on admission with no finding of adrenal incidentaloma. If BP remains difficult to control, will ask NAL to come back by and assess/address Pheo labs.      --- Significant bilateral LE weakness with ataxia, has had some ataxia in the past, previously seen by Dr. Syed at - PT/OT. Neurology has seen the patient here on this admission. Rehab placement PENDING.              DVT Prophylaxis: mechanical    CODE STATUS:   Code Status and Medical Interventions:   Ordered at: 10/15/18 0406     Level Of Support Discussed With:    Patient     Code Status:    CPR     Medical Interventions (Level of Support Prior to Arrest):    Full     Disposition: to rehab when bed available, hopefully Monday    Electronically signed by Adriana Kirkpatrick MD, 10/28/18, 12:20 PM.          Electronically signed by Adriana Kirkpatrick MD at 10/28/2018 12:21 PM     Adriana Kirkpatrick MD at 10/27/2018  8:53 AM              [facility] Medicine Services  PROGRESS NOTE    Patient Name: Bryanna Schwartz  : 1970  MRN: [id number]    Date of Admission: 10/14/2018  Length of Stay: 0  Primary Care Physician: Andrei Crisostomo MD    Subjective   Subjective     CC:n/v and bilateral LE weakness    HPI:  Pt c/o bilateral foot pain, mainly plantar aspect of all toes.  No swelling nor erythema. No other issues overnight.    Review of Systems  Gen- No fevers,  chills  CV- No chest pain, palpitations  Resp- No cough, dyspnea  GI- No N/V/D, abd pain    Otherwise ROS is negative except as mentioned in the HPI.    Objective   Objective     Vital Signs:   Temp:  [97.8 °F (36.6 °C)-98.8 °F (37.1 °C)] 98.1 °F (36.7 °C)  Heart Rate:  [] 97  Resp:  [16] 16  BP: (113-167)/() 167/100        Physical Exam:  Constitutional: No acute distress, awake, alert  HENT: NCAT, mucous membranes moist  Respiratory: Clear to auscultation bilaterally, respiratory effort normal   Cardiovascular: RRR, no murmurs, rubs, or gallops, palpable pedal pulses bilaterally  Gastrointestinal: Positive bowel sounds, soft, nontender, nondistended  Musculoskeletal: No bilateral ankle edema, no erythema or edema of any toes, mild tenderness to palpation at base of first toe on left.   Psychiatric: Odd affect, cooperative  Neurologic: Oriented x 3, BLE weakness, ataxic gait .  Skin: No rashes    Results Reviewed:  I have personally reviewed current lab, radiology, and data and agree.          Results from last 7 days  Lab Units 10/25/18  0943 10/21/18  2207 10/21/18  0820   SODIUM mmol/L 142  --  140   POTASSIUM mmol/L 3.7 4.0 3.4*   CHLORIDE mmol/L 106  --  102   CO2 mmol/L 27.0  --  28.0   BUN mg/dL 10  --  7*   CREATININE mg/dL 0.57*  --  0.50*   GLUCOSE mg/dL 128*  --  95   CALCIUM mg/dL 9.1  --  9.1     Estimated Creatinine Clearance: 143.9 mL/min (A) (by C-G formula based on SCr of 0.57 mg/dL (L)).  No results found for: BNP    Microbiology Results Abnormal     Procedure Component Value - Date/Time    Urine Culture - Urine, [[id number]]  (Abnormal)  (Susceptibility) Collected:  10/18/18 1603    Lab Status:  Final result Specimen:  Urine from Urine, Clean Catch Updated:  10/21/18 1055     Urine Culture >100,000 CFU/mL Proteus mirabilis (A)      40,000-50,000 CFU/mL Escherichia coli (A)    Susceptibility      Proteus mirabilis    Escherichia coli       ARELI    ARELI       Ampicillin <=8 ug/ml  Susceptible    <=8 ug/ml Susceptible       Ampicillin + Sulbactam <=8/4 ug/ml Susceptible    <=8/4 ug/ml Susceptible       Aztreonam <=8 ug/ml Susceptible    <=8 ug/ml Susceptible       Cefepime <=8 ug/ml Susceptible    <=8 ug/ml Susceptible       Cefotaxime <=2 ug/ml Susceptible    <=2 ug/ml Susceptible       Ceftriaxone <=8 ug/ml Susceptible    <=8 ug/ml Susceptible       Cefuroxime sodium <=4 ug/ml Susceptible    <=4 ug/ml Susceptible       Cephalothin <=8 ug/ml Susceptible    <=8 ug/ml Susceptible       Ertapenem <=1 ug/ml Susceptible    <=1 ug/ml Susceptible       Gentamicin <=4 ug/ml Susceptible    <=4 ug/ml Susceptible       Levofloxacin <=2 ug/ml Susceptible    <=2 ug/ml Susceptible       Meropenem <=1 ug/ml Susceptible    <=1 ug/ml Susceptible       Nitrofurantoin       <=32 ug/ml Susceptible       Piperacillin + Tazobactam <=16 ug/ml Susceptible    <=16 ug/ml Susceptible       Tetracycline       <=4 ug/ml Susceptible       Tobramycin <=4 ug/ml Susceptible    <=4 ug/ml Susceptible       Trimethoprim + Sulfamethoxazole <=2/38 ug/ml Susceptible    <=2/38 ug/ml Susceptible                      Blood Culture - Blood, [[id number]]  (Normal) Collected:  10/15/18 0029    Lab Status:  Final result Specimen:  Blood from Arm, Right Updated:  10/20/18 0130     Blood Culture No growth at 5 days    Blood Culture - Blood, [[id number]]  (Normal) Collected:  10/15/18 0029    Lab Status:  Final result Specimen:  Blood from Arm, Right Updated:  10/20/18 0130     Blood Culture No growth at 5 days    Eosinophil Smear - Urine, Urine, Clean Catch [[id number]]  (Normal) Collected:  10/18/18 1603    Lab Status:  Final result Specimen:  Urine from Urine, Clean Catch Updated:  10/18/18 1802     Eosinophil Smear 0 % EOS/100 Cells     Narrative:       No eosinophil seen          Imaging Results (last 24 hours)     ** No results found for the last 24 hours. **             I have reviewed the medications.      amLODIPine 5 mg Oral Q24H    atenolol 50 mg Oral Q12H   atorvastatin 10 mg Oral Daily   b complex-vitamin c-folic acid 1 tablet Oral Daily   cetirizine 10 mg Oral Daily   DULoxetine 60 mg Oral Daily   latanoprost 1 drop Both Eyes Nightly   lisinopril 30 mg Oral Q12H   melatonin 5 mg Sublingual Nightly   montelukast 5 mg Oral Nightly   pantoprazole 40 mg Oral BID AC   polyethylene glycol 17 g Oral Daily   QUEtiapine 50 mg Oral Q12H   sodium chloride 3 mL Intravenous Q12H         Assessment/Plan   Assessment / Plan     Active Hospital Problems    Diagnosis   • **Intractable nausea and vomiting   • Acute UTI (urinary tract infection)   • Lyme disease   • Hypokalemia   • Hypomagnesemia   • GERD without esophagitis   • Anxiety and depression   • Hyperlipidemia   • Essential hypertension   • Migraines   • Falls   • Acute hypokalemia   • Abnormal CT scan, stomach     Added automatically from request for surgery [id number]     • Intractable vomiting with nausea     Added automatically from request for surgery [id number]          Brief Hospital Course to date:  Bryanna Schwartz is a 48 y.o. female with a past medical history of anxiety, depression, hypertension, hyperlipidemia, migraines. Recent rx for RMSF and lyme. She presented with what she describes as 6 months of intractable nausea and vomiting that has been progressively worsening with reported weight loss of 120 lbs.     Assessment & Plan:    --Nausea/Vomiting-She has had significant workup at OSH this has included EGD, CCY. Her symptoms of anorexia, early satiety with n/v and 120lb weight loss( she does not look like she has lost that much weight )  .  CT abd/pelvis showed thickening of the proximal gastric mucosa, she is s/p EGD 10/16 showed antral gastritis with reflux esophagitis, currently on protonix 40 mg bid. Symptoms have improved markedly and now patient tolerates PO diet. Suspect functional etiology of her nausea.     --Multiple electrolyte abnormalities -hypokalemia, hypomagnesemia,  etiology unknown? Adrenal insufficieny, however random cortisol is wnl, suspect from prolonged n/v, continue to monitor and replete.    --UTI- likely contributed to N/V on admission.  UCx with >100K proteus and 40-50K E coli,both of which were susceptible to Rocephin. Pt completed 5 total days of Rocephin (last dose 10/22).      --Chronic illness malnutrition, nutrition/dietecian following.      --HTN- not well controlled, concerning for Pheochromocytoma. Consulted NAL who have now signed off.  Reviewed workup and is concerning for Pheo. Pt has had CT A/P on admission with no finding of adrenal incidentaloma. If BP remains difficult to control, will ask NAL to come back by and assess/address Pheo labs.      --- Significant bilateral LE weakness with ataxia, has had some ataxia in the past, previously seen by Dr. Syed at - PT/OT. Neurology has seen the patient here on this admission. Rehab placement PENDING.              DVT Prophylaxis: mechanical    CODE STATUS:   Code Status and Medical Interventions:   Ordered at: 10/15/18 0406     Level Of Support Discussed With:    Patient     Code Status:    CPR     Medical Interventions (Level of Support Prior to Arrest):    Full     Disposition: to rehab when bed available, hopefully Monday    Electronically signed by Adriana Kirkpatrick MD, 10/27/18, 9:54 AM.          Electronically signed by Adriana Kirkpatrick MD at 10/27/2018 10:08 AM     Christopher Sands MD at 10/26/2018  3:22 PM             LOS: 0 days    Patient Care Team:  Andrei Crisostomo MD as PCP - General (Gastroenterology)    Reason For Visit:  F/U HTN AND PROTEINURIA.  Subjective           Review of Systems:    Pulm: No soa   CV:  No CP      Objective       amLODIPine 5 mg Oral Q24H   atenolol 50 mg Oral Q12H   atorvastatin 10 mg Oral Daily   b complex-vitamin c-folic acid 1 tablet Oral Daily   cetirizine 10 mg Oral Daily   DULoxetine 60 mg Oral Daily   latanoprost 1 drop Both Eyes  "Nightly   lisinopril 30 mg Oral Q12H   melatonin 5 mg Sublingual Nightly   montelukast 5 mg Oral Nightly   pantoprazole 40 mg Oral BID AC   polyethylene glycol 17 g Oral Daily   QUEtiapine 50 mg Oral Q12H   sodium chloride 3 mL Intravenous Q12H            Vital Signs:  Blood pressure 113/54, pulse 110, temperature 98.4 °F (36.9 °C), temperature source Oral, resp. rate 16, height 175.3 cm (69\"), weight 75.5 kg (166 lb 6.4 oz), SpO2 93 %, not currently breastfeeding.    Flowsheet Rows      First Filed Value   Admission Height  175.3 cm (69\") Documented at 10/14/2018 2247   Admission Weight  72.6 kg (160 lb) Documented at 10/14/2018 2247          10/25 0701 - 10/26 0700  In: 120 [P.O.:120]  Out: 925 [Urine:925]    Physical Exam:    General Appearance: NAD, alert and cooperative, Ox3  Eyes: PER, conjunctivae and sclerae normal, no icterus  Lungs: respirations regular and unlabored, no crepitus, clear to auscultation  Heart/CV: regular rhythm & normal rate, no murmur, no gallop, no rub and no edema  Abdomen: not distended, soft, non-tender, no masses,  bowel sounds present  Skin: No rash, Warm and dry    Radiology:            Labs:        Results from last 7 days  Lab Units 10/26/18  0703 10/25/18  0943 10/22/18  0646 10/21/18  2207 10/21/18  0820 10/19/18  2118   SODIUM mmol/L  --  142  --   --  140  --    POTASSIUM mmol/L  --  3.7  --  4.0 3.4* 3.8   CHLORIDE mmol/L  --  106  --   --  102  --    CO2 mmol/L  --  27.0  --   --  28.0  --    BUN mg/dL  --  10  --   --  7*  --    CREATININE mg/dL  --  0.57*  --   --  0.50*  --    CALCIUM mg/dL  --  9.1  --   --  9.1  --    PHOSPHORUS mg/dL  --   --   --   --  4.5  --    MAGNESIUM mg/dL 2.2 1.5 2.0  --  1.4  --        Results from last 7 days  Lab Units 10/25/18  0943   GLUCOSE mg/dL 128*                   Results from last 7 days  Lab Units 10/25/18  0720   COLOR UA  Yellow   CLARITY UA  Clear   PH, URINE  5.5   SPECIFIC GRAVITY, URINE  1.024   GLUCOSE UA  Negative "   KETONES UA  Negative   BILIRUBIN UA  Negative   PROTEIN UA  Negative   BLOOD UA  Negative   LEUKOCYTES UA  Negative   NITRITE UA  Negative       Estimated Creatinine Clearance: 143.9 mL/min (A) (by C-G formula based on SCr of 0.57 mg/dL (L)).      Assessment       Intractable nausea and vomiting    GERD without esophagitis    Anxiety and depression    Hyperlipidemia    Essential hypertension    Migraines    Lyme disease    Hypokalemia    Hypomagnesemia    Acute hypokalemia    Abnormal CT scan, stomach    Intractable vomiting with nausea    Falls            Impression: MILD PROTEINURIA. HTN NOW GOOD CONTROL.            Recommendations: RENAL SIGNING OFF. F/U WITH NAL ARRANGED.    Christopher Sands MD  10/26/18  3:22 PM          Electronically signed by Christopher Sands MD at 10/26/2018  3:24 PM     Sulaiman Felder MD at 10/26/2018  1:54 PM              [facility] Medicine Services  PROGRESS NOTE    Patient Name: Bryanna Schwartz  : 1970  MRN: [id number]    Date of Admission: 10/14/2018  Length of Stay: 0  Primary Care Physician: Andrei Crisostomo MD    Subjective   Subjective     CC:n/v and bilateral LE weakness    HPI:    Resting in bed in no acute distress.  Slept well last night and this morning she is calm and cooperative.  No f/c, no CP or SOB.   eReview of Systems  Gen- No fevers, chills  CV- No chest pain, palpitations  Resp- No cough, dyspnea  GI- No N/V/D, abd pain    Otherwise ROS is negative except as mentioned in the HPI.    Objective   Objective     Vital Signs:   Temp:  [97.4 °F (36.3 °C)-98.4 °F (36.9 °C)] 98.4 °F (36.9 °C)  Heart Rate:  [] 110  Resp:  [16-18] 16  BP: (113-158)/(54-99) 113/54        Physical Exam:  Constitutional: No acute distress, awake, alert  HENT: NCAT, mucous membranes moist  Respiratory: Clear to auscultation bilaterally, respiratory effort normal   Cardiovascular: RRR, no murmurs, rubs, or gallops, palpable pedal pulses  bilaterally  Gastrointestinal: Positive bowel sounds, soft, nontender, nondistended  Musculoskeletal: No bilateral ankle edema  Psychiatric: Flat affect, cooperative  Neurologic: Oriented x 3, BLE weakness, ataxic gait .  Does have altered behavior.  Skin: No rashes    Results Reviewed:  I have personally reviewed current lab, radiology, and data and agree.          Results from last 7 days  Lab Units 10/25/18  0943 10/21/18  2207 10/21/18  0820   SODIUM mmol/L 142  --  140   POTASSIUM mmol/L 3.7 4.0 3.4*   CHLORIDE mmol/L 106  --  102   CO2 mmol/L 27.0  --  28.0   BUN mg/dL 10  --  7*   CREATININE mg/dL 0.57*  --  0.50*   GLUCOSE mg/dL 128*  --  95   CALCIUM mg/dL 9.1  --  9.1     Estimated Creatinine Clearance: 143.9 mL/min (A) (by C-G formula based on SCr of 0.57 mg/dL (L)).  No results found for: BNP    Microbiology Results Abnormal     Procedure Component Value - Date/Time    Urine Culture - Urine, [[id number]]  (Abnormal)  (Susceptibility) Collected:  10/18/18 1603    Lab Status:  Final result Specimen:  Urine from Urine, Clean Catch Updated:  10/21/18 1055     Urine Culture >100,000 CFU/mL Proteus mirabilis (A)      40,000-50,000 CFU/mL Escherichia coli (A)    Susceptibility      Proteus mirabilis    Escherichia coli       ARELI    ARELI       Ampicillin <=8 ug/ml Susceptible    <=8 ug/ml Susceptible       Ampicillin + Sulbactam <=8/4 ug/ml Susceptible    <=8/4 ug/ml Susceptible       Aztreonam <=8 ug/ml Susceptible    <=8 ug/ml Susceptible       Cefepime <=8 ug/ml Susceptible    <=8 ug/ml Susceptible       Cefotaxime <=2 ug/ml Susceptible    <=2 ug/ml Susceptible       Ceftriaxone <=8 ug/ml Susceptible    <=8 ug/ml Susceptible       Cefuroxime sodium <=4 ug/ml Susceptible    <=4 ug/ml Susceptible       Cephalothin <=8 ug/ml Susceptible    <=8 ug/ml Susceptible       Ertapenem <=1 ug/ml Susceptible    <=1 ug/ml Susceptible       Gentamicin <=4 ug/ml Susceptible    <=4 ug/ml Susceptible       Levofloxacin <=2 ug/ml  Susceptible    <=2 ug/ml Susceptible       Meropenem <=1 ug/ml Susceptible    <=1 ug/ml Susceptible       Nitrofurantoin       <=32 ug/ml Susceptible       Piperacillin + Tazobactam <=16 ug/ml Susceptible    <=16 ug/ml Susceptible       Tetracycline       <=4 ug/ml Susceptible       Tobramycin <=4 ug/ml Susceptible    <=4 ug/ml Susceptible       Trimethoprim + Sulfamethoxazole <=2/38 ug/ml Susceptible    <=2/38 ug/ml Susceptible                      Blood Culture - Blood, [[id number]]  (Normal) Collected:  10/15/18 0029    Lab Status:  Final result Specimen:  Blood from Arm, Right Updated:  10/20/18 0130     Blood Culture No growth at 5 days    Blood Culture - Blood, [[id number]]  (Normal) Collected:  10/15/18 0029    Lab Status:  Final result Specimen:  Blood from Arm, Right Updated:  10/20/18 0130     Blood Culture No growth at 5 days    Eosinophil Smear - Urine, Urine, Clean Catch [[id number]]  (Normal) Collected:  10/18/18 1603    Lab Status:  Final result Specimen:  Urine from Urine, Clean Catch Updated:  10/18/18 1802     Eosinophil Smear 0 % EOS/100 Cells     Narrative:       No eosinophil seen          Imaging Results (last 24 hours)     ** No results found for the last 24 hours. **             I have reviewed the medications.      amLODIPine 5 mg Oral Q24H   atenolol 50 mg Oral Q12H   atorvastatin 10 mg Oral Daily   b complex-vitamin c-folic acid 1 tablet Oral Daily   cetirizine 10 mg Oral Daily   DULoxetine 60 mg Oral Daily   latanoprost 1 drop Both Eyes Nightly   lisinopril 30 mg Oral Q12H   melatonin 5 mg Sublingual Nightly   montelukast 5 mg Oral Nightly   pantoprazole 40 mg Oral BID AC   polyethylene glycol 17 g Oral Daily   QUEtiapine 50 mg Oral Q12H   sodium chloride 3 mL Intravenous Q12H         Assessment/Plan   Assessment / Plan     Active Hospital Problems    Diagnosis   • **Intractable nausea and vomiting   • Falls   • GERD without esophagitis   • Anxiety and depression   • Hyperlipidemia   •  Essential hypertension   • Migraines   • Lyme disease   • Hypokalemia   • Hypomagnesemia   • Acute hypokalemia   • Abnormal CT scan, stomach     Added automatically from request for surgery [id number]     • Intractable vomiting with nausea     Added automatically from request for surgery [id number]          Brief Hospital Course to date:  Bryanna Schwatrz is a 48 y.o. female with a past medical history of anxiety depression, hypertension, hyperlipidemia, migraines. Recent rx for RMSF and lyme. She presented with what she describes as 6 months of intractable nausea and vomiting that has been progressively worsening etiology is currently is unknown,      Assessment & Plan:    N/V:  *She has had significant workup at OSH this has included EGD, CCY. Her symptoms of anorexia, early satiety with n/v and 120lb weight loss( she does not look like she has lost that much weight )  .  CT abd/pelvis showed thickening of the proximal gastric mucosa, she is s/p EGD 10/16 showed antral gastritis with reflux esophagitis, currently on protonix 40 mg bid. Symptoms have improved markedly and now patient tolerates PO diet. however, most likely nausea has psychogenic component.    * Multiple electrolyte abnormalities   hypokalemia, hypomagnesemia, etiology unknown? Adrenal insufficieny, however random cortisol is wnl, suspect from prolonged n/v, continue to monitor and replete.    * UTI, on rocephin.    *Chronic illness malnutrition, nutrition/dietecian following.    * periods of odd behavior and hallucination.     *HTN, still not well controlled. Fluctuates widely and is somewhat susspicious for pheo. Labs pending.    - Significant bilateral LE weakness with ataxia, has had some ataxia in the past, previously seen by Dr. Syed at - PT/OT. Neurology has seen the patient here on this admission.    PLAN:  - cont current care  - continue ambulation with assistance and also PT.  -rehab placement           DVT Prophylaxis: mechanical    CODE  STATUS:   Code Status and Medical Interventions:   Ordered at: 10/15/18 0406     Level Of Support Discussed With:    Patient     Code Status:    CPR     Medical Interventions (Level of Support Prior to Arrest):    Full     Disposition: TBD, anticipate she will need rehab. CM follwoing    Electronically signed by Sulaiman Felder MD, 10/26/18, 1:55 PM.          Electronically signed by Sulaiman Felder MD at 10/26/2018  2:03 PM

## 2018-11-02 NOTE — PLAN OF CARE
Problem: Patient Care Overview  Goal: Plan of Care Review  Outcome: Ongoing (interventions implemented as appropriate)   11/02/18 0004   Coping/Psychosocial   Plan of Care Reviewed With patient   Plan of Care Review   Progress no change   OTHER   Outcome Summary patient not able to have a normal conversation. she was hallucinating and her gait is abnormal.

## 2018-11-02 NOTE — SIGNIFICANT NOTE
RN states continued confusion and behavior issues overnight. Visual and auditory hallucinations, convinced is seeing dead people and dead babies in the hallway. Unsafe mobility and cannot walk, is taking off her equipment and turning off bed alarms and trying to go places. RN states talking out of her head, rambling, unable to calm or control her or get her to follow ANY directions.    May need neuro back to eval. Strongly likely needs a psych eval as well, RNs state since has been here many concerns.    Seroquel stopped r/t concern for making confusion worse.    Will try ativan x1. No staff for sitter. Will try to avoid restraints, may have to try more meds to avoid this. Unclear who she lives with or how she could go home, considering rehab and CM working on progress.    PLEASE F/U FURTHER IN AM

## 2018-11-03 PROCEDURE — 97116 GAIT TRAINING THERAPY: CPT

## 2018-11-03 PROCEDURE — G0378 HOSPITAL OBSERVATION PER HR: HCPCS

## 2018-11-03 PROCEDURE — 99225 PR SBSQ OBSERVATION CARE/DAY 25 MINUTES: CPT | Performed by: HOSPITALIST

## 2018-11-03 PROCEDURE — 97110 THERAPEUTIC EXERCISES: CPT

## 2018-11-03 RX ADMIN — Medication 5 MG: at 20:27

## 2018-11-03 RX ADMIN — QUETIAPINE FUMARATE 12.5 MG: 25 TABLET ORAL at 08:38

## 2018-11-03 RX ADMIN — MONTELUKAST SODIUM 5 MG: 5 TABLET, CHEWABLE ORAL at 20:27

## 2018-11-03 RX ADMIN — Medication 1 TABLET: at 08:42

## 2018-11-03 RX ADMIN — Medication 3 ML: at 20:27

## 2018-11-03 RX ADMIN — ATORVASTATIN CALCIUM 10 MG: 10 TABLET, FILM COATED ORAL at 08:38

## 2018-11-03 RX ADMIN — Medication 3 ML: at 08:40

## 2018-11-03 RX ADMIN — CETIRIZINE HYDROCHLORIDE 10 MG: 10 TABLET, FILM COATED ORAL at 08:39

## 2018-11-03 RX ADMIN — ATENOLOL 50 MG: 50 TABLET ORAL at 08:39

## 2018-11-03 RX ADMIN — POLYETHYLENE GLYCOL 3350 17 G: 17 POWDER, FOR SOLUTION ORAL at 08:39

## 2018-11-03 RX ADMIN — LISINOPRIL 2.5 MG: 5 TABLET ORAL at 08:39

## 2018-11-03 RX ADMIN — QUETIAPINE FUMARATE 12.5 MG: 25 TABLET ORAL at 20:27

## 2018-11-03 RX ADMIN — ATENOLOL 50 MG: 50 TABLET ORAL at 20:27

## 2018-11-03 RX ADMIN — DULOXETINE HYDROCHLORIDE 60 MG: 60 CAPSULE, DELAYED RELEASE ORAL at 08:38

## 2018-11-03 NOTE — PROGRESS NOTES
"   LOS: 0 days    Patient Care Team:  Andrei Crisostomo MD as PCP - General (Gastroenterology)    Reason For Visit:  F/U HTN AND PROTEINURIA.  Subjective     Feels better today  No new events    Review of Systems:    Pulm: No soa   CV:  No CP      Objective       atenolol 50 mg Oral Q12H   atorvastatin 10 mg Oral Daily   b complex-vitamin c-folic acid 1 tablet Oral Daily   cetirizine 10 mg Oral Daily   DULoxetine 60 mg Oral Daily   latanoprost 1 drop Both Eyes Nightly   lisinopril 2.5 mg Oral Daily   melatonin 5 mg Sublingual Nightly   montelukast 5 mg Oral Nightly   pantoprazole 40 mg Oral BID AC   polyethylene glycol 17 g Oral Daily   QUEtiapine 12.5 mg Oral Q12H   sodium chloride 3 mL Intravenous Q12H       sodium chloride 100 mL/hr         Vital Signs:  Blood pressure (!) 164/107, pulse 105, temperature 97.9 °F (36.6 °C), temperature source Oral, resp. rate 20, height 175.3 cm (69\"), weight 75.5 kg (166 lb 6.4 oz), SpO2 98 %, not currently breastfeeding.    Flowsheet Rows      First Filed Value   Admission Height  175.3 cm (69\") Documented at 10/14/2018 2247   Admission Weight  72.6 kg (160 lb) Documented at 10/14/2018 2247          No intake/output data recorded.    Physical Exam:    General Appearance: NAD, alert and cooperative, Ox3  Eyes: PER, conjunctivae and sclerae normal, no icterus  Lungs: respirations regular and unlabored, no crepitus, clear to auscultation  Heart/CV: regular rhythm & normal rate, no murmur, no gallop, no rub and no edema  Abdomen: not distended, soft, non-tender, no masses,  bowel sounds present  Skin: No rash, Warm and dry    Radiology:            Labs:    Results from last 7 days  Lab Units 11/01/18  0610 10/30/18  0732 10/28/18  0801   WBC 10*3/mm3 7.67 8.10 6.61   HEMOGLOBIN g/dL 11.0* 13.0 11.8   HEMATOCRIT % 33.2* 39.2 35.6   PLATELETS 10*3/mm3 336 338 280       Results from last 7 days  Lab Units 11/02/18  0354 11/01/18  0610 10/31/18  0530 10/30/18  1333 10/30/18  0732 "   SODIUM mmol/L 139 139 139  --  139   POTASSIUM mmol/L 3.8 3.6 3.6  --  4.2   CHLORIDE mmol/L 105 107 101  --  101   CO2 mmol/L 24.0 25.0 26.0  --  30.0   BUN mg/dL 6* 9 13  --  7*   CREATININE mg/dL 0.61 0.61 0.69  --  0.68   CALCIUM mg/dL 9.0 8.9 8.9  --  9.7   MAGNESIUM mg/dL  --   --   --  1.8  --        Results from last 7 days  Lab Units 11/02/18  0354   GLUCOSE mg/dL 95                       Estimated Creatinine Clearance: 134.4 mL/min (by C-G formula based on SCr of 0.61 mg/dL).      Assessment       Intractable nausea and vomiting    GERD without esophagitis    Anxiety and depression    Hyperlipidemia    Essential hypertension    Migraines    Lyme disease    Hypokalemia    Hypomagnesemia    Acute hypokalemia    Abnormal CT scan, stomach    Intractable vomiting with nausea    Falls    Acute UTI (urinary tract infection)            Impression:     MILD PROTEINURIA.   HTN - good now but it dows go up and down-- very high metanephrine in urine- plan as under  CT abd does not say any thing about adrenals      Recommendations:   MRI- No adrenal adenoma- but did have high nor epinephrine on urin eassessment-   Will dc phenoxybenzamine due to very low bp  BP is variable-  If her bp consistently running above 140   Restart back on phenoxybenzamine 10 mg at night and monitor  From my end she can be dced- will see her in clinic in 3-4 weeks with labs plro Casas MD  11/03/18  12:12 PM

## 2018-11-03 NOTE — PLAN OF CARE
Problem: Patient Care Overview  Goal: Plan of Care Review  Outcome: Ongoing (interventions implemented as appropriate)   11/03/18 1717   Coping/Psychosocial   Plan of Care Reviewed With patient;family   Plan of Care Review   Progress improving   OTHER   Outcome Summary Pt. has been resting with visitors today and successfully worked with PT. Mechanics and mental status are improving. VSS. Will continue to monitor        Problem: Fall Risk (Adult)  Goal: Identify Related Risk Factors and Signs and Symptoms  Outcome: Ongoing (interventions implemented as appropriate)   10/15/18 0640   Fall Risk (Adult)   Related Risk Factors (Fall Risk) fatigue/slow reaction;gait/mobility problems;history of falls;environment unfamiliar   Signs and Symptoms (Fall Risk) presence of risk factors

## 2018-11-03 NOTE — THERAPY TREATMENT NOTE
Acute Care - Physical Therapy Treatment Note  [facility]     Patient Name: Bryanna Schwartz  : 1970  MRN: [id number]  Today's Date: 11/3/2018  Onset of Illness/Injury or Date of Surgery: 10/14/18  Date of Referral to PT: 10/16/18  Referring Physician: MD Velasquez    Admit Date: 10/14/2018    Visit Dx:    ICD-10-CM ICD-9-CM   1. Acute hypokalemia E87.6 276.8   2. Hypomagnesemia E83.42 275.2   3. Unintentional weight loss R63.4 783.21   4. Intractable cyclical vomiting with nausea G43.A1 536.2   5. Abnormal CT scan, stomach R93.3 793.4   6. Intractable vomiting with nausea, unspecified vomiting type R11.2 536.2   7. Impaired functional mobility, balance, gait, and endurance Z74.09 V49.89   8. Impaired mobility and ADLs Z74.09 799.89     Patient Active Problem List   Diagnosis   • GERD without esophagitis   • Anxiety and depression   • Hyperlipidemia   • Essential hypertension   • Migraines   • Lyme disease   • Intractable nausea and vomiting   • Hypokalemia   • Hypomagnesemia   • Acute hypokalemia   • Abnormal CT scan, stomach   • Intractable vomiting with nausea   • Falls   • Acute UTI (urinary tract infection)       Therapy Treatment          Rehabilitation Treatment Summary     Row Name 18 0944             Treatment Time/Intention    Discipline physical therapist  -VG      Document Type therapy note (daily note)  -VG      Subjective Information no complaints  -VG      Mode of Treatment individual therapy;physical therapy  -VG      Patient/Family Observations In bed, RA, tele, bed checkx2; no family present  -VG      Care Plan Review patient/other agree to care plan  -VG      Total Minutes, Physical Therapy Treatment 23  -VG      Therapy Frequency (PT Clinical Impression) daily  -VG      Patient Effort good  -VG      Existing Precautions/Restrictions fall;other (see comments)   exit alarms, BLE ataxia  -VG      Recorded by [VG] Rupa Daniel, PT 18 1015      Row Name 18 0944              Vital Signs    Pre Systolic BP Rehab 135  -VG      Pre Treatment Diastolic BP 79  -VG      Post Systolic BP Rehab 124  -VG      Post Treatment Diastolic BP 70  -VG      Pretreatment Heart Rate (beats/min) 108  -VG      Posttreatment Heart Rate (beats/min) 119  -VG      Recorded by [VG] Rupa Daniel, PT 11/03/18 1015      Row Name 11/03/18 0944             Cognitive Assessment/Intervention- PT/OT    Affect/Mental Status (Cognitive) anxious  -VG      Orientation Status (Cognition) oriented to;person;disoriented to;place;time  -VG      Follows Commands (Cognition) follows one step commands;75-90% accuracy  -VG      Cognitive Function (Cognitive) safety deficit  -VG      Safety Deficit (Cognitive) moderate deficit;insight into deficits/self awareness;judgment;problem solving  -VG      Personal Safety Interventions fall prevention program maintained;gait belt;nonskid shoes/slippers when out of bed;supervised activity  -VG      Recorded by [VG] Rupa Daniel, PT 11/03/18 1015      Row Name 11/03/18 0944             Bed Mobility Assessment/Treatment    Bed Mobility Assessment/Treatment supine-sit  -VG      Supine-Sit [place] (Bed Mobility) conditional independence  -VG      Bed Mobility, Safety Issues decreased use of arms for pushing/pulling;decreased use of legs for bridging/pushing  -VG      Assistive Device (Bed Mobility) bed rails;head of bed elevated  -VG      Comment (Bed Mobility) Good safety awareness.   -VG      Recorded by [VG] Rupa Daniel, PT 11/03/18 1015      Row Name 11/03/18 0944             Transfer Assessment/Treatment    Transfer Assessment/Treatment sit-stand transfer;stand-sit transfer  -VG      Comment (Transfers) Cues for sequencing. Unsteady with transfers.   -VG      Recorded by [VG] Rupa Daniel, PT 11/03/18 1015      Row Name 11/03/18 0944             Sit-Stand Transfer    Sit-Stand [place] (Transfers) minimum assist (75% patient effort);2 person assist;verbal cues  -VG       Assistive Device (Sit-Stand Transfers) walker, front-wheeled  -VG      Recorded by [VG] Rupa Daniel, PT 11/03/18 1015      Row Name 11/03/18 0944             Stand-Sit Transfer    Stand-Sit [place] (Transfers) minimum assist (75% patient effort);2 person assist;verbal cues  -VG      Assistive Device (Stand-Sit Transfers) walker, front-wheeled  -VG      Recorded by [VG] Rupa Daniel, PT 11/03/18 1015      Row Name 11/03/18 0944             Gait/Stairs Assessment/Training    47133 - Gait Training Minutes  13  -VG      Gait/Stairs Assessment/Training gait/ambulation independence  -VG      [place] Level (Gait) minimum assist (75% patient effort);moderate assist (50% patient effort);verbal cues  -VG      Assistive Device (Gait) walker, front-wheeled  -VG      Distance in Feet (Gait) 75  -VG      Pattern (Gait) step-to;step-through  -VG      Deviations/Abnormal Patterns (Gait) ataxic;scissoring;steppage;stride length decreased;gait speed decreased  -VG      Bilateral Gait Deviations knee buckling, bilateral;heel strike decreased  -VG      Comment (Gait/Stairs) Distance limited by fatigue. 2-3 standing rest breaks for recovery of balance. MIN-MODA for balance and AD management. Ataxic gait.  -VG      Recorded by [VG] Rupa Daniel, PT 11/03/18 1015      Row Name 11/03/18 0944             Therapeutic Exercise    35685 - PT Therapeutic Exercise Minutes 10  -VG      Recorded by [VG] Rupa Daniel, PT 11/03/18 1015      Row Name 11/03/18 0944             Therapeutic Exercise    Lower Extremity (Therapeutic Exercise) heel slides, bilateral;SLR (straight leg raise), bilateral  -VG      Lower Extremity Range of Motion (Therapeutic Exercise) hip abduction/adduction, bilateral;ankle dorsiflexion/plantar flexion, bilateral  -VG      Exercise Type (Therapeutic Exercise) AROM (active range of motion);AAROM (active assistive range of motion)  -VG      Position (Therapeutic Exercise) seated  -VG       Sets/Reps (Therapeutic Exercise) 15x  -VG      Comment (Therapeutic Exercise) Cues for technique. Tactile cues for coordination.  -VG      Recorded by [VG] Rupa Daniel, PT 11/03/18 1015      Row Name 11/03/18 0944             Positioning and Restraints    Pre-Treatment Position in bed  -VG      Post Treatment Position chair  -VG      In Chair reclined;call light within reach;encouraged to call for assist;exit alarm on;legs elevated  -VG      Recorded by [VG] Rupa Daniel, PT 11/03/18 1015      Row Name 11/03/18 0944             Pain Scale: Numbers Pre/Post-Treatment    Pain Scale: Numbers, Pretreatment 0/10 - no pain  -VG      Pain Scale: Numbers, Post-Treatment 0/10 - no pain  -VG      Recorded by [VG] Rupa Daniel, PT 11/03/18 1015      Row Name 11/03/18 0944             Coping    Observed Emotional State accepting  -VG      Verbalized Emotional State acceptance  -VG      Recorded by [VG] Rupa Daniel, PT 11/03/18 1015      Row Name 11/03/18 0944             Plan of Care Review    Plan of Care Reviewed With patient  -VG      Recorded by [VG] Rupa Daniel, PT 11/03/18 1015      Row Name 11/03/18 0944             Outcome Summary/Treatment Plan (PT)    Daily Summary of Progress (PT) progress toward functional goals is good  -VG      Anticipated Equipment Needs at Discharge (PT) front wheeled walker  -VG      Anticipated Discharge Disposition (PT) inpatient rehabilitation facility  -VG      Recorded by [VG] Rupa Daniel, PT 11/03/18 1015        User Key  (r) = Recorded By, (t) = Taken By, (c) = Cosigned By    Initials Name Effective Dates Discipline    VG Rupa Daniel, PT 05/29/18 -  PT                     Physical Therapy Education     Title: PT OT SLP Therapies (Active)     Topic: Physical Therapy (Active)     Point: Mobility training (Active)    Learning Progress Summary     Learner Status Readiness Method Response Comment Documented by    Patient Done Acceptance E VU,NR Educated on HEP  and correct mechanics for safe functional mobility. Reinforcement needed for carryover. VG 11/03/18 0944     Active Acceptance E,D NR   11/01/18 1151     Active Eager E,D,H NR  DM 10/30/18 1759     Active Acceptance E NR  DEBRA 10/28/18 1310     Active Acceptance E NR  DEBRA 10/27/18 1330     Active Acceptance E NR  KR 10/25/18 1444     Active Acceptance E NR  AS 10/22/18 1012     Active Acceptance E NR  DEBRA 10/19/18 1515     Done Acceptance E,TB VU  LC 10/18/18 1738     Done Acceptance E,D NR,DU  BD 10/18/18 1049     Active Acceptance E,D NR  BD 10/17/18 1332    Family Done Acceptance E,TB VU  LC 10/18/18 1738     Active Acceptance E,D NR  BD 10/17/18 1332    Significant Other Active Eager E,D,H NR  DM 10/30/18 1759     Active Acceptance E NR  KR 10/25/18 1444     Active Acceptance E NR  DEBRA 10/19/18 1515     Done Acceptance E,D NR,DU  BD 10/18/18 1049          Point: Home exercise program (Active)    Learning Progress Summary     Learner Status Readiness Method Response Comment Documented by    Patient Done Acceptance E VU,NR Educated on HEP and correct mechanics for safe functional mobility. Reinforcement needed for carryover.  11/03/18 0944     Active Acceptance E,D NR   11/01/18 1151     Active Eager E,D,H NR  DM 10/30/18 1759     Active Acceptance E NR  DEBRA 10/28/18 1310     Active Acceptance E NR  DEBRA 10/27/18 1330     Active Acceptance E NR  KR 10/25/18 1444     Active Acceptance E NR  AS 10/22/18 1012     Active Acceptance E NR  DEBRA 10/19/18 1515     Done Acceptance E,TB VU  LC 10/18/18 1738     Done Acceptance E,D NR,DU  BD 10/18/18 1049     Active Acceptance E,D NR  BD 10/17/18 1332    Family Done Acceptance E,TB VU  LC 10/18/18 1738     Active Acceptance E,D NR  BD 10/17/18 1332    Significant Other Active Eager E,D,H NR  DM 10/30/18 1759     Active Acceptance E NR  KR 10/25/18 1444     Active Acceptance E NR  DEBRA 10/19/18 1515     Done Acceptance E,D NR,NICOLASA  BD 10/18/18 1049          Point: Body mechanics  (Active)    Learning Progress Summary     Learner Status Readiness Method Response Comment Documented by    Patient Done Acceptance E VU,NR Educated on HEP and correct mechanics for safe functional mobility. Reinforcement needed for carryover. VG 11/03/18 0944     Active Acceptance E,D NR   11/01/18 1151     Active Eager E,D,H NR  DM 10/30/18 1759     Active Acceptance E NR  DEBRA 10/28/18 1310     Active Acceptance E NR  DEBRA 10/27/18 1330     Active Acceptance E NR  KR 10/25/18 1444     Active Acceptance E NR  AS 10/22/18 1012     Active Acceptance E NR  DEBRA 10/19/18 1515     Done Acceptance E,TB VU  LC 10/18/18 1738     Done Acceptance E,D NR,DU  BD 10/18/18 1049     Active Acceptance E,D NR  BD 10/17/18 1332    Family Done Acceptance E,TB VU  LC 10/18/18 1738     Active Acceptance E,D NR  BD 10/17/18 1332    Significant Other Active Eager E,D,H NR  DM 10/30/18 1759     Active Acceptance E NR  KR 10/25/18 1444     Active Acceptance E NR  DEBRA 10/19/18 1515     Done Acceptance E,D NR,DU  BD 10/18/18 1049          Point: Precautions (Active)    Learning Progress Summary     Learner Status Readiness Method Response Comment Documented by    Patient Done Acceptance E VU,NR Educated on HEP and correct mechanics for safe functional mobility. Reinforcement needed for carryover.  11/03/18 0944     Active Acceptance E,D NR   11/01/18 1151     Active Eager E,D,H NR  DM 10/30/18 1759     Active Acceptance E NR  DEBRA 10/28/18 1310     Active Acceptance E NR  DEBRA 10/27/18 1330     Active Acceptance E NR  KR 10/25/18 1444     Active Acceptance E NR  AS 10/22/18 1012     Active Acceptance E NR  DEBRA 10/19/18 1515     Done Acceptance E,TB VU  LC 10/18/18 1738     Done Acceptance E,D NR,DU  BD 10/18/18 1049     Active Acceptance E,D NR  BD 10/17/18 1332    Family Done Acceptance E,TB VU  LC 10/18/18 1738     Active Acceptance E,D NR  BD 10/17/18 1332    Significant Other Active Eager E,D,H NR  DM 10/30/18 1759     Active Acceptance E NR   KR 10/25/18 1444     Active Acceptance E NR  DEBRA 10/19/18 1515     Done Acceptance E,D NR,DU   10/18/18 1049                      User Key     Initials Effective Dates Name Provider Type Discipline    DEBRA 06/19/15 -  Cady Clemons, PT Physical Therapist PT    SJ 06/19/15 -  Rosa M March, PT Physical Therapist PT    DM 06/19/15 -  Jaqui Jin, PT Physical Therapist PT    AS 06/22/15 -  Vanessa Espino, PTA Physical Therapy Assistant PT    LC 06/16/16 -  Lori Kumari RN Registered Nurse Nurse    BD 06/08/18 -  Vanessa Greene, PT Physical Therapist PT    KR 04/03/18 -  Shayy Luo, PT Physical Therapist PT    VG 05/29/18 -  Rupa Daniel, PT Physical Therapist PT                    PT Recommendation and Plan  Anticipated Discharge Disposition (PT): inpatient rehabilitation facility  Therapy Frequency (PT Clinical Impression): daily  Outcome Summary/Treatment Plan (PT)  Daily Summary of Progress (PT): progress toward functional goals is good  Anticipated Equipment Needs at Discharge (PT): front wheeled walker  Anticipated Discharge Disposition (PT): inpatient rehabilitation facility  Plan of Care Reviewed With: patient  Progress: improving  Outcome Summary: Pt increased ambulation distance to 75 ft with RW and MIN-MODA. Mechanics improve with PT providing pressure through RW to avoid LOB. Continues to be limited by impaired balance and ataxia in BLEs. Will continue to progress pt as able per POC.          Outcome Measures     Row Name 11/03/18 0944 11/02/18 1515 11/01/18 1105       How much help from another person do you currently need...    Turning from your back to your side while in flat bed without using bedrails? 4  -VG  -- 4  -SJ    Moving from lying on back to sitting on the side of a flat bed without bedrails? 4  -VG  -- 4  -SJ    Moving to and from a bed to a chair (including a wheelchair)? 2  -VG  -- 2  -SJ    Standing up from a chair using your arms (e.g., wheelchair, bedside  chair)? 2  -VG  -- 2  -SJ    Climbing 3-5 steps with a railing? 2  -VG  -- 2  -SJ    To walk in hospital room? 2  -VG  -- 2  -SJ    AM-PAC 6 Clicks Score 16  -VG  -- 16  -SJ       How much help from another is currently needed...    Putting on and taking off regular lower body clothing?  -- 2  -KF  --    Bathing (including washing, rinsing, and drying)  -- 2  -KF  --    Toileting (which includes using toilet bed pan or urinal)  -- 2  -KF  --    Putting on and taking off regular upper body clothing  -- 3  -KF  --    Taking care of personal grooming (such as brushing teeth)  -- 3  -KF  --    Eating meals  -- 3  -KF  --    Score  -- 15  -KF  --       Functional Assessment    Outcome Measure Options AM-[facility] 6 Clicks Basic Mobility (PT)  -VG AM-[facility] 6 Clicks Daily Activity (OT)  - AM-[facility] 6 Clicks Basic Mobility (PT)  -      User Key  (r) = Recorded By, (t) = Taken By, (c) = Cosigned By    Initials Name Provider Type     Rosa M March, PT Physical Therapist    KF Sola Pradhan, OT Occupational Therapist    VG Rupa Daniel, PT Physical Therapist           Time Calculation:         PT Charges     Row Name 11/03/18 0944             Time Calculation    Start Time 0944  -      PT Received On 11/03/18  -      PT Goal Re-Cert Due Date 11/07/18  -         Time Calculation- PT    Total Timed Code Minutes- PT 23 minute(s)  -         Timed Charges    08441 - PT Therapeutic Exercise Minutes 10  -VG      76929 - Gait Training Minutes  13  -VG        User Key  (r) = Recorded By, (t) = Taken By, (c) = Cosigned By    Initials Name Provider Type    VG Ruap Daniel, PT Physical Therapist        Therapy Suggested Charges     Code   Minutes Charges    11653 (CPT®) Hc Pt Neuromusc Re Education Ea 15 Min      50048 (CPT®) Hc Pt Ther Proc Ea 15 Min 10 1    33291 (CPT®) Hc Gait Training Ea 15 Min 13 1    24421 (CPT®) Hc Pt Therapeutic Act Ea 15 Min      46099 (CPT®) Hc Pt Manual Therapy Ea 15 Min      80953 (CPT®) Hc  Pt Iontophoresis Ea 15 Min      42468 (CPT®) Hc Pt Elec Stim Ea-Per 15 Min      43532 (CPT®) Hc Pt Ultrasound Ea 15 Min      75999 (CPT®) Hc Pt Self Care/Mgmt/Train Ea 15 Min      03111 (CPT®) Hc Pt Prosthetic (S) Train Initial Encounter, Each 15 Min      78098 (CPT®) Hc Pt Orthotic(S)/Prosthetic(S) Encounter, Each 15 Min      12176 (CPT®) Hc Orthotic(S) Mgmt/Train Initial Encounter, Each 15min      Total  23 2        Therapy Charges for Today     Code Description Service Date Service Provider Modifiers Qty    23956403750 HC PT THER PROC EA 15 MIN 11/3/2018 Rupa Daniel, PT GP 1    56642134967 HC GAIT TRAINING EA 15 MIN 11/3/2018 Rupa Daniel, PT GP 1    32322775957 HC PT THER SUPP EA 15 MIN 11/3/2018 Rupa Daniel, PT GP 1          PT G-Codes  Outcome Measure Options: AM-PAC 6 Clicks Basic Mobility (PT)  AM-PAC 6 Clicks Score: 16  Score: 15    Mary Daniel PT  11/3/2018

## 2018-11-03 NOTE — PLAN OF CARE
Problem: Patient Care Overview  Goal: Plan of Care Review   11/03/18 0944   Coping/Psychosocial   Plan of Care Reviewed With patient   Plan of Care Review   Progress improving   OTHER   Outcome Summary Pt increased ambulation distance to 75 ft with RW and MIN-MODA. Mechanics improve with PT providing pressure through RW to avoid LOB. Continues to be limited by impaired balance and ataxia in BLEs. Will continue to progress pt as able per POC.

## 2018-11-03 NOTE — PROGRESS NOTES
[facility] Medicine Services  PROGRESS NOTE    Patient Name: Bryanna Schwartz  : 1970  MRN: [id number]    Date of Admission: 10/14/2018  Length of Stay: 0  Primary Care Physician: Andrei Crisostomo MD    Subjective   Subjective     CC: F/U N/V and bilateral LE weakness    HPI:  Patient seen this morning. No complaints. Still wants to go home with her sister, Padma who she says lives in [place].    Review of Systems  Gen-no fevers, no chills  CV-no chest pain, no palpitations  Resp-no cough, no dyspnea  GI-no N/V/D, no abd pain      Otherwise ROS is negative except as mentioned in the HPI.    Objective   Objective     Vital Signs:   Temp:  [97.9 °F (36.6 °C)-98.7 °F (37.1 °C)] 97.9 °F (36.6 °C)  Heart Rate:  [105-108] 105  Resp:  [18-20] 20  BP: (137-164)/() 164/107        Physical Exam:  Constitutional: No acute distress, awake, alert, sitting up in bed  HENT: NCAT, mucous membranes moist  Respiratory: Clear to auscultation bilaterally, respiratory effort normal  Cardiovascular: RRR, no murmurs, rubs, or gallops, palpable pedal pulses bilaterally  Gastrointestinal: Positive bowel sounds, soft, nontender, nondistended  Musculoskeletal: No bilateral ankle edema   Psychiatric: appropriate mood  Neurologic: Oriented x 3, BLE weakness improving  Skin: No rashes    Results Reviewed:  I have personally reviewed current lab, radiology, and data and agree.      Results from last 7 days  Lab Units 18  0610 10/30/18  0732 10/28/18  0801   WBC 10*3/mm3 7.67 8.10 6.61   HEMOGLOBIN g/dL 11.0* 13.0 11.8   HEMATOCRIT % 33.2* 39.2 35.6   PLATELETS 10*3/mm3 336 338 280       Results from last 7 days  Lab Units 18  0354 18  0610 10/31/18  0530   SODIUM mmol/L 139 139 139   POTASSIUM mmol/L 3.8 3.6 3.6   CHLORIDE mmol/L 105 107 101   CO2 mmol/L 24.0 25.0 26.0   BUN mg/dL 6* 9 13   CREATININE mg/dL 0.61 0.61 0.69   GLUCOSE mg/dL 95 96 103*   CALCIUM mg/dL 9.0 8.9 8.9      Estimated Creatinine Clearance: 134.4 mL/min (by C-G formula based on SCr of 0.61 mg/dL).  No results found for: BNP    Microbiology Results Abnormal     Procedure Component Value - Date/Time    Urine Culture - Urine, [[id number]]  (Abnormal)  (Susceptibility) Collected:  10/18/18 1603    Lab Status:  Final result Specimen:  Urine from Urine, Clean Catch Updated:  10/21/18 1055     Urine Culture >100,000 CFU/mL Proteus mirabilis (A)      40,000-50,000 CFU/mL Escherichia coli (A)    Susceptibility      Proteus mirabilis    Escherichia coli       ARELI    ARELI       Ampicillin <=8 ug/ml Susceptible    <=8 ug/ml Susceptible       Ampicillin + Sulbactam <=8/4 ug/ml Susceptible    <=8/4 ug/ml Susceptible       Aztreonam <=8 ug/ml Susceptible    <=8 ug/ml Susceptible       Cefepime <=8 ug/ml Susceptible    <=8 ug/ml Susceptible       Cefotaxime <=2 ug/ml Susceptible    <=2 ug/ml Susceptible       Ceftriaxone <=8 ug/ml Susceptible    <=8 ug/ml Susceptible       Cefuroxime sodium <=4 ug/ml Susceptible    <=4 ug/ml Susceptible       Cephalothin <=8 ug/ml Susceptible    <=8 ug/ml Susceptible       Ertapenem <=1 ug/ml Susceptible    <=1 ug/ml Susceptible       Gentamicin <=4 ug/ml Susceptible    <=4 ug/ml Susceptible       Levofloxacin <=2 ug/ml Susceptible    <=2 ug/ml Susceptible       Meropenem <=1 ug/ml Susceptible    <=1 ug/ml Susceptible       Nitrofurantoin       <=32 ug/ml Susceptible       Piperacillin + Tazobactam <=16 ug/ml Susceptible    <=16 ug/ml Susceptible       Tetracycline       <=4 ug/ml Susceptible       Tobramycin <=4 ug/ml Susceptible    <=4 ug/ml Susceptible       Trimethoprim + Sulfamethoxazole <=2/38 ug/ml Susceptible    <=2/38 ug/ml Susceptible                      Blood Culture - Blood, [[id number]]  (Normal) Collected:  10/15/18 0029    Lab Status:  Final result Specimen:  Blood from Arm, Right Updated:  10/20/18 0130     Blood Culture No growth at 5 days    Blood Culture - Blood, [[id number]]   (Normal) Collected:  10/15/18 0029    Lab Status:  Final result Specimen:  Blood from Arm, Right Updated:  10/20/18 0130     Blood Culture No growth at 5 days    Eosinophil Smear - Urine, Urine, Clean Catch [[id number]]  (Normal) Collected:  10/18/18 1603    Lab Status:  Final result Specimen:  Urine from Urine, Clean Catch Updated:  10/18/18 1802     Eosinophil Smear 0 % EOS/100 Cells     Narrative:       No eosinophil seen          Imaging Results (last 24 hours)     ** No results found for the last 24 hours. **             I have reviewed the medications.      atenolol 50 mg Oral Q12H   atorvastatin 10 mg Oral Daily   b complex-vitamin c-folic acid 1 tablet Oral Daily   cetirizine 10 mg Oral Daily   DULoxetine 60 mg Oral Daily   latanoprost 1 drop Both Eyes Nightly   lisinopril 2.5 mg Oral Daily   melatonin 5 mg Sublingual Nightly   montelukast 5 mg Oral Nightly   pantoprazole 40 mg Oral BID AC   polyethylene glycol 17 g Oral Daily   QUEtiapine 12.5 mg Oral Q12H   sodium chloride 3 mL Intravenous Q12H         Assessment/Plan   Assessment / Plan     Active Hospital Problems    Diagnosis   • **Intractable nausea and vomiting   • Acute UTI (urinary tract infection)   • Falls   • GERD without esophagitis   • Anxiety and depression   • Hyperlipidemia   • Essential hypertension   • Migraines   • Lyme disease   • Hypokalemia   • Hypomagnesemia   • Acute hypokalemia   • Abnormal CT scan, stomach     Added automatically from request for surgery [id number]     • Intractable vomiting with nausea     Added automatically from request for surgery [id number]          Brief Hospital Course to date:  Bryanna Schwartz is a 48 y.o. female with a past medical history of anxiety, depression, hypertension, hyperlipidemia, migraines. Recent rx for RMSF and lyme. She presented with what she describes as 6 months of intractable nausea and vomiting that has been progressively worsening with reported weight loss of 120 lbs.     Assessment &  Plan:    --Nausea/Vomiting-She has had significant workup at OSH this has included EGD, CCY. Her symptoms of anorexia, early satiety with N/V and 120 lb weight loss (she does not look like she has lost that much weight).  CT abd/pelvis showed thickening of the proximal gastric mucosa, she is s/p EGD 10/16 showed antral gastritis with reflux esophagitis, currently on Protonix 40 mg BID. Symptoms have improved markedly and now patient tolerates PO diet. Suspect functional etiology of her nausea.     --Multiple electrolyte abnormalities -hypokalemia, hypomagnesemia, etiology unknown? Adrenal insufficiency, however random cortisol is wnl, suspect from prolonged N/V. Now resolved.    --UTI- likely contributed to N/V on admission.  UCx with >100K Proteus and 40-50K E coli, both of which were susceptible to Rocephin. Pt completed 5 total days of Rocephin (last dose 10/22).      --Chronic illness malnutrition, nutrition/dietecian following.    --HTN- not well controlled, concerning for Pheochromocytoma. Nephrology following.  Reviewed workup and is concerning for Pheo. Pt has had CT A/P on admission with no finding of adrenal incidentaloma. BP remains difficult to control with lows and extreme highs.  Abnormal urine metanephrines.  MRI unremarkable.  BP med adjustments made by NAL. Needs to follow up with NAL in 3-4 weeks.    --Significant bilateral LE weakness with ataxia, has had some ataxia in the past, previously seen by Dr. Syed at - PT/OT. Neurology has seen the patient here on this admission, likely peripheral neuropathy. Needs rehab.     --- Encephalopathy- likely multifactorial due to UTI, malnutrition, issues with intermittent low BP.  Discussed non medication related treatments- keep busy during day.  No napping.  Up in chair, wheelchair walks in hallway etc. Improved overall but continues to have some hallucinations. Will resume low dose Seroquel 12.5 mg BID and monitor. Needs outpatient Psych  evaluation.    DISPO: Pending rehab placement.     DVT Prophylaxis: mechanical    CODE STATUS:   Code Status and Medical Interventions:   Ordered at: 10/15/18 0406     Level Of Support Discussed With:    Patient     Code Status:    CPR     Medical Interventions (Level of Support Prior to Arrest):    Full     Dispo: rehab placement PENDING, medically ready anytime    Electronically signed by Erin De La Torre MD, 11/03/18, 10:49 AM.

## 2018-11-03 NOTE — PLAN OF CARE
Problem: Patient Care Overview  Goal: Plan of Care Review  Outcome: Ongoing (interventions implemented as appropriate)   11/03/18 0601   Coping/Psychosocial   Plan of Care Reviewed With patient   Plan of Care Review   Progress no change   OTHER   Outcome Summary patient had a better night, less hallucinations yet still bouts of confusion and odd converations

## 2018-11-04 PROCEDURE — G0378 HOSPITAL OBSERVATION PER HR: HCPCS

## 2018-11-04 PROCEDURE — 99225 PR SBSQ OBSERVATION CARE/DAY 25 MINUTES: CPT | Performed by: HOSPITALIST

## 2018-11-04 RX ORDER — LISINOPRIL 5 MG/1
5 TABLET ORAL EVERY 12 HOURS SCHEDULED
Status: DISCONTINUED | OUTPATIENT
Start: 2018-11-04 | End: 2018-11-07 | Stop reason: HOSPADM

## 2018-11-04 RX ADMIN — DULOXETINE HYDROCHLORIDE 60 MG: 60 CAPSULE, DELAYED RELEASE ORAL at 09:23

## 2018-11-04 RX ADMIN — PANTOPRAZOLE SODIUM 40 MG: 40 TABLET, DELAYED RELEASE ORAL at 17:47

## 2018-11-04 RX ADMIN — QUETIAPINE FUMARATE 12.5 MG: 25 TABLET ORAL at 20:32

## 2018-11-04 RX ADMIN — ATORVASTATIN CALCIUM 10 MG: 10 TABLET, FILM COATED ORAL at 09:23

## 2018-11-04 RX ADMIN — Medication 5 MG: at 20:32

## 2018-11-04 RX ADMIN — Medication 3 ML: at 09:26

## 2018-11-04 RX ADMIN — PANTOPRAZOLE SODIUM 40 MG: 40 TABLET, DELAYED RELEASE ORAL at 09:25

## 2018-11-04 RX ADMIN — ATENOLOL 50 MG: 50 TABLET ORAL at 09:25

## 2018-11-04 RX ADMIN — LISINOPRIL 5 MG: 5 TABLET ORAL at 20:32

## 2018-11-04 RX ADMIN — LATANOPROST 1 DROP: 50 SOLUTION OPHTHALMIC at 20:31

## 2018-11-04 RX ADMIN — CETIRIZINE HYDROCHLORIDE 10 MG: 10 TABLET, FILM COATED ORAL at 09:23

## 2018-11-04 RX ADMIN — QUETIAPINE FUMARATE 12.5 MG: 25 TABLET ORAL at 09:24

## 2018-11-04 RX ADMIN — Medication 3 ML: at 20:32

## 2018-11-04 RX ADMIN — ATENOLOL 50 MG: 50 TABLET ORAL at 20:32

## 2018-11-04 RX ADMIN — LISINOPRIL 5 MG: 5 TABLET ORAL at 09:23

## 2018-11-04 RX ADMIN — Medication 1 TABLET: at 09:26

## 2018-11-04 RX ADMIN — MONTELUKAST SODIUM 5 MG: 5 TABLET, CHEWABLE ORAL at 20:33

## 2018-11-04 NOTE — PLAN OF CARE
Problem: Patient Care Overview  Goal: Plan of Care Review  Outcome: Ongoing (interventions implemented as appropriate)   11/04/18 0109   Coping/Psychosocial   Plan of Care Reviewed With patient   Plan of Care Review   Progress no change   OTHER   Outcome Summary d/c planned for Monday

## 2018-11-04 NOTE — PROGRESS NOTES
[facility] Medicine Services  PROGRESS NOTE    Patient Name: Bryanna Schwartz  : 1970  MRN: [id number]    Date of Admission: 10/14/2018  Length of Stay: 0  Primary Care Physician: Andrei Crisostomo MD    Subjective   Subjective     CC: F/U N/V and bilateral LE weakness    HPI:  Patient seen this morning. Sitting in chair. Says she is having some family issues that are causing her stress, thinks that's why her BP is up some today. Says she is still going to go live with her sister in [place].     Review of Systems  Gen-no fevers, no chills  CV-no chest pain, no palpitations  Resp-no cough, no dyspnea  GI-no N/V/D, no abd pain      Otherwise ROS is negative except as mentioned in the HPI.    Objective   Objective     Vital Signs:   Temp:  [98.1 °F (36.7 °C)-99 °F (37.2 °C)] 98.7 °F (37.1 °C)  Heart Rate:  [105-115] 115  Resp:  [16-18] 18  BP: (118-153)/(76-96) 132/82        Physical Exam:  Constitutional: No acute distress, awake, alert, sitting up in bed  HENT: NCAT, mucous membranes moist  Respiratory: Clear to auscultation bilaterally, respiratory effort normal  Cardiovascular: RRR, no murmurs, rubs, or gallops, palpable pedal pulses bilaterally  Gastrointestinal: Positive bowel sounds, soft, nontender, nondistended  Musculoskeletal: No bilateral ankle edema   Psychiatric: appropriate mood  Neurologic: Oriented x 3, much less confused, BLE weakness improving  Skin: No rashes    Results Reviewed:  I have personally reviewed current lab, radiology, and data and agree.      Results from last 7 days  Lab Units 18  0610 10/30/18  0732   WBC 10*3/mm3 7.67 8.10   HEMOGLOBIN g/dL 11.0* 13.0   HEMATOCRIT % 33.2* 39.2   PLATELETS 10*3/mm3 336 338       Results from last 7 days  Lab Units 18  0354 18  0610 10/31/18  0530   SODIUM mmol/L 139 139 139   POTASSIUM mmol/L 3.8 3.6 3.6   CHLORIDE mmol/L 105 107 101   CO2 mmol/L 24.0 25.0 26.0   BUN mg/dL 6* 9 13   CREATININE  mg/dL 0.61 0.61 0.69   GLUCOSE mg/dL 95 96 103*   CALCIUM mg/dL 9.0 8.9 8.9     Estimated Creatinine Clearance: 134.4 mL/min (by C-G formula based on SCr of 0.61 mg/dL).  No results found for: BNP    Microbiology Results Abnormal     Procedure Component Value - Date/Time    Urine Culture - Urine, [[id number]]  (Abnormal)  (Susceptibility) Collected:  10/18/18 1603    Lab Status:  Final result Specimen:  Urine from Urine, Clean Catch Updated:  10/21/18 1055     Urine Culture >100,000 CFU/mL Proteus mirabilis (A)      40,000-50,000 CFU/mL Escherichia coli (A)    Susceptibility      Proteus mirabilis    Escherichia coli       ARELI    ARELI       Ampicillin <=8 ug/ml Susceptible    <=8 ug/ml Susceptible       Ampicillin + Sulbactam <=8/4 ug/ml Susceptible    <=8/4 ug/ml Susceptible       Aztreonam <=8 ug/ml Susceptible    <=8 ug/ml Susceptible       Cefepime <=8 ug/ml Susceptible    <=8 ug/ml Susceptible       Cefotaxime <=2 ug/ml Susceptible    <=2 ug/ml Susceptible       Ceftriaxone <=8 ug/ml Susceptible    <=8 ug/ml Susceptible       Cefuroxime sodium <=4 ug/ml Susceptible    <=4 ug/ml Susceptible       Cephalothin <=8 ug/ml Susceptible    <=8 ug/ml Susceptible       Ertapenem <=1 ug/ml Susceptible    <=1 ug/ml Susceptible       Gentamicin <=4 ug/ml Susceptible    <=4 ug/ml Susceptible       Levofloxacin <=2 ug/ml Susceptible    <=2 ug/ml Susceptible       Meropenem <=1 ug/ml Susceptible    <=1 ug/ml Susceptible       Nitrofurantoin       <=32 ug/ml Susceptible       Piperacillin + Tazobactam <=16 ug/ml Susceptible    <=16 ug/ml Susceptible       Tetracycline       <=4 ug/ml Susceptible       Tobramycin <=4 ug/ml Susceptible    <=4 ug/ml Susceptible       Trimethoprim + Sulfamethoxazole <=2/38 ug/ml Susceptible    <=2/38 ug/ml Susceptible                      Blood Culture - Blood, [[id number]]  (Normal) Collected:  10/15/18 0029    Lab Status:  Final result Specimen:  Blood from Arm, Right Updated:  10/20/18 0356      Blood Culture No growth at 5 days    Blood Culture - Blood, [[id number]]  (Normal) Collected:  10/15/18 0029    Lab Status:  Final result Specimen:  Blood from Arm, Right Updated:  10/20/18 0130     Blood Culture No growth at 5 days    Eosinophil Smear - Urine, Urine, Clean Catch [[id number]]  (Normal) Collected:  10/18/18 1603    Lab Status:  Final result Specimen:  Urine from Urine, Clean Catch Updated:  10/18/18 1802     Eosinophil Smear 0 % EOS/100 Cells     Narrative:       No eosinophil seen          Imaging Results (last 24 hours)     ** No results found for the last 24 hours. **             I have reviewed the medications.      atenolol 50 mg Oral Q12H   atorvastatin 10 mg Oral Daily   b complex-vitamin c-folic acid 1 tablet Oral Daily   cetirizine 10 mg Oral Daily   DULoxetine 60 mg Oral Daily   latanoprost 1 drop Both Eyes Nightly   lisinopril 5 mg Oral Q12H   melatonin 5 mg Sublingual Nightly   montelukast 5 mg Oral Nightly   pantoprazole 40 mg Oral BID AC   polyethylene glycol 17 g Oral Daily   QUEtiapine 12.5 mg Oral Q12H   sodium chloride 3 mL Intravenous Q12H         Assessment/Plan   Assessment / Plan     Active Hospital Problems    Diagnosis   • **Intractable nausea and vomiting   • Acute UTI (urinary tract infection)   • Falls   • GERD without esophagitis   • Anxiety and depression   • Hyperlipidemia   • Essential hypertension   • Migraines   • Lyme disease   • Hypokalemia   • Hypomagnesemia   • Acute hypokalemia   • Abnormal CT scan, stomach     Added automatically from request for surgery [id number]     • Intractable vomiting with nausea     Added automatically from request for surgery [id number]          Brief Hospital Course to date:  Bryanna Schwartz is a 48 y.o. female with a past medical history of anxiety, depression, hypertension, hyperlipidemia, migraines. Recent rx for RMSF and lyme. She presented with what she describes as 6 months of intractable nausea and vomiting that has been progressively  worsening with reported weight loss of 120 lbs.     Assessment & Plan:    --Nausea/Vomiting-She has had significant workup at OSH this has included EGD, CCY. Her symptoms of anorexia, early satiety with N/V and 120 lb weight loss (she does not look like she has lost that much weight).  CT abd/pelvis showed thickening of the proximal gastric mucosa, she is s/p EGD 10/16 showed antral gastritis with reflux esophagitis, currently on Protonix 40 mg BID. Symptoms have improved markedly and now patient tolerates PO diet. Suspect functional etiology of her nausea.     --Multiple electrolyte abnormalities -hypokalemia, hypomagnesemia, etiology unknown? Adrenal insufficiency, however random cortisol is wnl, suspect from prolonged N/V. Now resolved.    --UTI- likely contributed to N/V on admission.  UCx with >100K Proteus and 40-50K E coli, both of which were susceptible to Rocephin. Pt completed 5 total days of Rocephin (last dose 10/22).      --Chronic illness malnutrition, nutrition/dietician following.    --HTN- not well controlled, concerning for Pheochromocytoma. Abnormal urine metanephrines.  Nephrology following. Pt had CT A/P on admission with no finding of adrenal incidentaloma. MRI unremarkable.  BP remains difficult to control with lows and extreme highs. BP med adjustments made by NAL. Needs to follow up with NAL in 2-3 weeks in [facility]    --Significant bilateral LE weakness with ataxia, has had some ataxia in the past, previously seen by Dr. Syed at - PT/OT. Neurology has seen the patient here on this admission, likely peripheral neuropathy. Improving. Needs rehab.     --- Encephalopathy- likely multifactorial due to UTI, malnutrition, issues with intermittent low BP.  Discussed non medication related treatments- keep busy during day.  No napping.  Up in chair, wheelchair walks in hallway etc. Improved overall but continues to have some hallucinations. Resumed low dose Seroquel 12.5 mg BID,  improved. Needs outpatient Psych evaluation.    DISPO: Pending rehab placement.     DVT Prophylaxis: mechanical    CODE STATUS:   Code Status and Medical Interventions:   Ordered at: 10/15/18 0406     Level Of Support Discussed With:    Patient     Code Status:    CPR     Medical Interventions (Level of Support Prior to Arrest):    Full     Dispo: Discharge to rehab Monday?    Electronically signed by Erin De La Torre MD, 11/04/18, 10:45 AM.

## 2018-11-04 NOTE — PROGRESS NOTES
"   LOS: 0 days    Patient Care Team:  Andrei Crisostomo MD as PCP - General (Gastroenterology)    Reason For Visit:  F/U HTN AND PROTEINURIA.  Subjective     Feels better today  bp is a bit high     Review of Systems:    Pulm: No soa   CV:  No CP      Objective       atenolol 50 mg Oral Q12H   atorvastatin 10 mg Oral Daily   b complex-vitamin c-folic acid 1 tablet Oral Daily   cetirizine 10 mg Oral Daily   DULoxetine 60 mg Oral Daily   latanoprost 1 drop Both Eyes Nightly   lisinopril 5 mg Oral Q12H   melatonin 5 mg Sublingual Nightly   montelukast 5 mg Oral Nightly   pantoprazole 40 mg Oral BID AC   polyethylene glycol 17 g Oral Daily   QUEtiapine 12.5 mg Oral Q12H   sodium chloride 3 mL Intravenous Q12H       sodium chloride 100 mL/hr         Vital Signs:  Blood pressure 132/82, pulse 115, temperature 98.7 °F (37.1 °C), temperature source Oral, resp. rate 18, height 175.3 cm (69\"), weight 75.5 kg (166 lb 6.4 oz), SpO2 98 %, not currently breastfeeding.    Flowsheet Rows      First Filed Value   Admission Height  175.3 cm (69\") Documented at 10/14/2018 2247   Admission Weight  72.6 kg (160 lb) Documented at 10/14/2018 2247          11/03 0701 - 11/04 0700  In: 60 [P.O.:60]  Out: -     Physical Exam:    General Appearance: NAD, alert and cooperative, Ox3  Eyes: PER, conjunctivae and sclerae normal, no icterus  Lungs: respirations regular and unlabored, no crepitus, clear to auscultation  Heart/CV: regular rhythm & normal rate, no murmur, no gallop, no rub and no edema  Abdomen: not distended, soft, non-tender, no masses,  bowel sounds present  Skin: No rash, Warm and dry    Radiology:            Labs:    Results from last 7 days  Lab Units 11/01/18  0610 10/30/18  0732   WBC 10*3/mm3 7.67 8.10   HEMOGLOBIN g/dL 11.0* 13.0   HEMATOCRIT % 33.2* 39.2   PLATELETS 10*3/mm3 336 338       Results from last 7 days  Lab Units 11/02/18  0354 11/01/18  0610 10/31/18  0530 10/30/18  1333 10/30/18  0732   SODIUM mmol/L 139 " 139 139  --  139   POTASSIUM mmol/L 3.8 3.6 3.6  --  4.2   CHLORIDE mmol/L 105 107 101  --  101   CO2 mmol/L 24.0 25.0 26.0  --  30.0   BUN mg/dL 6* 9 13  --  7*   CREATININE mg/dL 0.61 0.61 0.69  --  0.68   CALCIUM mg/dL 9.0 8.9 8.9  --  9.7   MAGNESIUM mg/dL  --   --   --  1.8  --        Results from last 7 days  Lab Units 11/02/18  0354   GLUCOSE mg/dL 95                       Estimated Creatinine Clearance: 134.4 mL/min (by C-G formula based on SCr of 0.61 mg/dL).      Assessment       Intractable nausea and vomiting    GERD without esophagitis    Anxiety and depression    Hyperlipidemia    Essential hypertension    Migraines    Lyme disease    Hypokalemia    Hypomagnesemia    Acute hypokalemia    Abnormal CT scan, stomach    Intractable vomiting with nausea    Falls    Acute UTI (urinary tract infection)            Impression:     MILD PROTEINURIA.   HTN - good now but it dows go up and down-- very high metanephrine in urine- plan as under  CT abd does not say any thing about adrenals      Recommendations:   MRI- No adrenal adenoma- but did have high nor epinephrine on urin eassessment-   Will dc phenoxybenzamine due to very low bp  BP is variable-  Will raise lisinopril to 5 mg bid  Ok for dc   Fu in clinic in [place] in 2-3 weeks with labs      Daniel Casas MD  11/04/18  9:32 AM

## 2018-11-04 NOTE — SIGNIFICANT NOTE
RN witnessed patient lower self from chair to floor at 9:08 am. Patient denies pain, no injury noted. Dr. De La Torre notified.

## 2018-11-05 PROCEDURE — G0378 HOSPITAL OBSERVATION PER HR: HCPCS

## 2018-11-05 PROCEDURE — 97110 THERAPEUTIC EXERCISES: CPT

## 2018-11-05 PROCEDURE — 99225 PR SBSQ OBSERVATION CARE/DAY 25 MINUTES: CPT | Performed by: NURSE PRACTITIONER

## 2018-11-05 PROCEDURE — 97116 GAIT TRAINING THERAPY: CPT

## 2018-11-05 PROCEDURE — 97535 SELF CARE MNGMENT TRAINING: CPT

## 2018-11-05 RX ADMIN — LISINOPRIL 5 MG: 5 TABLET ORAL at 08:39

## 2018-11-05 RX ADMIN — MONTELUKAST SODIUM 5 MG: 5 TABLET, CHEWABLE ORAL at 20:37

## 2018-11-05 RX ADMIN — DULOXETINE HYDROCHLORIDE 60 MG: 60 CAPSULE, DELAYED RELEASE ORAL at 08:39

## 2018-11-05 RX ADMIN — QUETIAPINE FUMARATE 12.5 MG: 25 TABLET ORAL at 20:38

## 2018-11-05 RX ADMIN — Medication 3 ML: at 20:41

## 2018-11-05 RX ADMIN — PANTOPRAZOLE SODIUM 40 MG: 40 TABLET, DELAYED RELEASE ORAL at 05:32

## 2018-11-05 RX ADMIN — ATORVASTATIN CALCIUM 10 MG: 10 TABLET, FILM COATED ORAL at 08:38

## 2018-11-05 RX ADMIN — Medication 3 ML: at 08:38

## 2018-11-05 RX ADMIN — LATANOPROST 1 DROP: 50 SOLUTION OPHTHALMIC at 20:36

## 2018-11-05 RX ADMIN — Medication 5 MG: at 20:37

## 2018-11-05 RX ADMIN — ATENOLOL 50 MG: 50 TABLET ORAL at 08:38

## 2018-11-05 RX ADMIN — METOPROLOL TARTRATE 25 MG: 25 TABLET ORAL at 20:38

## 2018-11-05 RX ADMIN — PANTOPRAZOLE SODIUM 40 MG: 40 TABLET, DELAYED RELEASE ORAL at 16:57

## 2018-11-05 RX ADMIN — METOPROLOL TARTRATE 25 MG: 25 TABLET ORAL at 08:44

## 2018-11-05 RX ADMIN — ATENOLOL 50 MG: 50 TABLET ORAL at 20:38

## 2018-11-05 RX ADMIN — QUETIAPINE FUMARATE 12.5 MG: 25 TABLET ORAL at 08:38

## 2018-11-05 RX ADMIN — LISINOPRIL 5 MG: 5 TABLET ORAL at 20:38

## 2018-11-05 RX ADMIN — Medication 1 TABLET: at 08:39

## 2018-11-05 RX ADMIN — CETIRIZINE HYDROCHLORIDE 10 MG: 10 TABLET, FILM COATED ORAL at 08:39

## 2018-11-05 NOTE — THERAPY TREATMENT NOTE
Acute Care - Physical Therapy Treatment Note  [facility]     Patient Name: Bryanna Schwartz  : 1970  MRN: [id number]  Today's Date: 2018  Onset of Illness/Injury or Date of Surgery: 10/14/18  Date of Referral to PT: 10/16/18  Referring Physician: MD Velasquez    Admit Date: 10/14/2018    Visit Dx:    ICD-10-CM ICD-9-CM   1. Acute hypokalemia E87.6 276.8   2. Hypomagnesemia E83.42 275.2   3. Unintentional weight loss R63.4 783.21   4. Intractable cyclical vomiting with nausea G43.A1 536.2   5. Abnormal CT scan, stomach R93.3 793.4   6. Intractable vomiting with nausea, unspecified vomiting type R11.2 536.2   7. Impaired functional mobility, balance, gait, and endurance Z74.09 V49.89   8. Impaired mobility and ADLs Z74.09 799.89     Patient Active Problem List   Diagnosis   • GERD without esophagitis   • Anxiety and depression   • Hyperlipidemia   • Essential hypertension   • Migraines   • Lyme disease   • Intractable nausea and vomiting   • Hypokalemia   • Hypomagnesemia   • Acute hypokalemia   • Abnormal CT scan, stomach   • Intractable vomiting with nausea   • Falls   • Acute UTI (urinary tract infection)       Therapy Treatment          Rehabilitation Treatment Summary     Row Name 18 1316 18 1057          Treatment Time/Intention    Discipline physical therapist  -EH occupational therapist  -KF     Document Type therapy note (daily note)  -EH therapy note (daily note)  -KF     Subjective Information no complaints  -EH complains of;pain  -KF     Mode of Treatment physical therapy  -EH individual therapy;occupational therapy  -KF     Patient/Family Observations in bed,  present throughotu, RA, tele, agreeable to therapy, RN cleared  -EH in bed,  present throughout, RA, tele, agreeable for therapy, RN cleared  -KF     Care Plan Review evaluation/treatment results reviewed;care plan/treatment goals reviewed;risks/benefits reviewed;patient/other agree to care plan;current/potential  barriers reviewed  - evaluation/treatment results reviewed;care plan/treatment goals reviewed;risks/benefits reviewed;patient/other agree to care plan;current/potential barriers reviewed  -KF     Care Plan Review, Other Participant(s) spouse  - spouse  -KF     Patient Effort good  -EH good  -KF     Comment  -- decreased confusion to intermittent confusion during session, ataxic, gross motor impairments BUE  -KF     Existing Precautions/Restrictions fall  - fall   exit alarms, ataxic  -KF     Treatment Considerations/Comments exit alarms, ataxia during gait in UE and LEs  - exit alarm on pre and post  -KF     Patient Response to Treatment dizziness after ambulation  - no dizziness in standing, tolerated  -KF     Recorded by [] Bethany Gonzalez, PT 11/05/18 1354 [KF] Sola Pradhan, OT 11/05/18 1158     Row Name 11/05/18 1316 11/05/18 1057          Vital Signs    Pre Systolic BP Rehab  -- 161  -KF     Pre Treatment Diastolic BP  -- 100  -KF     Post Systolic BP Rehab 101  -EH  --     Post Treatment Diastolic BP 55  -EH  --     Pretreatment Heart Rate (beats/min)  -- 78  -KF     Posttreatment Heart Rate (beats/min)  -- 77  -KF     O2 Delivery Intra Treatment  -- room air  -KF     Pre Patient Position Supine  - Supine  -KF     Intra Patient Position Standing  - Standing  -KF     Post Patient Position Sitting  - Sitting  -KF     Rest Breaks   -- 2  -KF     Recorded by [] Bethany Gonzalez, PT 11/05/18 1354 [KF] Sola Pradhan, OT 11/05/18 1158     Row Name 11/05/18 1057             Cognitive Assessment/Intervention    Additional Documentation Cognitive Assessment/Intervention (Group)  -KF      Recorded by [KF] Sola Pradhan, OT 11/05/18 1158      Row Name 11/05/18 1316 11/05/18 1057          Cognitive Assessment/Intervention- PT/OT    Affect/Mental Status (Cognitive) anxious  - anxious  -KF     Behavioral Issues (Cognitive)  -- difficulty managing stress;overwhelmed easily  -KF      Orientation Status (Cognition) oriented to;person;place   time not assessed  - oriented to;person;place;time;verbal cues/prompts needed for orientation  -KF     Follows Commands (Cognition) follows one step commands;75-90% accuracy;verbal cues/prompting required;physical/tactile prompts required;visual queue  - follows one step commands;75-90% accuracy;repetition of directions required;verbal cues/prompting required  -KF     Cognitive Function (Cognitive)  -- attention deficit;executive function deficit;safety deficit  -KF     Attention Deficit (Cognitive)  -- mild deficit;concentration  -KF     Executive Function Deficit (Cognition)  -- moderate deficit;organization/sequencing;planning/decision making;insight/awareness of deficits;problem solving/reasoning  -KF     Memory Deficit (Cognitive)  -- mild deficit  -KF     Safety Deficit (Cognitive) moderate deficit;awareness of need for assistance;insight into deficits/self awareness;judgment;safety precautions awareness;safety precautions follow-through/compliance  - moderate deficit;awareness of need for assistance;insight into deficits/self awareness;problem solving;safety precautions awareness;judgment;ability to follow commands  -KF     Cognitive Interventions (Cognitive)  -- process/task specific training;occupation/activity based interventions  -KF     Personal Safety Interventions fall prevention program maintained;gait belt;nonskid shoes/slippers when out of bed  - fall prevention program maintained;gait belt;muscle strengthening facilitated;nonskid shoes/slippers when out of bed  -KF     Recorded by [EH] Bethany Gonzalez, PT 11/05/18 1354 [KF] Sola Pradhan, OT 11/05/18 1158     Row Name 11/05/18 1316 11/05/18 1057          Safety Issues, Functional Mobility    Safety Issues Affecting Function (Mobility) awareness of need for assistance;insight into deficits/self awareness;safety precaution awareness;safety precautions follow-through/compliance   - awareness of need for assistance;insight into deficits/self awareness;safety precaution awareness;positioning of assistive device;sequencing abilities  -     Impairments Affecting Function (Mobility) balance;motor control;motor planning;cognition;coordination;endurance/activity tolerance  - motor control;motor planning;strength;endurance/activity tolerance;balance  -     Comment, Safety Issues/Impairments (Mobility) exit alarm, ataxia  -  --     Recorded by [] Bethany Gonzalez, PT 11/05/18 1354 [KF] Sola Pradhan, OT 11/05/18 1158     Row Name 11/05/18 1316 11/05/18 1057          Bed Mobility Assessment/Treatment    Bed Mobility Assessment/Treatment  -- rolling right;scooting/bridging;supine-sit  -     Rolling Right [place] (Bed Mobility)  -- contact guard;verbal cues  -     Scooting/Bridging [place] (Bed Mobility)  -- contact guard;verbal cues  -     Supine-Sit [place] (Bed Mobility) contact guard;verbal cues  - contact guard;verbal cues  -     Bed Mobility, Safety Issues  -- decreased use of arms for pushing/pulling;decreased use of legs for bridging/pushing  -     Assistive Device (Bed Mobility)  -- bed rails;head of bed elevated  -     Comment (Bed Mobility)  -- CGA for safety, impulsive in standing  -KF     Recorded by [] Bethany Gonzalez, PT 11/05/18 1358 [KF] Sola Pradhan, OT 11/05/18 1158     Row Name 11/05/18 1057             Functional Mobility    Functional Mobility- Ind. Level moderate assist (50% patient effort);2 person assist required;verbal cues required;nonverbal cues required (demo/gesture)  -      Functional Mobility- Device rolling walker  -      Functional Mobility-Distance (Feet) 5  -KF      Functional Mobility- Safety Issues balance decreased during turns;sequencing ability decreased;weight-shifting ability decreased;loses balance backward  -      Functional Mobility- Comment mod A to pivot to chair, decreased balance and  motor planning for functional turns with RW requires mod A to move RW  -KF      Recorded by [KF] Sola Pradhan, OT 11/05/18 1158      Row Name 11/05/18 1316 11/05/18 1057          Transfer Assessment/Treatment    Transfer Assessment/Treatment sit-stand transfer;stand-sit transfer  - sit-stand transfer;stand-sit transfer;bed-chair transfer  -KF     Comment (Transfers)  -- cues required throughout and physical assist for RW  -KF     Recorded by [] Bethany Gonzalez, PT 11/05/18 1354 [KF] Sola Pradhan, OT 11/05/18 1158     Row Name 11/05/18 1057             Bed-Chair Transfer    Bed-Chair [place] (Transfers) moderate assist (50% patient effort);2 person assist;verbal cues;nonverbal cues (demo/gesture)  -KF      Assistive Device (Bed-Chair Transfers) walker, front-wheeled  -KF      Recorded by [KF] Sola Pradhan, OT 11/05/18 1158      Row Name 11/05/18 1316 11/05/18 1057          Sit-Stand Transfer    Sit-Stand [place] (Transfers) minimum assist (75% patient effort);2 person assist  - minimum assist (75% patient effort);2 person assist;verbal cues  -KF     Assistive Device (Sit-Stand Transfers) walker, front-wheeled  - walker, front-wheeled  -KF     Recorded by [] Bethany Gonzalez, PT 11/05/18 1354 [KF] Sola Pradhan, OT 11/05/18 1158     Row Name 11/05/18 1316 11/05/18 1057          Stand-Sit Transfer    Stand-Sit [place] (Transfers) moderate assist (50% patient effort);2 person assist  - minimum assist (75% patient effort);2 person assist;verbal cues  -KF     Assistive Device (Stand-Sit Transfers) walker, front-wheeled  - walker, front-wheeled  -KF     Recorded by [] Bethany Gonzalez, PT 11/05/18 1354 [KF] Sola Pradhan, OT 11/05/18 1158     Row Name 11/05/18 1316             Gait/Stairs Assessment/Training    11643 - Gait Training Minutes  15  -      Gait/Stairs Assessment/Training gait/ambulation independence;gait/ambulation assistive device;gait  pattern;gait deviations;maintains weight-bearing status  -      [place] Level (Gait) moderate assist (50% patient effort);2 person assist  -EH      Assistive Device (Gait) walker, front-wheeled  -EH      Distance in Feet (Gait) 90  -EH      Pattern (Gait) step-through  -EH      Deviations/Abnormal Patterns (Gait) ataxic;other (see comments)   inconsistent step length, width, pace  -EH      Right Sided Gait Deviations knee buckling, right side   at end of gait.  -EH      Comment (Gait/Stairs) Pt ambulates ~70 ft with MOD A x2. Pt is cued for stand rest break and resumes until 1 instance of knee buckling. Pt assisted to regain balance then assisted to sit down.  -EH      Recorded by [EH] Bethany Gonzalez, PT 11/05/18 1354      Row Name 11/05/18 1057             ADL Assessment/Intervention    BADL Assessment/Intervention grooming  -KF      Recorded by [KF] Sola Pradhan, OT 11/05/18 1158      Row Name 11/05/18 1057             Grooming Assessment/Training    [place] Level (Grooming) oral care regimen;wash face, hands;verbal cues;contact guard assist  -KF      Grooming Position unsupported sitting;supported standing  -KF      Comment (Grooming) Pt attempted to wash face in standing at sink with chair behind and [place] x2 standing with single UE but unable to fully coordinate, completed grooming tasks in unsupported sitting in chair, today required assist to apply toothpaste but able to brush and then use cup without spilling, cues required for sequencing throughout  -KF      Recorded by [KF] Sola Pradhan, OT 11/05/18 1158      Row Name 11/05/18 1059             BADL Safety/Performance    Impairments, BADL Safety/Performance balance;cognition;endurance/activity tolerance;grasp/prehension;motor control;motor planning  -KF      Cognitive Impairments, BADL Safety/Performance awareness, need for assistance;insight into deficits/self awareness;judgment;problem solving/reasoning;safety precaution  awareness;sequencing abilities;impulsivity  -KF      Skilled BADL Treatment/Intervention BADL process/adaptation training;cognitive/safety deficit modifications  -KF      Progress in BADL Status use of compensatory strategies  -KF      Recorded by [KF] Sola Pradhan, OT 11/05/18 1158      Row Name 11/05/18 1057             Motor Skills Assessment/Interventions    Additional Documentation Balance (Group);Balance Interventions (Group);Therapeutic Exercise (Group);Therapeutic Exercise Interventions (Group)  -KF      Recorded by [KF] Sola Pradhan, OT 11/05/18 1158      Row Name 11/05/18 1316 11/05/18 1057          Therapeutic Exercise    Therapeutic Exercise  -- seated, upper extremities  -KF     Additional Documentation  -- Therapeutic Exercise (Row)  -KF     70758 - PT Therapeutic Exercise Minutes 8  -EH  --     96158 - OT Therapeutic Exercise Minutes  -- 5  -KF     Recorded by [EH] Bethany Gonzalez, PT 11/05/18 1354 [KF] Sola Pradhan, OT 11/05/18 1158     Row Name 11/05/18 1057             Upper Extremity Seated Therapeutic Exercise    Performed, Seated Upper Extremity (Therapeutic Exercise) shoulder abduction/adduction;shoulder flexion/extension;digit flexion/extension   scapular elevation  -KF      Exercise Type, Seated Upper Extremity (Therapeutic Exercise) AROM (active range of motion);AAROM (active assistive range of motion)  -KF      Sets/Reps Detail, Seated Upper Extremity (Therapeutic Exercise) 1/10  -KF      Comment, Seated Upper Extremity (Therapeutic Exercise) cued to visually attend to hands during movements to coordinate gross motor, AAROM provided to complete movements   -KF      Recorded by [KF] Sola Pradhan, OT 11/05/18 1158      Row Name 11/05/18 1316             Lower Extremity Supine Therapeutic Exercise    Performed, Supine Lower Extremity (Therapeutic Exercise) heel slides;ankle pumps;SLR (straight leg raise)  -      Exercise Type, Supine Lower Extremity (Therapeutic  Exercise) AROM (active range of motion)  -      Sets/Reps Detail, Supine Lower Extremity (Therapeutic Exercise) 1/10 BLE  -      Comment, Supine Lower Extremity (Therapeutic Exercise) Cues needed for technique. Education provided on goals of ther ex.  -EH      Recorded by [EH] Bethany Gonzalez, PT 11/05/18 1354      Row Name 11/05/18 1057             Balance    Balance static sitting balance;static standing balance;dynamic sitting balance;dynamic standing balance  -KF      Recorded by [KF] Sola Pradhan, OT 11/05/18 1158      Row Name 11/05/18 1057             Static Sitting Balance    Level of [place] (Unsupported Sitting, Static Balance) standby assist  -KF      Sitting Position (Unsupported Sitting, Static Balance) sitting on edge of bed  -KF      Time Able to Maintain Position (Unsupported Sitting, Static Balance) 3 to 4 minutes  -KF      Recorded by [KF] Sola Pradhan, OT 11/05/18 1158      Row Name 11/05/18 1316 11/05/18 1057          Dynamic Sitting Balance    Level of [place], Reaches Outside Midline (Sitting, Dynamic Balance) contact guard assist  - contact guard assist  -KF     Sitting Position, Reaches Outside Midline (Sitting, Dynamic Balance)  -- sitting in chair  -KF     Comment, Reaches Outside Midline (Sitting, Dynamic Balance)  -- grooming tasks without back supported   ther ex  -KF     Recorded by [EH] Bethany Gonzalez, PT 11/05/18 1354 [KF] Sola Pradhan, OT 11/05/18 1158     Row Name 11/05/18 1316 11/05/18 1057          Static Standing Balance    Level of [place] (Supported Standing, Static Balance) minimal assist, 75% patient effort  - minimal assist, 75% patient effort   x2  -KF     Time Able to Maintain Position (Supported Standing, Static Balance)  -- 1 to 2 minutes  -KF     Assistive Device Utilized (Supported Standing, Static Balance) rolling walker  - rolling walker  -KF     Recorded by [EH] Bethany Gonzalez, PT 11/05/18 1354 [KF] Carolynn  Sola WILDE, OT 11/05/18 1158     Row Name 11/05/18 1057             Dynamic Standing Balance    Level of [place], Reaches Outside Midline (Standing, Dynamic Balance) moderate assist, 50 to 74% patient effort   x2  -KF      Time Able to Maintain Position, Reaches Outside Midline (Standing, Dynamic Balance) 1 to 2 minutes  -KF      Comment, Reaches Outside Midline (Standing, Dynamic Balance) at RW  -KF      Recorded by [KF] Sola Pradhan, OT 11/05/18 1158      Row Name 11/05/18 1057             Balance Interventions    Training Strategies (Balance) grooming tasks, use of UE at sink side during functional tasks  -KF      Recorded by [KF] Sola Pradhan, OT 11/05/18 1158      Row Name 11/05/18 1316 11/05/18 1057          Positioning and Restraints    Pre-Treatment Position in bed  -EH in bed  -KF     Post Treatment Position chair  - chair  -KF     In Chair reclined;notified nsg;call light within reach;encouraged to call for assist;exit alarm on;with family/caregiver  - notified nsg;reclined;sitting;call light within reach;encouraged to call for assist;exit alarm on;with family/caregiver;waffle cushion;legs elevated;heels elevated  -KF     Recorded by [EH] Bethany Gonzalez, PT 11/05/18 1354 [KF] Sola Pradhan, OT 11/05/18 1158     Row Name 11/05/18 1057             Pain Assessment    Additional Documentation Pain Scale: Numbers Pre/Post-Treatment (Group)  -KF      Recorded by [KF] Sola Pradhan, OT 11/05/18 1158      Row Name 11/05/18 1057             Pain Scale: Numbers Pre/Post-Treatment    Pain Scale: Numbers, Pretreatment 0/10 - no pain  -KF      Pain Scale: Numbers, Post-Treatment 0/10 - no pain  -KF      Pain Location foot  -KF      Pre/Post Treatment Pain Comment states no pain but soreness in feet  -KF      Pain Intervention(s) Repositioned;Ambulation/increased activity  -KF      Recorded by [KF] Sola Pradhan, OT 11/05/18 1158      Row Name 11/05/18 1316             Pain  Scale: FACES Pre/Post-Treatment    Pain: FACES Scale, Pretreatment 0-->no hurt  -EH      Pain: FACES Scale, Post-Treatment 0-->no hurt  -EH      Recorded by [EH] Bethany Gonzalez, PT 11/05/18 1354      Row Name 11/05/18 1057             Plan of Care Review    Plan of Care Reviewed With patient;spouse  -KF      Recorded by [KF] Sola Pradhan, OT 11/05/18 1158      Row Name 11/05/18 1057             Outcome Summary/Treatment Plan (OT)    Daily Summary of Progress (OT) progress towards functional goals is fair  -KF      Recorded by [KF] Sola Pradhan, OT 11/05/18 1158      Row Name 11/05/18 1316             Outcome Summary/Treatment Plan (PT)    Daily Summary of Progress (PT) progress towards functional goals is fair  -EH      Recorded by [EH] Bethany Gonzalez, PT 11/05/18 1354        User Key  (r) = Recorded By, (t) = Taken By, (c) = Cosigned By    Initials Name Effective Dates Discipline     Bethany Gonzalez, PT 06/19/15 -  PT    Sola Bliss, OT 04/03/18 -  OT                     Physical Therapy Education     Title: PT OT SLP Therapies (Done)     Topic: Physical Therapy (Done)     Point: Mobility training (Done)    Learning Progress Summary     Learner Status Readiness Method Response Comment Documented by    Patient Done Acceptance E VU,NR   11/05/18 1355     Done Acceptance E VU,NR Educated on HEP and correct mechanics for safe functional mobility. Reinforcement needed for carryover. VG 11/03/18 0944     Active Acceptance E,D NR  SJ 11/01/18 1151     Active Eager E,D,H NR  DM 10/30/18 1759     Active Acceptance E NR  DEBRA 10/28/18 1310     Active Acceptance E NR  DEBRA 10/27/18 1330     Active Acceptance E NR  KR 10/25/18 1444     Active Acceptance E NR  AS 10/22/18 1012     Active Acceptance E NR  DEBRA 10/19/18 1515     Done Acceptance E,TB VU  LC 10/18/18 1738     Done Acceptance E,D NR,DU  BD 10/18/18 1049     Active Acceptance E,D NR  BD 10/17/18 1332    Family Done Acceptance E,TB VU    10/18/18 1738     Active Acceptance E,D NR  BD 10/17/18 1332    Significant Other Done Acceptance E VU,NR   11/05/18 1355     Active Eager E,D,H NR  DM 10/30/18 1759     Active Acceptance E NR  KR 10/25/18 1444     Active Acceptance E NR  DEBRA 10/19/18 1515     Done Acceptance E,D NR,DU  BD 10/18/18 1049          Point: Home exercise program (Done)    Learning Progress Summary     Learner Status Readiness Method Response Comment Documented by    Patient Done Acceptance E VU,NR   11/05/18 1355     Done Acceptance E VU,NR Educated on HEP and correct mechanics for safe functional mobility. Reinforcement needed for carryover. VG 11/03/18 0944     Active Acceptance E,D NR   11/01/18 1151     Active Eager E,D,H NR  DM 10/30/18 1759     Active Acceptance E NR  DEBRA 10/28/18 1310     Active Acceptance E NR  DEBRA 10/27/18 1330     Active Acceptance E NR  KR 10/25/18 1444     Active Acceptance E NR  AS 10/22/18 1012     Active Acceptance E NR  DEBRA 10/19/18 1515     Done Acceptance E,TB VU   10/18/18 1738     Done Acceptance E,D NR,DU  BD 10/18/18 1049     Active Acceptance E,D NR  BD 10/17/18 1332    Family Done Acceptance E,TB VU   10/18/18 1738     Active Acceptance E,D NR  BD 10/17/18 1332    Significant Other Done Acceptance E VU,NR   11/05/18 1355     Active Eager E,D,H NR   10/30/18 1759     Active Acceptance E NR  KR 10/25/18 1444     Active Acceptance E NR  DEBRA 10/19/18 1515     Done Acceptance E,D NR,DU  BD 10/18/18 1049          Point: Body mechanics (Done)    Learning Progress Summary     Learner Status Readiness Method Response Comment Documented by    Patient Done Acceptance E VU,NR   11/05/18 1355     Done Acceptance E VU,NR Educated on HEP and correct mechanics for safe functional mobility. Reinforcement needed for carryover. VG 11/03/18 0944     Active Acceptance E,D NR   11/01/18 1151     Active Eager E,D,H NR   10/30/18 1759     Active Acceptance E NR  DEBRA 10/28/18 1310     Active Acceptance E  NR  DEBRA 10/27/18 1330     Active Acceptance E NR  KR 10/25/18 1444     Active Acceptance E NR  AS 10/22/18 1012     Active Acceptance E NR  DEBRA 10/19/18 1515     Done Acceptance E,TB VU  LC 10/18/18 1738     Done Acceptance E,D NR,DU  BD 10/18/18 1049     Active Acceptance E,D NR  BD 10/17/18 1332    Family Done Acceptance E,TB VU  LC 10/18/18 1738     Active Acceptance E,D NR  BD 10/17/18 1332    Significant Other Done Acceptance E VU,NR   11/05/18 1355     Active Eager E,D,H NR  DM 10/30/18 1759     Active Acceptance E NR  KR 10/25/18 1444     Active Acceptance E NR  DEBRA 10/19/18 1515     Done Acceptance E,D NR,DU  BD 10/18/18 1049          Point: Precautions (Done)    Learning Progress Summary     Learner Status Readiness Method Response Comment Documented by    Patient Done Acceptance E VU,NR   11/05/18 1355     Done Acceptance E VU,NR Educated on HEP and correct mechanics for safe functional mobility. Reinforcement needed for carryover. VG 11/03/18 0944     Active Acceptance E,D NR  SJ 11/01/18 1151     Active Eager E,D,H NR  DM 10/30/18 1759     Active Acceptance E NR  DEBRA 10/28/18 1310     Active Acceptance E NR  DEBRA 10/27/18 1330     Active Acceptance E NR  KR 10/25/18 1444     Active Acceptance E NR  AS 10/22/18 1012     Active Acceptance E NR  DEBRA 10/19/18 1515     Done Acceptance E,TB VU  LC 10/18/18 1738     Done Acceptance E,D NR,DU  BD 10/18/18 1049     Active Acceptance E,D NR  BD 10/17/18 1332    Family Done Acceptance E,TB VU  LC 10/18/18 1738     Active Acceptance E,D NR  BD 10/17/18 1332    Significant Other Done Acceptance E VU,NR  EH 11/05/18 1355     Active Eager E,D,H NR  DM 10/30/18 1759     Active Acceptance E NR  KR 10/25/18 1444     Active Acceptance E NR  DEBRA 10/19/18 1515     Done Acceptance E,D NR,DU  BD 10/18/18 1049                      User Key     Initials Effective Dates Name Provider Type Discipline     06/19/15 -  Cady Clemons, PT Physical Therapist PT     06/19/15 -   Bethany Gonzalez, PT Physical Therapist PT    SJ 06/19/15 -  Rosa M March, PT Physical Therapist PT    DM 06/19/15 -  Jaqui Jin, PT Physical Therapist PT    AS 06/22/15 -  Vanessa Espino, PTA Physical Therapy Assistant PT    LC 06/16/16 -  Lori Kumari, RN Registered Nurse Nurse    BD 06/08/18 -  Vanessa Greene, PT Physical Therapist PT    KR 04/03/18 -  Shayy Luo, PT Physical Therapist PT    VG 05/29/18 -  Rupa Daniel, PT Physical Therapist PT                    PT Recommendation and Plan     Outcome Summary/Treatment Plan (PT)  Daily Summary of Progress (PT): progress towards functional goals is fair  Plan of Care Reviewed With: patient  Outcome Summary: Pt ambulates x 90 ft with RWX, MOD A x2 with VC for standing rest break at early signs of fatigue and 1 instance knee buckling at end of ambulation. Pt educated on goals of ther ex program. PT to continue as able.          Outcome Measures     Row Name 11/05/18 1316 11/05/18 1057 11/03/18 0944       How much help from another person do you currently need...    Turning from your back to your side while in flat bed without using bedrails? 4  -EH  -- 4  -VG    Moving from lying on back to sitting on the side of a flat bed without bedrails? 4  -EH  -- 4  -VG    Moving to and from a bed to a chair (including a wheelchair)? 2  -EH  -- 2  -VG    Standing up from a chair using your arms (e.g., wheelchair, bedside chair)? 2  -EH  -- 2  -VG    Climbing 3-5 steps with a railing? 2  -EH  -- 2  -VG    To walk in hospital room? 2  -EH  -- 2  -VG    AM-PAC 6 Clicks Score 16  -EH  -- 16  -VG       How much help from another is currently needed...    Putting on and taking off regular lower body clothing?  -- 2  -KF  --    Bathing (including washing, rinsing, and drying)  -- 2  -KF  --    Toileting (which includes using toilet bed pan or urinal)  -- 2  -KF  --    Putting on and taking off regular upper body clothing  -- 3  -KF  --    Taking care  of personal grooming (such as brushing teeth)  -- 3  -KF  --    Eating meals  -- 3  -KF  --    Score  -- 15  -KF  --       Functional Assessment    Outcome Measure Options AM-PAC 6 Clicks Basic Mobility (PT)  - AM-PAC 6 Clicks Daily Activity (OT)  -KF AM-[facility] 6 Clicks Basic Mobility (PT)  -    Row Name 11/02/18 1515             How much help from another is currently needed...    Putting on and taking off regular lower body clothing? 2  -KF      Bathing (including washing, rinsing, and drying) 2  -KF      Toileting (which includes using toilet bed pan or urinal) 2  -KF      Putting on and taking off regular upper body clothing 3  -KF      Taking care of personal grooming (such as brushing teeth) 3  -KF      Eating meals 3  -KF      Score 15  -KF         Functional Assessment    Outcome Measure Options AM-PAC 6 Clicks Daily Activity (OT)  -KF        User Key  (r) = Recorded By, (t) = Taken By, (c) = Cosigned By    Initials Name Provider Type     Bethany Gonzalez, PT Physical Therapist    KF Sola Pradhan, OT Occupational Therapist    VG Rupa Daniel, PT Physical Therapist           Time Calculation:         PT Charges     Row Name 11/05/18 1316             Time Calculation    Start Time 1316  -      PT Received On 11/05/18  -      PT Goal Re-Cert Due Date 11/07/18  -         Time Calculation- PT    Total Timed Code Minutes- PT 23 minute(s)  -         Timed Charges    47986 - PT Therapeutic Exercise Minutes 8  -EH      87098 - Gait Training Minutes  15  -EH        User Key  (r) = Recorded By, (t) = Taken By, (c) = Cosigned By    Initials Name Provider Type     Bethany Gonzalez, PT Physical Therapist        Therapy Suggested Charges     Code   Minutes Charges    98708 (CPT®) Hc Pt Neuromusc Re Education Ea 15 Min      34952 (CPT®) Hc Pt Ther Proc Ea 15 Min 8 1    84722 (CPT®) Hc Gait Training Ea 15 Min 15 1    85149 (CPT®) Hc Pt Therapeutic Act Ea 15 Min      41764 (CPT®)  Pt Manual  Therapy Ea 15 Min      97205 (CPT®) Hc Pt Iontophoresis Ea 15 Min      08050 (CPT®) Hc Pt Elec Stim Ea-Per 15 Min      01751 (CPT®) Hc Pt Ultrasound Ea 15 Min      57775 (CPT®) Hc Pt Self Care/Mgmt/Train Ea 15 Min      23369 (CPT®) Hc Pt Prosthetic (S) Train Initial Encounter, Each 15 Min      12676 (CPT®) Hc Pt Orthotic(S)/Prosthetic(S) Encounter, Each 15 Min      33322 (CPT®) Hc Orthotic(S) Mgmt/Train Initial Encounter, Each 15min      Total  23 2        Therapy Charges for Today     Code Description Service Date Service Provider Modifiers Qty    94219289728 HC PT THER PROC EA 15 MIN 11/5/2018 Bethany Gonzalez, PT GP 1    22387381611 HC GAIT TRAINING EA 15 MIN 11/5/2018 Bethany Gonzalez, PT GP 1          PT G-Codes  Outcome Measure Options: AM-PAC 6 Clicks Basic Mobility (PT)  AM-PAC 6 Clicks Score: 16  Score: 15    Bethany Gonzalez, PT  11/5/2018

## 2018-11-05 NOTE — PLAN OF CARE
Problem: Patient Care Overview  Goal: Plan of Care Review  Outcome: Ongoing (interventions implemented as appropriate)   11/05/18 0306   Coping/Psychosocial   Plan of Care Reviewed With patient   Plan of Care Review   Progress no change   OTHER   Outcome Summary Pt. VSS. No complaints of pain or discomfort. Resting well. Plan to d/c on Monday. Will continue to monitor.      Goal: Interprofessional Rounds/Family Conf  Outcome: Ongoing (interventions implemented as appropriate)      Problem: Nutrition, Imbalanced: Excessive Oral Intake (Adult)  Goal: Acknowledges the Importance of Weight Loss/Maintenance and Overall Health  Outcome: Ongoing (interventions implemented as appropriate)    Goal: Expresses Desire and Motivation to Change  Outcome: Ongoing (interventions implemented as appropriate)

## 2018-11-05 NOTE — DISCHARGE PLACEMENT REQUEST
"VIVI GRUBBS, RN    [phone]          Bryanna Merrill (48 y.o. Female)     Date of Birth Social Security Number Address Home Phone MRN    1970  [address] [facility] [address] [phone] [id number]    M[employer]on Marital Status          None Unknown       Admission Date Admission Type Admitting Provider Attending Provider Department, Room/Bed    10/14/18 Emergency Erin De La Torre MD Reddy, Mayuri V, MD [address], S455/1    Discharge Date Discharge Disposition Discharge Destination                       Attending Provider:  Erin De La Torre MD    Allergies:  No Known Allergies    Isolation:  None   Infection:  None   Code Status:  CPR    Ht:  175.3 cm (69\")   Wt:  75.5 kg (166 lb 6.4 oz)    Admission Cmt:  None   Principal Problem:  Intractable nausea and vomiting [R11.2]                 Active Insurance as of 10/14/2018     Primary Coverage     Payor Plan Insurance Group Employer/Plan Group    [employer] [place] [employer] [place]ICA[place]      Payor Plan Address Payor Plan Phone Number Effective From Effective To    [address] [address] [phone] 2018     [facility] [id number]       Subscriber Name Subscriber Birth Date Member ID       BRYANNA MERRILL 1970 [id number]                 Emergency Contacts      (Rel.) Home Phone Work Phone Mobile Phone    Michael Merrill (Spouse) [phone] -- --    Chanelle Caruso (Relative) [phone] -- --    Padma Villalta (Sister) [phone] -- --               History & Physical      Paul Plata MD at 10/15/2018  4:07 AM              [facility] Medicine Services  HISTORY AND PHYSICAL    Patient Name: Bryanna Merrill  : 1970  MRN: [id number]  Primary Care Physician: Andrei Crisostomo MD  Date of admission: 10/14/2018      Subjective   Subjective     Chief Complaint:   N/V    HPI:  Bryanna Merrill is a 48 y.o. female with a past medical history significant for GERD, " "anxiety/depression, HLD, HTN, and migraines who presents with complaints of intractable nausea and vomiting progressively worsening over the past 6 months. There is associated chronic lower abdominal pain, anorexia, and reported subsequent unintentional weight loss of 125 pounds since onset of symptoms. Patient volunteers that she is unable to keep medications down and is concerned she is malnourished. She denies illicit substance abuse but reports that her mother  in April at the near onset of symptoms. She has been evaluated multiple times, both inpatient and outpatient resulting in negative work up.    Patient has been receiving care at [facility] where in the past two months she has undergone negative EGD and evaluation per GI ( Faina BUTLER), cholecystectomy (Dr. Felipe Watt), and admission for intractable vomiting. She reports no change in symptoms s/p cholecystectomy. Patient reports that she was evaluated 10 days ago in ED where she was tested for both RMSF and Lyme disease. States these diagnostics were \"both positive as they have a bad tick problem in her area\". She was started on a round of Doxycycline which she had completed.    She is currently without complaints of bloody stool, fever, cough, congestion, chest pain, or SOB. Will admit for further evaluation and treatment.    Emergency Department Evaluation; hypertensive (168/101) and tachycardic (121). Potassium low at 2.6. Magnesium 1.0. EKG stable.    Review of Systems   Constitutional: Positive for unexpected weight change. Negative for chills and fever.   HENT: Negative for congestion and trouble swallowing.    Eyes: Negative for pain and redness.   Respiratory: Negative for cough and shortness of breath.    Cardiovascular: Negative for chest pain and leg swelling.   Gastrointestinal: Positive for abdominal pain, nausea and vomiting. Negative for diarrhea.   Endocrine: Negative for cold intolerance and heat intolerance. "   Genitourinary: Negative for dysuria and frequency.   Musculoskeletal: Negative for back pain and gait problem.   Skin: Negative for pallor and rash.   Allergic/Immunologic: Negative for immunocompromised state.   Neurological: Positive for weakness. Negative for light-headedness and numbness.   Hematological: Negative for adenopathy.   Psychiatric/Behavioral: Negative for agitation and confusion.          Otherwise 10-system ROS reviewed and is negative except as mentioned in the HPI.    Personal History     Past Medical History:   Diagnosis Date   • Arthritis    • Depression    • Environmental allergies    • GERD (gastroesophageal reflux disease)    • Hyperlipidemia    • Hypertension    • Lyme disease    • Migraine    • Kelvin Mountain spotted fever    • Vitamin B 12 deficiency        Past Surgical History:   Procedure Laterality Date   • ACHILLES TENDON SURGERY Right    • BREAST CYST EXCISION Left    • CHOLECYSTECTOMY     • HYSTERECTOMY         Family History: family history is not on file.     Social History:  reports that she has never smoked. She has never used smokeless tobacco. She reports that she does not drink alcohol or use drugs.  Social History     Social History Narrative   • No narrative on file       Medications:  Available home medication information reviewed     No Known Allergies    Objective   Objective     Vital Signs:   Temp:  [98 °F (36.7 °C)] 98 °F (36.7 °C)  Heart Rate:  [106-121] 112  Resp:  [18] 18  BP: (143-181)/() 168/101        Physical Exam   Constitutional: No acute distress, awake, alert  Eyes: PERRLA, sclerae anicteric, no conjunctival injection  HENT: NCAT, mucous membranes moist  Neck: Supple, no thyromegaly, no lymphadenopathy, trachea midline  Respiratory: Clear to auscultation bilaterally, nonlabored respirations   Cardiovascular: RRR, no murmurs, rubs, or gallops, palpable pedal pulses bilaterally  Gastrointestinal: Positive bowel sounds, soft, nontender,  nondistended  Musculoskeletal: No bilateral ankle edema, no clubbing or cyanosis to extremities  Psychiatric: Appropriate affect, cooperative  Neurologic: Oriented x 3, strength symmetric in all extremities, Cranial Nerves grossly intact to confrontation, speech clear  Skin: No rashes      Results Reviewed:  I have personally reviewed current lab, radiology, and data and agree.      Results from last 7 days  Lab Units 10/15/18  0029   WBC 10*3/mm3 10.33   HEMOGLOBIN g/dL 14.5   HEMATOCRIT % 41.5   PLATELETS 10*3/mm3 326       Results from last 7 days  Lab Units 10/15/18  0029   SODIUM mmol/L 139   POTASSIUM mmol/L 2.6*   CHLORIDE mmol/L 96*   CO2 mmol/L 28.0   BUN mg/dL 15   CREATININE mg/dL 0.70   GLUCOSE mg/dL 97   CALCIUM mg/dL 9.3   ALT (SGPT) U/L 29   AST (SGOT) U/L 20     Estimated Creatinine Clearance: 112.6 mL/min (by C-G formula based on SCr of 0.7 mg/dL).  Brief Urine Lab Results     None        No results found for: BNP  Imaging Results (last 24 hours)     ** No results found for the last 24 hours. **             Assessment/Plan   Assessment / Plan     Active Hospital Problems    Diagnosis   • **Intractable nausea and vomiting   • Hypokalemia   • Hypomagnesemia   • Essential hypertension   • GERD without esophagitis   • Anxiety and depression   • Hyperlipidemia   • Migraines   • Lyme disease         Assessment & Plan:  1. Intractable nausea and vomiting:  - ? Cyclical. Will obtain UDS, CT abdomen  - consider consult to GI  - supportive care with antiemetics and IVF  - repeat labs as needed    2. Hypokalemia and hypomagnesemia:  - EKG stable. Replace as needed per protocol    3. Hypertension:  - accelerated. Continue home meds. Add PRN as needed    4. Lyme/RMSF:  - obtained records from [facility]. Available in patient's chart  - western blot negative. Serology negative      DVT prophylaxis: mechanical    CODE STATUS:  Full code, full support  Code Status and Medical Interventions:   Ordered at:  10/15/18 0406     Level Of Support Discussed With:    Patient     Code Status:    CPR     Medical Interventions (Level of Support Prior to Arrest):    Full       Admission Status:  I believe this patient meets INPATIENT status due to the need for care which can only be reasonably provided in an hospital setting such as aggressive/expedited ancillary services and/or consultation services, the necessity for IV medications, close physician monitoring and/or the possible need for procedures.  In such, I feel patient’s risk for adverse outcomes and need for care warrant INPATIENT evaluation and predict the patient’s care encounter to likely last beyond 2 midnights.        Electronically signed by Jina Pro PA-C, 10/15/18, 4:07 AM.    PHYSICIAN NOTE(ADDENDUM)       Vitals:    10/15/18 0230   BP: (!) 168/101   Pulse: 110   Resp: 18    Temp: Afebrile    SpO2: 97% on room air        HPI.  48-year-old female presented to ED with intractable nausea and vomiting-going on since April 2018, had EGD and colonoscopy done which were negative.  Patient had cholecystectomy done on 9/29 secondary to abdominal pain, post cholecystectomy patient again was admitted to [facility] on 10/2 with negative CT abdomen.  Do not complain of overt abdominal pain, no BM for last 2 weeks, passes gas, has poor appetite, weight loss significant over past few months.  Do not complain of any overt reflux symptoms, do not complain of headache, no new medications.  Patient also complained of the bites as a result of which Lyme titers/RMSF titers were done-Western blot for Lyme titers were negative, IgM for Kelvin Mountain spotted fever were also negative-on records on [facility] paperwork.  Patient was given empirically doxycycline but she hadn't taken the medication consistently.  Patient do not complain of specifically any joint pain does complain of generalized aches, myalgia, some numbness and tingling in both arms and both feet.  Patient  does have significant electrolyte abnormalities-with heavy admission.    In ED she got potassium replacement, magnesium 2 g, IV fluids 2 L.  Exam  RS- CTA-BL, ,  No wheezing , no crackles, good effort.  CVS- s1s2 Regular, no murmur.  ABD- soft, mild epigastric tenderness not distended, no organomegaly.  EXT- no edema.  NEURO- AAO-3, no focal defecit.      Old records reviewed and summarized in PM hx.      PM hx  Hypertension-atenolol 25 mg by mouth daily  Hyperlipidemia-Lipitor 10 mg by mouth daily  Mood disorder-Cymbalta 60 mg by mouth daily  Chronic pain-hydrocodone 5 mg by mouth every 12,   Migraine headache-Imitrex 50 mg EO every 12, zonesamide-patient has been taking for her migraine for 2 years-one of the side effect is nausea and vomiting.  Recently diagnosed with Lyme disease-started on doxycycline 100 mg by mouth every 12-did not complete the course.    LABS  CPK of 34, BUN/creatinine 15/0.7, bicarbonate of 28, potential of 2.6, LFTs within normal limit, mentation 1, lactic acid of 1.5, CRP of 0.22, TSH of 2.6, pro-calcitonin of less than 0.05, albumin of 3.8  WBC of 10, hemoglobin of 14, platelet count-326, ESR of 8.      A/P  Intractable nausea and vomiting without abdominal pain  -Known diagnosis of fibromyalgia, symptoms are worse since patient lost her mother in April-but patient does not think she is depressed.  -We'll start with CT abdomen, GI consult, continue IV hydration.    Electrolyte abnormalities  -Replace the electrolytes, urine electrolytes sent.      I have independently seen and examined the patient with ARNP and the note above reflects my changes and contributions. I have discussed with findings, diagnosis and plans with the patient and family.     Paul Plata MD 10/15/18 3:33 AM               Electronically signed by Paul Plata MD at 10/15/2018  6:21 AM          Physician Progress Notes (most recent note)      Erin De La Torre MD at 11/4/2018 10:42 AM              [facility]  [facility] Medicine Services  PROGRESS NOTE    Patient Name: Bryanna Schwartz  : 1970  MRN: [id number]    Date of Admission: 10/14/2018  Length of Stay: 0  Primary Care Physician: Andrei Crisostomo MD    Subjective   Subjective     CC: F/U N/V and bilateral LE weakness    HPI:  Patient seen this morning. Sitting in chair. Says she is having some family issues that are causing her stress, thinks that's why her BP is up some today. Says she is still going to go live with her sister in [place].     Review of Systems  Gen-no fevers, no chills  CV-no chest pain, no palpitations  Resp-no cough, no dyspnea  GI-no N/V/D, no abd pain      Otherwise ROS is negative except as mentioned in the HPI.    Objective   Objective     Vital Signs:   Temp:  [98.1 °F (36.7 °C)-99 °F (37.2 °C)] 98.7 °F (37.1 °C)  Heart Rate:  [105-115] 115  Resp:  [16-18] 18  BP: (118-153)/(76-96) 132/82        Physical Exam:  Constitutional: No acute distress, awake, alert, sitting up in bed  HENT: NCAT, mucous membranes moist  Respiratory: Clear to auscultation bilaterally, respiratory effort normal  Cardiovascular: RRR, no murmurs, rubs, or gallops, palpable pedal pulses bilaterally  Gastrointestinal: Positive bowel sounds, soft, nontender, nondistended  Musculoskeletal: No bilateral ankle edema   Psychiatric: appropriate mood  Neurologic: Oriented x 3, much less confused, BLE weakness improving  Skin: No rashes    Results Reviewed:  I have personally reviewed current lab, radiology, and data and agree.      Results from last 7 days  Lab Units 18  0610 10/30/18  0732   WBC 10*3/mm3 7.67 8.10   HEMOGLOBIN g/dL 11.0* 13.0   HEMATOCRIT % 33.2* 39.2   PLATELETS 10*3/mm3 336 338       Results from last 7 days  Lab Units 18  0354 18  0610 10/31/18  0530   SODIUM mmol/L 139 139 139   POTASSIUM mmol/L 3.8 3.6 3.6   CHLORIDE mmol/L 105 107 101   CO2 mmol/L 24.0 25.0 26.0   BUN mg/dL 6* 9 13   CREATININE mg/dL 0.61  0.61 0.69   GLUCOSE mg/dL 95 96 103*   CALCIUM mg/dL 9.0 8.9 8.9     Estimated Creatinine Clearance: 134.4 mL/min (by C-G formula based on SCr of 0.61 mg/dL).  No results found for: BNP    Microbiology Results Abnormal     Procedure Component Value - Date/Time    Urine Culture - Urine, [[id number]]  (Abnormal)  (Susceptibility) Collected:  10/18/18 1603    Lab Status:  Final result Specimen:  Urine from Urine, Clean Catch Updated:  10/21/18 1055     Urine Culture >100,000 CFU/mL Proteus mirabilis (A)      40,000-50,000 CFU/mL Escherichia coli (A)    Susceptibility      Proteus mirabilis    Escherichia coli       ARELI    ARELI       Ampicillin <=8 ug/ml Susceptible    <=8 ug/ml Susceptible       Ampicillin + Sulbactam <=8/4 ug/ml Susceptible    <=8/4 ug/ml Susceptible       Aztreonam <=8 ug/ml Susceptible    <=8 ug/ml Susceptible       Cefepime <=8 ug/ml Susceptible    <=8 ug/ml Susceptible       Cefotaxime <=2 ug/ml Susceptible    <=2 ug/ml Susceptible       Ceftriaxone <=8 ug/ml Susceptible    <=8 ug/ml Susceptible       Cefuroxime sodium <=4 ug/ml Susceptible    <=4 ug/ml Susceptible       Cephalothin <=8 ug/ml Susceptible    <=8 ug/ml Susceptible       Ertapenem <=1 ug/ml Susceptible    <=1 ug/ml Susceptible       Gentamicin <=4 ug/ml Susceptible    <=4 ug/ml Susceptible       Levofloxacin <=2 ug/ml Susceptible    <=2 ug/ml Susceptible       Meropenem <=1 ug/ml Susceptible    <=1 ug/ml Susceptible       Nitrofurantoin       <=32 ug/ml Susceptible       Piperacillin + Tazobactam <=16 ug/ml Susceptible    <=16 ug/ml Susceptible       Tetracycline       <=4 ug/ml Susceptible       Tobramycin <=4 ug/ml Susceptible    <=4 ug/ml Susceptible       Trimethoprim + Sulfamethoxazole <=2/38 ug/ml Susceptible    <=2/38 ug/ml Susceptible                      Blood Culture - Blood, [[id number]]  (Normal) Collected:  10/15/18 0029    Lab Status:  Final result Specimen:  Blood from Arm, Right Updated:  10/20/18 0130     Blood Culture  No growth at 5 days    Blood Culture - Blood, [[id number]]  (Normal) Collected:  10/15/18 0029    Lab Status:  Final result Specimen:  Blood from Arm, Right Updated:  10/20/18 0130     Blood Culture No growth at 5 days    Eosinophil Smear - Urine, Urine, Clean Catch [[id number]]  (Normal) Collected:  10/18/18 1603    Lab Status:  Final result Specimen:  Urine from Urine, Clean Catch Updated:  10/18/18 1802     Eosinophil Smear 0 % EOS/100 Cells     Narrative:       No eosinophil seen          Imaging Results (last 24 hours)     ** No results found for the last 24 hours. **             I have reviewed the medications.      atenolol 50 mg Oral Q12H   atorvastatin 10 mg Oral Daily   b complex-vitamin c-folic acid 1 tablet Oral Daily   cetirizine 10 mg Oral Daily   DULoxetine 60 mg Oral Daily   latanoprost 1 drop Both Eyes Nightly   lisinopril 5 mg Oral Q12H   melatonin 5 mg Sublingual Nightly   montelukast 5 mg Oral Nightly   pantoprazole 40 mg Oral BID AC   polyethylene glycol 17 g Oral Daily   QUEtiapine 12.5 mg Oral Q12H   sodium chloride 3 mL Intravenous Q12H         Assessment/Plan   Assessment / Plan     Active Hospital Problems    Diagnosis   • **Intractable nausea and vomiting   • Acute UTI (urinary tract infection)   • Falls   • GERD without esophagitis   • Anxiety and depression   • Hyperlipidemia   • Essential hypertension   • Migraines   • Lyme disease   • Hypokalemia   • Hypomagnesemia   • Acute hypokalemia   • Abnormal CT scan, stomach     Added automatically from request for surgery [id number]     • Intractable vomiting with nausea     Added automatically from request for surgery [id number]          Brief Hospital Course to date:  Bryanna Schwartz is a 48 y.o. female with a past medical history of anxiety, depression, hypertension, hyperlipidemia, migraines. Recent rx for RMSF and lyme. She presented with what she describes as 6 months of intractable nausea and vomiting that has been progressively worsening with  reported weight loss of 120 lbs.     Assessment & Plan:    --Nausea/Vomiting-She has had significant workup at OSH this has included EGD, CCY. Her symptoms of anorexia, early satiety with N/V and 120 lb weight loss (she does not look like she has lost that much weight).  CT abd/pelvis showed thickening of the proximal gastric mucosa, she is s/p EGD 10/16 showed antral gastritis with reflux esophagitis, currently on Protonix 40 mg BID. Symptoms have improved markedly and now patient tolerates PO diet. Suspect functional etiology of her nausea.     --Multiple electrolyte abnormalities -hypokalemia, hypomagnesemia, etiology unknown? Adrenal insufficiency, however random cortisol is wnl, suspect from prolonged N/V. Now resolved.    --UTI- likely contributed to N/V on admission.  UCx with >100K Proteus and 40-50K E coli, both of which were susceptible to Rocephin. Pt completed 5 total days of Rocephin (last dose 10/22).      --Chronic illness malnutrition, nutrition/dietician following.    --HTN- not well controlled, concerning for Pheochromocytoma. Abnormal urine metanephrines.  Nephrology following. Pt had CT A/P on admission with no finding of adrenal incidentaloma. MRI unremarkable.  BP remains difficult to control with lows and extreme highs. BP med adjustments made by NAL. Needs to follow up with NAL in 2-3 weeks in [facility].    --Significant bilateral LE weakness with ataxia, has had some ataxia in the past, previously seen by Dr. Syed at - PT/OT. Neurology has seen the patient here on this admission, likely peripheral neuropathy. Improving. Needs rehab.     --- Encephalopathy- likely multifactorial due to UTI, malnutrition, issues with intermittent low BP.  Discussed non medication related treatments- keep busy during day.  No napping.  Up in chair, wheelchair walks in hallway etc. Improved overall but continues to have some hallucinations. Resumed low dose Seroquel 12.5 mg BID, improved. Needs  outpatient Psych evaluation.    DISPO: Pending rehab placement.     DVT Prophylaxis: mechanical    CODE STATUS:   Code Status and Medical Interventions:   Ordered at: 10/15/18 0406     Level Of Support Discussed With:    Patient     Code Status:    CPR     Medical Interventions (Level of Support Prior to Arrest):    Full     Dispo: Discharge to rehab Monday?    Electronically signed by Erin De La Torre MD, 11/04/18, 10:45 AM.          Electronically signed by Erin De La Torre MD at 11/4/2018 10:45 AM          Consult Notes (most recent note)      Riley Rangel MD at 10/19/2018  1:11 PM      Consult Orders:    1. Inpatient Nephrology Consult [[id number]] ordered by Ronny Cardozo MD at 10/18/18 1419                  Referring Provider: Dr. Velasquez  Reason for Consultation: Proteinuria    Subjective     Chief complaint : Nausea vomiting.    History of present illness:    Patient is a 48-year-old  female with no previous history of kidney disease, normal kidney function creatinine 0.5, has a UA done which was negative for blood but positive for protein.  Protein creatinine ratio 500 mg.  Renal is being consulted at this time.  Patient history includes loss of 120 pounds over the last 6 months due to intractable nausea vomiting, she complains of falling, with generalized weakness.  CT scan showed a T12 compression fracture.  Endoscopy showed gastric ulcer.  Patient denies any use of nonsteroidal, no epistaxis, hemoptysis, hematemesis.  She does state that she has noticed one time of hematuria recent UA is negative for any blood.    History  Past Medical History:   Diagnosis Date   • Arthritis    • Depression    • Environmental allergies    • GERD (gastroesophageal reflux disease)    • Hyperlipidemia    • Hypertension    • Lyme disease    • Migraine    • Kelvin Mountain spotted fever    • Vitamin B 12 deficiency    ,   Past Surgical History:   Procedure Laterality Date   • ACHILLES TENDON SURGERY Right    •  BREAST CYST EXCISION Left    • CHOLECYSTECTOMY     • ENDOSCOPY N/A 10/16/2018    Procedure: ESOPHAGOGASTRODUODENOSCOPY;  Surgeon: Brunner, Mark I, MD;  Location: [facility] ENDOSCOPY;  Service: Gastroenterology   • HYSTERECTOMY     , History reviewed. No pertinent family history.,   Social History   Substance Use Topics   • Smoking status: Never Smoker   • Smokeless tobacco: Never Used   • Alcohol use No   ,   Prescriptions Prior to Admission   Medication Sig Dispense Refill Last Dose   • amitriptyline (ELAVIL) 10 MG tablet Take 10 mg by mouth Every Night.      • atenolol (TENORMIN) 25 MG tablet Take 50 mg by mouth 2 (Two) Times a Day.      • atorvastatin (LIPITOR) 10 MG tablet Take 10 mg by mouth Daily.      • celecoxib (CeleBREX) 200 MG capsule Take 200 mg by mouth Daily As Needed for Mild Pain .      • doxycycline (VIBRAMYCIN) 100 MG capsule Take 100 mg by mouth 2 (Two) Times a Day.      • DULoxetine (CYMBALTA) 60 MG capsule Take 60 mg by mouth Daily.      • HYDROcodone-acetaminophen (NORCO) 5-325 MG per tablet Take 1 tablet by mouth 2 (Two) Times a Day As Needed.      • l-methylfolate 15 MG tablet tablet Take 15 mg by mouth Daily.      • levocetirizine (XYZAL) 5 MG tablet Take 5 mg by mouth Daily As Needed for Allergies.      • magnesium oxide (MAGOX) 400 (241.3 Mg) MG tablet tablet Take 400 mg by mouth 2 (Two) Times a Day.      • metoclopramide (REGLAN) 5 MG tablet Take 5 mg by mouth 3 (Three) Times a Day Before Meals.      • omeprazole (priLOSEC) 20 MG capsule Take 20 mg by mouth Daily.      • potassium chloride (K-DUR,KLOR-CON) 10 MEQ CR tablet Take 1 tablet by mouth 2 (Two) Times a Day. (Patient taking differently: Take 20 mEq by mouth Daily.) 12 tablet 0    • promethazine (PHENERGAN) 25 MG tablet Take 25 mg by mouth Every 6 (Six) Hours As Needed for Nausea or Vomiting.      • SUMAtriptan (IMITREX) 50 MG tablet Take 50 mg by mouth Every 2 (Two) Hours As Needed for Migraine. Take one tablet at onset of headache.  May repeat dose one time in 2 hours if headache not relieved.      • tiZANidine (ZANAFLEX) 4 MG tablet Take 4 mg by mouth 3 (Three) Times a Day.      • vitamin D (ERGOCALCIFEROL) 65628 units capsule capsule Take 50,000 Units by mouth Every 7 (Seven) Days.      • zonisamide (ZONEGRAN) 25 MG capsule Take 25 mg by mouth 2 (Two) Times a Day.      • ondansetron (ZOFRAN) 4 MG tablet Take 1 tablet by mouth Every 8 (Eight) Hours As Needed for Nausea or Vomiting. 12 tablet 0    • prochlorperazine (COMPAZINE) 10 MG tablet Take 1 tablet by mouth Every 6 (Six) Hours As Needed for Nausea or Vomiting. 16 tablet 0    , Scheduled Meds:    atenolol 50 mg Oral Q12H   atorvastatin 10 mg Oral Daily   b complex-vitamin c-folic acid 1 tablet Oral Daily   ceftriaxone 1 g Intravenous Q24H   DULoxetine 60 mg Oral Daily   lisinopril 10 mg Oral Q24H   melatonin 5 mg Sublingual Nightly   pantoprazole 40 mg Intravenous BID AC   polyethylene glycol 17 g Oral Daily   QUEtiapine 25 mg Oral Nightly   sodium chloride 3 mL Intravenous Q12H   , Continuous Infusions:   , PRN Meds:  •  acetaminophen  •  influenza vaccine  •  magnesium sulfate **OR** magnesium sulfate **OR** magnesium sulfate  •  ondansetron  •  [DISCONTINUED] potassium chloride **OR** potassium chloride **OR** potassium chloride  •  promethazine  •  Insert peripheral IV **AND** sodium chloride  •  sodium chloride and Allergies:  Patient has no known allergies.    Review of Systems  Pertinent items are noted in HPI, all other systems reviewed and negative    Objective     Vital Signs  Temp:  [97.3 °F (36.3 °C)-98.2 °F (36.8 °C)] 97.3 °F (36.3 °C)  Heart Rate:  [] 89  Resp:  [15-17] 16  BP: (153-191)/() 165/106    No intake/output data recorded.  I/O last 3 completed shifts:  In: 120 [P.O.:120]  Out: 2075 [Urine:2075]    Physical Exam:     General Appearance:    Alert, cooperative, in no acute distress   Head:    Normocephalic, without obvious abnormality, atraumatic   Eyes:             PERR EOMI sclerae normal, no   icterus, no pallor, corneas clear,    Ears:    Ears appear intact with no abnormalities noted   Throat:   No oral lesions, no thrush, oral mucosa moist   Neck:   No adenopathy, supple, trachea midline, no thyromegaly, no     carotid bruit, no JVD   Back:     No kyphosis present, no scoliosis present,    Lungs:     Clear to auscultation,respirations regular, even and                   unlabored    Heart:    Regular rhythm and normal rate, normal S1 and S2, no            murmur, no gallop, no rub, no click   Breast Exam:    Deferred   Abdomen:     Normal bowel sounds, no masses, no organomegaly, soft        non-tender, non-distended, no guarding, no rebound                 tenderness, morbid obesity    Genitalia:    Deferred   Extremities:   Moves all extremities well, no edema, no cyanosis, no              redness   Pulses:   Pulses palpable and equal bilaterally   Skin:   No bleeding, bruising or rash   Lymph nodes:   No palpable adenopathy   Neurologic:   Grossly intact, generalized weakness, alert 20×3.          Results Review:   I reviewed the patient's new clinical results.    WBC No results found for: WBC   HGB No results found for: HGB   HCT No results found for: HCT   Platlets No results found for: LABPLAT   MCV No results found for: MCV       Sodium Sodium   Date Value Ref Range Status   10/19/2018 138 132 - 146 mmol/L Final      Potassium Potassium   Date Value Ref Range Status   10/19/2018 3.3 (L) 3.5 - 5.5 mmol/L Final   10/17/2018 3.8 3.5 - 5.5 mmol/L Final   10/16/2018 3.6 3.5 - 5.5 mmol/L Final      Chloride Chloride   Date Value Ref Range Status   10/19/2018 98 (L) 99 - 109 mmol/L Final      CO2 CO2   Date Value Ref Range Status   10/19/2018 24.0 20.0 - 31.0 mmol/L Final      BUN BUN   Date Value Ref Range Status   10/19/2018 5 (L) 9 - 23 mg/dL Final      Creatinine Creatinine   Date Value Ref Range Status   10/19/2018 0.56 (L) 0.60 - 1.30 mg/dL Final       Calcium Calcium   Date Value Ref Range Status   10/19/2018 8.9 8.7 - 10.4 mg/dL Final      PO4 No results found for: CAPO4   Albumin Albumin   Date Value Ref Range Status   10/19/2018 3.51 3.20 - 4.80 g/dL Final      Magnesium No results found for: MG   Uric Acid No results found for: URICACID           atenolol 50 mg Oral Q12H   atorvastatin 10 mg Oral Daily   b complex-vitamin c-folic acid 1 tablet Oral Daily   ceftriaxone 1 g Intravenous Q24H   DULoxetine 60 mg Oral Daily   lisinopril 10 mg Oral Q24H   melatonin 5 mg Sublingual Nightly   pantoprazole 40 mg Intravenous BID AC   polyethylene glycol 17 g Oral Daily   potassium chloride 10 mEq Intravenous Once   QUEtiapine 25 mg Oral Nightly   sodium chloride 3 mL Intravenous Q12H          Assessment/Plan       Intractable nausea and vomiting    GERD without esophagitis    Anxiety and depression    Hyperlipidemia    Essential hypertension    Migraines    Lyme disease    Hypokalemia    Hypomagnesemia    Acute hypokalemia    Abnormal CT scan, stomach    Intractable vomiting with nausea    1.  Mild proteinuria without hematuria noted on the UA.  Patient does give a history of hematuria sometime ago, this happened only once.  She has no history of kidney stones, recent UA has been negative for blood.  She does have a history of hypertension of 4-5 years.  He has a loss of weight more than 100 when he pounds.  She has been obese.  2.  Hypokalemia secondary to decreased oral intake.  3.  Compression fracture.  Plan:  I have ordered the serologies revealed for the results.  There is no indication for kidney biopsy at this time.  Controlled blood pressure.  Avoid nephrotoxic medications.  Keep systolic blood pressure greater than 100.  Check volume status.  Check labs in the morning.       I discussed the patients findings and my recommendations with patient and family    Riley Rangel MD  10/19/18  @NOW             Electronically signed by Riley Rangel MD at  10/20/2018 10:31 AM          Physical Therapy Notes (most recent note)      Rupa Daniel, PT at 11/3/2018 10:18 AM  Version 1 of 1         Acute Care - Physical Therapy Treatment Note  [facility]     Patient Name: Bryanna Schwartz  : 1970  MRN: [id number]  Today's Date: 11/3/2018  Onset of Illness/Injury or Date of Surgery: 10/14/18  Date of Referral to PT: 10/16/18  Referring Physician: MD Velasquez    Admit Date: 10/14/2018    Visit Dx:    ICD-10-CM ICD-9-CM   1. Acute hypokalemia E87.6 276.8   2. Hypomagnesemia E83.42 275.2   3. Unintentional weight loss R63.4 783.21   4. Intractable cyclical vomiting with nausea G43.A1 536.2   5. Abnormal CT scan, stomach R93.3 793.4   6. Intractable vomiting with nausea, unspecified vomiting type R11.2 536.2   7. Impaired functional mobility, balance, gait, and endurance Z74.09 V49.89   8. Impaired mobility and ADLs Z74.09 799.89     Patient Active Problem List   Diagnosis   • GERD without esophagitis   • Anxiety and depression   • Hyperlipidemia   • Essential hypertension   • Migraines   • Lyme disease   • Intractable nausea and vomiting   • Hypokalemia   • Hypomagnesemia   • Acute hypokalemia   • Abnormal CT scan, stomach   • Intractable vomiting with nausea   • Falls   • Acute UTI (urinary tract infection)       Therapy Treatment          Rehabilitation Treatment Summary     Row Name 18 0944             Treatment Time/Intention    Discipline physical therapist  -VG      Document Type therapy note (daily note)  -VG      Subjective Information no complaints  -VG      Mode of Treatment individual therapy;physical therapy  -VG      Patient/Family Observations In bed, RA, tele, bed checkx2; no family present  -VG      Care Plan Review patient/other agree to care plan  -VG      Total Minutes, Physical Therapy Treatment 23  -VG      Therapy Frequency (PT Clinical Impression) daily  -VG      Patient Effort good  -VG      Existing Precautions/Restrictions fall;other  (see comments)   exit alarms, BLE ataxia  -VG      Recorded by [VG] Rupa Daniel, PT 11/03/18 1015      Row Name 11/03/18 0944             Vital Signs    Pre Systolic BP Rehab 135  -VG      Pre Treatment Diastolic BP 79  -VG      Post Systolic BP Rehab 124  -VG      Post Treatment Diastolic BP 70  -VG      Pretreatment Heart Rate (beats/min) 108  -VG      Posttreatment Heart Rate (beats/min) 119  -VG      Recorded by [VG] Rupa Daniel, PT 11/03/18 1015      Row Name 11/03/18 0944             Cognitive Assessment/Intervention- PT/OT    Affect/Mental Status (Cognitive) anxious  -VG      Orientation Status (Cognition) oriented to;person;disoriented to;place;time  -VG      Follows Commands (Cognition) follows one step commands;75-90% accuracy  -VG      Cognitive Function (Cognitive) safety deficit  -VG      Safety Deficit (Cognitive) moderate deficit;insight into deficits/self awareness;judgment;problem solving  -VG      Personal Safety Interventions fall prevention program maintained;gait belt;nonskid shoes/slippers when out of bed;supervised activity  -VG      Recorded by [VG] Rupa Daniel, PT 11/03/18 1015      Row Name 11/03/18 0944             Bed Mobility Assessment/Treatment    Bed Mobility Assessment/Treatment supine-sit  -VG      Supine-Sit [place] (Bed Mobility) conditional independence  -VG      Bed Mobility, Safety Issues decreased use of arms for pushing/pulling;decreased use of legs for bridging/pushing  -VG      Assistive Device (Bed Mobility) bed rails;head of bed elevated  -VG      Comment (Bed Mobility) Good safety awareness.   -VG      Recorded by [VG] Rupa Daniel, PT 11/03/18 1015      Row Name 11/03/18 0944             Transfer Assessment/Treatment    Transfer Assessment/Treatment sit-stand transfer;stand-sit transfer  -VG      Comment (Transfers) Cues for sequencing. Unsteady with transfers.   -VG      Recorded by [VG] Rupa Daniel, PT 11/03/18 1015      Row Name 11/03/18  0944             Sit-Stand Transfer    Sit-Stand [place] (Transfers) minimum assist (75% patient effort);2 person assist;verbal cues  -VG      Assistive Device (Sit-Stand Transfers) walker, front-wheeled  -VG      Recorded by [VG] Rupa Daniel, PT 11/03/18 1015      Row Name 11/03/18 0944             Stand-Sit Transfer    Stand-Sit [place] (Transfers) minimum assist (75% patient effort);2 person assist;verbal cues  -VG      Assistive Device (Stand-Sit Transfers) walker, front-wheeled  -VG      Recorded by [VG] Rupa Daniel, PT 11/03/18 1015      Row Name 11/03/18 0944             Gait/Stairs Assessment/Training    57186 - Gait Training Minutes  13  -VG      Gait/Stairs Assessment/Training gait/ambulation independence  -VG      [place] Level (Gait) minimum assist (75% patient effort);moderate assist (50% patient effort);verbal cues  -VG      Assistive Device (Gait) walker, front-wheeled  -VG      Distance in Feet (Gait) 75  -VG      Pattern (Gait) step-to;step-through  -VG      Deviations/Abnormal Patterns (Gait) ataxic;scissoring;steppage;stride length decreased;gait speed decreased  -VG      Bilateral Gait Deviations knee buckling, bilateral;heel strike decreased  -VG      Comment (Gait/Stairs) Distance limited by fatigue. 2-3 standing rest breaks for recovery of balance. MIN-MODA for balance and AD management. Ataxic gait.  -VG      Recorded by [VG] Rupa Daniel, PT 11/03/18 1015      Row Name 11/03/18 0944             Therapeutic Exercise    93331 - PT Therapeutic Exercise Minutes 10  -VG      Recorded by [VG] Rupa Daniel, PT 11/03/18 1015      Row Name 11/03/18 0944             Therapeutic Exercise    Lower Extremity (Therapeutic Exercise) heel slides, bilateral;SLR (straight leg raise), bilateral  -VG      Lower Extremity Range of Motion (Therapeutic Exercise) hip abduction/adduction, bilateral;ankle dorsiflexion/plantar flexion, bilateral  -VG      Exercise Type (Therapeutic  Exercise) AROM (active range of motion);AAROM (active assistive range of motion)  -VG      Position (Therapeutic Exercise) seated  -VG      Sets/Reps (Therapeutic Exercise) 15x  -VG      Comment (Therapeutic Exercise) Cues for technique. Tactile cues for coordination.  -VG      Recorded by [VG] Rupa Daniel, PT 11/03/18 1015      Row Name 11/03/18 0944             Positioning and Restraints    Pre-Treatment Position in bed  -VG      Post Treatment Position chair  -VG      In Chair reclined;call light within reach;encouraged to call for assist;exit alarm on;legs elevated  -VG      Recorded by [VG] Rupa Daniel, PT 11/03/18 1015      Row Name 11/03/18 0944             Pain Scale: Numbers Pre/Post-Treatment    Pain Scale: Numbers, Pretreatment 0/10 - no pain  -VG      Pain Scale: Numbers, Post-Treatment 0/10 - no pain  -VG      Recorded by [VG] Rupa Daniel, PT 11/03/18 1015      Row Name 11/03/18 0944             Coping    Observed Emotional State accepting  -VG      Verbalized Emotional State acceptance  -VG      Recorded by [VG] Rupa Daniel, PT 11/03/18 1015      Row Name 11/03/18 0944             Plan of Care Review    Plan of Care Reviewed With patient  -VG      Recorded by [VG] Rupa Daniel, PT 11/03/18 1015      Row Name 11/03/18 0944             Outcome Summary/Treatment Plan (PT)    Daily Summary of Progress (PT) progress toward functional goals is good  -VG      Anticipated Equipment Needs at Discharge (PT) front wheeled walker  -VG      Anticipated Discharge Disposition (PT) inpatient rehabilitation facility  -VG      Recorded by [VG] Rupa Daniel, PT 11/03/18 1015        User Key  (r) = Recorded By, (t) = Taken By, (c) = Cosigned By    Initials Name Effective Dates Discipline    VG Rupa Daniel, PT 05/29/18 -  PT                     Physical Therapy Education     Title: PT OT SLP Therapies (Active)     Topic: Physical Therapy (Active)     Point: Mobility training (Active)     Learning Progress Summary     Learner Status Readiness Method Response Comment Documented by    Patient Done Acceptance E VU,NR Educated on HEP and correct mechanics for safe functional mobility. Reinforcement needed for carryover. VG 11/03/18 0944     Active Acceptance E,D NR   11/01/18 1151     Active Eager E,D,H NR  DM 10/30/18 1759     Active Acceptance E NR  DEBRA 10/28/18 1310     Active Acceptance E NR  DEBRA 10/27/18 1330     Active Acceptance E NR  KR 10/25/18 1444     Active Acceptance E NR  AS 10/22/18 1012     Active Acceptance E NR  DEBRA 10/19/18 1515     Done Acceptance E,TB VU  LC 10/18/18 1738     Done Acceptance E,D NR,DU  BD 10/18/18 1049     Active Acceptance E,D NR  BD 10/17/18 1332    Family Done Acceptance E,TB VU  LC 10/18/18 1738     Active Acceptance E,D NR  BD 10/17/18 1332    Significant Other Active Eager E,D,H NR  DM 10/30/18 1759     Active Acceptance E NR  KR 10/25/18 1444     Active Acceptance E NR  DEBRA 10/19/18 1515     Done Acceptance E,D NR,DU  BD 10/18/18 1049          Point: Home exercise program (Active)    Learning Progress Summary     Learner Status Readiness Method Response Comment Documented by    Patient Done Acceptance E VU,NR Educated on HEP and correct mechanics for safe functional mobility. Reinforcement needed for carryover.  11/03/18 0944     Active Acceptance E,D NR   11/01/18 1151     Active Eager E,D,H NR  DM 10/30/18 1759     Active Acceptance E NR  DEBRA 10/28/18 1310     Active Acceptance E NR  DEBRA 10/27/18 1330     Active Acceptance E NR  KR 10/25/18 1444     Active Acceptance E NR  AS 10/22/18 1012     Active Acceptance E NR  DEBRA 10/19/18 1515     Done Acceptance E,TB VU  LC 10/18/18 1738     Done Acceptance E,D NR,DU  BD 10/18/18 1049     Active Acceptance E,D NR  BD 10/17/18 1332    Family Done Acceptance E,TB VU  LC 10/18/18 1738     Active Acceptance E,D NR  BD 10/17/18 1332    Significant Other Active Eager E,D,H NR  DM 10/30/18 1759     Active Acceptance E NR   KR 10/25/18 1444     Active Acceptance E NR  DEBRA 10/19/18 1515     Done Acceptance E,D NR,DU  BD 10/18/18 1049          Point: Body mechanics (Active)    Learning Progress Summary     Learner Status Readiness Method Response Comment Documented by    Patient Done Acceptance E VU,NR Educated on HEP and correct mechanics for safe functional mobility. Reinforcement needed for carryover. VG 11/03/18 0944     Active Acceptance E,D NR   11/01/18 1151     Active Eager E,D,H NR  DM 10/30/18 1759     Active Acceptance E NR  DEBRA 10/28/18 1310     Active Acceptance E NR  DEBRA 10/27/18 1330     Active Acceptance E NR  KR 10/25/18 1444     Active Acceptance E NR  AS 10/22/18 1012     Active Acceptance E NR  DEBRA 10/19/18 1515     Done Acceptance E,TB VU  LC 10/18/18 1738     Done Acceptance E,D NR,DU  BD 10/18/18 1049     Active Acceptance E,D NR  BD 10/17/18 1332    Family Done Acceptance E,TB VU  LC 10/18/18 1738     Active Acceptance E,D NR  BD 10/17/18 1332    Significant Other Active Eager E,D,H NR  DM 10/30/18 1759     Active Acceptance E NR  KR 10/25/18 1444     Active Acceptance E NR  DEBRA 10/19/18 1515     Done Acceptance E,D NR,DU  BD 10/18/18 1049          Point: Precautions (Active)    Learning Progress Summary     Learner Status Readiness Method Response Comment Documented by    Patient Done Acceptance E VU,NR Educated on HEP and correct mechanics for safe functional mobility. Reinforcement needed for carryover. VG 11/03/18 0944     Active Acceptance E,D NR   11/01/18 1151     Active Eager E,D,H NR  DM 10/30/18 1759     Active Acceptance E NR  DEBRA 10/28/18 1310     Active Acceptance E NR  DEBRA 10/27/18 1330     Active Acceptance E NR  KR 10/25/18 1444     Active Acceptance E NR  AS 10/22/18 1012     Active Acceptance E NR  DEBRA 10/19/18 1515     Done Acceptance E,TB VU  LC 10/18/18 1738     Done Acceptance E,D NR,DU  BD 10/18/18 1049     Active Acceptance E,D NR  BD 10/17/18 1332    Family Done Acceptance E,TB VU  LC 10/18/18  1738     Active Acceptance E,D NR   10/17/18 1332    Significant Other Active Eager E,D,H NR  DM 10/30/18 1759     Active Acceptance E NR  KR 10/25/18 1444     Active Acceptance E NR  DEBRA 10/19/18 1515     Done Acceptance E,D NR,DU   10/18/18 1049                      User Key     Initials Effective Dates Name Provider Type Discipline    DEBRA 06/19/15 -  Cady Clemons, PT Physical Therapist PT    SJ 06/19/15 -  Rosa M March, PT Physical Therapist PT    DM 06/19/15 -  Jaqui Jin, PT Physical Therapist PT    AS 06/22/15 -  Vanessa Espino, PTA Physical Therapy Assistant PT    LC 06/16/16 -  Lori Kumari, RN Registered Nurse Nurse    BD 06/08/18 -  Vanessa Greene, PT Physical Therapist PT    KR 04/03/18 -  Shayy Luo, PT Physical Therapist PT    VG 05/29/18 -  Rupa Daniel, PT Physical Therapist PT                    PT Recommendation and Plan  Anticipated Discharge Disposition (PT): inpatient rehabilitation facility  Therapy Frequency (PT Clinical Impression): daily  Outcome Summary/Treatment Plan (PT)  Daily Summary of Progress (PT): progress toward functional goals is good  Anticipated Equipment Needs at Discharge (PT): front wheeled walker  Anticipated Discharge Disposition (PT): inpatient rehabilitation facility  Plan of Care Reviewed With: patient  Progress: improving  Outcome Summary: Pt increased ambulation distance to 75 ft with RW and MIN-MODA. Mechanics improve with PT providing pressure through RW to avoid LOB. Continues to be limited by impaired balance and ataxia in BLEs. Will continue to progress pt as able per POC.          Outcome Measures     Row Name 11/03/18 0944 11/02/18 1515 11/01/18 1105       How much help from another person do you currently need...    Turning from your back to your side while in flat bed without using bedrails? 4  -VG  -- 4  -SJ    Moving from lying on back to sitting on the side of a flat bed without bedrails? 4  -VG  -- 4  -SJ    Moving to  and from a bed to a chair (including a wheelchair)? 2  -VG  -- 2  -SJ    Standing up from a chair using your arms (e.g., wheelchair, bedside chair)? 2  -VG  -- 2  -SJ    Climbing 3-5 steps with a railing? 2  -VG  -- 2  -SJ    To walk in hospital room? 2  -VG  -- 2  -SJ    AM-PAC 6 Clicks Score 16  -VG  -- 16  -SJ       How much help from another is currently needed...    Putting on and taking off regular lower body clothing?  -- 2  -KF  --    Bathing (including washing, rinsing, and drying)  -- 2  -KF  --    Toileting (which includes using toilet bed pan or urinal)  -- 2  -KF  --    Putting on and taking off regular upper body clothing  -- 3  -KF  --    Taking care of personal grooming (such as brushing teeth)  -- 3  -KF  --    Eating meals  -- 3  -KF  --    Score  -- 15  -KF  --       Functional Assessment    Outcome Measure Options AM-PAC 6 Clicks Basic Mobility (PT)  -VG AM-PAC 6 Clicks Daily Activity (OT)  -KF AM-PAC 6 Clicks Basic Mobility (PT)  -SJ      User Key  (r) = Recorded By, (t) = Taken By, (c) = Cosigned By    Initials Name Provider Type    Rosa M Preston, PT Physical Therapist    KF Sola Pradhan, OT Occupational Therapist    VG Rupa Daniel, PT Physical Therapist           Time Calculation:         PT Charges     Row Name 11/03/18 0944             Time Calculation    Start Time 0944  -      PT Received On 11/03/18  -      PT Goal Re-Cert Due Date 11/07/18  -         Time Calculation- PT    Total Timed Code Minutes- PT 23 minute(s)  -VG         Timed Charges    65621 - PT Therapeutic Exercise Minutes 10  -VG      50900 - Gait Training Minutes  13  -VG        User Key  (r) = Recorded By, (t) = Taken By, (c) = Cosigned By    Initials Name Provider Type    VG Rupa Daniel, PT Physical Therapist        Therapy Suggested Charges     Code   Minutes Charges    51557 (CPT®) Hc Pt Neuromusc Re Education Ea 15 Min      38241 (CPT®) Hc Pt Ther Proc Ea 15 Min 10 1    43072 (CPT®) Hc Gait  Training Ea 15 Min 13 1    50001 (CPT®) Hc Pt Therapeutic Act Ea 15 Min      65266 (CPT®) Hc Pt Manual Therapy Ea 15 Min      40400 (CPT®) Hc Pt Iontophoresis Ea 15 Min      31155 (CPT®) Hc Pt Elec Stim Ea-Per 15 Min      75055 (CPT®) Hc Pt Ultrasound Ea 15 Min      56433 (CPT®) Hc Pt Self Care/Mgmt/Train Ea 15 Min      34689 (CPT®) Hc Pt Prosthetic (S) Train Initial Encounter, Each 15 Min      14951 (CPT®) Hc Pt Orthotic(S)/Prosthetic(S) Encounter, Each 15 Min      51779 (CPT®) Hc Orthotic(S) Mgmt/Train Initial Encounter, Each 15min      Total  23 2        Therapy Charges for Today     Code Description Service Date Service Provider Modifiers Qty    24467155758 HC PT THER PROC EA 15 MIN 11/3/2018 Rupa Daniel, PT GP 1    71204706870 HC GAIT TRAINING EA 15 MIN 11/3/2018 Rupa Daniel, PT GP 1    99664352317 HC PT THER SUPP EA 15 MIN 11/3/2018 Rupa Daniel, PT GP 1          PT G-Codes  Outcome Measure Options: AM-PAC 6 Clicks Basic Mobility (PT)  AM-PAC 6 Clicks Score: 16  Score: 15    Mary Daniel PT  11/3/2018         Electronically signed by Rupa Daniel, PT at 11/3/2018 10:18 AM

## 2018-11-05 NOTE — PLAN OF CARE
Problem: Patient Care Overview  Goal: Plan of Care Review  Outcome: Ongoing (interventions implemented as appropriate)   11/05/18 8791   Coping/Psychosocial   Plan of Care Reviewed With patient   OTHER   Outcome Summary Pt ambulates x 90 ft with RWX, MOD A x2 with VC for standing rest break at early signs of fatigue and 1 instance knee buckling at end of ambulation. Pt educated on goals of ther ex program. PT to continue as able.

## 2018-11-05 NOTE — PROGRESS NOTES
Continued Stay Note  [facility]     Patient Name: Bryanna Schwartz  MRN: [id number]  Today's Date: 11/5/2018    Admit Date: 10/14/2018          Discharge Plan     Row Name 11/05/18 1123       Plan    Plan Comments Spoke with Draya at [facility] acute rehab.  They are unable to offer a bed at present.  Also spoke with Lucero at [facility].  They have a bed to offer pending insurance precert which will be initiated today.  CM faxed clinical update.  Will cont to follow.              Discharge Codes    No documentation.           Rosa M Brown

## 2018-11-05 NOTE — PLAN OF CARE
Problem: Patient Care Overview  Goal: Plan of Care Review  Outcome: Ongoing (interventions implemented as appropriate)   11/05/18 1200   Coping/Psychosocial   Plan of Care Reviewed With patient;spouse   Plan of Care Review   Progress improving   OTHER   Outcome Summary Pt CGA for bed mobiltity, mod A x2 bed to chair transfer with steps to chair, requires physical assist to move RW during functional mobiltiy especially with turns, supervision and set up for brushing teeth and washing face. Cont IPOT per POC

## 2018-11-05 NOTE — PROGRESS NOTES
[facility] Medicine Services  PROGRESS NOTE    Patient Name: Bryanna Schwartz  : 1970  MRN: [id number]    Date of Admission: 10/14/2018  Length of Stay: 0  Primary Care Physician: Andrei Crisostomo MD    Subjective   Subjective     CC: F/U N/V and bilateral LE weakness    HPI:  Resting in bed, NAD. Spouse in room. No new concerns. Eating and tolerating oral intake well without n/v. Had a few BM's overnight.     Review of Systems  Gen-no fevers, no chills  CV-no chest pain, no palpitations  Resp-no cough, no dyspnea  GI-no N/V/D, no abd pain      Otherwise ROS is negative except as mentioned in the HPI.    Objective   Objective     Vital Signs:   Temp:  [98.1 °F (36.7 °C)-98.8 °F (37.1 °C)] 98.8 °F (37.1 °C)  Heart Rate:  [] 110  Resp:  [18] 18  BP: (121-159)/() 121/75        Physical Exam:  Constitutional: No acute distress, awake, alert, lying in bed   HENT: NCAT, mucous membranes moist  Respiratory: Clear to auscultation bilaterally, respiratory effort normal  Cardiovascular: RRR, no murmurs, rubs, or gallops, palpable pedal pulses bilaterally  Gastrointestinal: Positive bowel sounds, soft, nontender, nondistended  Musculoskeletal: No bilateral ankle edema   Psychiatric: appropriate mood  Neurologic: Oriented x 3, much less confused, BLE weakness improving  Skin: No rashes    Results Reviewed:  I have personally reviewed current lab, radiology, and data and agree.      Results from last 7 days  Lab Units 18  0610 10/30/18  0732   WBC 10*3/mm3 7.67 8.10   HEMOGLOBIN g/dL 11.0* 13.0   HEMATOCRIT % 33.2* 39.2   PLATELETS 10*3/mm3 336 338       Results from last 7 days  Lab Units 18  0354 18  0610 10/31/18  0530   SODIUM mmol/L 139 139 139   POTASSIUM mmol/L 3.8 3.6 3.6   CHLORIDE mmol/L 105 107 101   CO2 mmol/L 24.0 25.0 26.0   BUN mg/dL 6* 9 13   CREATININE mg/dL 0.61 0.61 0.69   GLUCOSE mg/dL 95 96 103*   CALCIUM mg/dL 9.0 8.9 8.9     Estimated  Creatinine Clearance: 134.4 mL/min (by C-G formula based on SCr of 0.61 mg/dL).  No results found for: BNP    Microbiology Results Abnormal     Procedure Component Value - Date/Time    Urine Culture - Urine, [[id number]]  (Abnormal)  (Susceptibility) Collected:  10/18/18 1603    Lab Status:  Final result Specimen:  Urine from Urine, Clean Catch Updated:  10/21/18 1055     Urine Culture >100,000 CFU/mL Proteus mirabilis (A)      40,000-50,000 CFU/mL Escherichia coli (A)    Susceptibility      Proteus mirabilis    Escherichia coli       ARELI    ARELI       Ampicillin <=8 ug/ml Susceptible    <=8 ug/ml Susceptible       Ampicillin + Sulbactam <=8/4 ug/ml Susceptible    <=8/4 ug/ml Susceptible       Aztreonam <=8 ug/ml Susceptible    <=8 ug/ml Susceptible       Cefepime <=8 ug/ml Susceptible    <=8 ug/ml Susceptible       Cefotaxime <=2 ug/ml Susceptible    <=2 ug/ml Susceptible       Ceftriaxone <=8 ug/ml Susceptible    <=8 ug/ml Susceptible       Cefuroxime sodium <=4 ug/ml Susceptible    <=4 ug/ml Susceptible       Cephalothin <=8 ug/ml Susceptible    <=8 ug/ml Susceptible       Ertapenem <=1 ug/ml Susceptible    <=1 ug/ml Susceptible       Gentamicin <=4 ug/ml Susceptible    <=4 ug/ml Susceptible       Levofloxacin <=2 ug/ml Susceptible    <=2 ug/ml Susceptible       Meropenem <=1 ug/ml Susceptible    <=1 ug/ml Susceptible       Nitrofurantoin       <=32 ug/ml Susceptible       Piperacillin + Tazobactam <=16 ug/ml Susceptible    <=16 ug/ml Susceptible       Tetracycline       <=4 ug/ml Susceptible       Tobramycin <=4 ug/ml Susceptible    <=4 ug/ml Susceptible       Trimethoprim + Sulfamethoxazole <=2/38 ug/ml Susceptible    <=2/38 ug/ml Susceptible                      Blood Culture - Blood, [[id number]]  (Normal) Collected:  10/15/18 0029    Lab Status:  Final result Specimen:  Blood from Arm, Right Updated:  10/20/18 0130     Blood Culture No growth at 5 days    Blood Culture - Blood, [[id number]]  (Normal) Collected:   10/15/18 0029    Lab Status:  Final result Specimen:  Blood from Arm, Right Updated:  10/20/18 0130     Blood Culture No growth at 5 days    Eosinophil Smear - Urine, Urine, Clean Catch [[id number]]  (Normal) Collected:  10/18/18 1603    Lab Status:  Final result Specimen:  Urine from Urine, Clean Catch Updated:  10/18/18 1802     Eosinophil Smear 0 % EOS/100 Cells     Narrative:       No eosinophil seen          Imaging Results (last 24 hours)     ** No results found for the last 24 hours. **             I have reviewed the medications.      atenolol 50 mg Oral Q12H   atorvastatin 10 mg Oral Daily   b complex-vitamin c-folic acid 1 tablet Oral Daily   cetirizine 10 mg Oral Daily   DULoxetine 60 mg Oral Daily   latanoprost 1 drop Both Eyes Nightly   lisinopril 5 mg Oral Q12H   melatonin 5 mg Sublingual Nightly   metoprolol tartrate 25 mg Oral Q12H   montelukast 5 mg Oral Nightly   pantoprazole 40 mg Oral BID AC   polyethylene glycol 17 g Oral Daily   QUEtiapine 12.5 mg Oral Q12H   sodium chloride 3 mL Intravenous Q12H         Assessment/Plan   Assessment / Plan     Active Hospital Problems    Diagnosis   • **Intractable nausea and vomiting   • Acute UTI (urinary tract infection)   • Falls   • GERD without esophagitis   • Anxiety and depression   • Hyperlipidemia   • Essential hypertension   • Migraines   • Lyme disease   • Hypokalemia   • Hypomagnesemia   • Acute hypokalemia   • Abnormal CT scan, stomach     Added automatically from request for surgery [id number]     • Intractable vomiting with nausea     Added automatically from request for surgery [id number]          Brief Hospital Course to date:  Bryanna Schwartz is a 48 y.o. female with a past medical history of anxiety, depression, hypertension, hyperlipidemia, migraines. Recent rx for RMSF and lyme. She presented with what she describes as 6 months of intractable nausea and vomiting that has been progressively worsening with reported weight loss of 120 lbs.      Assessment & Plan:    --Nausea/Vomiting-She has had significant workup at OSH this has included EGD, CCY. Her symptoms of anorexia, early satiety with N/V and 120 lb weight loss (she does not look like she has lost that much weight).  CT abd/pelvis showed thickening of the proximal gastric mucosa, she is s/p EGD 10/16 showed antral gastritis with reflux esophagitis, currently on Protonix 40 mg BID. Symptoms have improved markedly and now patient tolerates PO diet. Suspect functional etiology of her nausea.     --Multiple electrolyte abnormalities -hypokalemia, hypomagnesemia, etiology unknown? Adrenal insufficiency, however random cortisol is wnl, suspect from prolonged N/V. Now resolved.    --UTI- likely contributed to N/V on admission.  UCx with >100K Proteus and 40-50K E coli, both of which were susceptible to Rocephin. Pt completed 5 total days of Rocephin (last dose 10/22).      --Chronic illness malnutrition, nutrition/dietician following.    --HTN- not well controlled, concerning for Pheochromocytoma. Abnormal urine metanephrines.  Nephrology following. Pt had CT A/P on admission with no finding of adrenal incidentaloma. MRI unremarkable.  BP remains difficult to control with lows and extreme highs. BP med adjustments made by [facility]. Needs to follow up with [facility] in 2-3 weeks in [facility].    --Significant bilateral LE weakness with ataxia, has had some ataxia in the past, previously seen by Dr. Syed at - [facility]. Neurology has seen the patient here on this admission, likely peripheral neuropathy. Improving. Needs rehab.     --- Encephalopathy- likely multifactorial due to UTI, malnutrition, issues with intermittent low BP.  Discussed non medication related treatments- keep busy during day.  No napping.  Up in chair, wheelchair walks in hallway etc. Improved overall but continues to have some hallucinations. Resumed low dose Seroquel 12.5 mg BID, improved. Needs outpatient Psych evaluation.    DISPO:  Pending rehab placement.     DVT Prophylaxis: mechanical    CODE STATUS:   Code Status and Medical Interventions:   Ordered at: 10/15/18 0406     Level Of Support Discussed With:    Patient     Code Status:    CPR     Medical Interventions (Level of Support Prior to Arrest):    Full     Dispo: Discharge to rehab when bed available     Electronically signed by CARRIE Gallegos, 11/05/18, 1:59 PM.

## 2018-11-05 NOTE — THERAPY TREATMENT NOTE
Acute Care - Occupational Therapy Treatment Note  [facility]     Patient Name: Bryanna Schwartz  : 1970  MRN: [id number]  Today's Date: 2018  Onset of Illness/Injury or Date of Surgery: 10/14/18  Date of Referral to OT: 10/14/18  Referring Physician: MD Velasquez    Admit Date: 10/14/2018       ICD-10-CM ICD-9-CM   1. Acute hypokalemia E87.6 276.8   2. Hypomagnesemia E83.42 275.2   3. Unintentional weight loss R63.4 783.21   4. Intractable cyclical vomiting with nausea G43.A1 536.2   5. Abnormal CT scan, stomach R93.3 793.4   6. Intractable vomiting with nausea, unspecified vomiting type R11.2 536.2   7. Impaired functional mobility, balance, gait, and endurance Z74.09 V49.89   8. Impaired mobility and ADLs Z74.09 799.89     Patient Active Problem List   Diagnosis   • GERD without esophagitis   • Anxiety and depression   • Hyperlipidemia   • Essential hypertension   • Migraines   • Lyme disease   • Intractable nausea and vomiting   • Hypokalemia   • Hypomagnesemia   • Acute hypokalemia   • Abnormal CT scan, stomach   • Intractable vomiting with nausea   • Falls   • Acute UTI (urinary tract infection)     Past Medical History:   Diagnosis Date   • Arthritis    • Depression    • Environmental allergies    • Falls 10/24/2018   • GERD (gastroesophageal reflux disease)    • Hyperlipidemia    • Hypertension    • Lyme disease    • Migraine    • [place] spotted fever    • Vitamin B 12 deficiency      Past Surgical History:   Procedure Laterality Date   • ACHILLES TENDON SURGERY Right    • BREAST CYST EXCISION Left    • CHOLECYSTECTOMY     • ENDOSCOPY N/A 10/16/2018    Procedure: ESOPHAGOGASTRODUODENOSCOPY;  Surgeon: Brunner, Mark I, MD;  Location: [facility] ENDOSCOPY;  Service: Gastroenterology   • HYSTERECTOMY         Therapy Treatment          Rehabilitation Treatment Summary     Row Name 18 1057             Treatment Time/Intention    Discipline occupational therapist  -      Document Type therapy  note (daily note)  -KF      Subjective Information complains of;pain  -KF      Mode of Treatment individual therapy;occupational therapy  -KF      Patient/Family Observations in bed,  present throughout, RA, tele, agreeable for therapy, RN cleared  -KF      Care Plan Review evaluation/treatment results reviewed;care plan/treatment goals reviewed;risks/benefits reviewed;patient/other agree to care plan;current/potential barriers reviewed  -KF      Care Plan Review, Other Participant(s) spouse  -KF      Patient Effort good  -KF      Comment decreased confusion to intermittent confusion during session, ataxic, gross motor impairments BUE  -KF      Existing Precautions/Restrictions fall   exit alarms, ataxic  -KF      Treatment Considerations/Comments exit alarm on pre and post  -KF      Patient Response to Treatment no dizziness in standing, tolerated  -KF      Recorded by [KF] Sola Pradhan, OT 11/05/18 1158      Row Name 11/05/18 1057             Vital Signs    Pre Systolic BP Rehab 161  -KF      Pre Treatment Diastolic   -KF      Pretreatment Heart Rate (beats/min) 78  -KF      Posttreatment Heart Rate (beats/min) 77  -KF      O2 Delivery Intra Treatment room air  -KF      Pre Patient Position Supine  -KF      Intra Patient Position Standing  -KF      Post Patient Position Sitting  -KF      Rest Breaks  2  -KF      Recorded by [KF] Sola Pradhan, OT 11/05/18 1158      Row Name 11/05/18 1057             Cognitive Assessment/Intervention    Additional Documentation Cognitive Assessment/Intervention (Group)  -KF      Recorded by [KF] Sola Pradhan, OT 11/05/18 1158      Row Name 11/05/18 1057             Cognitive Assessment/Intervention- PT/OT    Affect/Mental Status (Cognitive) anxious  -KF      Behavioral Issues (Cognitive) difficulty managing stress;overwhelmed easily  -KF      Orientation Status (Cognition) oriented to;person;place;time;verbal cues/prompts needed for orientation  -KF       Follows Commands (Cognition) follows one step commands;75-90% accuracy;repetition of directions required;verbal cues/prompting required  -KF      Cognitive Function (Cognitive) attention deficit;executive function deficit;safety deficit  -KF      Attention Deficit (Cognitive) mild deficit;concentration  -KF      Executive Function Deficit (Cognition) moderate deficit;organization/sequencing;planning/decision making;insight/awareness of deficits;problem solving/reasoning  -KF      Memory Deficit (Cognitive) mild deficit  -KF      Safety Deficit (Cognitive) moderate deficit;awareness of need for assistance;insight into deficits/self awareness;problem solving;safety precautions awareness;judgment;ability to follow commands  -KF      Cognitive Interventions (Cognitive) process/task specific training;occupation/activity based interventions  -KF      Personal Safety Interventions fall prevention program maintained;gait belt;muscle strengthening facilitated;nonskid shoes/slippers when out of bed  -KF      Recorded by [KF] Sola Pradhan, OT 11/05/18 1158      Row Name 11/05/18 1057             Safety Issues, Functional Mobility    Safety Issues Affecting Function (Mobility) awareness of need for assistance;insight into deficits/self awareness;safety precaution awareness;positioning of assistive device;sequencing abilities  -KF      Impairments Affecting Function (Mobility) motor control;motor planning;strength;endurance/activity tolerance;balance  -KF      Recorded by [KF] Sola Pradhan, OT 11/05/18 1158      Row Name 11/05/18 1057             Bed Mobility Assessment/Treatment    Bed Mobility Assessment/Treatment rolling right;scooting/bridging;supine-sit  -KF      Rolling Right [place] (Bed Mobility) contact guard;verbal cues  -KF      Scooting/Bridging [place] (Bed Mobility) contact guard;verbal cues  -KF      Supine-Sit [place] (Bed Mobility) contact guard;verbal cues  -KF      Bed Mobility,  Safety Issues decreased use of arms for pushing/pulling;decreased use of legs for bridging/pushing  -KF      Assistive Device (Bed Mobility) bed rails;head of bed elevated  -KF      Comment (Bed Mobility) CGA for safety, impulsive in standing  -KF      Recorded by [KF] Sola Pradhan, OT 11/05/18 1158      Row Name 11/05/18 1057             Functional Mobility    Functional Mobility- Ind. Level moderate assist (50% patient effort);2 person assist required;verbal cues required;nonverbal cues required (demo/gesture)  -KF      Functional Mobility- Device rolling walker  -KF      Functional Mobility-Distance (Feet) 5  -KF      Functional Mobility- Safety Issues balance decreased during turns;sequencing ability decreased;weight-shifting ability decreased;loses balance backward  -KF      Functional Mobility- Comment mod A to pivot to chair, decreased balance and motor planning for functional turns with RW requires mod A to move RW  -KF      Recorded by [KF] Sola Pradhan, OT 11/05/18 1158      Row Name 11/05/18 1057             Transfer Assessment/Treatment    Transfer Assessment/Treatment sit-stand transfer;stand-sit transfer;bed-chair transfer  -KF      Comment (Transfers) cues required throughout and physical assist for RW  -KF      Recorded by [KF] Sola Pradhan, OT 11/05/18 1158      Row Name 11/05/18 1057             Bed-Chair Transfer    Bed-Chair [place] (Transfers) moderate assist (50% patient effort);2 person assist;verbal cues;nonverbal cues (demo/gesture)  -KF      Assistive Device (Bed-Chair Transfers) walker, front-wheeled  -KF      Recorded by [KF] Sola Pradhan, OT 11/05/18 1158      Row Name 11/05/18 1057             Sit-Stand Transfer    Sit-Stand [place] (Transfers) minimum assist (75% patient effort);2 person assist;verbal cues  -KF      Assistive Device (Sit-Stand Transfers) walker, front-wheeled  -KF      Recorded by [KF] Sola Pradhan, OT 11/05/18 1158      Row Name  11/05/18 1057             Stand-Sit Transfer    Stand-Sit [place] (Transfers) minimum assist (75% patient effort);2 person assist;verbal cues  -KF      Assistive Device (Stand-Sit Transfers) walker, front-wheeled  -KF      Recorded by [KF] Sola Pradhan, OT 11/05/18 1158      Row Name 11/05/18 1057             ADL Assessment/Intervention    BADL Assessment/Intervention grooming  -KF      Recorded by [KF] Sola Pradhan, OT 11/05/18 1158      Row Name 11/05/18 1057             Grooming Assessment/Training    [place] Level (Grooming) oral care regimen;wash face, hands;verbal cues;contact guard assist  -KF      Grooming Position unsupported sitting;supported standing  -KF      Comment (Grooming) Pt attempted to wash face in standing at sink with chair behind and [place] x2 standing with single UE but unable to fully coordinate, completed grooming tasks in unsupported sitting in chair, today required assist to apply toothpaste but able to brush and then use cup without spilling, cues required for sequencing throughout  -KF      Recorded by [KF] Sola Pradhan, OT 11/05/18 1158      Row Name 11/05/18 1057             BADL Safety/Performance    Impairments, BADL Safety/Performance balance;cognition;endurance/activity tolerance;grasp/prehension;motor control;motor planning  -KF      Cognitive Impairments, BADL Safety/Performance awareness, need for assistance;insight into deficits/self awareness;judgment;problem solving/reasoning;safety precaution awareness;sequencing abilities;impulsivity  -KF      Skilled BADL Treatment/Intervention BADL process/adaptation training;cognitive/safety deficit modifications  -KF      Progress in BADL Status use of compensatory strategies  -KF      Recorded by [KF] Sola Pradhan, OT 11/05/18 1158      Row Name 11/05/18 1057             Motor Skills Assessment/Interventions    Additional Documentation Balance (Group);Balance Interventions (Group);Therapeutic Exercise  (Group);Therapeutic Exercise Interventions (Group)  -KF      Recorded by [KF] Sola Pradhan, OT 11/05/18 1158      Row Name 11/05/18 1057             Therapeutic Exercise    Therapeutic Exercise seated, upper extremities  -KF      Additional Documentation Therapeutic Exercise (Row)  -KF      73999 - OT Therapeutic Exercise Minutes 5  -KF      Recorded by [KF] Sola Pradhan, OT 11/05/18 1158      Row Name 11/05/18 1057             Upper Extremity Seated Therapeutic Exercise    Performed, Seated Upper Extremity (Therapeutic Exercise) shoulder abduction/adduction;shoulder flexion/extension;digit flexion/extension   scapular elevation  -KF      Exercise Type, Seated Upper Extremity (Therapeutic Exercise) AROM (active range of motion);AAROM (active assistive range of motion)  -KF      Sets/Reps Detail, Seated Upper Extremity (Therapeutic Exercise) 1/10  -KF      Comment, Seated Upper Extremity (Therapeutic Exercise) cued to visually attend to hands during movements to coordinate gross motor, AAROM provided to complete movements   -KF      Recorded by [KF] Sola Pradhan, OT 11/05/18 1158      Row Name 11/05/18 1057             Balance    Balance static sitting balance;static standing balance;dynamic sitting balance;dynamic standing balance  -KF      Recorded by [KF] Sola Pradhan, OT 11/05/18 1158      Row Name 11/05/18 1057             Static Sitting Balance    Level of [place] (Unsupported Sitting, Static Balance) standby assist  -KF      Sitting Position (Unsupported Sitting, Static Balance) sitting on edge of bed  -KF      Time Able to Maintain Position (Unsupported Sitting, Static Balance) 3 to 4 minutes  -KF      Recorded by [KF] Sola Pradhan, OT 11/05/18 1158      Row Name 11/05/18 1057             Dynamic Sitting Balance    Level of [place], Reaches Outside Midline (Sitting, Dynamic Balance) contact guard assist  -KF      Sitting Position, Reaches Outside Midline (Sitting,  Dynamic Balance) sitting in chair  -KF      Comment, Reaches Outside Midline (Sitting, Dynamic Balance) grooming tasks without back supported   ther ex  -KF      Recorded by [KF] Sola Pradhan, OT 11/05/18 1158      Row Name 11/05/18 1057             Static Standing Balance    Level of [place] (Supported Standing, Static Balance) minimal assist, 75% patient effort   x2  -KF      Time Able to Maintain Position (Supported Standing, Static Balance) 1 to 2 minutes  -KF      Assistive Device Utilized (Supported Standing, Static Balance) rolling walker  -KF      Recorded by [KF] Sola Pradhan, OT 11/05/18 1158      Row Name 11/05/18 1057             Dynamic Standing Balance    Level of [place], Reaches Outside Midline (Standing, Dynamic Balance) moderate assist, 50 to 74% patient effort   x2  -KF      Time Able to Maintain Position, Reaches Outside Midline (Standing, Dynamic Balance) 1 to 2 minutes  -KF      Comment, Reaches Outside Midline (Standing, Dynamic Balance) at RW  -KF      Recorded by [KF] Sola Pradhan, OT 11/05/18 1158      Row Name 11/05/18 1057             Balance Interventions    Training Strategies (Balance) grooming tasks, use of UE at sink side during functional tasks  -KF      Recorded by [KF] Sola Pradhan, OT 11/05/18 1158      Row Name 11/05/18 1057             Positioning and Restraints    Pre-Treatment Position in bed  -KF      Post Treatment Position chair  -KF      In Chair notified nsg;reclined;sitting;call light within reach;encouraged to call for assist;exit alarm on;with family/caregiver;waffle cushion;legs elevated;heels elevated  -KF      Recorded by [KF] Sola Pradhan, OT 11/05/18 1158      Row Name 11/05/18 1057             Pain Assessment    Additional Documentation Pain Scale: Numbers Pre/Post-Treatment (Group)  -KF      Recorded by [KF] Sola Pradhan, OT 11/05/18 1158      Row Name 11/05/18 1057             Pain Scale: Numbers Pre/Post-Treatment     Pain Scale: Numbers, Pretreatment 0/10 - no pain  -KF      Pain Scale: Numbers, Post-Treatment 0/10 - no pain  -KF      Pain Location foot  -KF      Pre/Post Treatment Pain Comment states no pain but soreness in feet  -KF      Pain Intervention(s) Repositioned;Ambulation/increased activity  -KF      Recorded by [KF] Sola Pradhan OT 11/05/18 1158      Row Name 11/05/18 1057             Plan of Care Review    Plan of Care Reviewed With patient;spouse  -KF      Recorded by [KF] Sola Pradhan OT 11/05/18 1158      Row Name 11/05/18 1057             Outcome Summary/Treatment Plan (OT)    Daily Summary of Progress (OT) progress towards functional goals is fair  -KF      Recorded by [KF] Sola Pradhan, OT 11/05/18 1158        User Key  (r) = Recorded By, (t) = Taken By, (c) = Cosigned By    Initials Name Effective Dates Discipline    KF Sola Pradhan, OT 04/03/18 -  OT               Occupational Therapy Education     Title: PT OT SLP Therapies (Active)     Topic: Occupational Therapy (Done)     Point: ADL training (Done)     Description: Instruct learner(s) on proper safety adaptation and remediation techniques during self care or transfers.   Instruct in proper use of assistive devices.   Learning Progress Summary     Learner Status Readiness Method Response Comment Documented by    Patient Done Acceptance E,TB,D VU,NR BUE AROM HEP with AAROM provided to technique, purpose of skilled OT services, safety and sequencing for ADL retraining  11/05/18 1057     Active Acceptance E NR Safety awareness with functional transfers and mobility, UB dressing EOB, orientation to place  11/02/18 1515     Active Acceptance E,D NR positioning of UE with task to help decrease ataxia, placement of eyes to help with vertigo, transfer and standing safety.  10/30/18 1340     Done Acceptance E,D VU,NR sliding board use, transfer safety, toilet techniques.  10/28/18 1254     Done Acceptance E,D VU,NR  coordination FM can completed, safety. DEBRA 10/22/18 1311     Active Acceptance E NR Pt educated on AE to improve self feeding, compensatory strategies d/t poor sensation/GMC, role of OT, and benefits of therapy. CL 10/20/18 1536     Done Acceptance E,D VU,NR Educated on therapeutic activites to increase functional I. AN 10/19/18 1416     Done Acceptance E,TB VU   10/18/18 1738     Done Acceptance E VU  HK 10/17/18 0940     Done Acceptance E NR,VU Pt educated on ADL retraining with LBD, safety precautions, and appropriate body mechanics. HK 10/17/18 0940    Family Done Acceptance E,TB,D VU,NR BUE AROM HEP with AAROM provided to technique, purpose of skilled OT services, safety and sequencing for ADL retraining  11/05/18 1057     Active Acceptance E,D NR positioning of UE with task to help decrease ataxia, placement of eyes to help with vertigo, transfer and standing safety. DEBRA 10/30/18 1340     Done Acceptance E,D VU,NR coordination [facility] [place], safety. DEBRA 10/22/18 1311     Done Acceptance E,D VU,NR Educated on therapeutic activites to increase functional I. AN 10/19/18 1416     Done Acceptance E,TB VU   10/18/18 1738          Point: Home exercise program (Done)     Description: Instruct learner(s) on appropriate technique for monitoring, assisting and/or progressing therapeutic exercises/activities.   Learning Progress Summary     Learner Status Readiness Method Response Comment Documented by    Patient Done Acceptance E,TB,D VU,NR BUE AROM HEP with AAROM provided to technique, purpose of skilled OT services, safety and sequencing for ADL retraining KF 11/05/18 1057     Active Acceptance E NR Safety awareness with functional transfers and mobility, UB dressing EOB, orientation to place KF 11/02/18 1515     Active Acceptance E,D NR positioning of UE with task to help decrease ataxia, placement of eyes to help with vertigo, transfer and standing safety. DEBRA 10/30/18 1340     Done Acceptance E,H,D VU,DU  issued handout and pt gave return demo  [facility] HEP AC 10/24/18 1113     Done Acceptance E,D VU,NR coordination FM can completed, safety. DEBRA 10/22/18 1311     Done Acceptance E,TB VU   10/18/18 1738    Family Done Acceptance E,TB,D VU,NR BUE AROM HEP with AAROM provided to technique, purpose of skilled OT services, safety and sequencing for ADL retraining  11/05/18 1057     Active Acceptance E,D NR positioning of UE with task to help decrease ataxia, placement of eyes to help with vertigo, transfer and standing safety. DEBRA 10/30/18 1340     Done Acceptance E,H,D VU,DU issued handout and pt gave return demo  [facility] 10/24/18 1113     Done Acceptance E,D VU,NR coordination FMGM can completed, safety. DEBRA 10/22/18 1311     Done Acceptance E,TB VU   10/18/18 1738          Point: Precautions (Done)     Description: Instruct learner(s) on prescribed precautions during self-care and functional transfers.   Learning Progress Summary     Learner Status Readiness Method Response Comment Documented by    Patient Done Acceptance E,TB,D VU,NR BUE AROM HEP with AAROM provided to technique, purpose of skilled OT services, safety and sequencing for ADL retraining  11/05/18 1057     Active Acceptance E NR Safety awareness with functional transfers and mobility, UB dressing EOB, orientation to place  11/02/18 1515     Active Acceptance E,D NR positioning of UE with task to help decrease ataxia, placement of eyes to help with vertigo, transfer and standing safety. DEBRA 10/30/18 1340     Done Acceptance E,D VU,NR sliding board use, transfer safety, toilet techniques.  10/28/18 1254     Done Acceptance E,D VU,NR coordination FM can completed, safety. DEBRA 10/22/18 1311     Active Acceptance E NR Pt educated on AE to improve self feeding, compensatory strategies d/t poor sensation/GMC, role of OT, and benefits of therapy. CL 10/20/18 1536     Done Acceptance E,TB VU   10/18/18 1738     Done Acceptance E VU   10/17/18  0940     Done Acceptance E NR,VU Pt educated on ADL retraining with LBD, safety precautions, and appropriate body mechanics.  10/17/18 0940    Family Done Acceptance E,TB,D VU,NR BUE AROM HEP with AAROM provided to technique, purpose of skilled OT services, safety and sequencing for ADL retraining  11/05/18 1057     Active Acceptance E,D NR positioning of UE with task to help decrease ataxia, placement of eyes to help with vertigo, transfer and standing safety. DEBRA 10/30/18 1340     Done Acceptance E,D VU,NR coordination [facility] [place], safety. DEBRA 10/22/18 1311     Done Acceptance E,TB VU   10/18/18 1738          Point: Body mechanics (Done)     Description: Instruct learner(s) on proper positioning and spine alignment during self-care, functional mobility activities and/or exercises.   Learning Progress Summary     Learner Status Readiness Method Response Comment Documented by    Patient Done Acceptance E,TB,D VU,NR BUE AROM HEP with AAROM provided to technique, purpose of skilled OT services, safety and sequencing for ADL retraining  11/05/18 1057     Active Acceptance E NR Safety awareness with functional transfers and mobility, UB dressing EOB, orientation to place  11/02/18 1515     Active Acceptance E,D NR positioning of UE with task to help decrease ataxia, placement of eyes to help with vertigo, transfer and standing safety. DEBRA 10/30/18 1340     Done Acceptance E,TB VU   10/18/18 1738     Done Acceptance E VU   10/17/18 0940     Done Acceptance E NR,VU Pt educated on ADL retraining with LBD, safety precautions, and appropriate body mechanics.  10/17/18 0940    Family Done Acceptance E,TB,D VU,NR BUE AROM HEP with AAROM provided to technique, purpose of skilled OT services, safety and sequencing for ADL retraining  11/05/18 1057     Active Acceptance E,D NR positioning of UE with task to help decrease ataxia, placement of eyes to help with vertigo, transfer and standing safety. DEBRA  10/30/18 1340     Done Acceptance E,TB VU  LC 10/18/18 1738                      User Key     Initials Effective Dates Name Provider Type Discipline    DEBRA 06/08/18 -  Vania Arceo, OT Occupational Therapist OT    AC 06/23/15 -  Yas Lawrence, OT Occupational Therapist OT    AN 06/22/15 -  Bren Garcia, OT Occupational Therapist OT    LC 06/16/16 -  Lori Kumari RN Registered Nurse Nurse    CL 04/03/18 -  Cecy Tompkins, OT Occupational Therapist OT    HK 03/07/18 -  Rita Smith, OT Occupational Therapist OT    KF 04/03/18 -  Sola Pradhan, OT Occupational Therapist OT                OT Recommendation and Plan  Outcome Summary/Treatment Plan (OT)  Daily Summary of Progress (OT): progress towards functional goals is fair  Daily Summary of Progress (OT): progress towards functional goals is fair  Plan of Care Review  Plan of Care Reviewed With: patient, spouse  Plan of Care Reviewed With: patient, spouse  Outcome Summary: Pt CGA for bed mobiltity, mod A x2 bed to chair transfer with steps to chair, requires physical assist to move RW during functional mobiltiy especially with turns, supervision and set up for brushing teeth and washing face. Cont IPOT per POC        Outcome Measures     Row Name 11/05/18 1057 11/03/18 0944 11/02/18 1515       How much help from another person do you currently need...    Turning from your back to your side while in flat bed without using bedrails?  -- 4  -VG  --    Moving from lying on back to sitting on the side of a flat bed without bedrails?  -- 4  -VG  --    Moving to and from a bed to a chair (including a wheelchair)?  -- 2  -VG  --    Standing up from a chair using your arms (e.g., wheelchair, bedside chair)?  -- 2  -VG  --    Climbing 3-5 steps with a railing?  -- 2  -VG  --    To walk in hospital room?  -- 2  -VG  --    AM-PAC 6 Clicks Score  -- 16  -VG  --       How much help from another is currently needed...    Putting on and taking off regular lower body  clothing? 2  -KF  -- 2  -KF    Bathing (including washing, rinsing, and drying) 2  -KF  -- 2  -KF    Toileting (which includes using toilet bed pan or urinal) 2  -KF  -- 2  -KF    Putting on and taking off regular upper body clothing 3  -KF  -- 3  -KF    Taking care of personal grooming (such as brushing teeth) 3  -KF  -- 3  -KF    Eating meals 3  -KF  -- 3  -KF    Score 15  -KF  -- 15  -KF       Functional Assessment    Outcome Measure Options AM-PAC 6 Clicks Daily Activity (OT)  -KF AM-PAC 6 Clicks Basic Mobility (PT)  -VG AM-PAC 6 Clicks Daily Activity (OT)  -KF      User Key  (r) = Recorded By, (t) = Taken By, (c) = Cosigned By    Initials Name Provider Type    Sola Bliss, OT Occupational Therapist    Rupa Sales, PT Physical Therapist           Time Calculation:         Time Calculation- OT     Row Name 11/05/18 1057             Time Calculation- OT    OT Start Time 1057  -KF      Total Timed Code Minutes- OT 24 minute(s)  -KF      OT Received On 11/05/18  -KF      OT Goal Re-Cert Due Date 11/07/18  -KF         Timed Charges    64013 - OT Therapeutic Exercise Minutes 5  -KF      59106 - OT Self Care/Mgmt Minutes 19  -KF        User Key  (r) = Recorded By, (t) = Taken By, (c) = Cosigned By    Initials Name Provider Type    Sola Bliss, OT Occupational Therapist           Therapy Suggested Charges     Code   Minutes Charges    08847 (CPT®) Hc Ot Neuromusc Re Education Ea 15 Min      02231 (CPT®) Hc Ot Ther Proc Ea 15 Min 5 1    09286 (CPT®) Hc Ot Therapeutic Act Ea 15 Min      36905 (CPT®) Hc Ot Manual Therapy Ea 15 Min      25013 (CPT®) Hc Ot Iontophoresis Ea 15 Min      11186 (CPT®) Hc Ot Elec Stim Ea-Per 15 Min      42998 (CPT®) Hc Ot Ultrasound Ea 15 Min      10418 (CPT®) Hc Ot Self Care/Mgmt/Train Ea 15 Min 19 1    Total  24 2        Therapy Charges for Today     Code Description Service Date Service Provider Modifiers Qty    26764804950 HC OT THER PROC EA 15 MIN 11/5/2018  Sola Pradhan, OT GO 1    03568607282 HC OT SELF CARE/MGMT/TRAIN EA 15 MIN 11/5/2018 Sola Pradhan, OT GO 1    65748226140 HC OT THER SUPP EA 15 MIN 11/5/2018 Sola Pradhan OT GO 2               Sola Pradhan OT  11/5/2018

## 2018-11-05 NOTE — PROGRESS NOTES
"   LOS: 0 days    Patient Care Team:  Andrei Crisostomo MD as PCP - General (Gastroenterology)    Reason For Visit:  F/U HTN AND PROTEINURIA.  Subjective     Feels better today   on bed side    Review of Systems:    Pulm: No soa   CV:  No CP      Objective       atenolol 50 mg Oral Q12H   atorvastatin 10 mg Oral Daily   b complex-vitamin c-folic acid 1 tablet Oral Daily   cetirizine 10 mg Oral Daily   DULoxetine 60 mg Oral Daily   latanoprost 1 drop Both Eyes Nightly   lisinopril 5 mg Oral Q12H   melatonin 5 mg Sublingual Nightly   metoprolol tartrate 25 mg Oral Q12H   montelukast 5 mg Oral Nightly   pantoprazole 40 mg Oral BID AC   polyethylene glycol 17 g Oral Daily   QUEtiapine 12.5 mg Oral Q12H   sodium chloride 3 mL Intravenous Q12H       sodium chloride 100 mL/hr         Vital Signs:  Blood pressure 121/75, pulse 110, temperature 98.8 °F (37.1 °C), temperature source Oral, resp. rate 18, height 175.3 cm (69\"), weight 75.5 kg (166 lb 6.4 oz), SpO2 98 %, not currently breastfeeding.    Flowsheet Rows      First Filed Value   Admission Height  175.3 cm (69\") Documented at 10/14/2018 2247   Admission Weight  72.6 kg (160 lb) Documented at 10/14/2018 2247          11/04 0701 - 11/05 0700  In: 240 [P.O.:240]  Out: -     Physical Exam:    General Appearance: NAD, alert and cooperative, Ox3  Eyes: PER, conjunctivae and sclerae normal, no icterus  Lungs: respirations regular and unlabored, no crepitus, clear to auscultation  Heart/CV: regular rhythm & normal rate, no murmur, no gallop, no rub and no edema  Abdomen: not distended, soft, non-tender, no masses,  bowel sounds present  Skin: No rash, Warm and dry    Radiology:            Labs:    Results from last 7 days  Lab Units 11/01/18  0610 10/30/18  0732   WBC 10*3/mm3 7.67 8.10   HEMOGLOBIN g/dL 11.0* 13.0   HEMATOCRIT % 33.2* 39.2   PLATELETS 10*3/mm3 336 338       Results from last 7 days  Lab Units 11/02/18  0354 11/01/18  0610 10/31/18  0530 " 10/30/18  1333 10/30/18  0732   SODIUM mmol/L 139 139 139  --  139   POTASSIUM mmol/L 3.8 3.6 3.6  --  4.2   CHLORIDE mmol/L 105 107 101  --  101   CO2 mmol/L 24.0 25.0 26.0  --  30.0   BUN mg/dL 6* 9 13  --  7*   CREATININE mg/dL 0.61 0.61 0.69  --  0.68   CALCIUM mg/dL 9.0 8.9 8.9  --  9.7   MAGNESIUM mg/dL  --   --   --  1.8  --        Results from last 7 days  Lab Units 11/02/18  0354   GLUCOSE mg/dL 95                       Estimated Creatinine Clearance: 134.4 mL/min (by C-G formula based on SCr of 0.61 mg/dL).      Assessment       Intractable nausea and vomiting    GERD without esophagitis    Anxiety and depression    Hyperlipidemia    Essential hypertension    Migraines    Lyme disease    Hypokalemia    Hypomagnesemia    Acute hypokalemia    Abnormal CT scan, stomach    Intractable vomiting with nausea    Falls    Acute UTI (urinary tract infection)            Impression:     MILD PROTEINURIA.   HTN - good now but it dows go up and down-- very high metanephrine in urine- plan as under  CT abd does not say any thing about adrenals      Recommendations:   MRI- No adrenal adenoma- but did have high nor epinephrine on urin eassessment-   Will dc phenoxybenzamine due to very low bp  BP is variable-  Will add metoprolol for now due to tachycardia  Ok for dc   Fu in clinic in [place] in 3-4 weeks with labs  Dw Dr Wil Casas MD  11/05/18  12:13 PM

## 2018-11-06 PROCEDURE — G0378 HOSPITAL OBSERVATION PER HR: HCPCS

## 2018-11-06 PROCEDURE — 99225 PR SBSQ OBSERVATION CARE/DAY 25 MINUTES: CPT | Performed by: NURSE PRACTITIONER

## 2018-11-06 RX ADMIN — PANTOPRAZOLE SODIUM 40 MG: 40 TABLET, DELAYED RELEASE ORAL at 17:33

## 2018-11-06 RX ADMIN — Medication 3 ML: at 19:45

## 2018-11-06 RX ADMIN — Medication 5 MG: at 19:42

## 2018-11-06 RX ADMIN — POLYETHYLENE GLYCOL 3350 17 G: 17 POWDER, FOR SOLUTION ORAL at 08:51

## 2018-11-06 RX ADMIN — QUETIAPINE FUMARATE 12.5 MG: 25 TABLET ORAL at 08:51

## 2018-11-06 RX ADMIN — ATENOLOL 50 MG: 50 TABLET ORAL at 08:51

## 2018-11-06 RX ADMIN — Medication 1 TABLET: at 08:55

## 2018-11-06 RX ADMIN — ATENOLOL 50 MG: 50 TABLET ORAL at 19:43

## 2018-11-06 RX ADMIN — LISINOPRIL 5 MG: 5 TABLET ORAL at 08:51

## 2018-11-06 RX ADMIN — LATANOPROST 1 DROP: 50 SOLUTION OPHTHALMIC at 19:47

## 2018-11-06 RX ADMIN — CETIRIZINE HYDROCHLORIDE 10 MG: 10 TABLET, FILM COATED ORAL at 08:51

## 2018-11-06 RX ADMIN — METOPROLOL TARTRATE 25 MG: 25 TABLET ORAL at 19:43

## 2018-11-06 RX ADMIN — DULOXETINE HYDROCHLORIDE 60 MG: 60 CAPSULE, DELAYED RELEASE ORAL at 08:51

## 2018-11-06 RX ADMIN — LISINOPRIL 5 MG: 5 TABLET ORAL at 19:43

## 2018-11-06 RX ADMIN — ATORVASTATIN CALCIUM 10 MG: 10 TABLET, FILM COATED ORAL at 08:51

## 2018-11-06 RX ADMIN — QUETIAPINE FUMARATE 12.5 MG: 25 TABLET ORAL at 19:43

## 2018-11-06 RX ADMIN — METOPROLOL TARTRATE 25 MG: 25 TABLET ORAL at 08:51

## 2018-11-06 RX ADMIN — Medication 3 ML: at 08:56

## 2018-11-06 RX ADMIN — PANTOPRAZOLE SODIUM 40 MG: 40 TABLET, DELAYED RELEASE ORAL at 05:58

## 2018-11-06 RX ADMIN — MONTELUKAST SODIUM 5 MG: 5 TABLET, CHEWABLE ORAL at 19:43

## 2018-11-06 NOTE — PLAN OF CARE
Problem: Patient Care Overview  Goal: Plan of Care Review  Outcome: Ongoing (interventions implemented as appropriate)   11/06/18 0222   Coping/Psychosocial   Plan of Care Reviewed With patient   Plan of Care Review   Progress improving   OTHER   Outcome Summary Pt. VSS. Resting well throughout night. No reports of pain or discomfort. Will continue to monitor.      Goal: Interprofessional Rounds/Family Conf  Outcome: Ongoing (interventions implemented as appropriate)      Problem: Nutrition, Imbalanced: Excessive Oral Intake (Adult)  Goal: Acknowledges the Importance of Weight Loss/Maintenance and Overall Health  Outcome: Ongoing (interventions implemented as appropriate)    Goal: Expresses Desire and Motivation to Change  Outcome: Ongoing (interventions implemented as appropriate)
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
## 2018-11-07 VITALS
RESPIRATION RATE: 14 BRPM | SYSTOLIC BLOOD PRESSURE: 123 MMHG | OXYGEN SATURATION: 98 % | TEMPERATURE: 98 F | HEART RATE: 101 BPM | BODY MASS INDEX: 24.65 KG/M2 | DIASTOLIC BLOOD PRESSURE: 90 MMHG | WEIGHT: 166.4 LBS | HEIGHT: 69 IN

## 2018-11-07 LAB
ANION GAP SERPL CALCULATED.3IONS-SCNC: 8 MMOL/L (ref 3–11)
BASOPHILS # BLD AUTO: 0.02 10*3/MM3 (ref 0–0.2)
BASOPHILS NFR BLD AUTO: 0.2 % (ref 0–1)
BUN BLD-MCNC: 13 MG/DL (ref 9–23)
BUN/CREAT SERPL: 16.7 (ref 7–25)
CALCIUM SPEC-SCNC: 9.5 MG/DL (ref 8.7–10.4)
CHLORIDE SERPL-SCNC: 106 MMOL/L (ref 99–109)
CO2 SERPL-SCNC: 28 MMOL/L (ref 20–31)
CREAT BLD-MCNC: 0.78 MG/DL (ref 0.6–1.3)
DEPRECATED RDW RBC AUTO: 47.3 FL (ref 37–54)
EOSINOPHIL # BLD AUTO: 0.15 10*3/MM3 (ref 0–0.3)
EOSINOPHIL NFR BLD AUTO: 1.7 % (ref 0–3)
ERYTHROCYTE [DISTWIDTH] IN BLOOD BY AUTOMATED COUNT: 14 % (ref 11.3–14.5)
GFR SERPL CREATININE-BSD FRML MDRD: 79 ML/MIN/1.73
GLUCOSE BLD-MCNC: 111 MG/DL (ref 70–100)
HCT VFR BLD AUTO: 36.6 % (ref 34.5–44)
HGB BLD-MCNC: 12.1 G/DL (ref 11.5–15.5)
IMM GRANULOCYTES # BLD: 0.03 10*3/MM3 (ref 0–0.03)
IMM GRANULOCYTES NFR BLD: 0.3 % (ref 0–0.6)
LYMPHOCYTES # BLD AUTO: 2.45 10*3/MM3 (ref 0.6–4.8)
LYMPHOCYTES NFR BLD AUTO: 28.5 % (ref 24–44)
MCH RBC QN AUTO: 30.6 PG (ref 27–31)
MCHC RBC AUTO-ENTMCNC: 33.1 G/DL (ref 32–36)
MCV RBC AUTO: 92.7 FL (ref 80–99)
MONOCYTES # BLD AUTO: 0.59 10*3/MM3 (ref 0–1)
MONOCYTES NFR BLD AUTO: 6.9 % (ref 0–12)
NEUTROPHILS # BLD AUTO: 5.4 10*3/MM3 (ref 1.5–8.3)
NEUTROPHILS NFR BLD AUTO: 62.7 % (ref 41–71)
PLATELET # BLD AUTO: 363 10*3/MM3 (ref 150–450)
PMV BLD AUTO: 11.2 FL (ref 6–12)
POTASSIUM BLD-SCNC: 4.5 MMOL/L (ref 3.5–5.5)
RBC # BLD AUTO: 3.95 10*6/MM3 (ref 3.89–5.14)
SODIUM BLD-SCNC: 142 MMOL/L (ref 132–146)
WBC NRBC COR # BLD: 8.61 10*3/MM3 (ref 3.5–10.8)

## 2018-11-07 PROCEDURE — 80048 BASIC METABOLIC PNL TOTAL CA: CPT | Performed by: NURSE PRACTITIONER

## 2018-11-07 PROCEDURE — 99217 PR OBSERVATION CARE DISCHARGE MANAGEMENT: CPT | Performed by: NURSE PRACTITIONER

## 2018-11-07 PROCEDURE — G0378 HOSPITAL OBSERVATION PER HR: HCPCS

## 2018-11-07 PROCEDURE — 97530 THERAPEUTIC ACTIVITIES: CPT

## 2018-11-07 PROCEDURE — 85025 COMPLETE CBC W/AUTO DIFF WBC: CPT | Performed by: NURSE PRACTITIONER

## 2018-11-07 PROCEDURE — 97110 THERAPEUTIC EXERCISES: CPT

## 2018-11-07 RX ORDER — LISINOPRIL 5 MG/1
5 TABLET ORAL EVERY 12 HOURS SCHEDULED
Qty: 60 TABLET | Refills: 0 | Status: ON HOLD
Start: 2018-11-07 | End: 2023-03-01

## 2018-11-07 RX ORDER — GABAPENTIN 100 MG/1
100 CAPSULE ORAL NIGHTLY
Qty: 30 CAPSULE | Refills: 0 | Status: ON HOLD | OUTPATIENT
Start: 2018-11-07 | End: 2023-03-01

## 2018-11-07 RX ORDER — GABAPENTIN 100 MG/1
100 CAPSULE ORAL NIGHTLY
Status: DISCONTINUED | OUTPATIENT
Start: 2018-11-07 | End: 2018-11-07 | Stop reason: HOSPADM

## 2018-11-07 RX ORDER — ACETAMINOPHEN 325 MG/1
650 TABLET ORAL EVERY 4 HOURS PRN
Qty: 1 BOTTLE | Refills: 0 | Status: ON HOLD
Start: 2018-11-07 | End: 2023-03-01

## 2018-11-07 RX ORDER — QUETIAPINE FUMARATE 25 MG/1
12.5 TABLET, FILM COATED ORAL EVERY 12 HOURS SCHEDULED
Qty: 60 TABLET | Refills: 0 | Status: ON HOLD
Start: 2018-11-07 | End: 2023-03-01

## 2018-11-07 RX ORDER — CETIRIZINE HYDROCHLORIDE 10 MG/1
10 TABLET ORAL DAILY
Qty: 30 TABLET | Refills: 0
Start: 2018-11-08

## 2018-11-07 RX ORDER — LATANOPROST 50 UG/ML
1 SOLUTION/ DROPS OPHTHALMIC NIGHTLY
Qty: 2.5 ML | Refills: 0 | Status: ON HOLD
Start: 2018-11-07 | End: 2023-03-01

## 2018-11-07 RX ORDER — MONTELUKAST SODIUM 5 MG/1
5 TABLET, CHEWABLE ORAL NIGHTLY
Qty: 30 TABLET | Refills: 0 | Status: ON HOLD
Start: 2018-11-07 | End: 2023-03-01

## 2018-11-07 RX ORDER — FOLIC ACID/VIT B COMPLEX AND C 0.8 MG
1 TABLET ORAL DAILY
Qty: 30 TABLET | Refills: 0 | Status: ON HOLD
Start: 2018-11-08 | End: 2023-03-01

## 2018-11-07 RX ADMIN — QUETIAPINE FUMARATE 12.5 MG: 25 TABLET ORAL at 09:35

## 2018-11-07 RX ADMIN — POLYETHYLENE GLYCOL 3350 17 G: 17 POWDER, FOR SOLUTION ORAL at 09:35

## 2018-11-07 RX ADMIN — DULOXETINE HYDROCHLORIDE 60 MG: 60 CAPSULE, DELAYED RELEASE ORAL at 09:34

## 2018-11-07 RX ADMIN — Medication 1 TABLET: at 09:35

## 2018-11-07 RX ADMIN — LISINOPRIL 5 MG: 5 TABLET ORAL at 09:34

## 2018-11-07 RX ADMIN — METOPROLOL TARTRATE 25 MG: 25 TABLET ORAL at 09:34

## 2018-11-07 RX ADMIN — ACETAMINOPHEN 650 MG: 325 TABLET ORAL at 04:42

## 2018-11-07 RX ADMIN — PANTOPRAZOLE SODIUM 40 MG: 40 TABLET, DELAYED RELEASE ORAL at 05:31

## 2018-11-07 RX ADMIN — CETIRIZINE HYDROCHLORIDE 10 MG: 10 TABLET, FILM COATED ORAL at 09:34

## 2018-11-07 RX ADMIN — ATENOLOL 50 MG: 50 TABLET ORAL at 09:35

## 2018-11-07 RX ADMIN — ATORVASTATIN CALCIUM 10 MG: 10 TABLET, FILM COATED ORAL at 09:34

## 2018-11-07 NOTE — THERAPY PROGRESS REPORT/RE-CERT
Acute Care - Occupational Therapy Progress Note  [facility]     Patient Name: Bryanna Schwartz  : 1970  MRN: [id number]  Today's Date: 2018  Onset of Illness/Injury or Date of Surgery: 10/14/18  Date of Referral to OT: 10/14/18  Referring Physician: MD Velasquez    Admit Date: 10/14/2018       ICD-10-CM ICD-9-CM   1. Acute hypokalemia E87.6 276.8   2. Hypomagnesemia E83.42 275.2   3. Unintentional weight loss R63.4 783.21   4. Intractable cyclical vomiting with nausea G43.A1 536.2   5. Abnormal CT scan, stomach R93.3 793.4   6. Intractable vomiting with nausea, unspecified vomiting type R11.2 536.2   7. Impaired functional mobility, balance, gait, and endurance Z74.09 V49.89   8. Impaired mobility and ADLs Z74.09 799.89     Patient Active Problem List   Diagnosis   • GERD without esophagitis   • Anxiety and depression   • Hyperlipidemia   • Essential hypertension   • Migraines   • Lyme disease   • Intractable nausea and vomiting   • Hypokalemia   • Hypomagnesemia   • Acute hypokalemia   • Abnormal CT scan, stomach   • Intractable vomiting with nausea   • Falls   • Acute UTI (urinary tract infection)     Past Medical History:   Diagnosis Date   • Arthritis    • Depression    • Environmental allergies    • Falls 10/24/2018   • GERD (gastroesophageal reflux disease)    • Hyperlipidemia    • Hypertension    • Lyme disease    • Migraine    • [place] spotted fever    • Vitamin B 12 deficiency      Past Surgical History:   Procedure Laterality Date   • ACHILLES TENDON SURGERY Right    • BREAST CYST EXCISION Left    • CHOLECYSTECTOMY     • ENDOSCOPY N/A 10/16/2018    Procedure: ESOPHAGOGASTRODUODENOSCOPY;  Surgeon: Brunner, Mark I, MD;  Location: [facility] ENDOSCOPY;  Service: Gastroenterology   • HYSTERECTOMY         Therapy Treatment          Rehabilitation Treatment Summary     Row Name 18 1300             Treatment Time/Intention    Discipline occupational therapist  -      Document Type therapy  note (daily note)  -KF      Subjective Information complains of;pain  -KF      Mode of Treatment individual therapy;occupational therapy  -KF      Patient/Family Observations in bed supine,  present throughout, exit alarm on pre and post, tele, RA  -KF      Care Plan Review evaluation/treatment results reviewed;care plan/treatment goals reviewed;risks/benefits reviewed;current/potential barriers reviewed;patient/other agree to care plan  -KF      Care Plan Review, Other Participant(s) spouse  -KF      Patient Effort good  -KF      Comment disoriented to place and time despite reorientation, increase hypersensitivity to touch (B) feet and hands  -KF      Existing Precautions/Restrictions fall  -KF      Treatment Considerations/Comments exit alarm  -KF      Patient Response to Treatment no dizziness in sitting, tolerated soft blanket against hands   -KF      Recorded by [KF] Sola Pradhan, OT 11/07/18 1343      Row Name 11/07/18 1300             Vital Signs    Pre Systolic BP Rehab 123  -KF      Pre Treatment Diastolic BP 90   RN cleared vitals stable  -KF      Pretreatment Heart Rate (beats/min) 90  -KF      Posttreatment Heart Rate (beats/min) 95  -KF      O2 Delivery Pre Treatment room air  -KF      Pre Patient Position Supine  -KF      Intra Patient Position Sitting  -KF      Post Patient Position Supine  -KF      Rest Breaks  2  -KF      Recorded by [KF] Sola Pradhan, OT 11/07/18 1343      Row Name 11/07/18 1300             Cognitive Assessment/Intervention    Additional Documentation Cognitive Assessment/Intervention (Group)  -KF      Recorded by [KF] Sola Pradhan, OT 11/07/18 1343      Row Name 11/07/18 1300             Cognitive Assessment/Intervention- PT/OT    Affect/Mental Status (Cognitive) anxious;confused  -KF      Behavioral Issues (Cognitive) disinhibition;overwhelmed easily  -KF      Orientation Status (Cognition) oriented to;person;disoriented to;place;time;situation  -KF       Follows Commands (Cognition) follows one step commands;over 90% accuracy;repetition of directions required;verbal cues/prompting required;physical/tactile prompts required  -KF      Cognitive Function (Cognitive) safety deficit;executive function deficit;memory deficit;attention deficit  -KF      Attention Deficit (Cognitive) mild deficit;concentration  -KF      Executive Function Deficit (Cognition) severe deficit;insight/awareness of deficits;judgment;organization/sequencing;planning/decision making;problem solving/reasoning;self-monitoring/self-correction  -KF      Memory Deficit (Cognitive) moderate deficit;short term memory  -KF      Safety Deficit (Cognitive) severe deficit;safety precautions awareness;judgment;insight into deficits/self awareness;impulsivity;awareness of need for assistance  -KF      Cognitive Interventions (Cognitive) metacognitive skills training;errorless learning methods;process/task specific training  -KF      Personal Safety Interventions fall prevention program maintained;gait belt;muscle strengthening facilitated;nonskid shoes/slippers when out of bed  -KF      Recorded by [KF] Sola Pradhan, OT 11/07/18 1343      Row Name 11/07/18 1300             Safety Issues, Functional Mobility    Safety Issues Affecting Function (Mobility) insight into deficits/self awareness;positioning of assistive device;safety precaution awareness;sequencing abilities  -KF      Impairments Affecting Function (Mobility) balance;endurance/activity tolerance;strength;pain;postural/trunk control  -KF      Comment, Safety Issues/Impairments (Mobility) impulsive, recommend x2 assist due to coordination impairments, safety awareness, impulsivity  -KF      Recorded by [KF] Sola Pradhan OT 11/07/18 1343      Row Name 11/07/18 1300             Bed Mobility Assessment/Treatment    Bed Mobility Assessment/Treatment rolling right;scooting/bridging;supine-sit;sit-supine  -KF      Rolling Right [place]  (Bed Mobility) contact guard;verbal cues  -KF      Scooting/Bridging [place] (Bed Mobility) contact guard;verbal cues  -KF      Supine-Sit [place] (Bed Mobility) minimum assist (75% patient effort);verbal cues  -KF      Sit-Supine [place] (Bed Mobility) moderate assist (50% patient effort);2 person assist;verbal cues  -KF      Bed Mobility, Safety Issues cognitive deficits limit understanding;decreased use of arms for pushing/pulling;decreased use of legs for bridging/pushing;impaired trunk control for bed mobility  -KF      Assistive Device (Bed Mobility) bed rails;draw sheet;head of bed elevated  -KF      Comment (Bed Mobility) CGA at times SBA for balance EOB  -KF      Recorded by [KF] Sola Pradhan, OT 11/07/18 1343      Row Name 11/07/18 1300             Functional Mobility    Functional Mobility- Comment deferred due to pain increased and hypersensitivity to bottoms of feet  -KF      Recorded by [KF] Sola Pradhan, OT 11/07/18 1343      Row Name 11/07/18 1300             Transfer Assessment/Treatment    Comment (Transfers) deferred due to pain   -KF      Recorded by [KF] Sola Pradhan, OT 11/07/18 1343      Row Name 11/07/18 1300             ADL Assessment/Intervention    BADL Assessment/Intervention --   Pt declined ADLs today  -KF      Recorded by [KF] Sola Pradhan, OT 11/07/18 1343      Row Name 11/07/18 1300             BADL Safety/Performance    Impairments, BADL Safety/Performance balance;cognition;endurance/activity tolerance;strength;pain;coordination  -KF      Cognitive Impairments, BADL Safety/Performance impulsivity;attention;awareness, need for assistance;insight into deficits/self awareness;safety precaution awareness;judgment;safety precaution follow-through;sequencing abilities  -KF      Skilled BADL Treatment/Intervention adaptive equipment training;BADL process/adaptation training;cognitive/safety deficit modifications  -KF      Recorded by [KF] Carolynn  Sola WILDE, OT 11/07/18 1343      Row Name 11/07/18 1300             Motor Skills Assessment/Interventions    Additional Documentation Balance (Group);Balance Interventions (Group);Therapeutic Exercise (Group);Therapeutic Exercise Interventions (Group)  -KF      Recorded by [KF] Sola Pradhan, OT 11/07/18 1343      Row Name 11/07/18 1300             Therapeutic Exercise    Therapeutic Exercise seated, upper extremities  -KF      Additional Documentation Therapeutic Exercise (Row)  -KF      74384 - OT Therapeutic Exercise Minutes 15  -KF      08195 - OT Therapeutic Activity Minutes 8  -KF      Recorded by [KF] Sola Pradhan, OT 11/07/18 1343      Row Name 11/07/18 1300             Upper Extremity Seated Therapeutic Exercise    Performed, Seated Upper Extremity (Therapeutic Exercise) shoulder flexion/extension;elbow flexion/extension;digit flexion/extension  -KF      Exercise Type, Seated Upper Extremity (Therapeutic Exercise) AROM (active range of motion);AAROM (active assistive range of motion)  -KF      Sets/Reps Detail, Seated Upper Extremity (Therapeutic Exercise) 2/10  -KF      Comment, Seated Upper Extremity (Therapeutic Exercise) alternating UE, sitting EOB, AAROM at times to coordinate initial movements and straigten elbow  -KF      Recorded by [KF] Sola Pradhan, OT 11/07/18 1343      Row Name 11/07/18 1300             Gross Motor Coordination    Gross Motor Impairments AROM (active range of motion);coordination  -KF      Recorded by [KF] Sola Pradhan, OT 11/07/18 1343      Row Name 11/07/18 1300             Balance    Balance static sitting balance;dynamic sitting balance  -KF      Recorded by [KF] Sola Pradhan, OT 11/07/18 1343      Row Name 11/07/18 1300             Static Sitting Balance    Level of [place] (Unsupported Sitting, Static Balance) standby assist  -KF      Sitting Position (Unsupported Sitting, Static Balance) sitting on edge of bed  -KF      Time Able to  Maintain Position (Unsupported Sitting, Static Balance) more than 5 minutes  -KF      Recorded by [KF] Sola Pradhan, OT 11/07/18 1343      Row Name 11/07/18 1300             Dynamic Sitting Balance    Level of [place], Reaches Outside Midline (Sitting, Dynamic Balance) contact guard assist  -KF      Sitting Position, Reaches Outside Midline (Sitting, Dynamic Balance) sitting on edge of bed  -KF      Comment, Reaches Outside Midline (Sitting, Dynamic Balance) ther ex and dynamic sitting balance TA sitting EOB  -KF      Recorded by [KF] Sola Pradhan, OT 11/07/18 1343      Row Name 11/07/18 1300             Positioning and Restraints    Pre-Treatment Position in bed  -KF      Post Treatment Position bed  -KF      In Bed notified nsg;supine;fowlers;call light within reach;encouraged to call for assist;exit alarm on;side rails up x3;legs elevated;heels elevated;with family/caregiver;patient within staff view  -KF      Recorded by [KF] Sola Pradhan, OT 11/07/18 1343      Row Name 11/07/18 1300             Pain Assessment    Additional Documentation Pain Scale: FACES Pre/Post-Treatment (Group)  -KF      Recorded by [KF] Sola Pradhan, OT 11/07/18 1343      Row Name 11/07/18 1300             Pain Scale: Numbers Pre/Post-Treatment    Pain Location - Side Bilateral  -KF      Pain Location foot   hands  -KF      Pre/Post Treatment Pain Comment states only pain with touch to palms and feet  -KF      Pain Intervention(s) Repositioned;Ambulation/increased activity  -KF      Recorded by [KF] Sola Pradhan, OT 11/07/18 1343      Row Name 11/07/18 1300             Plan of Care Review    Plan of Care Reviewed With patient  -KF      Recorded by [KF] Sola Pradhan, OT 11/07/18 1343      Row Name 11/07/18 1300             Outcome Summary/Treatment Plan (OT)    Daily Summary of Progress (OT) progress towards functional goals is fair  -KF      Barriers to Overall Progress (OT) limited by  hypersensitivity and pain to palms and feet  -KF      Recorded by [KF] Sola Pradhan, OT 11/07/18 1343        User Key  (r) = Recorded By, (t) = Taken By, (c) = Cosigned By    Initials Name Effective Dates Discipline    Juan Blissramila WILDE, OT 04/03/18 -  OT                   OT Rehab Goals     Row Name 11/07/18 1300             Transfer Goal 1 (OT)    Progress/Outcome (Transfer Goal 1, OT) continuing progress toward goal;medical status inhibiting progress;goal partially met  -KF         Toileting Goal 1 (OT)    Progress/Outcome (Toileting Goal 1, OT) medical status inhibiting progress;goal partially met  -KF         Grooming Goal 1 (OT)    Progress/Outcome (Grooming Goal 1, OT) goal partially met   met for oral care and brushing hair   -KF        User Key  (r) = Recorded By, (t) = Taken By, (c) = Cosigned By    Initials Name Provider Type Discipline    MIKHAIL MirzachiquisSola, OT Occupational Therapist OT        Occupational Therapy Education     Title: PT OT SLP Therapies (Done)     Topic: Occupational Therapy (Done)     Point: ADL training (Done)     Description: Instruct learner(s) on proper safety adaptation and remediation techniques during self care or transfers.   Instruct in proper use of assistive devices.   Learning Progress Summary     Learner Status Readiness Method Response Comment Documented by    Patient Done Acceptance E VU,DU Purpose of skilled OT services, BUE HEP and balance therapeutic activity purpose  11/07/18 1300     Done Acceptance E,TB,D VU,NR BUE AROM HEP with AAROM provided to technique, purpose of skilled OT services, safety and sequencing for ADL retraining  11/05/18 1057     Active Acceptance E NR Safety awareness with functional transfers and mobility, UB dressing EOB, orientation to place  11/02/18 1515     Active Acceptance E,D NR positioning of UE with task to help decrease ataxia, placement of eyes to help with vertigo, transfer and standing safety. DEBRA 10/30/18 1340      Done Acceptance E,D VU,NR sliding board use, transfer safety, toilet techniques. DEBRA 10/28/18 1254     Done Acceptance E,D VU,NR coordination [facility] can completed, safety. DEBRA 10/22/18 1311     Active Acceptance E NR Pt educated on AE to improve self feeding, compensatory strategies d/t poor sensation/GMC, role of OT, and benefits of therapy. CL 10/20/18 1536     Done Acceptance E,D VU,NR Educated on therapeutic activites to increase functional I. AN 10/19/18 1416     Done Acceptance E,TB VU   10/18/18 1738     Done Acceptance E VU   10/17/18 0940     Done Acceptance E NR,VU Pt educated on ADL retraining with LBD, safety precautions, and appropriate body mechanics. HK 10/17/18 0940    Family Done Acceptance E,TB,D VU,NR BUE AROM HEP with AAROM provided to technique, purpose of skilled OT services, safety and sequencing for ADL retraining KF 11/05/18 1057     Active Acceptance E,D NR positioning of UE with task to help decrease ataxia, placement of eyes to help with vertigo, transfer and standing safety. DEBRA 10/30/18 1340     Done Acceptance E,D VU,NR coordination [facility] can completed, safety. DEBRA 10/22/18 1311     Done Acceptance E,D VU,NR Educated on therapeutic activites to increase functional I. AN 10/19/18 1416     Done Acceptance E,TB VU   10/18/18 1738    Significant Other Done Acceptance E VU,DU Purpose of skilled OT services, BUE HEP and balance therapeutic activity purpose KF 11/07/18 1300          Point: Home exercise program (Done)     Description: Instruct learner(s) on appropriate technique for monitoring, assisting and/or progressing therapeutic exercises/activities.   Learning Progress Summary     Learner Status Readiness Method Response Comment Documented by    Patient Done Acceptance E VU,DU Purpose of skilled OT services, BUE HEP and balance therapeutic activity purpose KF 11/07/18 1300     Done Acceptance E,TB,D VU,NR BUE AROM HEP with AAROM provided to technique, purpose of skilled OT services,  safety and sequencing for ADL retraining  11/05/18 1057     Active Acceptance E NR Safety awareness with functional transfers and mobility, UB dressing EOB, orientation to place  11/02/18 1515     Active Acceptance E,D NR positioning of UE with task to help decrease ataxia, placement of eyes to help with vertigo, transfer and standing safety.  10/30/18 1340     Done Acceptance E,H,D VU,DU issued handout and pt [facility] HEP  10/24/18 1113     Done Acceptance E,D VU,NR coordination [facility] can completed, safety.  10/22/18 1311     Done Acceptance E,TB VU   10/18/18 1738    Family Done Acceptance E,TB,D VU,NR BUE AROM HEP with AAROM provided to technique, purpose of skilled OT services, safety and sequencing for ADL retraining  11/05/18 1057     Active Acceptance E,D NR positioning of UE with task to help decrease ataxia, placement of eyes to help with vertigo, transfer and standing safety.  10/30/18 1340     Done Acceptance E,H,D VU,DU issued handout and pt gave return [facility] HEP  10/24/18 1113     Done Acceptance E,D VU,NR coordination [facility] can completed, safety.  10/22/18 1311     Done Acceptance E,TB VU   10/18/18 1738    Significant Other Done Acceptance E VU,DU Purpose of skilled OT services, BUE HEP and balance therapeutic activity purpose  11/07/18 1300          Point: Precautions (Done)     Description: Instruct learner(s) on prescribed precautions during self-care and functional transfers.   Learning Progress Summary     Learner Status Readiness Method Response Comment Documented by    Patient Done Acceptance E VU,DU Purpose of skilled OT services, BUE HEP and balance therapeutic activity purpose  11/07/18 1300     Done Acceptance E,TB,D VU,NR BUE AROM HEP with AAROM provided to technique, purpose of skilled OT services, safety and sequencing for ADL retraining  11/05/18 1057     Active Acceptance E NR Safety awareness with functional transfers and mobility, UB  dressing EOB, orientation to place  11/02/18 1515     Active Acceptance E,D NR positioning of UE with task to help decrease ataxia, placement of eyes to help with vertigo, transfer and standing safety. DEBRA 10/30/18 1340     Done Acceptance E,D VU,NR sliding board use, transfer safety, toilet techniques. DEBRA 10/28/18 1254     Done Acceptance E,D VU,NR coordination [facility] can completed, safety. DEBRA 10/22/18 1311     Active Acceptance E NR Pt educated on AE to improve self feeding, compensatory strategies d/t poor sensation/GMC, role of OT, and benefits of therapy. CL 10/20/18 1536     Done Acceptance E,TB VU   10/18/18 1738     Done Acceptance E VU  HK 10/17/18 0940     Done Acceptance E NR,VU Pt educated on ADL retraining with LBD, safety precautions, and appropriate body mechanics.  10/17/18 0940    Family Done Acceptance E,TB,D VU,NR BUE AROM HEP with AAROM provided to technique, purpose of skilled OT services, safety and sequencing for ADL retraining  11/05/18 1057     Active Acceptance E,D NR positioning of UE with task to help decrease ataxia, placement of eyes to help with vertigo, transfer and standing safety. DEBRA 10/30/18 1340     Done Acceptance E,D VU,NR coordination [facility] can completed, safety. DEBRA 10/22/18 1311     Done Acceptance E,TB VU   10/18/18 1738    Significant Other Done Acceptance E VU,DU Purpose of skilled OT services, BUE HEP and balance therapeutic activity purpose  11/07/18 1300          Point: Body mechanics (Done)     Description: Instruct learner(s) on proper positioning and spine alignment during self-care, functional mobility activities and/or exercises.   Learning Progress Summary     Learner Status Readiness Method Response Comment Documented by    Patient Done Acceptance E VU,DU Purpose of skilled OT services, BUE HEP and balance therapeutic activity purpose  11/07/18 1300     Done Acceptance E,TB,D VU,NR BUE AROM HEP with AAROM provided to technique, purpose of skilled OT  services, safety and sequencing for ADL retraining  11/05/18 1057     Active Acceptance E NR Safety awareness with functional transfers and mobility, UB dressing EOB, orientation to place  11/02/18 1515     Active Acceptance E,D NR positioning of UE with task to help decrease ataxia, placement of eyes to help with vertigo, transfer and standing safety. DEBRA 10/30/18 1340     Done Acceptance E,TB VU   10/18/18 1738     Done Acceptance E VU   10/17/18 0940     Done Acceptance E NR,VU Pt educated on ADL retraining with LBD, safety precautions, and appropriate body mechanics.  10/17/18 0940    Family Done Acceptance E,TB,D VU,NR BUE AROM HEP with AAROM provided to technique, purpose of skilled OT services, safety and sequencing for ADL retraining  11/05/18 1057     Active Acceptance E,D NR positioning of UE with task to help decrease ataxia, placement of eyes to help with vertigo, transfer and standing safety.  10/30/18 1340     Done Acceptance E,TB VU   10/18/18 1738    Significant Other Done Acceptance E VU,DU Purpose of skilled OT services, BUE HEP and balance therapeutic activity purpose  11/07/18 1300                      User Key     Initials Effective Dates Name Provider Type Discipline     06/08/18 -  Vania Arceo, OT Occupational Therapist OT    AC 06/23/15 -  Yas Lawrence, OT Occupational Therapist OT     06/22/15 -  Bren Garcia, OT Occupational Therapist OT     06/16/16 -  Lori Kumari, RN Registered Nurse Nurse    CL 04/03/18 -  Cecy Tompkins, OT Occupational Therapist OT     03/07/18 -  Rita Smith, OT Occupational Therapist OT     04/03/18 -  Sola Pradhan, OT Occupational Therapist OT                OT Recommendation and Plan  Outcome Summary/Treatment Plan (OT)  Daily Summary of Progress (OT): progress towards functional goals is fair  Barriers to Overall Progress (OT): limited by hypersensitivity and pain to palms and feet  Daily Summary of Progress (OT):  progress towards functional goals is fair  Plan of Care Review  Plan of Care Reviewed With: patient, spouse  Plan of Care Reviewed With: patient, spouse  Outcome Summary: Pt limited by pain, tolerated sitting EOB, mod A x2 return to supine, CGA for dynamic sitting balance, tolerated BUE HEP with AAROM at times to coordinate gross motor control. Cont IPOT per POC        Outcome Measures     Row Name 11/07/18 1300 11/05/18 1316 11/05/18 1057       How much help from another person do you currently need...    Turning from your back to your side while in flat bed without using bedrails?  -- 4  -EH  --    Moving from lying on back to sitting on the side of a flat bed without bedrails?  -- 4  -EH  --    Moving to and from a bed to a chair (including a wheelchair)?  -- 2  -EH  --    Standing up from a chair using your arms (e.g., wheelchair, bedside chair)?  -- 2  -EH  --    Climbing 3-5 steps with a railing?  -- 2  -EH  --    To walk in hospital room?  -- 2  -EH  --    AM-PAC 6 Clicks Score  -- 16  -EH  --       How much help from another is currently needed...    Putting on and taking off regular lower body clothing? 2  -KF  -- 2  -KF    Bathing (including washing, rinsing, and drying) 2  -KF  -- 2  -KF    Toileting (which includes using toilet bed pan or urinal) 2  -KF  -- 2  -KF    Putting on and taking off regular upper body clothing 3  -KF  -- 3  -KF    Taking care of personal grooming (such as brushing teeth) 3  -KF  -- 3  -KF    Eating meals 3  -KF  -- 3  -KF    Score 15  -KF  -- 15  -KF       Functional Assessment    Outcome Measure Options AM-PAC 6 Clicks Daily Activity (OT)  -KF AM-PAC 6 Clicks Basic Mobility (PT)  - AM-PAC 6 Clicks Daily Activity (OT)  -KF      User Key  (r) = Recorded By, (t) = Taken By, (c) = Cosigned By    Initials Name Provider Type     Bethany Gonzalez, PT Physical Therapist    KF Sola Pradhan, OT Occupational Therapist           Time Calculation:         Time Calculation- OT      Row Name 11/07/18 1300             Time Calculation- OT    OT Start Time 1300  -KF      Total Timed Code Minutes- OT 23 minute(s)  -KF      OT Received On 11/07/18  -KF      OT Goal Re-Cert Due Date 11/17/18  -KF         Timed Charges    10523 - OT Therapeutic Exercise Minutes 15  -KF      21478 - OT Therapeutic Activity Minutes 8  -KF        User Key  (r) = Recorded By, (t) = Taken By, (c) = Cosigned By    Initials Name Provider Type    KF Sola Pradhan, OT Occupational Therapist           Therapy Suggested Charges     Code   Minutes Charges    29553 (CPT®) Hc Ot Neuromusc Re Education Ea 15 Min      38204 (CPT®) Hc Ot Ther Proc Ea 15 Min 15 1    00557 (CPT®) Hc Ot Therapeutic Act Ea 15 Min 8 1    54866 (CPT®) Hc Ot Manual Therapy Ea 15 Min      89083 (CPT®) Hc Ot Iontophoresis Ea 15 Min      88321 (CPT®) Hc Ot Elec Stim Ea-Per 15 Min      37942 (CPT®) Hc Ot Ultrasound Ea 15 Min      48796 (CPT®) Hc Ot Self Care/Mgmt/Train Ea 15 Min      Total  23 2        Therapy Charges for Today     Code Description Service Date Service Provider Modifiers Qty    50098091769 HC OT THER PROC EA 15 MIN 11/7/2018 Sola Pradhan OT GO 1    55884252466 HC OT THERAPEUTIC ACT EA 15 MIN 11/7/2018 Sola Pradhan OT GO 1    16578311331 HC OT THER SUPP EA 15 MIN 11/7/2018 Sola Pradhan OT GO 2               Sola Pradhan OT  11/7/2018

## 2018-11-07 NOTE — PLAN OF CARE
Problem: Patient Care Overview  Goal: Plan of Care Review  Outcome: Ongoing (interventions implemented as appropriate)   11/07/18 0155   Coping/Psychosocial   Plan of Care Reviewed With patient   Plan of Care Review   Progress improving   OTHER   Outcome Summary Pt. VSS. Resting well throughout night. No reports of pain or discomfort. Will continue to monitor.      Goal: Interprofessional Rounds/Family Conf  Outcome: Ongoing (interventions implemented as appropriate)      Problem: Nutrition, Imbalanced: Excessive Oral Intake (Adult)  Goal: Acknowledges the Importance of Weight Loss/Maintenance and Overall Health  Outcome: Ongoing (interventions implemented as appropriate)    Goal: Expresses Desire and Motivation to Change  Outcome: Ongoing (interventions implemented as appropriate)

## 2018-11-07 NOTE — DISCHARGE SUMMARY
[facility] Hospital Medicine Services  DISCHARGE SUMMARY    Patient Name: Bryanna Schwartz  : 1970  MRN: [id number]    Date of Admission: 10/14/2018  Date of Discharge:  2018  Primary Care Physician: Andrei Crisostomo MD    Consults     Date and Time Order Name Status Description    10/18/2018 1412 Inpatient Nephrology Consult Completed     10/18/2018 1223 Inpatient Neurology Consult General Completed     10/15/2018 0510 Inpatient Gastroenterology Consult Completed         Hospital Course     Presenting Problem:   Acute hypokalemia [E87.6]  Acute hypokalemia [E87.6]    Active Hospital Problems    Diagnosis Date Noted   • **Intractable nausea and vomiting [R11.2] 10/15/2018   • Acute UTI (urinary tract infection) [N39.0] 10/27/2018   • Falls [W19.XXXA] 10/24/2018   • GERD without esophagitis [K21.9] 10/15/2018   • Anxiety and depression [F41.9, F32.9] 10/15/2018   • Hyperlipidemia [E78.5] 10/15/2018   • Essential hypertension [I10] 10/15/2018   • Migraines [G43.909] 10/15/2018   • Lyme disease [A69.20] 10/15/2018   • Hypokalemia [E87.6] 10/15/2018   • Hypomagnesemia [E83.42] 10/15/2018   • Acute hypokalemia [E87.6] 10/15/2018   • Abnormal CT scan, stomach [R93.3] 10/14/2018   • Intractable vomiting with nausea [R11.2] 10/14/2018      Resolved Hospital Problems    Diagnosis Date Noted Date Resolved   No resolved problems to display.          Hospital Course:  Bryanna Schwartz is a 48 y.o. femaleWith a past medical history of anxiety, depression, hypertension, hyperlipidemia, migraines, recent treatment for RMSF and Lyme disease.  The patient presented to [facility] with complaints of 6 months of intractable nausea and vomiting that had been progressively worsening with reported weight loss of 120 pounds.  CT of the abdomen and pelvis showed thickening of the proximal gastric mucosa.  She was seen in consultation by gastroenterology and underwent an EGD on 10/16/2018 which  showed antral gastritis with reflux esophagitis.  She was placed on Protonix 40 mg twice daily.  Symptoms and improved markedly and she has been able to tolerate an oral diet well.  The patient was noted to have multiple electrolyte abnormalities and had her potassium and magnesium replaced on multiple occasions. There was some question about adrenal insufficiency, however random cortisol was within normal limits.  It was suspected that her multiple electrolyte abnormalities were likely due to her prolonged nausea and vomiting.  This is now resolved.  The patient was noted to have a urinary tract infection on admission which likely contributed to her nausea and vomiting.  She grew out 100k of Proteus as well as 40-50k of Escherichia coli.  She is now status post 5 days of IV Rocephin.  Her last dose was on October 22. The patient struggled with uncontrolled hypertension during her hospitalization, despite multiple medication regimen.  She was seen in consultation by nephrology and worked up for pheochromocytoma.  She did have abnormally high urine metanephrines.She did undergo an MRI, which did not show any adrenal adenomas.  She is now medically stable on her current blood pressure regimen and will follow-up with nephrology in the clinic in [place] in 3-4 weeks with lab work.  The patient has had some ongoing issues with confusion/encephalopathy during this hospitalization. She was seen by neurology and her home medication list was paired down to only necessary medications.  She has continued with waxing and waning mental status.  She appears to be better when her  is at the bedside.  She has done well on low-dose Seroquel twice daily.  She has complained of some neuropathy pain in her lower extremities as well as her hands.  She was placed on a low-dose 100 mg gabapentin on day of discharge for bedtime only.  The patient is now medically stable and will be discharged to rehabilitation today by  family.          Day of Discharge     HPI:     Resting comfortably in bed this afternoon with  at bedside.  Alert and pleasant.  Continues with waxing and waning confusion.  Most of the time, she is answers appropriately.  Complaining of neuropathic pain in her feet and hands.      Review of Systems  Unreliable due to waxing and waning confusion     Gen- No fevers, chills  CV- No chest pain, palpitations  Resp- No cough, dyspnea  GI- No N/V/D, abd pain      Otherwise ROS is negative except as mentioned in the HPI.    Vital Signs:   Temp:  [98 °F (36.7 °C)-98.3 °F (36.8 °C)] 98 °F (36.7 °C)  Resp:  [14-16] 14  BP: (108-123)/(59-90) 123/90     Physical Exam:    Constitutional: No acute distress, awake, alert, resting in bed,  at bedside.   HENT: NCAT, mucous membranes moist  Respiratory: Clear to auscultation bilaterally, respiratory effort normal   Cardiovascular: RRR, no murmurs, rubs, or gallops, palpable pedal pulses bilaterally  Gastrointestinal: Positive bowel sounds, soft, nontender, nondistended  Musculoskeletal: No bilateral ankle edema  Psychiatric: Appropriate affect, cooperative  Neurologic: Oriented x 2 with waxing and waning confusion, strength symmetric in all extremities, Cranial Nerves grossly intact to confrontation, speech clear  Skin: No rashes      Pertinent  and/or Most Recent Results       Results from last 7 days  Lab Units 11/07/18  0404 11/02/18  0354 11/01/18  0610   WBC 10*3/mm3 8.61  --  7.67   HEMOGLOBIN g/dL 12.1  --  11.0*   HEMATOCRIT % 36.6  --  33.2*   PLATELETS 10*3/mm3 363  --  336   SODIUM mmol/L 142 139 139   POTASSIUM mmol/L 4.5 3.8 3.6   CHLORIDE mmol/L 106 105 107   CO2 mmol/L 28.0 24.0 25.0   BUN mg/dL 13 6* 9   CREATININE mg/dL 0.78 0.61 0.61   GLUCOSE mg/dL 111* 95 96   CALCIUM mg/dL 9.5 9.0 8.9           Invalid input(s): PROT, LABALBU        Invalid input(s): TG, LDLCALC, LDLREALC      Brief Urine Lab Results  (Last result in the past 365 days)      Color    Clarity   Blood   Leuk Est   Nitrite   Protein   CREAT   Urine HCG        10/25/18 0720 Yellow Clear Negative Negative Negative Negative               Microbiology Results Abnormal     Procedure Component Value - Date/Time    Urine Culture - Urine, [[id number]]  (Abnormal)  (Susceptibility) Collected:  10/18/18 1603    Lab Status:  Final result Specimen:  Urine from Urine, Clean Catch Updated:  10/21/18 1055     Urine Culture >100,000 CFU/mL Proteus mirabilis (A)      40,000-50,000 CFU/mL Escherichia coli (A)    Susceptibility      Proteus mirabilis    Escherichia coli       ARELI    ARELI       Ampicillin <=8 ug/ml Susceptible    <=8 ug/ml Susceptible       Ampicillin + Sulbactam <=8/4 ug/ml Susceptible    <=8/4 ug/ml Susceptible       Aztreonam <=8 ug/ml Susceptible    <=8 ug/ml Susceptible       Cefepime <=8 ug/ml Susceptible    <=8 ug/ml Susceptible       Cefotaxime <=2 ug/ml Susceptible    <=2 ug/ml Susceptible       Ceftriaxone <=8 ug/ml Susceptible    <=8 ug/ml Susceptible       Cefuroxime sodium <=4 ug/ml Susceptible    <=4 ug/ml Susceptible       Cephalothin <=8 ug/ml Susceptible    <=8 ug/ml Susceptible       Ertapenem <=1 ug/ml Susceptible    <=1 ug/ml Susceptible       Gentamicin <=4 ug/ml Susceptible    <=4 ug/ml Susceptible       Levofloxacin <=2 ug/ml Susceptible    <=2 ug/ml Susceptible       Meropenem <=1 ug/ml Susceptible    <=1 ug/ml Susceptible       Nitrofurantoin       <=32 ug/ml Susceptible       Piperacillin + Tazobactam <=16 ug/ml Susceptible    <=16 ug/ml Susceptible       Tetracycline       <=4 ug/ml Susceptible       Tobramycin <=4 ug/ml Susceptible    <=4 ug/ml Susceptible       Trimethoprim + Sulfamethoxazole <=2/38 ug/ml Susceptible    <=2/38 ug/ml Susceptible                      Blood Culture - Blood, [[id number]]  (Normal) Collected:  10/15/18 0029    Lab Status:  Final result Specimen:  Blood from Arm, Right Updated:  10/20/18 0130     Blood Culture No growth at 5 days    Blood Culture -  Blood, [[id number]]  (Normal) Collected:  10/15/18 0029    Lab Status:  Final result Specimen:  Blood from Arm, Right Updated:  10/20/18 0130     Blood Culture No growth at 5 days    Eosinophil Smear - Urine, Urine, Clean Catch [[id number]]  (Normal) Collected:  10/18/18 1603    Lab Status:  Final result Specimen:  Urine from Urine, Clean Catch Updated:  10/18/18 1802     Eosinophil Smear 0 % EOS/100 Cells     Narrative:       No eosinophil seen          Imaging Results (all)     Procedure Component Value Units Date/Time    MRI Abdomen With & Without Contrast [[id number]] Collected:  10/30/18 1151     Updated:  10/30/18 1238    Narrative:       EXAMINATION: MRI ABDOMEN W WO CONTRAST-10/29/2018:     INDICATION: Pheochromocytoma; E87.6-Hypokalemia; E83.42-Hypomagnesemia;  R63.4-Abnormal weight loss; G43.A1-Cyclical vomiting, intractable;  R93.3-Abnormal findings on diagnostic imaging of other parts of  digestive tract; R11.2-Nausea with vomiting, unspecified; Z74.09-Other  reduced mobility; Z74.09-Other reduced mobility.      TECHNIQUE: Multiplanar MRI of the abdomen with and without intravenous  contrast administration.     COMPARISON: NONE.     FINDINGS: The lung bases are grossly clear. Asymmetric elevation of the  left hemidiaphragm noted. Small subcentimeter areas of T2 hyperintense  signal within the right hepatic lobe without abnormal enhancement  associated consistent with hepatic cysts. No abnormal enhancing lesion  within the liver parenchyma. Gallbladder surgically absent. No  intrahepatic or extrahepatic biliary dilatation with normal caliber  smooth tapering common bile duct to the level of the ampulla. No  pancreas divisum anatomy. Pancreas and spleen unremarkable without  abnormal enhancing soft tissue lesion. Adrenals without distinct nodule  or abnormal thickening/enhancement. Kidneys without hydronephrosis or  hydroureter. No abnormal enhancing paraspinal mass lesion. No bulky  retroperitoneal or upper  abdominal adenopathy. Patent nonaneurysmal  abdominal aorta. Portal veins and IVC patent. Partially visualized GI  tract grossly unremarkable without focal thickening or disproportionate  dilatation. No ascites. No aggressive bone marrow signal abnormality or  soft tissue body wall findings.       Impression:       1.  No discrete adrenal mass lesion or abnormal thickening/enhancement  of the adrenal glands or paraspinal soft tissues identified.  2.  Small T2 hyperintense foci within the liver without associated  enhancement consistent with fluid signal hepatic cysts. No abnormal  enhancing lesion within the liver parenchyma is identified.     D:  10/30/2018  E:  10/30/2018            This report was finalized on 10/30/2018 12:35 PM by Dr. Loi Velasco.       XR Chest 1 View [[id number]] Collected:  10/24/18 1909     Updated:  10/24/18 2346    Narrative:       EXAM:    XR Chest, 1 View     EXAM DATE/TIME:    10/24/2018 7:09 PM     CLINICAL HISTORY:    48 years old, female; Hypomagnesemia; Hypokalemia; Cyclical vomiting,   intractable; Nausea with vomiting, unspecified; Abnormal weight loss; Abnormal   findings on diagnostic imaging of other parts of digestive tract; Other reduced   mobility; Other reduced mobility; Signs and symptoms; Other:   Delirium/encephalopathy/weakness     TECHNIQUE:    XR of the chest, 1 view.     COMPARISON:    CR XR CHEST 1 VW 10/15/2018 6:35 AM     FINDINGS:     Heart size within normal limits. Mild right basilar atelectasis, increased from   prior study. The left lung is clear. No visible pneumothorax or pleural   effusion. Pulmonary vasculature within normal limits.      Impression:       1. No radiographic findings of acute cardiopulmonary disease.    THIS DOCUMENT HAS BEEN ELECTRONICALLY SIGNED BY MACI TAVERAS MD    XR Foot 3+ View Left [[id number]] Collected:  10/24/18 1018     Updated:  10/24/18 1521    Narrative:       EXAMINATION: XR FOOT 3+ VW, LEFT-10/24/2018:      INDICATION:  Pain; E87.6-Hypokalemia; E83.42-Hypomagnesemia;  R63.4-Abnormal weight loss; G43.A1-Cyclical vomiting, intractable;  R93.3-Abnormal findings on diagnostic imaging of other parts of  digestive tract; R11.2-Nausea with vomiting, unspecified; Z74.09-Other  reduced mobility; Z74.09-Other reduced mobility.      COMPARISON: NONE.     FINDINGS: Three views of the left foot reveal no evidence of acute bony  abnormality. The cortex is intact. Joint spaces are preserved. No soft  tissue abnormality identified.           Impression:       No acute bony abnormality.     D:  10/24/2018  E:  10/24/2018     This report was finalized on 10/24/2018 3:19 PM by Dr. Oliva Cee MD.       MRI Cervical Spine Without Contrast [[id number]] Collected:  10/19/18 1006     Updated:  10/19/18 1113    Narrative:       EXAMINATION: MRI CERVICAL SPINE WO CONTRAST- 10/18/2018     INDICATION: E87.6-Hypokalemia; E83.42-Hypomagnesemia; R63.4-Abnormal  weight loss; G43.A1-Cyclical vomiting, intractable; R93.3-Abnormal  findings on diagnostic imaging of other parts of digestive tract;  R11.2-Nausea with vomiting, unspecified; Z74.09-Other reduced mobility     TECHNIQUE: Multiplanar MRI of the cervical spine without intravenous  contrast administration     COMPARISON: NONE     FINDINGS: Severely motion degraded despite multiple maneuvers and repeat  imaging, within limits of the study.      Sagittal datasets without focal subluxation demonstrating mild  flattening of the cervical lordotic curvature. No aggressive bone marrow  signal abnormality is identified. Cervicomedullary junction widely  patent. Axial datasets evaluation for soft tissue findings without  paraspinal soft tissue abnormality or abnormality of the lung apices.     Axial datasets evaluation on repeat T2 axial sequences for degenerative  changes with level by level details as follows:  C2-C3: Minimal disc osteophyte complex along with minimal uncovertebral  spurring and hypertrophy  producing mild left neuroforaminal stenosis.  C3-C4: Moderate broad disc osteophyte complex in a fairly to symmetric  distribution with uncovertebral spurring and hypertrophy producing mild  anterior thecal sac effacement and spinal canal stenosis with mild right  and mild to moderate left neuroforaminal stenosis.  C4-C5: Mild disc osteophyte complex along with uncovertebral spurring  and hypertrophy producing mild to moderate bilateral neuroforaminal  stenoses without significant spinal canal stenosis.  C5-C6: Minimal disc osteophyte complex along with uncovertebral spurring  and hypertrophy producing mild right and mild to moderate left  neuroforaminal stenosis with mild spinal canal stenosis.  C6-C7: Mild to moderate disc osteophyte complex in a fairly symmetric  distribution with significant motion artifact producing moderate right  and mild to moderate left neuroforaminal stenosis.       Impression:       Severely motion degraded examination without severe spinal  canal stenosis at any level. Multilevel degenerative changes greatest at  the C4-C5 and C5-C6 level. At the C4-C5 level there is mild to moderate  bilateral neuroforaminal stenoses however no significant spinal canal  stenosis. At the C5-C6 level there is mild right and mild to moderate  left neuroforaminal stenoses with mild spinal canal stenosis. No  intrinsic cord signal abnormality to suggest cord edema or myelomalacia.     D:  10/19/2018  E:  10/19/2018         This report was finalized on 10/19/2018 11:11 AM by Dr. Loi Velasco.       MRI Thoracic Spine Without Contrast [[id number]] Collected:  10/19/18 0831     Updated:  10/19/18 0948    Narrative:       EXAMINATION: MRI THORACIC SPINE WO CONTRAST-10/18/2018:     INDICATION: Vibration and position sense loss; E87.6-Hypokalemia;  E83.42-Hypomagnesemia; R63.4-Abnormal weight loss; G43.A1-Cyclical  vomiting, intractable; R93.3-Abnormal findings on diagnostic imaging of  other parts of digestive  tract; R11.2-Nausea with vomiting, unspecified;  Z74.09-Other reduced mobility; Z74.09-Other reduced mobility.      TECHNIQUE: Multiplanar MRI of the thoracic spine without intravenous  contrast administration.     COMPARISON: NONE.     FINDINGS: Sagittal datasets reveal grossly normal alignment of the  thoracic curvature with noted retropulsion of the T12 level where there  is compression deformity noted and superior endplate cortical  irregularity. Approximately 7 mm of retropulsion as measured on sagittal  imaging with approximately 25-50% height loss anteriorly with anterior  wedging. On STIR datasets there is increased signal involving the  superior endplate suggesting components of bone marrow edema and/or  degeneration. No other sites of compression deformity or aggressive bone  marrow signal are otherwise identified. Axial dataset evaluation for  degenerative changes and paraspinal soft tissue findings without  paraspinal hematoma or soft tissue abnormality of concern. No  significant spinal canal narrowing or evidence for compromised  perineural fat signal/stenosis on sagittal dataset comparison. At the  level of the T12 compression fracture deformity there is mild anterior  thecal sac effacement and spinal canal stenosis, however, no significant  neuroforaminal stenosis. No intrinsic cord signal abnormality is seen  throughout the visualized lower cervical or thoracic cord. Scattered T2  hyperintense foci within the liver, primarily the right hepatic lobe,  which is partially seen consistent with hepatic cysts.       Impression:       1. T12 compression fracture deformity with superior endplate  irregularity and approximately 25-50% height loss anteriorly. Mild  retropulsion of approximately 7 mm without evidence for severe spinal  canal stenosis or cord indentation at this level. No intrinsic cord  signal abnormality at this level or elsewhere. Minimal increased STIR  signal associated with endplate  irregularity may represent bone marrow  edema of indeterminate acuity.  2. No severe spinal canal or neuroforaminal stenosis.     D:  10/19/2018  E:  10/19/2018            This report was finalized on 10/19/2018 9:46 AM by Dr. Loi Velasco.       CT Abdomen Pelvis With Contrast [[id number]] Collected:  10/16/18 1433     Updated:  10/17/18 2223    Narrative:       EXAMINATION: CT ABDOMEN PELVIS W CONTRAST- 10/15/2018     INDICATION: nausea, vomiting, early satiety, unintentional weight loss;  E87.6-Hypokalemia; E83.42-Hypomagnesemia; R63.4-Abnormal weight loss;  G43.A1-Cyclical vomiting, intractable         TECHNIQUE: 5 mm post IV contrast and oral contrast portal venous phase  and delayed venous phase images through the abdomen and pelvis.     The radiation dose reduction device was turned on for each scan per the  ALARA (As Low as Reasonably Achievable) protocol.     COMPARISON: NONE     FINDINGS: Patient history indicates nausea, vomiting, early satiety and  unintentional weight loss.     There is minimal dependent atelectasis in the posterior lower lobes.  There is expected degree of fatty liver change for body habitus. The  gallbladder is surgically absent. The spleen is not enlarged. No  significant abnormalities are noted of the pancreas, adrenal glands, or  kidneys. Subjectively, there appears to be mild thickening of the  proximal stomach although this may be due to nondistention. There is  somewhat more focal thickened appearance of the gastric antrum. No  perigastric inflammatory change is seen. No upper abdominal free air,  ascites or adenopathy is appreciated. A couple of loops of proximal  jejunum appears slightly thick-walled but this may also be due to  nondistention.     Regarding the lower abdomen and pelvis, oral contrast has passed to the  level of the right colon. The colon contains only fluid but is not  thick-walled. No intrapelvic inflammatory change is seen. The bladder is  compressed by a  Moralez balloon catheter. No mass or adenopathy is seen.  Uterus and ovaries are either markedly atrophic or surgically absent.  Bony structures appear intact.       Impression:       1. Questionable thickening of the proximal gastric mucosa and somewhat  stronger suggestion of antral mucosal thickening. Please correlate with  patient's symptoms.   2. Fluid filled but otherwise normal-appearing colon, nonspecific except  for diarrhea.   3. Questionable mild mucosal thickening of a few loops of proximal  jejunum.  4. No other evidence of acute intraabdominal or intrapelvic disease is  seen.     D:  10/16/2018  E:  10/16/2018     This report was finalized on 10/17/2018 10:21 PM by DR. Luis Ayoub MD.       XR Chest 1 View [[id number]] Collected:  10/15/18 0511     Updated:  10/15/18 0649    Narrative:       EXAM:    XR Chest, 1 View    EXAM DATE/TIME:    10/15/2018 5:11 AM    CLINICAL HISTORY:    48 years old, female; Hypomagnesemia; Hypokalemia; Cyclical vomiting,   intractable; Abnormal weight loss; Signs and symptoms; Cough    TECHNIQUE:    Single upright AP portable chest at 6:35 AM.    COMPARISON:    No prior studies available.    FINDINGS:    No acute infiltrate, pulmonary edema, pneumothorax, or significant pleural   effusion.  Cardiomediastinal silhouette within normal limits.  Mild degenerative changes of the spine.  No acute bone abnormality.      Impression:         No evidence of acute cardiopulmonary disease.    THIS DOCUMENT HAS BEEN ELECTRONICALLY SIGNED BY RAFI VELASQUEZ MD                           Discharge Details        Discharge Medications      New Medications      Instructions Start Date   acetaminophen 325 MG tablet  Commonly known as:  TYLENOL   650 mg, Oral, Every 4 Hours PRN      b complex-vitamin c-folic acid 0.8 MG tablet tablet   1 tablet, Oral, Daily      cetirizine 10 MG tablet  Commonly known as:  zyrTEC   10 mg, Oral, Daily      gabapentin 100 MG capsule  Commonly known as:  NEURONTIN    100 mg, Oral, Nightly      latanoprost 0.005 % ophthalmic solution  Commonly known as:  XALATAN   1 drop, Both Eyes, Nightly      lisinopril 5 MG tablet  Commonly known as:  PRINIVIL,ZESTRIL   5 mg, Oral, Every 12 Hours Scheduled      melatonin 5 MG sublingual tablet sublingual tablet   5 mg, Sublingual, Nightly      metoprolol tartrate 25 MG tablet  Commonly known as:  LOPRESSOR   25 mg, Oral, Every 12 Hours Scheduled      montelukast 5 MG chewable tablet  Commonly known as:  SINGULAIR   5 mg, Oral, Nightly      polyethylene glycol pack packet  Commonly known as:  MIRALAX   17 g, Oral, Daily      QUEtiapine 25 MG tablet  Commonly known as:  SEROquel   12.5 mg, Oral, Every 12 Hours Scheduled         Continue These Medications      Instructions Start Date   atenolol 25 MG tablet  Commonly known as:  TENORMIN   50 mg, Oral, 2 Times Daily      atorvastatin 10 MG tablet  Commonly known as:  LIPITOR   10 mg, Oral, Daily      DULoxetine 60 MG capsule  Commonly known as:  CYMBALTA   60 mg, Oral, Daily      magnesium oxide 400 (241.3 Mg) MG tablet tablet  Commonly known as:  MAGOX   400 mg, Oral, 2 Times Daily      omeprazole 20 MG capsule  Commonly known as:  priLOSEC   20 mg, Oral, Daily      ondansetron 4 MG tablet  Commonly known as:  ZOFRAN   4 mg, Oral, Every 8 Hours PRN      promethazine 25 MG tablet  Commonly known as:  PHENERGAN   25 mg, Oral, Every 6 Hours PRN         Stop These Medications    amitriptyline 10 MG tablet  Commonly known as:  ELAVIL     celecoxib 200 MG capsule  Commonly known as:  CeleBREX     doxycycline 100 MG capsule  Commonly known as:  VIBRAMYCIN     HYDROcodone-acetaminophen 5-325 MG per tablet  Commonly known as:  NORCO     l-methylfolate 15 MG tablet tablet     levocetirizine 5 MG tablet  Commonly known as:  XYZAL     metoclopramide 5 MG tablet  Commonly known as:  REGLAN     potassium chloride 10 MEQ CR tablet  Commonly known as:  K-DURKLOR-CON     prochlorperazine 10 MG tablet  Commonly  known as:  COMPAZINE     SUMAtriptan 50 MG tablet  Commonly known as:  IMITREX     tiZANidine 4 MG tablet  Commonly known as:  ZANAFLEX     vitamin D 29095 units capsule capsule  Commonly known as:  ERGOCALCIFEROL     zonisamide 25 MG capsule  Commonly known as:  ZONEGRAN              Discharge Disposition:  Rehab Facility or Unit (DC - External)    Discharge Diet:  Diet Instructions     Diet: Regular; Thin       Discharge Diet:  Regular    Fluid Consistency:  Thin          Discharge Activity:   Activity Instructions     Activity as Tolerated             Code Status/Level of Support:  Code Status and Medical Interventions:   Ordered at: 10/15/18 0406     Level Of Support Discussed With:    Patient     Code Status:    CPR     Medical Interventions (Level of Support Prior to Arrest):    Full       No future appointments.    Additional Instructions for the Follow-ups that You Need to Schedule     Ambulatory Referral to Home Health    As directed      Face to Face Visit Date:  10/17/2018    Follow-up Provider for Plan of Care?:  I treated the patient in an acute care facility and will not continue treatment after discharge.    Follow-up Provider:  NETO CRISOSTOMO [[id number]]    Reason/Clinical Findings:  weakness Intractable nausea and vomiting    Describe mobility limitations that make leaving home difficult:  impaired mobility    Nursing/Therapeutic Services Requested:  Physical Therapy    PT orders:  Total joint pathway Therapeutic exercise Gait Training Transfer training Strengthening    Weight Bearing Status:  As Tolerated    Frequency:  1 Week 1         Discharge Follow-up with PCP    As directed      Currently Documented PCP:  Neto Crisostomo MD  PCP Phone Number:  [phone]    Follow Up Details:  once discharged from rehab.         Discharge Follow-up with Specified Provider: Nephrology in [place]    As directed      To:  Nephrology in [place]    Follow Up Details:  3-4 weeks.  Recommend labs.                Time Spent on Discharge:  45 minutes    Electronically signed by CARRIE Cottrell, 11/07/18, 2:50 PM.

## 2018-11-07 NOTE — PROGRESS NOTES
Case Management Discharge Note    Final Note: Per Lucero at [facility], [facility] has approved pre-cert for patient to transfer to an acute rehab bed there today. CM spoke with Hallie BUTLER, patient & spouse, and bedside nurse to notify. Spouse will transport patient. Per Lucero, patient will need to be there by 6:00 PM.     Nurse to call report to P: [phone]. Transfer summary to be faxed to F: [phone]. Thank you. Andressa Kirkpatrick, GUERO [phone]      Destination - Selection Complete     Service Request Status Selected Specialties Address Phone Number Fax Number    [facility] Selected Rehabilitation [address] [place] [id number] [phone]      Durable Medical Equipment     No service has been selected for the patient.      Dialysis/Infusion     No service has been selected for the patient.      Home Medical Care     No service has been selected for the patient.      Social Care     No service has been selected for the patient.             Final Discharge Disposition Code: 62 - inpatient rehab facility

## 2018-11-07 NOTE — PROGRESS NOTES
"Clinical Nutrition   Reason For Visit: Follow-up protocol    Patient Name: Bryanna Schwartz  YOB: 1970  MRN: [id number]  Date of Encounter: 11/07/18 3:48 PM  Admission date: 10/14/2018    Nutrition Assessment     Admission Problem List:  Intractable nausea/vomiting  Acute UTI  Anxiety/depression  Falls  Recent rx for jaleesa mountain spotted fever and lyme disease    Applicable Nutrition-Related Information:  Chronic Illness Malnutrition per RD note (10/19)      PMH: She  has a past medical history of Arthritis; Depression; Environmental allergies; Falls (10/24/2018); GERD (gastroesophageal reflux disease); Hyperlipidemia; Hypertension; Lyme disease; Migraine; Jaleesa Mountain spotted fever; and Vitamin B 12 deficiency.   PSxH: She  has a past surgical history that includes Cholecystectomy; Achilles tendon surgery (Right); Hysterectomy; Breast cyst excision (Left); and Esophagogastroduodenoscopy (N/A, 10/16/2018).        Reported/Observed/Food/Nutrition Related History     Patient and  in room at visit.  reports patients appetite has been fair. Patient states she used to love beef/red meat and eat it all the time, however since she was treated for Lyme disease/RMSF she thinks her taste buds have changed and she can no longer tolerate it. Discussed sending message to kitchen to relay patients preferences. Reports she likes the Boost Breeze supplement and drinks it well. No other preferences at this time.      Anthropometrics   Height: 175.3 cm (69\")  Weight: 75.5 kg (166 lb 6.4 oz) -standing scale weight per charting (10/19)  BMI: 24.57 kg/m²  BMI classification: Normal: 18.5-24.9kg/m2     Labs reviewed   Labs reviewed: Yes    Results from last 7 days  Lab Units 11/07/18  0404 11/02/18  0354 11/01/18  0610   SODIUM mmol/L 142 139 139   POTASSIUM mmol/L 4.5 3.8 3.6   CHLORIDE mmol/L 106 105 107   CO2 mmol/L 28.0 24.0 25.0   BUN mg/dL 13 6* 9   CREATININE mg/dL 0.78 0.61 0.61   GLUCOSE mg/dL " 111* 95 96   CALCIUM mg/dL 9.5 9.0 8.9     Medications reviewed   Medications reviewed: Yes    Current Nutrition Prescription   PO: Diet Regular  Oral Nutrition Supplement:    Evaluation of Received Nutrient/Fluid Intake:  4 Days: 40% of 5 meals from (11/5 - 11/7)       Nutrition Diagnosis     (10/19) updated (11/7)  Problem Inadequate oral intake   Etiology Altered mental status; clinical condition   Signs/Symptoms PO Intake (40% of 5 meals)   Status: ongoing    (10/19)  Problem Malnutrition (Severe, Chronic)   Etiology Medical dx   Signs/Symptoms Weight/intake hx and moderate muscle wasting   Status: ongoing    Intervention   Intervention: Follow treatment progress, Care plan reviewed      Goal:   General: Nutrition support treatment  PO: Increase intake      Monitoring/Evaluation:       Monitoring/Evaluation: Per protocol, PO intake, Supplement intake, Symptoms  Will Continue to follow per protocol  Marsha Becerril  Time Spent: 30 minutes

## 2018-11-07 NOTE — PROGRESS NOTES
Continued Stay Note  [facility]     Patient Name: Bryanna Schwartz  MRN: [id number]  Today's Date: 11/7/2018    Admit Date: 10/14/2018          Discharge Plan     Row Name 11/07/18 1048       Plan    Plan Transfer to [facility] pending insurance pre-cert    Patient/Family in Agreement with Plan yes    Plan Comments CM [facility] with [facility] and pre-cert with patient's [facility] Medicaid is still pending (was initiated 11/5). She will follow up with [facility] and notify me when pre-cert comes through. CM updated patient and spouse at bedside. Spouse Michael confirms he can transport patient at time of discharge. Andressa Kirkpatrick RN [phone]              Discharge Codes    No documentation.           Andressa Kirkpatrick RN

## 2018-11-07 NOTE — PLAN OF CARE
Problem: Patient Care Overview  Goal: Plan of Care Review  Outcome: Ongoing (interventions implemented as appropriate)   11/07/18 5341   Coping/Psychosocial   Plan of Care Reviewed With patient;spouse   Plan of Care Review   Progress no change   OTHER   Outcome Summary Pt limited by pain, tolerated sitting EOB, mod A x2 return to supine, CGA for dynamic sitting balance, tolerated BUE HEP with AAROM at times to coordinate gross motor control. Cont IPOT per POC

## 2018-11-07 NOTE — DISCHARGE PLACEMENT REQUEST
"Andressa Kirkpatrick RN  Case Management  P: [phone]    Discharge summary attached    Bryanna Merrill (48 y.o. Female)     Date of Birth Social Security Number Address Home Phone MRN    1970  [id number] [facility] [address] [phone] [id number]    Sabianist Marital Status          None Unknown       Admission Date Admission Type Admitting Provider Attending Provider Department, Room/Bed    10/14/18 Emergency Sherri Jackson DO Barbato, Hayley R, DO [address], S455/1    Discharge Date Discharge Disposition Discharge Destination         Rehab Facility or Unit (DC - External)              Attending Provider:  Sherri Jackson DO    Allergies:  No Known Allergies    Isolation:  None   Infection:  None   Code Status:  CPR    Ht:  175.3 cm (69\")   Wt:  75.5 kg (166 lb 6.4 oz)    Admission Cmt:  None   Principal Problem:  Intractable nausea and vomiting [R11.2]                 Active Insurance as of 10/14/2018     Primary Coverage     Payor Plan Insurance Group Employer/Plan Group    [employer] [place] [employer] [place]ICA[place]      Payor Plan Address Payor Plan Phone Number Effective From Effective To    [address] [address] [phone] 8/16/2018     [facility] [address]       Subscriber Name Subscriber Birth Date Member ID       BRYANNA MERRILL 1970 [id number]                 Emergency Contacts      (Rel.) Home Phone Work Phone Mobile Phone    Michael Merrill (Spouse) [phone] -- --    Chanelle Caruso (Relative) [phone] -- --    Padma Villalta (Sister) [phone] -- --               Discharge Summary      Hallie Bonner APRN at 11/7/2018  2:50 PM     Attestation signed by Sherri Jackson DO at 11/7/2018  3:15 PM      Kashif Aviles I supervised care of the patient on day of discharge with direct care provided by the advanced care provider (APC).    Electronically signed by Sherri Jackson DO, 11/07/18, 3:15 PM.                          [facility], [place], operated by [facility]" [facility] Medicine Services  DISCHARGE SUMMARY    Patient Name: Bryanna Schwartz  : 1970  MRN: [id number]    Date of Admission: 10/14/2018  Date of Discharge:  2018  Primary Care Physician: Andrei Crisostomo MD    Consults     Date and Time Order Name Status Description    10/18/2018 1412 Inpatient Nephrology Consult Completed     10/18/2018 1223 Inpatient Neurology Consult General Completed     10/15/2018 0510 Inpatient Gastroenterology Consult Completed         Hospital Course     Presenting Problem:   Acute hypokalemia [E87.6]  Acute hypokalemia [E87.6]    Active Hospital Problems    Diagnosis Date Noted   • **Intractable nausea and vomiting [R11.2] 10/15/2018   • Acute UTI (urinary tract infection) [N39.0] 10/27/2018   • Falls [W19.XXXA] 10/24/2018   • GERD without esophagitis [K21.9] 10/15/2018   • Anxiety and depression [F41.9, F32.9] 10/15/2018   • Hyperlipidemia [E78.5] 10/15/2018   • Essential hypertension [I10] 10/15/2018   • Migraines [G43.909] 10/15/2018   • Lyme disease [A69.20] 10/15/2018   • Hypokalemia [E87.6] 10/15/2018   • Hypomagnesemia [E83.42] 10/15/2018   • Acute hypokalemia [E87.6] 10/15/2018   • Abnormal CT scan, stomach [R93.3] 10/14/2018   • Intractable vomiting with nausea [R11.2] 10/14/2018      Resolved Hospital Problems    Diagnosis Date Noted Date Resolved   No resolved problems to display.          Hospital Course:  Bryanna Schwartz is a 48 y.o. femaleWith a past medical history of anxiety, depression, hypertension, hyperlipidemia, migraines, recent treatment for RMSF and Lyme disease.  The patient presented to [facility] with complaints of 6 months of intractable nausea and vomiting that had been progressively worsening with reported weight loss of 120 pounds.  CT of the abdomen and pelvis showed thickening of the proximal gastric mucosa.  She was seen in consultation by gastroenterology and underwent an EGD on 10/16/2018 which showed  antral gastritis with reflux esophagitis.  She was placed on Protonix 40 mg twice daily.  Symptoms and improved markedly and she has been able to tolerate an oral diet well.  The patient was noted to have multiple electrolyte abnormalities and had her potassium and magnesium replaced on multiple occasions. There was some question about adrenal insufficiency, however random cortisol was within normal limits.  It was suspected that her multiple electrolyte abnormalities were likely due to her prolonged nausea and vomiting.  This is now resolved.  The patient was noted to have a urinary tract infection on admission which likely contributed to her nausea and vomiting.  She grew out 100k of Proteus as well as 40-50k of Escherichia coli.  She is now status post 5 days of IV Rocephin.  Her last dose was on October 22. The patient struggled with uncontrolled hypertension during her hospitalization, despite multiple medication regimen.  She was seen in consultation by nephrology and worked up for pheochromocytoma.  She did have abnormally high urine metanephrines.She did undergo an MRI, which did not show any adrenal adenomas.  She is now medically stable on her current blood pressure regimen and will follow-up with nephrology in the clinic in [place] in 3-4 weeks with lab work.  The patient has had some ongoing issues with confusion/encephalopathy during this hospitalization. She was seen by neurology and her home medication list was paired down to only necessary medications.  She has continued with waxing and waning mental status.  She appears to be better when her  is at the bedside.  She has done well on low-dose Seroquel twice daily.  She has complained of some neuropathy pain in her lower extremities as well as her hands.  She was placed on a low-dose 100 mg gabapentin on day of discharge for bedtime only.  The patient is now medically stable and will be discharged to rehabilitation today by  family.          Day of Discharge     HPI:     Resting comfortably in bed this afternoon with  at bedside.  Alert and pleasant.  Continues with waxing and waning confusion.  Most of the time, she is answers appropriately.  Complaining of neuropathic pain in her feet and hands.      Review of Systems  Unreliable due to waxing and waning confusion     Gen- No fevers, chills  CV- No chest pain, palpitations  Resp- No cough, dyspnea  GI- No N/V/D, abd pain      Otherwise ROS is negative except as mentioned in the HPI.    Vital Signs:   Temp:  [98 °F (36.7 °C)-98.3 °F (36.8 °C)] 98 °F (36.7 °C)  Resp:  [14-16] 14  BP: (108-123)/(59-90) 123/90     Physical Exam:    Constitutional: No acute distress, awake, alert, resting in bed,  at bedside.   HENT: NCAT, mucous membranes moist  Respiratory: Clear to auscultation bilaterally, respiratory effort normal   Cardiovascular: RRR, no murmurs, rubs, or gallops, palpable pedal pulses bilaterally  Gastrointestinal: Positive bowel sounds, soft, nontender, nondistended  Musculoskeletal: No bilateral ankle edema  Psychiatric: Appropriate affect, cooperative  Neurologic: Oriented x 2 with waxing and waning confusion, strength symmetric in all extremities, Cranial Nerves grossly intact to confrontation, speech clear  Skin: No rashes      Pertinent  and/or Most Recent Results       Results from last 7 days  Lab Units 11/07/18  0404 11/02/18  0354 11/01/18  0610   WBC 10*3/mm3 8.61  --  7.67   HEMOGLOBIN g/dL 12.1  --  11.0*   HEMATOCRIT % 36.6  --  33.2*   PLATELETS 10*3/mm3 363  --  336   SODIUM mmol/L 142 139 139   POTASSIUM mmol/L 4.5 3.8 3.6   CHLORIDE mmol/L 106 105 107   CO2 mmol/L 28.0 24.0 25.0   BUN mg/dL 13 6* 9   CREATININE mg/dL 0.78 0.61 0.61   GLUCOSE mg/dL 111* 95 96   CALCIUM mg/dL 9.5 9.0 8.9           Invalid input(s): PROT, LABALBU        Invalid input(s): TG, LDLCALC, LDLREALC      Brief Urine Lab Results  (Last result in the past 365 days)      Color    Clarity   Blood   Leuk Est   Nitrite   Protein   CREAT   Urine HCG        10/25/18 0720 Yellow Clear Negative Negative Negative Negative               Microbiology Results Abnormal     Procedure Component Value - Date/Time    Urine Culture - Urine, [[id number]]  (Abnormal)  (Susceptibility) Collected:  10/18/18 1603    Lab Status:  Final result Specimen:  Urine from Urine, Clean Catch Updated:  10/21/18 1055     Urine Culture >100,000 CFU/mL Proteus mirabilis (A)      40,000-50,000 CFU/mL Escherichia coli (A)    Susceptibility      Proteus mirabilis    Escherichia coli       ARELI    ARELI       Ampicillin <=8 ug/ml Susceptible    <=8 ug/ml Susceptible       Ampicillin + Sulbactam <=8/4 ug/ml Susceptible    <=8/4 ug/ml Susceptible       Aztreonam <=8 ug/ml Susceptible    <=8 ug/ml Susceptible       Cefepime <=8 ug/ml Susceptible    <=8 ug/ml Susceptible       Cefotaxime <=2 ug/ml Susceptible    <=2 ug/ml Susceptible       Ceftriaxone <=8 ug/ml Susceptible    <=8 ug/ml Susceptible       Cefuroxime sodium <=4 ug/ml Susceptible    <=4 ug/ml Susceptible       Cephalothin <=8 ug/ml Susceptible    <=8 ug/ml Susceptible       Ertapenem <=1 ug/ml Susceptible    <=1 ug/ml Susceptible       Gentamicin <=4 ug/ml Susceptible    <=4 ug/ml Susceptible       Levofloxacin <=2 ug/ml Susceptible    <=2 ug/ml Susceptible       Meropenem <=1 ug/ml Susceptible    <=1 ug/ml Susceptible       Nitrofurantoin       <=32 ug/ml Susceptible       Piperacillin + Tazobactam <=16 ug/ml Susceptible    <=16 ug/ml Susceptible       Tetracycline       <=4 ug/ml Susceptible       Tobramycin <=4 ug/ml Susceptible    <=4 ug/ml Susceptible       Trimethoprim + Sulfamethoxazole <=2/38 ug/ml Susceptible    <=2/38 ug/ml Susceptible                      Blood Culture - Blood, [[id number]]  (Normal) Collected:  10/15/18 0029    Lab Status:  Final result Specimen:  Blood from Arm, Right Updated:  10/20/18 0130     Blood Culture No growth at 5 days    Blood Culture -  Blood, [[id number]]  (Normal) Collected:  10/15/18 0029    Lab Status:  Final result Specimen:  Blood from Arm, Right Updated:  10/20/18 0130     Blood Culture No growth at 5 days    Eosinophil Smear - Urine, Urine, Clean Catch [[id number]]  (Normal) Collected:  10/18/18 1603    Lab Status:  Final result Specimen:  Urine from Urine, Clean Catch Updated:  10/18/18 1802     Eosinophil Smear 0 % EOS/100 Cells     Narrative:       No eosinophil seen          Imaging Results (all)     Procedure Component Value Units Date/Time    MRI Abdomen With & Without Contrast [[id number]] Collected:  10/30/18 1151     Updated:  10/30/18 1238    Narrative:       EXAMINATION: MRI ABDOMEN W WO CONTRAST-10/29/2018:     INDICATION: Pheochromocytoma; E87.6-Hypokalemia; E83.42-Hypomagnesemia;  R63.4-Abnormal weight loss; G43.A1-Cyclical vomiting, intractable;  R93.3-Abnormal findings on diagnostic imaging of other parts of  digestive tract; R11.2-Nausea with vomiting, unspecified; Z74.09-Other  reduced mobility; Z74.09-Other reduced mobility.      TECHNIQUE: Multiplanar MRI of the abdomen with and without intravenous  contrast administration.     COMPARISON: NONE.     FINDINGS: The lung bases are grossly clear. Asymmetric elevation of the  left hemidiaphragm noted. Small subcentimeter areas of T2 hyperintense  signal within the right hepatic lobe without abnormal enhancement  associated consistent with hepatic cysts. No abnormal enhancing lesion  within the liver parenchyma. Gallbladder surgically absent. No  intrahepatic or extrahepatic biliary dilatation with normal caliber  smooth tapering common bile duct to the level of the ampulla. No  pancreas divisum anatomy. Pancreas and spleen unremarkable without  abnormal enhancing soft tissue lesion. Adrenals without distinct nodule  or abnormal thickening/enhancement. Kidneys without hydronephrosis or  hydroureter. No abnormal enhancing paraspinal mass lesion. No bulky  retroperitoneal or upper  abdominal adenopathy. Patent nonaneurysmal  abdominal aorta. Portal veins and IVC patent. Partially visualized GI  tract grossly unremarkable without focal thickening or disproportionate  dilatation. No ascites. No aggressive bone marrow signal abnormality or  soft tissue body wall findings.       Impression:       1.  No discrete adrenal mass lesion or abnormal thickening/enhancement  of the adrenal glands or paraspinal soft tissues identified.  2.  Small T2 hyperintense foci within the liver without associated  enhancement consistent with fluid signal hepatic cysts. No abnormal  enhancing lesion within the liver parenchyma is identified.     D:  10/30/2018  E:  10/30/2018            This report was finalized on 10/30/2018 12:35 PM by Dr. Loi Velasco.       XR Chest 1 View [[id number]] Collected:  10/24/18 1909     Updated:  10/24/18 2346    Narrative:       EXAM:    XR Chest, 1 View     EXAM DATE/TIME:    10/24/2018 7:09 PM     CLINICAL HISTORY:    48 years old, female; Hypomagnesemia; Hypokalemia; Cyclical vomiting,   intractable; Nausea with vomiting, unspecified; Abnormal weight loss; Abnormal   findings on diagnostic imaging of other parts of digestive tract; Other reduced   mobility; Other reduced mobility; Signs and symptoms; Other:   Delirium/encephalopathy/weakness     TECHNIQUE:    XR of the chest, 1 view.     COMPARISON:    CR XR CHEST 1 VW 10/15/2018 6:35 AM     FINDINGS:     Heart size within normal limits. Mild right basilar atelectasis, increased from   prior study. The left lung is clear. No visible pneumothorax or pleural   effusion. Pulmonary vasculature within normal limits.      Impression:       1. No radiographic findings of acute cardiopulmonary disease.    THIS DOCUMENT HAS BEEN ELECTRONICALLY SIGNED BY MACI TAVERAS MD    XR Foot 3+ View Left [[id number]] Collected:  10/24/18 1018     Updated:  10/24/18 1521    Narrative:       EXAMINATION: XR FOOT 3+ VW, LEFT-10/24/2018:      INDICATION:  Pain; E87.6-Hypokalemia; E83.42-Hypomagnesemia;  R63.4-Abnormal weight loss; G43.A1-Cyclical vomiting, intractable;  R93.3-Abnormal findings on diagnostic imaging of other parts of  digestive tract; R11.2-Nausea with vomiting, unspecified; Z74.09-Other  reduced mobility; Z74.09-Other reduced mobility.      COMPARISON: NONE.     FINDINGS: Three views of the left foot reveal no evidence of acute bony  abnormality. The cortex is intact. Joint spaces are preserved. No soft  tissue abnormality identified.           Impression:       No acute bony abnormality.     D:  10/24/2018  E:  10/24/2018     This report was finalized on 10/24/2018 3:19 PM by Dr. Oliva Cee MD.       MRI Cervical Spine Without Contrast [[id number]] Collected:  10/19/18 1006     Updated:  10/19/18 1113    Narrative:       EXAMINATION: MRI CERVICAL SPINE WO CONTRAST- 10/18/2018     INDICATION: E87.6-Hypokalemia; E83.42-Hypomagnesemia; R63.4-Abnormal  weight loss; G43.A1-Cyclical vomiting, intractable; R93.3-Abnormal  findings on diagnostic imaging of other parts of digestive tract;  R11.2-Nausea with vomiting, unspecified; Z74.09-Other reduced mobility     TECHNIQUE: Multiplanar MRI of the cervical spine without intravenous  contrast administration     COMPARISON: NONE     FINDINGS: Severely motion degraded despite multiple maneuvers and repeat  imaging, within limits of the study.      Sagittal datasets without focal subluxation demonstrating mild  flattening of the cervical lordotic curvature. No aggressive bone marrow  signal abnormality is identified. Cervicomedullary junction widely  patent. Axial datasets evaluation for soft tissue findings without  paraspinal soft tissue abnormality or abnormality of the lung apices.     Axial datasets evaluation on repeat T2 axial sequences for degenerative  changes with level by level details as follows:  C2-C3: Minimal disc osteophyte complex along with minimal uncovertebral  spurring and hypertrophy  producing mild left neuroforaminal stenosis.  C3-C4: Moderate broad disc osteophyte complex in a fairly to symmetric  distribution with uncovertebral spurring and hypertrophy producing mild  anterior thecal sac effacement and spinal canal stenosis with mild right  and mild to moderate left neuroforaminal stenosis.  C4-C5: Mild disc osteophyte complex along with uncovertebral spurring  and hypertrophy producing mild to moderate bilateral neuroforaminal  stenoses without significant spinal canal stenosis.  C5-C6: Minimal disc osteophyte complex along with uncovertebral spurring  and hypertrophy producing mild right and mild to moderate left  neuroforaminal stenosis with mild spinal canal stenosis.  C6-C7: Mild to moderate disc osteophyte complex in a fairly symmetric  distribution with significant motion artifact producing moderate right  and mild to moderate left neuroforaminal stenosis.       Impression:       Severely motion degraded examination without severe spinal  canal stenosis at any level. Multilevel degenerative changes greatest at  the C4-C5 and C5-C6 level. At the C4-C5 level there is mild to moderate  bilateral neuroforaminal stenoses however no significant spinal canal  stenosis. At the C5-C6 level there is mild right and mild to moderate  left neuroforaminal stenoses with mild spinal canal stenosis. No  intrinsic cord signal abnormality to suggest cord edema or myelomalacia.     D:  10/19/2018  E:  10/19/2018         This report was finalized on 10/19/2018 11:11 AM by Dr. Loi Velasco.       MRI Thoracic Spine Without Contrast [[id number]] Collected:  10/19/18 0831     Updated:  10/19/18 0948    Narrative:       EXAMINATION: MRI THORACIC SPINE WO CONTRAST-10/18/2018:     INDICATION: Vibration and position sense loss; E87.6-Hypokalemia;  E83.42-Hypomagnesemia; R63.4-Abnormal weight loss; G43.A1-Cyclical  vomiting, intractable; R93.3-Abnormal findings on diagnostic imaging of  other parts of digestive  tract; R11.2-Nausea with vomiting, unspecified;  Z74.09-Other reduced mobility; Z74.09-Other reduced mobility.      TECHNIQUE: Multiplanar MRI of the thoracic spine without intravenous  contrast administration.     COMPARISON: NONE.     FINDINGS: Sagittal datasets reveal grossly normal alignment of the  thoracic curvature with noted retropulsion of the T12 level where there  is compression deformity noted and superior endplate cortical  irregularity. Approximately 7 mm of retropulsion as measured on sagittal  imaging with approximately 25-50% height loss anteriorly with anterior  wedging. On STIR datasets there is increased signal involving the  superior endplate suggesting components of bone marrow edema and/or  degeneration. No other sites of compression deformity or aggressive bone  marrow signal are otherwise identified. Axial dataset evaluation for  degenerative changes and paraspinal soft tissue findings without  paraspinal hematoma or soft tissue abnormality of concern. No  significant spinal canal narrowing or evidence for compromised  perineural fat signal/stenosis on sagittal dataset comparison. At the  level of the T12 compression fracture deformity there is mild anterior  thecal sac effacement and spinal canal stenosis, however, no significant  neuroforaminal stenosis. No intrinsic cord signal abnormality is seen  throughout the visualized lower cervical or thoracic cord. Scattered T2  hyperintense foci within the liver, primarily the right hepatic lobe,  which is partially seen consistent with hepatic cysts.       Impression:       1. T12 compression fracture deformity with superior endplate  irregularity and approximately 25-50% height loss anteriorly. Mild  retropulsion of approximately 7 mm without evidence for severe spinal  canal stenosis or cord indentation at this level. No intrinsic cord  signal abnormality at this level or elsewhere. Minimal increased STIR  signal associated with endplate  irregularity may represent bone marrow  edema of indeterminate acuity.  2. No severe spinal canal or neuroforaminal stenosis.     D:  10/19/2018  E:  10/19/2018            This report was finalized on 10/19/2018 9:46 AM by Dr. Loi Velasco.       CT Abdomen Pelvis With Contrast [[id number]] Collected:  10/16/18 1433     Updated:  10/17/18 2223    Narrative:       EXAMINATION: CT ABDOMEN PELVIS W CONTRAST- 10/15/2018     INDICATION: nausea, vomiting, early satiety, unintentional weight loss;  E87.6-Hypokalemia; E83.42-Hypomagnesemia; R63.4-Abnormal weight loss;  G43.A1-Cyclical vomiting, intractable         TECHNIQUE: 5 mm post IV contrast and oral contrast portal venous phase  and delayed venous phase images through the abdomen and pelvis.     The radiation dose reduction device was turned on for each scan per the  ALARA (As Low as Reasonably Achievable) protocol.     COMPARISON: NONE     FINDINGS: Patient history indicates nausea, vomiting, early satiety and  unintentional weight loss.     There is minimal dependent atelectasis in the posterior lower lobes.  There is expected degree of fatty liver change for body habitus. The  gallbladder is surgically absent. The spleen is not enlarged. No  significant abnormalities are noted of the pancreas, adrenal glands, or  kidneys. Subjectively, there appears to be mild thickening of the  proximal stomach although this may be due to nondistention. There is  somewhat more focal thickened appearance of the gastric antrum. No  perigastric inflammatory change is seen. No upper abdominal free air,  ascites or adenopathy is appreciated. A couple of loops of proximal  jejunum appears slightly thick-walled but this may also be due to  nondistention.     Regarding the lower abdomen and pelvis, oral contrast has passed to the  level of the right colon. The colon contains only fluid but is not  thick-walled. No intrapelvic inflammatory change is seen. The bladder is  compressed by a  Moralez balloon catheter. No mass or adenopathy is seen.  Uterus and ovaries are either markedly atrophic or surgically absent.  Bony structures appear intact.       Impression:       1. Questionable thickening of the proximal gastric mucosa and somewhat  stronger suggestion of antral mucosal thickening. Please correlate with  patient's symptoms.   2. Fluid filled but otherwise normal-appearing colon, nonspecific except  for diarrhea.   3. Questionable mild mucosal thickening of a few loops of proximal  jejunum.  4. No other evidence of acute intraabdominal or intrapelvic disease is  seen.     D:  10/16/2018  E:  10/16/2018     This report was finalized on 10/17/2018 10:21 PM by DR. Luis Ayoub MD.       XR Chest 1 View [[id number]] Collected:  10/15/18 0511     Updated:  10/15/18 0649    Narrative:       EXAM:    XR Chest, 1 View    EXAM DATE/TIME:    10/15/2018 5:11 AM    CLINICAL HISTORY:    48 years old, female; Hypomagnesemia; Hypokalemia; Cyclical vomiting,   intractable; Abnormal weight loss; Signs and symptoms; Cough    TECHNIQUE:    Single upright AP portable chest at 6:35 AM.    COMPARISON:    No prior studies available.    FINDINGS:    No acute infiltrate, pulmonary edema, pneumothorax, or significant pleural   effusion.  Cardiomediastinal silhouette within normal limits.  Mild degenerative changes of the spine.  No acute bone abnormality.      Impression:         No evidence of acute cardiopulmonary disease.    THIS DOCUMENT HAS BEEN ELECTRONICALLY SIGNED BY RAFI VELASQUEZ MD                           Discharge Details        Discharge Medications      New Medications      Instructions Start Date   acetaminophen 325 MG tablet  Commonly known as:  TYLENOL   650 mg, Oral, Every 4 Hours PRN      b complex-vitamin c-folic acid 0.8 MG tablet tablet   1 tablet, Oral, Daily      cetirizine 10 MG tablet  Commonly known as:  zyrTEC   10 mg, Oral, Daily      gabapentin 100 MG capsule  Commonly known as:  NEURONTIN    100 mg, Oral, Nightly      latanoprost 0.005 % ophthalmic solution  Commonly known as:  XALATAN   1 drop, Both Eyes, Nightly      lisinopril 5 MG tablet  Commonly known as:  PRINIVIL,ZESTRIL   5 mg, Oral, Every 12 Hours Scheduled      melatonin 5 MG sublingual tablet sublingual tablet   5 mg, Sublingual, Nightly      metoprolol tartrate 25 MG tablet  Commonly known as:  LOPRESSOR   25 mg, Oral, Every 12 Hours Scheduled      montelukast 5 MG chewable tablet  Commonly known as:  SINGULAIR   5 mg, Oral, Nightly      polyethylene glycol pack packet  Commonly known as:  MIRALAX   17 g, Oral, Daily      QUEtiapine 25 MG tablet  Commonly known as:  SEROquel   12.5 mg, Oral, Every 12 Hours Scheduled         Continue These Medications      Instructions Start Date   atenolol 25 MG tablet  Commonly known as:  TENORMIN   50 mg, Oral, 2 Times Daily      atorvastatin 10 MG tablet  Commonly known as:  LIPITOR   10 mg, Oral, Daily      DULoxetine 60 MG capsule  Commonly known as:  CYMBALTA   60 mg, Oral, Daily      magnesium oxide 400 (241.3 Mg) MG tablet tablet  Commonly known as:  MAGOX   400 mg, Oral, 2 Times Daily      omeprazole 20 MG capsule  Commonly known as:  priLOSEC   20 mg, Oral, Daily      ondansetron 4 MG tablet  Commonly known as:  ZOFRAN   4 mg, Oral, Every 8 Hours PRN      promethazine 25 MG tablet  Commonly known as:  PHENERGAN   25 mg, Oral, Every 6 Hours PRN         Stop These Medications    amitriptyline 10 MG tablet  Commonly known as:  ELAVIL     celecoxib 200 MG capsule  Commonly known as:  CeleBREX     doxycycline 100 MG capsule  Commonly known as:  VIBRAMYCIN     HYDROcodone-acetaminophen 5-325 MG per tablet  Commonly known as:  NORCO     l-methylfolate 15 MG tablet tablet     levocetirizine 5 MG tablet  Commonly known as:  XYZAL     metoclopramide 5 MG tablet  Commonly known as:  REGLAN     potassium chloride 10 MEQ CR tablet  Commonly known as:  K-DURKLOR-CON     prochlorperazine 10 MG tablet  Commonly  known as:  COMPAZINE     SUMAtriptan 50 MG tablet  Commonly known as:  IMITREX     tiZANidine 4 MG tablet  Commonly known as:  ZANAFLEX     vitamin D 48318 units capsule capsule  Commonly known as:  ERGOCALCIFEROL     zonisamide 25 MG capsule  Commonly known as:  ZONEGRAN              Discharge Disposition:  Rehab Facility or Unit (DC - External)    Discharge Diet:  Diet Instructions     Diet: Regular; Thin       Discharge Diet:  Regular    Fluid Consistency:  Thin          Discharge Activity:   Activity Instructions     Activity as Tolerated             Code Status/Level of Support:  Code Status and Medical Interventions:   Ordered at: 10/15/18 0406     Level Of Support Discussed With:    Patient     Code Status:    CPR     Medical Interventions (Level of Support Prior to Arrest):    Full       No future appointments.    Additional Instructions for the Follow-ups that You Need to Schedule     Ambulatory Referral to Home Health    As directed      Face to Face Visit Date:  10/17/2018    Follow-up Provider for Plan of Care?:  I treated the patient in an acute care facility and will not continue treatment after discharge.    Follow-up Provider:  NETO CRISOSTOMO [[id number]]    Reason/Clinical Findings:  weakness Intractable nausea and vomiting    Describe mobility limitations that make leaving home difficult:  impaired mobility    Nursing/Therapeutic Services Requested:  Physical Therapy    PT orders:  Total joint pathway Therapeutic exercise Gait Training Transfer training Strengthening    Weight Bearing Status:  As Tolerated    Frequency:  1 Week 1         Discharge Follow-up with PCP    As directed      Currently Documented PCP:  Neto Crisostomo MD  PCP Phone Number:  [phone]    Follow Up Details:  once discharged from rehab.         Discharge Follow-up with Specified Provider: Nephrology in [place]    As directed      To:  Nephrology in [place]    Follow Up Details:  3-4 weeks.  Recommend labs.                Time Spent on Discharge:  45 minutes    Electronically signed by CARRIE Cottrell, 11/07/18, 2:50 PM.        Electronically signed by Sherri Jackson DO at 11/7/2018  3:15 PM

## 2018-11-08 NOTE — THERAPY DISCHARGE NOTE
Acute Care - Physical Therapy Discharge Summary  [facility]       Patient Name: Bryanna Schwartz  : 1970  MRN: [id number]    Today's Date: 2018  Onset of Illness/Injury or Date of Surgery: 10/14/18    Date of Referral to PT: 10/16/18  Referring Physician: MD Velasquez      Admit Date: 10/14/2018      PT Recommendation and Plan    Visit Dx:    ICD-10-CM ICD-9-CM   1. Acute hypokalemia E87.6 276.8   2. Hypomagnesemia E83.42 275.2   3. Unintentional weight loss R63.4 783.21   4. Intractable cyclical vomiting with nausea G43.A1 536.2   5. Abnormal CT scan, stomach R93.3 793.4   6. Intractable vomiting with nausea, unspecified vomiting type R11.2 536.2   7. Impaired functional mobility, balance, gait, and endurance Z74.09 V49.89   8. Impaired mobility and ADLs Z74.09 799.89             Outcome Measures     Row Name 18 1300 18 1316 18 1057       How much help from another person do you currently need...    Turning from your back to your side while in flat bed without using bedrails?  -- 4  -EH  --    Moving from lying on back to sitting on the side of a flat bed without bedrails?  -- 4  -EH  --    Moving to and from a bed to a chair (including a wheelchair)?  -- 2  -EH  --    Standing up from a chair using your arms (e.g., wheelchair, bedside chair)?  -- 2  -EH  --    Climbing 3-5 steps with a railing?  -- 2  -EH  --    To walk in hospital room?  -- 2  -EH  --    AM-PAC 6 Clicks Score  -- 16  -EH  --       How much help from another is currently needed...    Putting on and taking off regular lower body clothing? 2  -KF  -- 2  -KF    Bathing (including washing, rinsing, and drying) 2  -KF  -- 2  -KF    Toileting (which includes using toilet bed pan or urinal) 2  -KF  -- 2  -KF    Putting on and taking off regular upper body clothing 3  -KF  -- 3  -KF    Taking care of personal grooming (such as brushing teeth) 3  -KF  -- 3  -KF    Eating meals 3  -KF  -- 3  -KF    Score 15  -KF  -- 15  -KF        Functional Assessment    Outcome Measure Options AM-PAC 6 Clicks Daily Activity (OT)  -KF AM-PAC 6 Clicks Basic Mobility (PT)  -EH AM-PAC 6 Clicks Daily Activity (OT)  -KF      User Key  (r) = Recorded By, (t) = Taken By, (c) = Cosigned By    Initials Name Provider Type     Bethany Gonzalez, PT Physical Therapist    KF Sola Pradhan, OT Occupational Therapist            Therapy Suggested Charges     Code   Minutes Charges    47198 (CPT®) Hc Pt Neuromusc Re Education Ea 15 Min      25106 (CPT®) Hc Pt Ther Proc Ea 15 Min 8 1    12600 (CPT®) Hc Gait Training Ea 15 Min 15 1    59295 (CPT®) Hc Pt Therapeutic Act Ea 15 Min      77216 (CPT®) Hc Pt Manual Therapy Ea 15 Min      16435 (CPT®) Hc Pt Iontophoresis Ea 15 Min      14451 (CPT®) Hc Pt Elec Stim Ea-Per 15 Min      98197 (CPT®) Hc Pt Ultrasound Ea 15 Min      13047 (CPT®) Hc Pt Self Care/Mgmt/Train Ea 15 Min      35467 (CPT®) Hc Pt Prosthetic (S) Train Initial Encounter, Each 15 Min      97221 (CPT®) Hc Pt Orthotic(S)/Prosthetic(S) Encounter, Each 15 Min      17146 (CPT®) Hc Orthotic(S) Mgmt/Train Initial Encounter, Each 15min      Total  23 2                PT Rehab Goals     Row Name 11/08/18 0700             Bed Mobility Goal 1 (PT)    Activity/Assistive Device (Bed Mobility Goal 1, PT) bed mobility activities, all;sit to supine;supine to sit  -ES      [place] Level/Cues Needed (Bed Mobility Goal 1, PT) independent  -ES      Time Frame (Bed Mobility Goal 1, PT) long term goal (LTG);10 days  -ES      Progress/Outcomes (Bed Mobility Goal 1, PT) goal revised this date  -ES         Transfer Goal 1 (PT)    Activity/Assistive Device (Transfer Goal 1, PT) sit-to-stand/stand-to-sit   try sliding board transfers  -ES      [place] Level/Cues Needed (Transfer Goal 1, PT) minimum assist (75% or more patient effort)  -ES      Time Frame (Transfer Goal 1, PT) long term goal (LTG);10 days  -ES      Progress/Outcome (Transfer Goal 1, PT) goal revised this date   -ES         Gait Training Goal 1 (PT)    Activity/Assistive Device (Gait Training Goal 1, PT) gait (walking locomotion);assistive device use;decrease asymmetrical patterns;diminish gait deviation;forward stepping;improve balance and speed;increase endurance/gait distance;walker, rolling  -ES      [place] Level (Gait Training Goal 1, PT) minimum assist (75% or more patient effort)  -ES      Distance (Gait Goal 1, PT) 50  -ES      Time Frame (Gait Training Goal 1, PT) long term goal (LTG);10 days  -ES      Progress/Outcome (Gait Training Goal 1, PT) goal revised this date  -ES         Patient Education Goal (PT)    Activity (Patient Education Goal, PT) Pt to understand the importance of frequent movement and movement safety  -ES      [place]/Cues/Accuracy (Memory Goal 2, PT) demonstrates adequately;verbalizes understanding  -ES      Time Frame (Patient Education Goal, PT) long term goal (LTG);10 days  -ES      Progress/Outcome (Patient Education Goal, PT) goal revised this date  -ES        User Key  (r) = Recorded By, (t) = Taken By, (c) = Cosigned By    Initials Name Provider Type Discipline    Catina Ceballos, PT Physical Therapist PT           Goals Status: Treatment plan discontinued secondary to discharge from acute facility.      PT Discharge Summary  Reason for Discharge: Discharge from facility  Discharge Destination: Inpatient rehabilitation facility      Catina Naranjo, PT   11/8/2018

## 2018-11-19 ENCOUNTER — OUTSIDE FACILITY SERVICE (OUTPATIENT)
Dept: CARDIOLOGY | Facility: CLINIC | Age: 48
End: 2018-11-19

## 2018-11-19 PROCEDURE — 93306 TTE W/DOPPLER COMPLETE: CPT | Performed by: INTERNAL MEDICINE

## 2021-05-25 NOTE — ED PROVIDER NOTES
Subjective   Patient presents to ER with dehydration.        Weakness - Generalized   Severity:  Mild  Onset quality:  Gradual  Timing:  Intermittent  Progression:  Waxing and waning  Chronicity:  Recurrent  Context: change in medication and dehydration    Relieved by:  Nothing  Worsened by:  Nothing  Ineffective treatments:  None tried      Review of Systems   Constitutional: Positive for activity change, appetite change and fatigue.   HENT: Negative.    Eyes: Negative.    Respiratory: Negative.    Cardiovascular: Negative.    Gastrointestinal: Negative.    Endocrine: Negative.    Genitourinary: Negative.    Musculoskeletal: Negative.    Skin: Negative.    Allergic/Immunologic: Negative.    Hematological: Negative.    Psychiatric/Behavioral: Negative.        Past Medical History:   Diagnosis Date   • Arthritis    • Depression    • Environmental allergies    • Falls 10/24/2018   • GERD (gastroesophageal reflux disease)    • Hyperlipidemia    • Hypertension    • Lyme disease    • Migraine    • [place]vin [place] spotted fever    • Vitamin B 12 deficiency        No Known Allergies    Past Surgical History:   Procedure Laterality Date   • ACHILLES TENDON SURGERY Right    • BREAST CYST EXCISION Left    • CHOLECYSTECTOMY     • ENDOSCOPY N/A 10/16/2018    Procedure: ESOPHAGOGASTRODUODENOSCOPY;  Surgeon: Brunner, Mark I, MD;  Location: [facility] ENDOSCOPY;  Service: Gastroenterology   • HYSTERECTOMY         History reviewed. No pertinent family history.    Social History     Social History   • Marital status: Unknown     Social History Main Topics   • Smoking status: Never Smoker   • Smokeless tobacco: Never Used   • Alcohol use No   • Drug use: No   • Sexual activity: Defer     Other Topics Concern   • Not on file           Objective   Physical Exam   Constitutional: She appears well-developed and well-nourished.   HENT:   Head: Normocephalic and atraumatic.   Eyes: Pupils are equal, round, and reactive to light. EOM are normal.    Neck: Normal range of motion.   Cardiovascular: Regular rhythm.    tachycardic   Pulmonary/Chest: Effort normal and breath sounds normal.   Abdominal: Soft.   Musculoskeletal: Normal range of motion.   Neurological: She is alert.   Skin: Skin is warm.   Psychiatric: She has a normal mood and affect.   Nursing note and vitals reviewed.      Procedures           ED Course                  MDM      Final diagnoses:   Dehydration   Hypokalemia            Jairo Harmon MD  10/12/18 1930       Jairo Harmon MD  10/26/18 9639     Opt out

## 2023-01-25 ENCOUNTER — HOSPITAL ENCOUNTER (EMERGENCY)
Facility: HOSPITAL | Age: 53
Discharge: HOME | End: 2023-01-25
Attending: STUDENT IN AN ORGANIZED HEALTH CARE EDUCATION/TRAINING PROGRAM | Admitting: STUDENT IN AN ORGANIZED HEALTH CARE EDUCATION/TRAINING PROGRAM
Payer: MEDICARE

## 2023-01-25 ENCOUNTER — APPOINTMENT (OUTPATIENT)
Dept: GENERAL RADIOLOGY | Facility: HOSPITAL | Age: 53
End: 2023-01-25
Payer: MEDICARE

## 2023-01-25 VITALS
HEIGHT: 69 IN | OXYGEN SATURATION: 95 % | WEIGHT: 233 LBS | SYSTOLIC BLOOD PRESSURE: 122 MMHG | HEART RATE: 90 BPM | DIASTOLIC BLOOD PRESSURE: 92 MMHG | TEMPERATURE: 97.6 F | BODY MASS INDEX: 34.51 KG/M2 | RESPIRATION RATE: 18 BRPM

## 2023-01-25 DIAGNOSIS — R73.9 HYPERGLYCEMIA: ICD-10-CM

## 2023-01-25 DIAGNOSIS — R07.9 CHEST PAIN IN ADULT: Primary | ICD-10-CM

## 2023-01-25 LAB
ALBUMIN SERPL-MCNC: 4.2 G/DL (ref 3.5–5.2)
ALBUMIN/GLOB SERPL: 1.3 G/DL
ALP SERPL-CCNC: 209 U/L (ref 39–117)
ALT SERPL W P-5'-P-CCNC: 25 U/L (ref 1–33)
ANION GAP SERPL CALCULATED.3IONS-SCNC: 8.6 MMOL/L (ref 5–15)
AST SERPL-CCNC: 23 U/L (ref 1–32)
BASOPHILS # BLD AUTO: 0.04 10*3/MM3 (ref 0–0.2)
BASOPHILS NFR BLD AUTO: 0.4 % (ref 0–1.5)
BILIRUB SERPL-MCNC: 0.5 MG/DL (ref 0–1.2)
BUN SERPL-MCNC: 20 MG/DL (ref 6–20)
BUN/CREAT SERPL: 17.5 (ref 7–25)
CALCIUM SPEC-SCNC: 10.2 MG/DL (ref 8.6–10.5)
CHLORIDE SERPL-SCNC: 102 MMOL/L (ref 98–107)
CO2 SERPL-SCNC: 27.4 MMOL/L (ref 22–29)
CREAT SERPL-MCNC: 1.14 MG/DL (ref 0.57–1)
DEPRECATED RDW RBC AUTO: 42.7 FL (ref 37–54)
EGFRCR SERPLBLD CKD-EPI 2021: 58 ML/MIN/1.73
EOSINOPHIL # BLD AUTO: 0.09 10*3/MM3 (ref 0–0.4)
EOSINOPHIL NFR BLD AUTO: 0.9 % (ref 0.3–6.2)
ERYTHROCYTE [DISTWIDTH] IN BLOOD BY AUTOMATED COUNT: 13.1 % (ref 12.3–15.4)
GLOBULIN UR ELPH-MCNC: 3.2 GM/DL
GLUCOSE SERPL-MCNC: 128 MG/DL (ref 65–99)
HCT VFR BLD AUTO: 46 % (ref 34–46.6)
HGB BLD-MCNC: 15.3 G/DL (ref 12–15.9)
HOLD SPECIMEN: NORMAL
HOLD SPECIMEN: NORMAL
IMM GRANULOCYTES # BLD AUTO: 0.04 10*3/MM3 (ref 0–0.05)
IMM GRANULOCYTES NFR BLD AUTO: 0.4 % (ref 0–0.5)
LYMPHOCYTES # BLD AUTO: 1.47 10*3/MM3 (ref 0.7–3.1)
LYMPHOCYTES NFR BLD AUTO: 14.3 % (ref 19.6–45.3)
MCH RBC QN AUTO: 29.9 PG (ref 26.6–33)
MCHC RBC AUTO-ENTMCNC: 33.3 G/DL (ref 31.5–35.7)
MCV RBC AUTO: 90 FL (ref 79–97)
MONOCYTES # BLD AUTO: 0.49 10*3/MM3 (ref 0.1–0.9)
MONOCYTES NFR BLD AUTO: 4.8 % (ref 5–12)
NEUTROPHILS NFR BLD AUTO: 79.2 % (ref 42.7–76)
NEUTROPHILS NFR BLD AUTO: 8.16 10*3/MM3 (ref 1.7–7)
NRBC BLD AUTO-RTO: 0 /100 WBC (ref 0–0.2)
PLATELET # BLD AUTO: 343 10*3/MM3 (ref 140–450)
PMV BLD AUTO: 10.2 FL (ref 6–12)
POTASSIUM SERPL-SCNC: 4.8 MMOL/L (ref 3.5–5.2)
PROT SERPL-MCNC: 7.4 G/DL (ref 6–8.5)
QT INTERVAL: 396 MS
QTC INTERVAL: 492 MS
RBC # BLD AUTO: 5.11 10*6/MM3 (ref 3.77–5.28)
SODIUM SERPL-SCNC: 138 MMOL/L (ref 136–145)
TROPONIN T SERPL-MCNC: <0.01 NG/ML (ref 0–0.03)
TROPONIN T SERPL-MCNC: <0.01 NG/ML (ref 0–0.03)
WBC NRBC COR # BLD: 10.29 10*3/MM3 (ref 3.4–10.8)
WHOLE BLOOD HOLD COAG: NORMAL
WHOLE BLOOD HOLD SPECIMEN: NORMAL

## 2023-01-25 PROCEDURE — 84484 ASSAY OF TROPONIN QUANT: CPT | Performed by: EMERGENCY MEDICINE

## 2023-01-25 PROCEDURE — 71045 X-RAY EXAM CHEST 1 VIEW: CPT | Performed by: RADIOLOGY

## 2023-01-25 PROCEDURE — 71045 X-RAY EXAM CHEST 1 VIEW: CPT

## 2023-01-25 PROCEDURE — 93010 ELECTROCARDIOGRAM REPORT: CPT | Performed by: INTERNAL MEDICINE

## 2023-01-25 PROCEDURE — 93005 ELECTROCARDIOGRAM TRACING: CPT | Performed by: EMERGENCY MEDICINE

## 2023-01-25 PROCEDURE — 36415 COLL VENOUS BLD VENIPUNCTURE: CPT

## 2023-01-25 PROCEDURE — 80053 COMPREHEN METABOLIC PANEL: CPT | Performed by: EMERGENCY MEDICINE

## 2023-01-25 PROCEDURE — 99284 EMERGENCY DEPT VISIT MOD MDM: CPT

## 2023-01-25 PROCEDURE — 85025 COMPLETE CBC W/AUTO DIFF WBC: CPT | Performed by: EMERGENCY MEDICINE

## 2023-01-25 RX ORDER — ASPIRIN 81 MG/1
324 TABLET, CHEWABLE ORAL ONCE
Status: DISCONTINUED | OUTPATIENT
Start: 2023-01-25 | End: 2023-01-25

## 2023-01-25 RX ORDER — SUMATRIPTAN 50 MG/1
50 TABLET, FILM COATED ORAL ONCE
Status: DISCONTINUED | OUTPATIENT
Start: 2023-01-25 | End: 2023-01-25

## 2023-01-25 RX ORDER — ASPIRIN 81 MG/1
324 TABLET, CHEWABLE ORAL ONCE
Status: COMPLETED | OUTPATIENT
Start: 2023-01-25 | End: 2023-01-25

## 2023-01-25 RX ORDER — SODIUM CHLORIDE 0.9 % (FLUSH) 0.9 %
10 SYRINGE (ML) INJECTION AS NEEDED
Status: DISCONTINUED | OUTPATIENT
Start: 2023-01-25 | End: 2023-01-25 | Stop reason: HOSPADM

## 2023-01-25 RX ADMIN — ASPIRIN 324 MG: 81 TABLET, CHEWABLE ORAL at 15:54

## 2023-01-25 NOTE — ED NOTES
MEDICAL SCREENING:    Reason for Visit: Chest Pain    Patient initially seen in triage.  The patient was advised further evaluation and diagnostic testing will be needed, some of the treatment and testing will be initiated in the lobby in order to begin the process.  The patient will be returned to the waiting area for the time being and possibly be re-assessed by a subsequent ED provider.  The patient will be brought back to the treatment area in as timely manner as possible.       Ankur Cullen MD  01/25/23 0403

## 2023-01-25 NOTE — ED PROVIDER NOTES
Subjective   History of Present Illness  51 y/o female presents with complaints of generalized chest pain that is not associated with exertion or at rest for several months now.  Patient is vague and overall poor historian upon questioning.  Patient is scheduled to see cardiology Dr. Christina in [facility] in the next month        Review of Systems   Constitutional: Negative.  Negative for fever.   HENT: Negative.    Respiratory: Negative.    Cardiovascular: Positive for chest pain.   Gastrointestinal: Negative.  Negative for abdominal pain.   Endocrine: Negative.    Genitourinary: Negative.  Negative for dysuria.   Skin: Negative.    Neurological: Negative.    Psychiatric/Behavioral: Negative.    All other systems reviewed and are negative.      Past Medical History:   Diagnosis Date   • Arthritis    • Depression    • Environmental allergies    • Falls 10/24/2018   • GERD (gastroesophageal reflux disease)    • Hyperlipidemia    • Hypertension    • Lyme disease    • Migraine    • Kelvin [place] spotted fever    • Vitamin B 12 deficiency        Allergies   Allergen Reactions   • Gabapentin Hives       Past Surgical History:   Procedure Laterality Date   • ACHILLES TENDON SURGERY Right    • BREAST CYST EXCISION Left    • CHOLECYSTECTOMY     • ENDOSCOPY N/A 10/16/2018    Procedure: ESOPHAGOGASTRODUODENOSCOPY;  Surgeon: Brunner, Mark I, MD;  Location: [facility] ENDOSCOPY;  Service: Gastroenterology   • HYSTERECTOMY         No family history on file.    Social History     Socioeconomic History   • Marital status:    Tobacco Use   • Smoking status: Never   • Smokeless tobacco: Never   Substance and Sexual Activity   • Alcohol use: No   • Drug use: No   • Sexual activity: Defer           Objective   Physical Exam  Vitals and nursing note reviewed.   Constitutional:       General: She is not in acute distress.     Appearance: She is well-developed. She is not diaphoretic.   HENT:      Head: Normocephalic and  atraumatic.      Right Ear: External ear normal.      Left Ear: External ear normal.      Nose: Nose normal.   Eyes:      Conjunctiva/sclera: Conjunctivae normal.      Pupils: Pupils are equal, round, and reactive to light.   Neck:      Vascular: No JVD.      Trachea: No tracheal deviation.   Cardiovascular:      Rate and Rhythm: Normal rate and regular rhythm.      Heart sounds: Normal heart sounds. No murmur heard.  Pulmonary:      Effort: Pulmonary effort is normal. No respiratory distress.      Breath sounds: Normal breath sounds. No wheezing.   Abdominal:      General: Bowel sounds are normal.      Palpations: Abdomen is soft.      Tenderness: There is no abdominal tenderness.   Musculoskeletal:         General: No deformity. Normal range of motion.      Cervical back: Normal range of motion and neck supple.   Skin:     General: Skin is warm and dry.      Coloration: Skin is not pale.      Findings: No erythema or rash.   Neurological:      Mental Status: She is alert and oriented to person, place, and time.      Cranial Nerves: No cranial nerve deficit.   Psychiatric:         Behavior: Behavior normal.         Thought Content: Thought content normal.         Procedures        Results for orders placed or performed during the hospital encounter of 01/25/23   Comprehensive Metabolic Panel    Specimen: Arm, Left; Blood   Result Value Ref Range    Glucose 128 (H) 65 - 99 mg/dL    BUN 20 6 - 20 mg/dL    Creatinine 1.14 (H) 0.57 - 1.00 mg/dL    Sodium 138 136 - 145 mmol/L    Potassium 4.8 3.5 - 5.2 mmol/L    Chloride 102 98 - 107 mmol/L    CO2 27.4 22.0 - 29.0 mmol/L    Calcium 10.2 8.6 - 10.5 mg/dL    Total Protein 7.4 6.0 - 8.5 g/dL    Albumin 4.2 3.5 - 5.2 g/dL    ALT (SGPT) 25 1 - 33 U/L    AST (SGOT) 23 1 - 32 U/L    Alkaline Phosphatase 209 (H) 39 - 117 U/L    Total Bilirubin 0.5 0.0 - 1.2 mg/dL    Globulin 3.2 gm/dL    A/G Ratio 1.3 g/dL    BUN/Creatinine Ratio 17.5 7.0 - 25.0    Anion Gap 8.6 5.0 - 15.0  mmol/L    eGFR 58.0 (L) >60.0 mL/min/1.73   Troponin    Specimen: Arm, Left; Blood   Result Value Ref Range    Troponin T <0.010 0.000 - 0.030 ng/mL   Troponin    Specimen: Hand, Left; Blood   Result Value Ref Range    Troponin T <0.010 0.000 - 0.030 ng/mL   CBC Auto Differential    Specimen: Arm, Left; Blood   Result Value Ref Range    WBC 10.29 3.40 - 10.80 10*3/mm3    RBC 5.11 3.77 - 5.28 10*6/mm3    Hemoglobin 15.3 12.0 - 15.9 g/dL    Hematocrit 46.0 34.0 - 46.6 %    MCV 90.0 79.0 - 97.0 fL    MCH 29.9 26.6 - 33.0 pg    MCHC 33.3 31.5 - 35.7 g/dL    RDW 13.1 12.3 - 15.4 %    RDW-SD 42.7 37.0 - 54.0 fl    MPV 10.2 6.0 - 12.0 fL    Platelets 343 140 - 450 10*3/mm3    Neutrophil % 79.2 (H) 42.7 - 76.0 %    Lymphocyte % 14.3 (L) 19.6 - 45.3 %    Monocyte % 4.8 (L) 5.0 - 12.0 %    Eosinophil % 0.9 0.3 - 6.2 %    Basophil % 0.4 0.0 - 1.5 %    Immature Grans % 0.4 0.0 - 0.5 %    Neutrophils, Absolute 8.16 (H) 1.70 - 7.00 10*3/mm3    Lymphocytes, Absolute 1.47 0.70 - 3.10 10*3/mm3    Monocytes, Absolute 0.49 0.10 - 0.90 10*3/mm3    Eosinophils, Absolute 0.09 0.00 - 0.40 10*3/mm3    Basophils, Absolute 0.04 0.00 - 0.20 10*3/mm3    Immature Grans, Absolute 0.04 0.00 - 0.05 10*3/mm3    nRBC 0.0 0.0 - 0.2 /100 WBC   ECG 12 Lead Chest Pain   Result Value Ref Range    QT Interval 396 ms    QTC Interval 492 ms   Green Top (Gel)   Result Value Ref Range    Extra Tube Hold for add-ons.    Lavender Top   Result Value Ref Range    Extra Tube hold for add-on    Gold Top - SST   Result Value Ref Range    Extra Tube Hold for add-ons.    Light Blue Top   Result Value Ref Range    Extra Tube Hold for add-ons.            ED Course  ED Course as of 01/25/23 1927 Wed Jan 25, 2023   1616 ECG 12 Lead Chest Pain  Vent. Rate :  93 BPM     Atrial Rate :  93 BPM     P-R Int : 196 ms          QRS Dur : 138 ms      QT Int : 396 ms       P-R-T Axes :  53 122 -33 degrees     QTc Int : 492 ms     Normal sinus rhythm  Nonspecific intraventricular  block  T wave abnormality, consider inferior ischemia  T wave abnormality, consider anterolateral ischemia  Abnormal ECG  When compared with ECG of 29-OCT-2018 12:19,  QRS duration has increased  Non-specific change in ST segment in Inferior leads  T wave inversion now evident in Inferior leads  T wave inversion now evident in Anterolateral leads [ES]   1618 Initial troponin is negative [LK]   1646 Chest x-ray is negative for acute cardiopulmonary process with a normal white count.     [LK]   1837 Discharge instructions  Follow-up with Dr. Christina for further cardiac evaluation.  Would recommend stress test based on EKG findings showing old ischemia [LK]   1838 Recommend taking a baby aspirin daily given findings on her EKG. [LK]      ED Course User Index  [ES] Ankur Cullen MD  [LK] Sandra Dover DO                      HEART Score: 4           Has evidence of hyperglycemia on labs and old repolarization abnormalities on EKG.     Patient had negative troponin x2       MDM    Final diagnoses:   Chest pain in adult   Hyperglycemia       ED Disposition  ED Disposition     ED Disposition   Discharge    Condition   Stable    Comment   Pt ambuated out of ed. Handouts given to take home             Brooke Finley, APRN  [address]  [facility] [address]  [phone]               Medication List      No changes were made to your prescriptions during this visit.          Sandra Dover DO  01/25/23 1839       Sandra Dover DO  01/25/23 1927

## 2023-01-25 NOTE — DISCHARGE INSTRUCTIONS
Follow-up with Dr. Christina for further cardiac evaluation.  Would recommend stress test based on EKG findings showing old ischemia

## 2023-02-23 ENCOUNTER — TRANSCRIBE ORDERS (OUTPATIENT)
Dept: ADMINISTRATIVE | Facility: HOSPITAL | Age: 53
End: 2023-02-23
Payer: MEDICARE

## 2023-02-23 DIAGNOSIS — R23.0 CYANOSIS: Primary | ICD-10-CM

## 2023-02-23 DIAGNOSIS — R32 URINARY INCONTINENCE, UNSPECIFIED TYPE: Primary | ICD-10-CM

## 2023-03-01 ENCOUNTER — HOSPITAL ENCOUNTER (INPATIENT)
Facility: HOSPITAL | Age: 53
LOS: 5 days | Discharge: HOME | DRG: 300 | End: 2023-03-06
Attending: INTERNAL MEDICINE | Admitting: FAMILY MEDICINE
Payer: MEDICARE

## 2023-03-01 ENCOUNTER — OFFICE VISIT (OUTPATIENT)
Dept: CARDIAC SURGERY | Facility: CLINIC | Age: 53
End: 2023-03-01
Payer: MEDICARE

## 2023-03-01 VITALS
TEMPERATURE: 97.9 F | HEART RATE: 94 BPM | BODY MASS INDEX: 35.1 KG/M2 | DIASTOLIC BLOOD PRESSURE: 86 MMHG | WEIGHT: 237 LBS | HEIGHT: 69 IN | SYSTOLIC BLOOD PRESSURE: 116 MMHG

## 2023-03-01 DIAGNOSIS — I96 GANGRENE: Primary | ICD-10-CM

## 2023-03-01 DIAGNOSIS — M84.374D STRESS FRACTURE OF METATARSAL BONE OF RIGHT FOOT WITH ROUTINE HEALING, SUBSEQUENT ENCOUNTER: Primary | ICD-10-CM

## 2023-03-01 DIAGNOSIS — Z00.6 EXAMINATION FOR NORMAL COMPARISON FOR CLINICAL RESEARCH: ICD-10-CM

## 2023-03-01 PROBLEM — R11.2 INTRACTABLE VOMITING WITH NAUSEA: Status: RESOLVED | Noted: 2018-10-14 | Resolved: 2023-03-01

## 2023-03-01 PROBLEM — E74.39 GLUCOSE INTOLERANCE: Status: ACTIVE | Noted: 2023-03-01

## 2023-03-01 PROBLEM — N39.0 ACUTE UTI (URINARY TRACT INFECTION): Status: RESOLVED | Noted: 2018-10-27 | Resolved: 2023-03-01

## 2023-03-01 LAB
ALBUMIN SERPL-MCNC: 3.1 G/DL (ref 3.5–5.2)
ALBUMIN/GLOB SERPL: 1.1 G/DL
ALP SERPL-CCNC: 213 U/L (ref 39–117)
ALT SERPL W P-5'-P-CCNC: 39 U/L (ref 1–33)
ANION GAP SERPL CALCULATED.3IONS-SCNC: 12 MMOL/L (ref 5–15)
AST SERPL-CCNC: 27 U/L (ref 1–32)
BASOPHILS # BLD AUTO: 0.03 10*3/MM3 (ref 0–0.2)
BASOPHILS NFR BLD AUTO: 0.4 % (ref 0–1.5)
BILIRUB SERPL-MCNC: 0.4 MG/DL (ref 0–1.2)
BUN SERPL-MCNC: 11 MG/DL (ref 6–20)
BUN/CREAT SERPL: 16.4 (ref 7–25)
CALCIUM SPEC-SCNC: 8.9 MG/DL (ref 8.6–10.5)
CHLORIDE SERPL-SCNC: 104 MMOL/L (ref 98–107)
CK SERPL-CCNC: 48 U/L (ref 20–180)
CO2 SERPL-SCNC: 23 MMOL/L (ref 22–29)
CREAT SERPL-MCNC: 0.67 MG/DL (ref 0.57–1)
CRP SERPL-MCNC: 0.84 MG/DL (ref 0–0.5)
D DIMER PPP FEU-MCNC: 0.44 MCGFEU/ML (ref 0–0.52)
D-LACTATE SERPL-SCNC: 1.6 MMOL/L (ref 0.5–2)
D-LACTATE SERPL-SCNC: 2.2 MMOL/L (ref 0.5–2)
DEPRECATED RDW RBC AUTO: 45.1 FL (ref 37–54)
EGFRCR SERPLBLD CKD-EPI 2021: 105.3 ML/MIN/1.73
EOSINOPHIL # BLD AUTO: 0.22 10*3/MM3 (ref 0–0.4)
EOSINOPHIL NFR BLD AUTO: 3 % (ref 0.3–6.2)
ERYTHROCYTE [DISTWIDTH] IN BLOOD BY AUTOMATED COUNT: 13.2 % (ref 12.3–15.4)
ERYTHROCYTE [SEDIMENTATION RATE] IN BLOOD: 16 MM/HR (ref 0–30)
GLOBULIN UR ELPH-MCNC: 2.9 GM/DL
GLUCOSE BLDC GLUCOMTR-MCNC: 102 MG/DL (ref 70–130)
GLUCOSE BLDC GLUCOMTR-MCNC: 140 MG/DL (ref 70–130)
GLUCOSE SERPL-MCNC: 118 MG/DL (ref 65–99)
HBA1C MFR BLD: 5.2 % (ref 4.8–5.6)
HCT VFR BLD AUTO: 40.8 % (ref 34–46.6)
HGB BLD-MCNC: 13.4 G/DL (ref 12–15.9)
IMM GRANULOCYTES # BLD AUTO: 0.04 10*3/MM3 (ref 0–0.05)
IMM GRANULOCYTES NFR BLD AUTO: 0.6 % (ref 0–0.5)
LDH SERPL-CCNC: 248 U/L (ref 135–214)
LYMPHOCYTES # BLD AUTO: 1.94 10*3/MM3 (ref 0.7–3.1)
LYMPHOCYTES NFR BLD AUTO: 26.9 % (ref 19.6–45.3)
MCH RBC QN AUTO: 30.3 PG (ref 26.6–33)
MCHC RBC AUTO-ENTMCNC: 32.8 G/DL (ref 31.5–35.7)
MCV RBC AUTO: 92.3 FL (ref 79–97)
MONOCYTES # BLD AUTO: 0.51 10*3/MM3 (ref 0.1–0.9)
MONOCYTES NFR BLD AUTO: 7.1 % (ref 5–12)
MRSA DNA SPEC QL NAA+PROBE: NEGATIVE
NEUTROPHILS NFR BLD AUTO: 4.48 10*3/MM3 (ref 1.7–7)
NEUTROPHILS NFR BLD AUTO: 62 % (ref 42.7–76)
NRBC BLD AUTO-RTO: 0 /100 WBC (ref 0–0.2)
NT-PROBNP SERPL-MCNC: 640.5 PG/ML (ref 0–900)
PLATELET # BLD AUTO: 233 10*3/MM3 (ref 140–450)
PMV BLD AUTO: 10.2 FL (ref 6–12)
POTASSIUM SERPL-SCNC: 4.2 MMOL/L (ref 3.5–5.2)
PROCALCITONIN SERPL-MCNC: 0.07 NG/ML (ref 0–0.25)
PROT SERPL-MCNC: 6 G/DL (ref 6–8.5)
RBC # BLD AUTO: 4.42 10*6/MM3 (ref 3.77–5.28)
SODIUM SERPL-SCNC: 139 MMOL/L (ref 136–145)
WBC NRBC COR # BLD: 7.22 10*3/MM3 (ref 3.4–10.8)

## 2023-03-01 PROCEDURE — 85379 FIBRIN DEGRADATION QUANT: CPT | Performed by: INTERNAL MEDICINE

## 2023-03-01 PROCEDURE — 84145 PROCALCITONIN (PCT): CPT | Performed by: INTERNAL MEDICINE

## 2023-03-01 PROCEDURE — 83615 LACTATE (LD) (LDH) ENZYME: CPT | Performed by: INTERNAL MEDICINE

## 2023-03-01 PROCEDURE — 83880 ASSAY OF NATRIURETIC PEPTIDE: CPT | Performed by: INTERNAL MEDICINE

## 2023-03-01 PROCEDURE — 82550 ASSAY OF CK (CPK): CPT | Performed by: INTERNAL MEDICINE

## 2023-03-01 PROCEDURE — 87641 MR-STAPH DNA AMP PROBE: CPT

## 2023-03-01 PROCEDURE — 85025 COMPLETE CBC W/AUTO DIFF WBC: CPT | Performed by: INTERNAL MEDICINE

## 2023-03-01 PROCEDURE — 86038 ANTINUCLEAR ANTIBODIES: CPT | Performed by: INTERNAL MEDICINE

## 2023-03-01 PROCEDURE — 83036 HEMOGLOBIN GLYCOSYLATED A1C: CPT | Performed by: INTERNAL MEDICINE

## 2023-03-01 PROCEDURE — 87040 BLOOD CULTURE FOR BACTERIA: CPT | Performed by: INTERNAL MEDICINE

## 2023-03-01 PROCEDURE — 25010000002 VANCOMYCIN 10 G RECONSTITUTED SOLUTION

## 2023-03-01 PROCEDURE — 85652 RBC SED RATE AUTOMATED: CPT | Performed by: INTERNAL MEDICINE

## 2023-03-01 PROCEDURE — 83605 ASSAY OF LACTIC ACID: CPT | Performed by: INTERNAL MEDICINE

## 2023-03-01 PROCEDURE — 25010000002 PIPERACILLIN SOD-TAZOBACTAM PER 1 G: Performed by: INTERNAL MEDICINE

## 2023-03-01 PROCEDURE — 82962 GLUCOSE BLOOD TEST: CPT

## 2023-03-01 PROCEDURE — 86140 C-REACTIVE PROTEIN: CPT | Performed by: INTERNAL MEDICINE

## 2023-03-01 PROCEDURE — 99203 OFFICE O/P NEW LOW 30 MIN: CPT | Performed by: THORACIC SURGERY (CARDIOTHORACIC VASCULAR SURGERY)

## 2023-03-01 PROCEDURE — 85060 BLOOD SMEAR INTERPRETATION: CPT | Performed by: INTERNAL MEDICINE

## 2023-03-01 PROCEDURE — 99223 1ST HOSP IP/OBS HIGH 75: CPT | Performed by: INTERNAL MEDICINE

## 2023-03-01 PROCEDURE — 94799 UNLISTED PULMONARY SVC/PX: CPT

## 2023-03-01 PROCEDURE — 25010000002 HEPARIN (PORCINE) PER 1000 UNITS: Performed by: INTERNAL MEDICINE

## 2023-03-01 PROCEDURE — 80053 COMPREHEN METABOLIC PANEL: CPT | Performed by: INTERNAL MEDICINE

## 2023-03-01 RX ORDER — BUDESONIDE AND FORMOTEROL FUMARATE DIHYDRATE 80; 4.5 UG/1; UG/1
2 AEROSOL RESPIRATORY (INHALATION)
COMMUNITY

## 2023-03-01 RX ORDER — ATENOLOL 25 MG/1
25 TABLET ORAL DAILY
Status: DISCONTINUED | OUTPATIENT
Start: 2023-03-01 | End: 2023-03-06 | Stop reason: HOSPADM

## 2023-03-01 RX ORDER — INSULIN LISPRO 100 [IU]/ML
0-7 INJECTION, SOLUTION INTRAVENOUS; SUBCUTANEOUS
Status: DISCONTINUED | OUTPATIENT
Start: 2023-03-01 | End: 2023-03-06 | Stop reason: HOSPADM

## 2023-03-01 RX ORDER — PANTOPRAZOLE SODIUM 40 MG/1
40 TABLET, DELAYED RELEASE ORAL DAILY
COMMUNITY

## 2023-03-01 RX ORDER — ROSUVASTATIN CALCIUM 5 MG/1
5 TABLET, COATED ORAL DAILY
COMMUNITY
End: 2023-03-06 | Stop reason: HOSPADM

## 2023-03-01 RX ORDER — HEPARIN SODIUM 5000 [USP'U]/ML
5000 INJECTION, SOLUTION INTRAVENOUS; SUBCUTANEOUS EVERY 12 HOURS SCHEDULED
Status: DISCONTINUED | OUTPATIENT
Start: 2023-03-01 | End: 2023-03-06 | Stop reason: HOSPADM

## 2023-03-01 RX ORDER — POTASSIUM CHLORIDE 750 MG/1
10 TABLET, FILM COATED, EXTENDED RELEASE ORAL 4 TIMES DAILY
Status: ON HOLD | COMMUNITY
End: 2023-03-02

## 2023-03-01 RX ORDER — FOLIC ACID 1 MG/1
1 TABLET ORAL DAILY
COMMUNITY

## 2023-03-01 RX ORDER — SODIUM CHLORIDE 9 MG/ML
100 INJECTION, SOLUTION INTRAVENOUS CONTINUOUS
Status: DISCONTINUED | OUTPATIENT
Start: 2023-03-01 | End: 2023-03-02

## 2023-03-01 RX ORDER — PREGABALIN 200 MG/1
200 CAPSULE ORAL 2 TIMES DAILY
COMMUNITY

## 2023-03-01 RX ORDER — RIZATRIPTAN BENZOATE 10 MG/1
10 TABLET ORAL AS NEEDED
COMMUNITY
End: 2023-03-06 | Stop reason: HOSPADM

## 2023-03-01 RX ORDER — CELECOXIB 200 MG/1
200 CAPSULE ORAL DAILY
COMMUNITY
End: 2023-03-06 | Stop reason: HOSPADM

## 2023-03-01 RX ORDER — SODIUM CHLORIDE 9 MG/ML
40 INJECTION, SOLUTION INTRAVENOUS AS NEEDED
Status: DISCONTINUED | OUTPATIENT
Start: 2023-03-01 | End: 2023-03-01

## 2023-03-01 RX ORDER — ZONISAMIDE 100 MG/1
200 CAPSULE ORAL EVERY EVENING
Status: ON HOLD | COMMUNITY
End: 2023-03-03

## 2023-03-01 RX ORDER — ZONISAMIDE 100 MG/1
100 CAPSULE ORAL 3 TIMES DAILY
Status: DISCONTINUED | OUTPATIENT
Start: 2023-03-01 | End: 2023-03-02

## 2023-03-01 RX ORDER — ACETAMINOPHEN 325 MG/1
650 TABLET ORAL EVERY 6 HOURS PRN
Status: DISCONTINUED | OUTPATIENT
Start: 2023-03-01 | End: 2023-03-06 | Stop reason: HOSPADM

## 2023-03-01 RX ORDER — LEVOTHYROXINE SODIUM 0.03 MG/1
25 TABLET ORAL
Status: DISCONTINUED | OUTPATIENT
Start: 2023-03-02 | End: 2023-03-06 | Stop reason: HOSPADM

## 2023-03-01 RX ORDER — FAMOTIDINE 40 MG/1
40 TABLET, FILM COATED ORAL DAILY
Status: ON HOLD | COMMUNITY
End: 2023-03-02

## 2023-03-01 RX ORDER — PREGABALIN 50 MG/1
50 CAPSULE ORAL 2 TIMES DAILY
COMMUNITY

## 2023-03-01 RX ORDER — SODIUM CHLORIDE 0.9 % (FLUSH) 0.9 %
10 SYRINGE (ML) INJECTION AS NEEDED
Status: DISCONTINUED | OUTPATIENT
Start: 2023-03-01 | End: 2023-03-06 | Stop reason: HOSPADM

## 2023-03-01 RX ORDER — ROSUVASTATIN CALCIUM 10 MG/1
5 TABLET, COATED ORAL NIGHTLY
Status: DISCONTINUED | OUTPATIENT
Start: 2023-03-01 | End: 2023-03-06

## 2023-03-01 RX ORDER — SODIUM CHLORIDE 0.9 % (FLUSH) 0.9 %
10 SYRINGE (ML) INJECTION EVERY 12 HOURS SCHEDULED
Status: DISCONTINUED | OUTPATIENT
Start: 2023-03-01 | End: 2023-03-06 | Stop reason: HOSPADM

## 2023-03-01 RX ORDER — PREGABALIN 75 MG/1
150 CAPSULE ORAL 2 TIMES DAILY
Status: DISCONTINUED | OUTPATIENT
Start: 2023-03-01 | End: 2023-03-06 | Stop reason: HOSPADM

## 2023-03-01 RX ORDER — CHOLECALCIFEROL (VITAMIN D3) 125 MCG
50 CAPSULE ORAL DAILY
COMMUNITY

## 2023-03-01 RX ORDER — LEVOTHYROXINE SODIUM 0.03 MG/1
25 TABLET ORAL
COMMUNITY

## 2023-03-01 RX ORDER — VANCOMYCIN 2 GRAM/500 ML IN 0.9 % SODIUM CHLORIDE INTRAVENOUS
20 ONCE
Status: COMPLETED | OUTPATIENT
Start: 2023-03-01 | End: 2023-03-01

## 2023-03-01 RX ADMIN — VANCOMYCIN HYDROCHLORIDE 2000 MG: 10 INJECTION, POWDER, LYOPHILIZED, FOR SOLUTION INTRAVENOUS at 16:40

## 2023-03-01 RX ADMIN — PREGABALIN 150 MG: 75 CAPSULE ORAL at 20:42

## 2023-03-01 RX ADMIN — HEPARIN SODIUM 5000 UNITS: 5000 INJECTION INTRAVENOUS; SUBCUTANEOUS at 20:42

## 2023-03-01 RX ADMIN — ROSUVASTATIN CALCIUM 5 MG: 10 TABLET, COATED ORAL at 20:42

## 2023-03-01 RX ADMIN — Medication 10 ML: at 20:42

## 2023-03-01 RX ADMIN — TAZOBACTAM SODIUM AND PIPERACILLIN SODIUM 3.38 G: 375; 3 INJECTION, SOLUTION INTRAVENOUS at 15:55

## 2023-03-01 RX ADMIN — SODIUM CHLORIDE 100 ML/HR: 9 INJECTION, SOLUTION INTRAVENOUS at 15:38

## 2023-03-01 RX ADMIN — ATENOLOL 25 MG: 25 TABLET ORAL at 20:41

## 2023-03-01 RX ADMIN — TAZOBACTAM SODIUM AND PIPERACILLIN SODIUM 3.38 G: 375; 3 INJECTION, SOLUTION INTRAVENOUS at 21:22

## 2023-03-01 NOTE — H&P
[facility] Medicine Services  HISTORY AND PHYSICAL    Patient Name: Bryanna Schwartz  : 1970  MRN: [id number]  Primary Care Physician: Baljeet Manley APRN    Subjective   Subjective     Chief Complaint:foot pain    HPI:  Bryanna Schwartz is a 52 y.o. female who  has a past medical history of Arthritis, Depression, Diabetes (HCC), GERD, Hyperlipidemia, Hypertension, Hypothyroidism, Lyme disease, Migraine, Kelvin [place] spotted fever, and Vitamin B 12 deficiency who presented worsening redness and pain in toes on both feet. She has had wound on her left toes for some time but over the last 2 weeks she reports increased intermttent redness, swelling and pain, some breakdown on the skin of her right foot.     Her  has also reported some confusion of late, recent MRi reportedly with no acute changes.  She says she has been unable to sleep due to the pain. She was sent to the hospital from Dr Oconnor office.    Review of Systems   Patient denies weight loss, headaches, changes in vision, fever, chills, sore throat, shortness of breath, cough, nausea or vomiting, diarrhea, abdominal pain or distension, change in urine output or habits, joint pain, rash, itching or bleeding.    Otherwise complete ROS is negative except as mentioned in the HPI.    Personal History     Past Medical History:   Diagnosis Date   • Arthritis    • Depression    • Diabetes (HCC)    • Environmental allergies    • Falls 10/24/2018   • GERD (gastroesophageal reflux disease)    • Hyperlipidemia    • Hypertension    • Hypothyroidism    • Lyme disease    • Migraine    • Kelvin Mountain spotted fever    • Vitamin B 12 deficiency      Past Surgical History:   Procedure Laterality Date   • ACHILLES TENDON SURGERY Right    • BREAST CYST EXCISION Left    • CHOLECYSTECTOMY     • ENDOSCOPY N/A 10/16/2018    Procedure: ESOPHAGOGASTRODUODENOSCOPY;  Surgeon: Brunner, Mark I, MD;  Location: [facility] ENDOSCOPY;  Service:  Gastroenterology   • HYSTERECTOMY         Family History: family history includes COPD in her mother; Heart attack in her mother; Hypertension in her mother; Other in her father.     Social History:  reports that she has never smoked. She has never used smokeless tobacco. She reports that she does not drink alcohol and does not use drugs.  , disabled since her RMSF,Lyme disease 5 years ago when she had to learn to walk and talk all over again.  Medications:  DULoxetine, Vitamin D3, atenolol, budesonide-formoterol, celecoxib, cetirizine, famotidine, folic acid, levothyroxine, linaclotide, magnesium oxide, metFORMIN, ondansetron, pantoprazole, potassium chloride, pregabalin, rizatriptan, rosuvastatin, and zonisamide    Allergies   Allergen Reactions   • Gabapentin Hives       Objective   Objective     Vital Signs:   Temp:  [97.9 °F (36.6 °C)-98.9 °F (37.2 °C)] 98.9 °F (37.2 °C)  Heart Rate:  [79-97] 80  Resp:  [16-19] 16  BP: (116-139)/(70-86) 134/81    Constitutional: Awake, alert, interactive and pleasant-- she was asleep with sat 79% on RA when I entered the room.  Eyes: clear sclerae, no conjunctival injection  HENT: NCAT, mucous membranes dry pale  Neck: no masses or lymphadenopathy, trachea midline  Respiratory: good breath sounds bilaterally, respirations unlabored  Cardiovascular: RRR, only a flow murmur appreciated, palpable peripheral pulses  Abdomen:  soft, no HSM or masses palpable, not tender or distended but rotund  Musculoskeletal: No peripheral edema, clubbing or cyanosis  Neurologic: Oriented x 3,                       Strength symmetric in all extremities                     Cranial Nerves grossly intact, speech clear  Skin: multiple ulcers on her right toes, left toes are red and hot but no ulcers   Palpable DP and PT pulses, decreased sensation  Psychiatric: Appropriate mood, insight      Result Review:  I have personally reviewed the results from the time of this admission   to 3/2/2023  00:51 EST and agree with these findings:  [x]  Laboratory  [x]  Microbiology  [x]  Radiology  [x]  EKG/Telemetry   []  Cardiology/Vascular   []  Pathology  [x]  Old records  []  Other:  Most notable findings include:overall normal findings in her lab work      LAB RESULTS:      Lab 03/01/23  2219 03/01/23  1808 03/01/23  1555   WBC  --   --  7.22   HEMOGLOBIN  --   --  13.4   HEMATOCRIT  --   --  40.8   PLATELETS  --   --  233   NEUTROS ABS  --   --  4.48   IMMATURE GRANS (ABS)  --   --  0.04   LYMPHS ABS  --   --  1.94   MONOS ABS  --   --  0.51   EOS ABS  --   --  0.22   MCV  --   --  92.3   SED RATE  --   --  16   CRP  --   --  0.84*   PROCALCITONIN  --   --  0.07   LACTATE 1.6 2.2*  --    LDH  --   --  248*   D DIMER QUANT  --   --  0.44         Lab 03/01/23  1555   SODIUM 139   POTASSIUM 4.2   CHLORIDE 104   CO2 23.0   ANION GAP 12.0   BUN 11   CREATININE 0.67   EGFR 105.3   GLUCOSE 118*   CALCIUM 8.9   HEMOGLOBIN A1C 5.20         Lab 03/01/23  1555   TOTAL PROTEIN 6.0   ALBUMIN 3.1*   GLOBULIN 2.9   ALT (SGPT) 39*   AST (SGOT) 27   BILIRUBIN 0.4   ALK PHOS 213*         Lab 03/01/23  1555   PROBNP 640.5                 Brief Urine Lab Results     None        No results found for: COVID19    No radiology results from the last 24 hrs    Results for orders placed in visit on 11/19/18    SCANNED - ECHOCARDIOGRAM      The patient has started, but not completed, their COVID-19 vaccination series.    Assessment & Plan   Assessment / Plan       Gangrene (HCC)    Anxiety and depression    Essential hypertension    Glucose intolerance      Assessment & Plan:    Bryanna Schwartz is a 52 y.o. female who  has a past medical history of Arthritis, Depression, Diabetes (HCC), GERD, Hyperlipidemia, Hypertension, Hypothyroidism, Lyme disease, Migraine, Kelvin Mountain spotted fever, and Vitamin B 12 deficiency who presented worsening redness and pain in toes on both feet.    Gangrene of ischemic right toes  -reportedly aortogram  is normal  -check echo of source of cardiac emboli  -MRI  -vanc and zosyn  -ID consult  -Dr Oconnor expertise    HTN, HLP  -cont atenolol, crestor    JUAN C with severe hypoxia  -needs real sleep study,CPAP, PRN oxygen in the hospital  -be careful with narcotics    HO Lyme disease and RMSF    Glucose intolerance  -monitor fsbs    Confusion  -seems clear tonight  -simplify her multiple meds for pain-- neurontin, lyrica, cymbalta  -check tsh, b12, ammonia      DVT prophylaxis:  Medical DVT prophylaxis orders are present.    CODE STATUS:  Code Status (Patient has no pulse and is not breathing): CPR (Attempt to Resuscitate)  Medical Interventions (Patient has pulse or is breathing): Full Support    Expected Discharge TBD      Electronically signed by Charo De La Paz MD 03/02/23 00:51 EST

## 2023-03-01 NOTE — PROGRESS NOTES
03/01/2023  Patient Information  Bryanna Schwartz                                                                                          [address] [facility] [address]   1970  'PCP/Referring Physician'  Brooke Finley, APRN  [phone]  Baljeet Manley, APRN  [phone]  Chief Complaint   Patient presents with   • Consult     Np referred for necrosis of toes on right foot.       History of Present Illness: Patient is a 52-year-old female referred to me at this time by Sury Manley, nurse practitioner in [facility] for recent onset of gangrenous changes of the right fourth toe at the distal tuft and right second toe.  Patient has a significant past history of having had Lyme's disease as well as [place] spotted fever from a tick bite over 5 years ago the patient could not walk after the tick bite became symptomatic and she was hospitalized here in 2018 for over 3 weeks in October and November.  The patient was eventually sent to [facility] for rehab in order to be able to walk again.  Her  states she has never been the same mentally since the diagnosis of Lyme disease and [place] spotted fever.  The necrosis of the toes according to the patient and her  began about 1 year ago but has been getting worse over the past 2 months.  This prompted the patient being seen by Sury Manley nurse practitioner in [facility] on February 16, 2023.  Due to the patient mental acuity problems the patient has had a carotid duplex done on February 17, 2023 which was read as no significant stenosis and she had an MRI of the brain that was done on February 17 and was read as no acute intracranial abnormality with mild atrophy.  Patient is scheduled to see  of cardiology at [facility] next month for vague symptoms of chest pain.  The patient also has been scheduled see neurology at  in April 2023 for her mental acuity problems      Patient Active Problem  List   Diagnosis   • GERD without esophagitis   • Anxiety and depression   • Hyperlipidemia   • Essential hypertension   • Migraines   • Lyme disease   • Intractable nausea and vomiting   • Hypokalemia   • Hypomagnesemia   • Acute hypokalemia   • Abnormal CT scan, stomach   • Intractable vomiting with nausea   • Falls   • Acute UTI (urinary tract infection)     Past Medical History:   Diagnosis Date   • Arthritis    • Depression    • Environmental allergies    • Falls 10/24/2018   • GERD (gastroesophageal reflux disease)    • Hyperlipidemia    • Hypertension    • Lyme disease    • Migraine    • Kelvin Mountain spotted fever    • Vitamin B 12 deficiency      Past Surgical History:   Procedure Laterality Date   • ACHILLES TENDON SURGERY Right    • BREAST CYST EXCISION Left    • CHOLECYSTECTOMY     • ENDOSCOPY N/A 10/16/2018    Procedure: ESOPHAGOGASTRODUODENOSCOPY;  Surgeon: Brunner, Mark I, MD;  Location: [facility] ENDOSCOPY;  Service: Gastroenterology   • HYSTERECTOMY         Current Outpatient Medications:   •  atenolol (TENORMIN) 25 MG tablet, Take 2 tablets by mouth 2 (Two) Times a Day., Disp: , Rfl:   •  budesonide-formoterol (SYMBICORT) 80-4.5 MCG/ACT inhaler, Inhale 2 puffs 2 (Two) Times a Day., Disp: , Rfl:   •  celecoxib (CeleBREX) 200 MG capsule, Take 1 capsule by mouth Daily., Disp: , Rfl:   •  cetirizine (zyrTEC) 10 MG tablet, Take 1 tablet by mouth Daily., Disp: 30 tablet, Rfl: 0  •  Cholecalciferol (Vitamin D3) 50 MCG (2000 UT) tablet, Take 1 tablet by mouth Daily., Disp: , Rfl:   •  DULoxetine (CYMBALTA) 60 MG capsule, Take 1 capsule by mouth Daily., Disp: , Rfl:   •  famotidine (PEPCID) 40 MG tablet, Take 1 tablet by mouth Daily., Disp: , Rfl:   •  folic acid (FOLVITE) 1 MG tablet, Take 1 tablet by mouth Daily., Disp: , Rfl:   •  levothyroxine (SYNTHROID, LEVOTHROID) 25 MCG tablet, Take 1 tablet by mouth Every Morning., Disp: , Rfl:   •  linaclotide (Linzess) 145 MCG capsule capsule, Take 1 capsule by  mouth Every Morning Before Breakfast., Disp: , Rfl:   •  magnesium oxide (MAGOX) 400 (241.3 Mg) MG tablet tablet, Take 1 tablet by mouth 2 (Two) Times a Day., Disp: , Rfl:   •  metFORMIN (GLUCOPHAGE) 500 MG tablet, Take 1 tablet by mouth 2 (Two) Times a Day With Meals., Disp: , Rfl:   •  montelukast (SINGULAIR) 5 MG chewable tablet, Chew 1 tablet Every Night., Disp: 30 tablet, Rfl: 0  •  ondansetron (ZOFRAN) 4 MG tablet, Take 1 tablet by mouth Every 8 (Eight) Hours As Needed for Nausea or Vomiting., Disp: 12 tablet, Rfl: 0  •  pantoprazole (PROTONIX) 40 MG EC tablet, Take 1 tablet by mouth Daily., Disp: , Rfl:   •  potassium chloride 10 MEQ CR tablet, Take 1 tablet by mouth 2 (Two) Times a Day., Disp: , Rfl:   •  pregabalin (LYRICA) 200 MG capsule, Take 1 capsule by mouth 2 (Two) Times a Day., Disp: , Rfl:   •  rizatriptan (MAXALT) 10 MG tablet, Take 1 tablet by mouth 1 (One) Time As Needed for Migraine. May repeat in 2 hours if needed, Disp: , Rfl:   •  rosuvastatin (CRESTOR) 5 MG tablet, Take 1 tablet by mouth Daily., Disp: , Rfl:   •  zonisamide (ZONEGRAN) 100 MG capsule, Take 1 capsule by mouth 3 (Three) Times a Day., Disp: , Rfl:   •  acetaminophen (TYLENOL) 325 MG tablet, Take 2 tablets by mouth Every 4 (Four) Hours As Needed for Mild Pain . (Patient not taking: Reported on 3/1/2023), Disp: 1 bottle, Rfl: 0  •  atorvastatin (LIPITOR) 10 MG tablet, Take 10 mg by mouth Daily. (Patient not taking: Reported on 3/1/2023), Disp: , Rfl:   •  b complex-vitamin c-folic acid (NEPHRO-ELADIA) 0.8 MG tablet tablet, Take 1 tablet by mouth Daily. (Patient not taking: Reported on 3/1/2023), Disp: 30 tablet, Rfl: 0  •  gabapentin (NEURONTIN) 100 MG capsule, Take 1 capsule by mouth Every Night. (Patient not taking: Reported on 3/1/2023), Disp: 30 capsule, Rfl: 0  •  latanoprost (XALATAN) 0.005 % ophthalmic solution, Administer 1 drop to both eyes Every Night. (Patient not taking: Reported on 3/1/2023), Disp: 2.5 mL, Rfl: 0  •   lisinopril (PRINIVIL,ZESTRIL) 5 MG tablet, Take 1 tablet by mouth Every 12 (Twelve) Hours. (Patient not taking: Reported on 3/1/2023), Disp: 60 tablet, Rfl: 0  •  melatonin 5 MG sublingual tablet sublingual tablet, Place 1 tablet under the tongue Every Night. (Patient not taking: Reported on 3/1/2023), Disp: 30 tablet, Rfl: 0  •  metoprolol tartrate (LOPRESSOR) 25 MG tablet, Take 1 tablet by mouth Every 12 (Twelve) Hours. (Patient not taking: Reported on 3/1/2023), Disp: 60 tablet, Rfl: 0  •  omeprazole (priLOSEC) 20 MG capsule, Take 20 mg by mouth Daily. (Patient not taking: Reported on 3/1/2023), Disp: , Rfl:   •  polyethylene glycol (MIRALAX) pack packet, Take 17 g by mouth Daily. (Patient not taking: Reported on 3/1/2023), Disp: 17 g, Rfl: 0  •  promethazine (PHENERGAN) 25 MG tablet, Take 25 mg by mouth Every 6 (Six) Hours As Needed for Nausea or Vomiting. (Patient not taking: Reported on 3/1/2023), Disp: , Rfl:   •  QUEtiapine (SEROquel) 25 MG tablet, Take 0.5 tablets by mouth Every 12 (Twelve) Hours. (Patient not taking: Reported on 3/1/2023), Disp: 60 tablet, Rfl: 0  Allergies   Allergen Reactions   • Gabapentin Hives     Social History     Socioeconomic History   • Marital status:    Tobacco Use   • Smoking status: Never   • Smokeless tobacco: Never   Substance and Sexual Activity   • Alcohol use: No   • Drug use: No   • Sexual activity: Defer     History reviewed. No pertinent family history.  Review of Systems   Constitutional: Positive for malaise/fatigue. Negative for chills, fever, night sweats and weight loss.   HENT: Positive for sore throat. Negative for hearing loss and odynophagia.    Cardiovascular: Positive for chest pain, dyspnea on exertion and leg swelling. Negative for orthopnea and palpitations.   Respiratory: Negative.  Negative for cough and hemoptysis.    Endocrine: Negative for cold intolerance, heat intolerance, polydipsia, polyphagia and polyuria.   Hematologic/Lymphatic:  "Negative.  Does not bruise/bleed easily.   Skin: Positive for color change and poor wound healing. Negative for itching and rash.   Musculoskeletal: Positive for back pain. Negative for joint pain, joint swelling and myalgias.   Gastrointestinal: Negative.  Negative for abdominal pain, constipation, diarrhea, hematemesis, hematochezia, melena, nausea and vomiting.   Genitourinary: Negative.  Negative for dysuria, frequency and hematuria.   Neurological: Positive for headaches, loss of balance and numbness. Negative for focal weakness and seizures.   Psychiatric/Behavioral: Positive for depression. Negative for suicidal ideas. The patient has insomnia.    Allergic/Immunologic: Positive for environmental allergies.   All other systems reviewed and are negative.    Vitals:    03/01/23 1113   BP: 116/86   BP Location: Right arm   Patient Position: Sitting   Pulse: 94   Temp: 97.9 °F (36.6 °C)   Weight: 108 kg (237 lb)   Height: 175.3 cm (69\")      Physical Exam  Constitutional:       General: She is not in acute distress.     Appearance: She is not ill-appearing or toxic-appearing.      Comments: Patient is morbidly obese   HENT:      Head: Normocephalic.   Eyes:      Pupils: Pupils are equal, round, and reactive to light.   Cardiovascular:      Rate and Rhythm: Normal rate and regular rhythm.      Pulses: Normal pulses.      Comments: Has palpable as well as dopplerable pedal pulses  Abdominal:      General: There is no distension.      Palpations: There is no mass.      Tenderness: There is no abdominal tenderness.      Hernia: No hernia is present.      Comments: Patient is morbidly obese   Skin:     Capillary Refill: Capillary refill takes less than 2 seconds.      Findings: Lesion present.      Comments: Patient has a skin ulceration at the tuft of the right fourth toe that with some subungual drainage.  Patient also has a very small skin tuft ulceration  at the right second toe with some drainage  Patient has some " rubor/discoloration of both lower extremities when the feet are dangling off the side of the examination table   Neurological:      General: No focal deficit present.      Mental Status: She is alert and oriented to person, place, and time.   Psychiatric:         Mood and Affect: Mood normal.         Behavior: Behavior normal.         Thought Content: Thought content normal.         Judgment: Judgment normal.         The ROS, past medical history, surgical history, family history, social history and vitals were reviewed by myself and corrected as needed.      Labs/Imaging: I reviewed the reports of the MRI of the brain which was read as basically normal with some mild atrophy.  The CT angiogram report with runoff shows no obstructive stenotic lesions of the lower extremities with runoff three-vessel to the feet.    Assessment/Plan: #1 gangrenous changes of the right fourth toe at the distal tuft and beginning some gangrenous changes of the right second toe at the distal tuft area.  They both have some drainage noted.  Unclear etiology at this time.  Must rule out embolic source.  2.  By report CT angiogram with runoff does not show any stenotic lesions to either leg/foot  3.  By report MRI of the brain shows mild atrophy but no lesions noted.    Plan: With this rather recent onset of these gangrenous changes the right fourth toe and second toe I think the patient need to be admitted to the hospital to rule out antibiotic source for these lesions.  I discussed this with Sadaf Torrez of the hospital service and they will be admitting the patient today.  I told her we need to get an MRI of the right foot as well as an infectious disease consultation.    Patient Active Problem List   Diagnosis   • GERD without esophagitis   • Anxiety and depression   • Hyperlipidemia   • Essential hypertension   • Migraines   • Lyme disease   • Intractable nausea and vomiting   • Hypokalemia   • Hypomagnesemia   • Acute hypokalemia   •  Abnormal CT scan, stomach   • Intractable vomiting with nausea   • Falls   • Acute UTI (urinary tract infection)

## 2023-03-02 ENCOUNTER — APPOINTMENT (OUTPATIENT)
Dept: CARDIOLOGY | Facility: HOSPITAL | Age: 53
DRG: 300 | End: 2023-03-02
Payer: MEDICARE

## 2023-03-02 ENCOUNTER — APPOINTMENT (OUTPATIENT)
Dept: MRI IMAGING | Facility: HOSPITAL | Age: 53
DRG: 300 | End: 2023-03-02
Payer: MEDICARE

## 2023-03-02 PROBLEM — E87.6 ACUTE HYPOKALEMIA: Status: RESOLVED | Noted: 2018-10-15 | Resolved: 2023-03-02

## 2023-03-02 PROBLEM — R29.6 FALLS: Status: RESOLVED | Noted: 2018-10-24 | Resolved: 2023-03-02
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
## 2023-03-02 PROBLEM — R11.2 INTRACTABLE NAUSEA AND VOMITING: Status: RESOLVED | Noted: 2018-10-15 | Resolved: 2023-03-02

## 2023-03-02 PROBLEM — E83.42 HYPOMAGNESEMIA: Status: RESOLVED | Noted: 2018-10-15 | Resolved: 2023-03-02

## 2023-03-02 PROBLEM — A69.20 LYME DISEASE: Status: RESOLVED | Noted: 2018-10-15 | Resolved: 2023-03-02

## 2023-03-02 PROBLEM — E87.6 HYPOKALEMIA: Status: RESOLVED | Noted: 2018-10-15 | Resolved: 2023-03-02

## 2023-03-02 LAB
ASCENDING AORTA: 2.7 CM
BACTERIA UR QL AUTO: ABNORMAL /HPF
BH CV ECHO MEAS - AO MAX PG: 6.2 MMHG
BH CV ECHO MEAS - AO MEAN PG: 3.2 MMHG
BH CV ECHO MEAS - AO ROOT AREA (BSA CORRECTED): 1.4 CM2
BH CV ECHO MEAS - AO ROOT DIAM: 3.1 CM
BH CV ECHO MEAS - AO V2 MAX: 124.2 CM/SEC
BH CV ECHO MEAS - AO V2 VTI: 23.3 CM
BH CV ECHO MEAS - AVA(I,D): 1.99 CM2
BH CV ECHO MEAS - EDV(CUBED): 96.1 ML
BH CV ECHO MEAS - EDV(MOD-SP2): 121 ML
BH CV ECHO MEAS - EDV(MOD-SP4): 141 ML
BH CV ECHO MEAS - EF(MOD-BP): 52.8 %
BH CV ECHO MEAS - EF(MOD-SP2): 61.9 %
BH CV ECHO MEAS - EF(MOD-SP4): 43.5 %
BH CV ECHO MEAS - ESV(CUBED): 30.8 ML
BH CV ECHO MEAS - ESV(MOD-SP2): 46.1 ML
BH CV ECHO MEAS - ESV(MOD-SP4): 79.6 ML
BH CV ECHO MEAS - FS: 31.6 %
BH CV ECHO MEAS - IVS/LVPW: 0.82 CM
BH CV ECHO MEAS - IVSD: 0.85 CM
BH CV ECHO MEAS - LA DIMENSION: 3.9 CM
BH CV ECHO MEAS - LAT PEAK E' VEL: 7.7 CM/SEC
BH CV ECHO MEAS - LV DIASTOLIC VOL/BSA (35-75): 65 CM2
BH CV ECHO MEAS - LV MASS(C)D: 145.3 GRAMS
BH CV ECHO MEAS - LV MAX PG: 1.78 MMHG
BH CV ECHO MEAS - LV MEAN PG: 1.04 MMHG
BH CV ECHO MEAS - LV SYSTOLIC VOL/BSA (12-30): 36.7 CM2
BH CV ECHO MEAS - LV V1 MAX: 66.7 CM/SEC
BH CV ECHO MEAS - LV V1 VTI: 14.1 CM
BH CV ECHO MEAS - LVIDD: 4.6 CM
BH CV ECHO MEAS - LVIDS: 3.1 CM
BH CV ECHO MEAS - LVOT AREA: 3.3 CM2
BH CV ECHO MEAS - LVOT DIAM: 2.05 CM
BH CV ECHO MEAS - LVPWD: 1.04 CM
BH CV ECHO MEAS - MED PEAK E' VEL: 7.1 CM/SEC
BH CV ECHO MEAS - MV A MAX VEL: 100.4 CM/SEC
BH CV ECHO MEAS - MV DEC SLOPE: 1082 CM/SEC2
BH CV ECHO MEAS - MV DEC TIME: 0.07 MSEC
BH CV ECHO MEAS - MV E MAX VEL: 88.8 CM/SEC
BH CV ECHO MEAS - MV E/A: 0.88
BH CV ECHO MEAS - MV MAX PG: 3.9 MMHG
BH CV ECHO MEAS - MV MEAN PG: 2.2 MMHG
BH CV ECHO MEAS - MV P1/2T: 25.6 MSEC
BH CV ECHO MEAS - MV V2 VTI: 16.1 CM
BH CV ECHO MEAS - MVA(P1/2T): 8.6 CM2
BH CV ECHO MEAS - MVA(VTI): 2.9 CM2
BH CV ECHO MEAS - PA ACC TIME: 0.08 SEC
BH CV ECHO MEAS - PA PR(ACCEL): 41.8 MMHG
BH CV ECHO MEAS - PA V2 MAX: 139.1 CM/SEC
BH CV ECHO MEAS - RAP SYSTOLE: 3 MMHG
BH CV ECHO MEAS - RVSP: 19 MMHG
BH CV ECHO MEAS - SI(MOD-SP2): 34.6 ML/M2
BH CV ECHO MEAS - SI(MOD-SP4): 28.3 ML/M2
BH CV ECHO MEAS - SV(LVOT): 46.4 ML
BH CV ECHO MEAS - SV(MOD-SP2): 74.9 ML
BH CV ECHO MEAS - SV(MOD-SP4): 61.4 ML
BH CV ECHO MEAS - TAPSE (>1.6): 2.07 CM
BH CV ECHO MEAS - TR MAX PG: 16.3 MMHG
BH CV ECHO MEAS - TR MAX VEL: 201.6 CM/SEC
BH CV ECHO MEASUREMENTS AVERAGE E/E' RATIO: 12
BH CV ECHO SHUNT ASSESSMENT PERFORMED (HIDDEN SCRIPTING): 1
BH CV VAS BP RIGHT ARM: NORMAL MMHG
BH CV XLRA - RV BASE: 3 CM
BH CV XLRA - RV LENGTH: 7.1 CM
BH CV XLRA - RV MID: 2.41 CM
BH CV XLRA - TDI S': 11.6 CM/SEC
BILIRUB UR QL STRIP: NEGATIVE
CLARITY UR: ABNORMAL
COLOR UR: YELLOW
CYTOLOGIST CVX/VAG CYTO: NORMAL
GLUCOSE BLDC GLUCOMTR-MCNC: 103 MG/DL (ref 70–130)
GLUCOSE BLDC GLUCOMTR-MCNC: 105 MG/DL (ref 70–130)
GLUCOSE BLDC GLUCOMTR-MCNC: 122 MG/DL (ref 70–130)
GLUCOSE UR STRIP-MCNC: NEGATIVE MG/DL
HGB UR QL STRIP.AUTO: NEGATIVE
HYALINE CASTS UR QL AUTO: ABNORMAL /LPF
IVRT: 124 MSEC
KETONES UR QL STRIP: NEGATIVE
LEFT ATRIUM VOLUME INDEX: 22.9 ML/M2
LEUKOCYTE ESTERASE UR QL STRIP.AUTO: NEGATIVE
LV EF 2D ECHO EST: 40 %
MAXIMAL PREDICTED HEART RATE: 168 BPM
NITRITE UR QL STRIP: NEGATIVE
PATH INTERP BLD-IMP: NORMAL
PH UR STRIP.AUTO: 5.5 [PH] (ref 5–8)
PROT UR QL STRIP: NEGATIVE
RBC # UR STRIP: ABNORMAL /HPF
REF LAB TEST METHOD: ABNORMAL
SP GR UR STRIP: 1.02 (ref 1–1.03)
SQUAMOUS #/AREA URNS HPF: ABNORMAL /HPF
STRESS TARGET HR: 143 BPM
UROBILINOGEN UR QL STRIP: ABNORMAL
WBC # UR STRIP: ABNORMAL /HPF

## 2023-03-02 PROCEDURE — 25010000002 HEPARIN (PORCINE) PER 1000 UNITS: Performed by: INTERNAL MEDICINE

## 2023-03-02 PROCEDURE — 81001 URINALYSIS AUTO W/SCOPE: CPT | Performed by: INTERNAL MEDICINE

## 2023-03-02 PROCEDURE — 25010000002 PIPERACILLIN SOD-TAZOBACTAM PER 1 G: Performed by: INTERNAL MEDICINE

## 2023-03-02 PROCEDURE — A9577 INJ MULTIHANCE: HCPCS | Performed by: INTERNAL MEDICINE

## 2023-03-02 PROCEDURE — 93306 TTE W/DOPPLER COMPLETE: CPT | Performed by: INTERNAL MEDICINE

## 2023-03-02 PROCEDURE — 82962 GLUCOSE BLOOD TEST: CPT

## 2023-03-02 PROCEDURE — 99232 SBSQ HOSP IP/OBS MODERATE 35: CPT | Performed by: FAMILY MEDICINE

## 2023-03-02 PROCEDURE — 73720 MRI LWR EXTREMITY W/O&W/DYE: CPT

## 2023-03-02 PROCEDURE — 25010000002 SULFUR HEXAFLUORIDE MICROSPH 60.7-25 MG RECONSTITUTED SUSPENSION: Performed by: INTERNAL MEDICINE

## 2023-03-02 PROCEDURE — 93306 TTE W/DOPPLER COMPLETE: CPT

## 2023-03-02 PROCEDURE — 0 GADOBENATE DIMEGLUMINE 529 MG/ML SOLUTION: Performed by: INTERNAL MEDICINE

## 2023-03-02 PROCEDURE — 99231 SBSQ HOSP IP/OBS SF/LOW 25: CPT | Performed by: THORACIC SURGERY (CARDIOTHORACIC VASCULAR SURGERY)

## 2023-03-02 PROCEDURE — 25010000002 VANCOMYCIN 10 G RECONSTITUTED SOLUTION: Performed by: INTERNAL MEDICINE

## 2023-03-02 PROCEDURE — 25010000002 CEFTRIAXONE PER 250 MG: Performed by: INTERNAL MEDICINE

## 2023-03-02 RX ORDER — MONTELUKAST SODIUM 10 MG/1
10 TABLET ORAL NIGHTLY
COMMUNITY

## 2023-03-02 RX ORDER — ZONISAMIDE 100 MG/1
CAPSULE ORAL 2 TIMES DAILY
COMMUNITY

## 2023-03-02 RX ORDER — ZONISAMIDE 100 MG/1
100 CAPSULE ORAL DAILY
Status: DISCONTINUED | OUTPATIENT
Start: 2023-03-02 | End: 2023-03-06 | Stop reason: HOSPADM

## 2023-03-02 RX ORDER — AMOXICILLIN 250 MG
2 CAPSULE ORAL 2 TIMES DAILY
Status: DISCONTINUED | OUTPATIENT
Start: 2023-03-02 | End: 2023-03-06 | Stop reason: HOSPADM

## 2023-03-02 RX ORDER — ZONISAMIDE 100 MG/1
200 CAPSULE ORAL NIGHTLY
Status: DISCONTINUED | OUTPATIENT
Start: 2023-03-02 | End: 2023-03-06 | Stop reason: HOSPADM

## 2023-03-02 RX ORDER — BISACODYL 5 MG/1
5 TABLET, DELAYED RELEASE ORAL DAILY PRN
Status: DISCONTINUED | OUTPATIENT
Start: 2023-03-02 | End: 2023-03-06 | Stop reason: HOSPADM

## 2023-03-02 RX ORDER — HYDROCODONE BITARTRATE AND ACETAMINOPHEN 5; 325 MG/1; MG/1
1 TABLET ORAL EVERY 6 HOURS PRN
Status: DISCONTINUED | OUTPATIENT
Start: 2023-03-02 | End: 2023-03-06 | Stop reason: HOSPADM

## 2023-03-02 RX ORDER — POLYETHYLENE GLYCOL 3350 17 G/17G
17 POWDER, FOR SOLUTION ORAL DAILY PRN
Status: DISCONTINUED | OUTPATIENT
Start: 2023-03-02 | End: 2023-03-06 | Stop reason: HOSPADM

## 2023-03-02 RX ORDER — BISACODYL 10 MG
10 SUPPOSITORY, RECTAL RECTAL DAILY PRN
Status: DISCONTINUED | OUTPATIENT
Start: 2023-03-02 | End: 2023-03-06 | Stop reason: HOSPADM

## 2023-03-02 RX ORDER — ALBUTEROL SULFATE 90 UG/1
2 AEROSOL, METERED RESPIRATORY (INHALATION) 2 TIMES DAILY PRN
COMMUNITY

## 2023-03-02 RX ADMIN — HEPARIN SODIUM 5000 UNITS: 5000 INJECTION INTRAVENOUS; SUBCUTANEOUS at 20:12

## 2023-03-02 RX ADMIN — SODIUM CHLORIDE 100 ML/HR: 9 INJECTION, SOLUTION INTRAVENOUS at 00:35

## 2023-03-02 RX ADMIN — VANCOMYCIN HYDROCHLORIDE 1250 MG: 10 INJECTION, POWDER, LYOPHILIZED, FOR SOLUTION INTRAVENOUS at 03:30

## 2023-03-02 RX ADMIN — HEPARIN SODIUM 5000 UNITS: 5000 INJECTION INTRAVENOUS; SUBCUTANEOUS at 11:58

## 2023-03-02 RX ADMIN — ATENOLOL 25 MG: 25 TABLET ORAL at 08:56

## 2023-03-02 RX ADMIN — Medication 10 ML: at 20:28

## 2023-03-02 RX ADMIN — HYDROCODONE BITARTRATE AND ACETAMINOPHEN 1 TABLET: 5; 325 TABLET ORAL at 12:51

## 2023-03-02 RX ADMIN — TAZOBACTAM SODIUM AND PIPERACILLIN SODIUM 3.38 G: 375; 3 INJECTION, SOLUTION INTRAVENOUS at 13:42

## 2023-03-02 RX ADMIN — PREGABALIN 150 MG: 75 CAPSULE ORAL at 08:56

## 2023-03-02 RX ADMIN — CEFTRIAXONE 2 G: 2 INJECTION, POWDER, FOR SOLUTION INTRAMUSCULAR; INTRAVENOUS at 15:28

## 2023-03-02 RX ADMIN — GADOBENATE DIMEGLUMINE 20 ML: 529 INJECTION, SOLUTION INTRAVENOUS at 02:44

## 2023-03-02 RX ADMIN — PREGABALIN 150 MG: 75 CAPSULE ORAL at 20:13

## 2023-03-02 RX ADMIN — Medication 10 ML: at 09:02

## 2023-03-02 RX ADMIN — ZONISAMIDE 200 MG: 100 CAPSULE ORAL at 20:12

## 2023-03-02 RX ADMIN — SENNOSIDES AND DOCUSATE SODIUM 2 TABLET: 8.6; 5 TABLET ORAL at 20:28

## 2023-03-02 RX ADMIN — HYDROCODONE BITARTRATE AND ACETAMINOPHEN 1 TABLET: 5; 325 TABLET ORAL at 20:13

## 2023-03-02 RX ADMIN — ROSUVASTATIN CALCIUM 5 MG: 10 TABLET, COATED ORAL at 20:13

## 2023-03-02 RX ADMIN — TAZOBACTAM SODIUM AND PIPERACILLIN SODIUM 3.38 G: 375; 3 INJECTION, SOLUTION INTRAVENOUS at 05:32

## 2023-03-02 NOTE — PROGRESS NOTES
"Pharmacy Consult-Vancomycin Dosing  Bryanna Schwartz is a  52 y.o. female receiving vancomycin therapy.     Indication: SSTI - gangrenous toe  Consulting Provider: Hospitalist  ID Consult: Y - pending     Goal AUC: 400 - 600 mg/L*hr    Current Antimicrobial Therapy  Anti-Infectives (From admission, onward)      Ordered     Dose/Rate Route Frequency Start Stop    03/01/23 2010  vancomycin 1250 mg/250 mL 0.9% NS IVPB ([facility])        Ordering Provider: Charo De La Paz MD    1,250 mg  over 90 Minutes Intravenous Every 12 Hours 03/02/23 0400 03/06/23 0359    03/01/23 1513  piperacillin-tazobactam (ZOSYN) 3.375 g in iso-osmotic dextrose 50 ml (premix)        Ordering Provider: Charo De La Paz MD    3.375 g  over 4 Hours Intravenous Every 8 Hours 03/01/23 2200 03/06/23 2159    03/01/23 1520  vancomycin IVPB 2000 mg in 0.9% Sodium Chloride (premix) 500 mL        Ordering Provider: Yung Hughes, PharmD    20 mg/kg × 102 kg Intravenous Once 03/01/23 1615 03/01/23 1640    03/01/23 1513  piperacillin-tazobactam (ZOSYN) 3.375 g in iso-osmotic dextrose 50 ml (premix)        Ordering Provider: Charo De La Paz MD    3.375 g  over 30 Minutes Intravenous Once 03/01/23 1600 03/01/23 1625    03/01/23 1513  Pharmacy to dose vancomycin        Ordering Provider: Charo De La Paz MD     Does not apply Continuous PRN 03/01/23 1512 03/06/23 1511          Allergies  Allergies as of 03/01/2023 - Reviewed 03/01/2023   Allergen Reaction Noted    Gabapentin Hives 01/25/2023     Labs  Results from last 7 days   Lab Units 03/01/23  1555   BUN mg/dL 11   CREATININE mg/dL 0.67     Results from last 7 days   Lab Units 03/01/23  1555   WBC 10*3/mm3 7.22     Evaluation of Dosing     Last Dose Received in the ED/Outside Facility: vancomycin 2000mg (~20mg/kg) IV on 3/1 @ 1640  Is Patient on Dialysis or Renal Replacement: No    Ht - 175.3 cm (69\")  Wt - 102 kg (224 lb)    Estimated Creatinine Clearance: 124.8 mL/min (by C-G formula based on SCr " of 0.67 mg/dL).    Intake & Output (last 3 days)         02/27 0701  02/28 0700 02/28 0701  03/01 0700 03/01 0701  03/02 0700    P.O.   360    Total Intake(mL/kg)   360 (3.5)    Urine (mL/kg/hr)   600    Total Output   600    Net   -240                 Microbiology and Radiology  Microbiology Results (last 10 days)       Procedure Component Value - Date/Time    MRSA Screen, PCR (Inpatient) - Swab, Nares [[id number]]  (Normal) Collected: 03/01/23 1539    Lab Status: Final result Specimen: Swab from Nares Updated: 03/01/23 1704     MRSA PCR Negative    Narrative:      The negative predictive value of this diagnostic test is high and should only be used to consider de-escalating anti-MRSA therapy. A positive result may indicate colonization with MRSA and must be correlated clinically.  MRSA Negative          Reported Vancomycin Levels                Insight[employer] AUC Calculation:    Current AUC: 308 mg/L*hr    Predicted Steady State AUC on Current Dose: -- mg/L*hr  _________________________________    Predicted Steady State AUC on New Dose: 461 mg/L*hr    Assessment/Plan:  Pharmacy to dose vancomycin for SSTI - gangrenous toe. Goal -600 mg/L*hr.  Patient received loading dose of vancomycin 2000mg (~20mg/kg) IV on 3/1 @ 1640. Renal function appears stable from baseline. Initiate maintenance dose of vancomycin 1250mg (~12mg/kg) IV Q12hr on 3/2 @ 0400.  Assess clearance by random level on 3/3 @ 1200, prior to 5th dose.  Pharmacy will continue to monitor renal function, cultures and sensitivities, and clinical status to adjust regimen as necessary.    Taylor Riedel, PharmD, [facility]  3/1/2023  20:11 EST

## 2023-03-02 NOTE — PLAN OF CARE
Problem: Adult Inpatient Plan of Care  Goal: Plan of Care Review  3/2/2023 1832 by Azam Perez LPN  Outcome: Ongoing, Progressing  3/2/2023 1832 by Azam Perez LPN  Outcome: Ongoing, Progressing  Flowsheets (Taken 3/2/2023 1832)  Plan of Care Reviewed With:   patient   spouse  3/2/2023 1831 by Azam Perez LPN  Outcome: Ongoing, Progressing  Flowsheets (Taken 3/2/2023 1831)  Plan of Care Reviewed With:   patient   spouse  Goal: Patient-Specific Goal (Individualized)  3/2/2023 1832 by Azam Perez LPN  Outcome: Ongoing, Progressing  3/2/2023 1832 by Azam Perez LPN  Outcome: Ongoing, Progressing  3/2/2023 1831 by Azam Perez LPN  Outcome: Ongoing, Progressing  Goal: Absence of Hospital-Acquired Illness or Injury  3/2/2023 1832 by Azam Perez LPN  Outcome: Ongoing, Progressing  3/2/2023 1832 by Azam Perez LPN  Outcome: Ongoing, Progressing  3/2/2023 1831 by Azam Perez LPN  Outcome: Ongoing, Progressing  Goal: Optimal Comfort and Wellbeing  3/2/2023 1832 by Azam Perez LPN  Outcome: Ongoing, Progressing  3/2/2023 1832 by Azam Perez LPN  Outcome: Ongoing, Progressing  3/2/2023 1831 by Azam Perez LPN  Outcome: Ongoing, Progressing  Goal: Readiness for Transition of Care  3/2/2023 1832 by Azam Perez LPN  Outcome: Ongoing, Progressing  3/2/2023 1832 by Azam Perez LPN  Outcome: Ongoing, Progressing  3/2/2023 1831 by Azam Perez LPN  Outcome: Ongoing, Progressing     Problem: Diabetes Comorbidity  Goal: Blood Glucose Level Within Targeted Range  3/2/2023 1832 by Azam Perez LPN  Outcome: Ongoing, Progressing  3/2/2023 1832 by Azam Perez LPN  Outcome: Ongoing, Progressing  3/2/2023 1831 by Azam Perez LPN  Outcome: Ongoing, Progressing   Goal Outcome Evaluation:  Plan of Care Reviewed With: patient, spouse

## 2023-03-02 NOTE — PLAN OF CARE
Problem: Adult Inpatient Plan of Care  Goal: Plan of Care Review  3/2/2023 1832 by Azam Perez LPN  Outcome: Ongoing, Progressing  Flowsheets (Taken 3/2/2023 1832)  Plan of Care Reviewed With:   patient   spouse  3/2/2023 1831 by Azam Perez LPN  Outcome: Ongoing, Progressing  Flowsheets (Taken 3/2/2023 1831)  Plan of Care Reviewed With:   patient   spouse  Goal: Patient-Specific Goal (Individualized)  3/2/2023 1832 by Azam Perez LPN  Outcome: Ongoing, Progressing  3/2/2023 1831 by Azam Perez LPN  Outcome: Ongoing, Progressing  Goal: Absence of Hospital-Acquired Illness or Injury  3/2/2023 1832 by Azam Perez LPN  Outcome: Ongoing, Progressing  3/2/2023 1831 by Azam Perez LPN  Outcome: Ongoing, Progressing  Goal: Optimal Comfort and Wellbeing  3/2/2023 1832 by Azam Perez LPN  Outcome: Ongoing, Progressing  3/2/2023 1831 by Azam Perez LPN  Outcome: Ongoing, Progressing  Goal: Readiness for Transition of Care  3/2/2023 1832 by Azam Perez LPN  Outcome: Ongoing, Progressing  3/2/2023 1831 by Azam Perez LPN  Outcome: Ongoing, Progressing     Problem: Diabetes Comorbidity  Goal: Blood Glucose Level Within Targeted Range  3/2/2023 1832 by Azam Perez LPN  Outcome: Ongoing, Progressing  3/2/2023 1831 by Azam Perez LPN  Outcome: Ongoing, Progressing   Goal Outcome Evaluation:  Plan of Care Reviewed With: patient, spouse

## 2023-03-02 NOTE — PROGRESS NOTES
Cardiothoracic Surgery Progress Note      POD #:     LOS: 1 day      Subjective: Asleep this morning not really arousable.  Chief complaint: Right foot pain  Objective:  Vital Signs vital signs below noted Tmax past 24 hours 98.9 °F  Temp:  [97.9 °F (36.6 °C)-98.9 °F (37.2 °C)] 98.3 °F (36.8 °C)  Heart Rate:  [79-97] 83  Resp:  [16-19] 16  BP: (116-139)/(70-86) 118/71    Physical Exam:   General Appearance: Asleep this morning resting comfortably vital signs normal on monitor   Lungs   Heart:   Skin: Ulceration of right fourth toe and right second toe at the distal tuft stable at this time ulcerations painted with Betadine and covered with a 6 x 6 Optifoam   Incision:     Results:  Results from last 7 days   Lab Units 03/01/23  1555   WBC 10*3/mm3 7.22   HEMOGLOBIN g/dL 13.4   HEMATOCRIT % 40.8   PLATELETS 10*3/mm3 233     Results from last 7 days   Lab Units 03/01/23  1555   SODIUM mmol/L 139   POTASSIUM mmol/L 4.2   CHLORIDE mmol/L 104   CO2 mmol/L 23.0   BUN mg/dL 11   CREATININE mg/dL 0.67   GLUCOSE mg/dL 118*   CALCIUM mg/dL 8.9         Assessment: #1.  Gangrenous changes the right fourth toe at the distal tuft and gangrenous changes the right second toe at the distal tuft subungual area.  Etiology unclear at this time  2.  Prior history of both Lyme's disease and Raif[place] spotted fever.  Hospitalized here for polyp proximal a month in 2018.  Had to go to [facility] to learn to walk again.  #3.  Recent carotid duplex with basically normal  4.  Recent CT angiogram with runoff read as normal with no obstructive disease.  5.  MRI of the brain shows some mild atrophy also done recently  #6.  Recent chest pain work-up in progress  Plan: Work-up in progress.  Infectious disease consultation.  Cardiology consultation.  Overall medical manage per hospitalist at this time.  I will place paper copies of the CT angiogram report as well as the MRI report as well as the carotid duplex report into the paper chart.  We  are trying to get the actual images of the CT angiogram with runoff as well as [facility]  Patient has a rather complex medical history      Michael Krause MD - 03/02/23 - 05:34 EST

## 2023-03-02 NOTE — PLAN OF CARE
Problem: Adult Inpatient Plan of Care  Goal: Plan of Care Review  Outcome: Ongoing, Progressing  Flowsheets (Taken 3/2/2023 0650)  Plan of Care Reviewed With: patient  Goal: Patient-Specific Goal (Individualized)  Outcome: Ongoing, Progressing  Goal: Absence of Hospital-Acquired Illness or Injury  Outcome: Ongoing, Progressing  Intervention: Identify and Manage Fall Risk  Recent Flowsheet Documentation  Taken 3/2/2023 0444 by Rosa M Weinstein RN  Safety Promotion/Fall Prevention:   safety round/check completed   fall prevention program maintained   clutter free environment maintained   assistive device/personal items within reach  Taken 3/2/2023 0034 by Rosa M Weinstein RN  Safety Promotion/Fall Prevention:   safety round/check completed   room organization consistent   fall prevention program maintained   clutter free environment maintained  Taken 3/1/2023 2050 by Rosa M Weinstein RN  Safety Promotion/Fall Prevention:   safety round/check completed   room organization consistent   activity supervised   assistive device/personal items within reach   clutter free environment maintained   fall prevention program maintained   nonskid shoes/slippers when out of bed  Intervention: Prevent Skin Injury  Recent Flowsheet Documentation  Taken 3/2/2023 0444 by Rosa M Weinstein RN  Body Position: position changed independently  Taken 3/2/2023 0034 by Rosa M Weinstein RN  Body Position: position changed independently  Taken 3/1/2023 2050 by Rosa M Weinstein RN  Body Position: position changed independently  Intervention: Prevent Infection  Recent Flowsheet Documentation  Taken 3/2/2023 0444 by Rosa M Weinstein RN  Infection Prevention: environmental surveillance performed  Taken 3/2/2023 0034 by Rosa M Weinstein RN  Infection Prevention: environmental surveillance performed  Taken 3/1/2023 2050 by Rosa M Weinstein RN  Infection Prevention: environmental surveillance performed  Goal: Optimal Comfort  and Wellbeing  Outcome: Ongoing, Progressing  Intervention: Provide Person-Centered Care  Recent Flowsheet Documentation  Taken 3/1/2023 2050 by Rosa M Weinstein, RN  Trust Relationship/Rapport:   care explained   choices provided   emotional support provided   empathic listening provided   questions answered   questions encouraged   reassurance provided   thoughts/feelings acknowledged  Goal: Readiness for Transition of Care  Outcome: Ongoing, Progressing     Problem: Diabetes Comorbidity  Goal: Blood Glucose Level Within Targeted Range  Outcome: Ongoing, Progressing     Problem: Hypertension Comorbidity  Goal: Blood Pressure in Desired Range  Outcome: Ongoing, Progressing     Problem: Pain Chronic (Persistent) (Comorbidity Management)  Goal: Acceptable Pain Control and Functional Ability  Outcome: Ongoing, Progressing  Intervention: Optimize Psychosocial Wellbeing  Recent Flowsheet Documentation  Taken 3/1/2023 2050 by Rosa M Weinstein, RN  Diversional Activities:   television   smartphone  Family/Support System Care:   support provided   self-care encouraged   Goal Outcome Evaluation:  Plan of Care Reviewed With: patient

## 2023-03-02 NOTE — PROGRESS NOTES
[facility] Medicine Services  PROGRESS NOTE    Patient Name: Bryanna Schwartz  : 1970  MRN: [id number]    Date of Admission: 3/1/2023  Primary Care Physician: Baljeet Manley APRN    Subjective   Subjective     CC:  Foot ulcers    HPI:  Patient is a 52-year-old diabetic with peripheral neuropathy who presented for evaluation of foot ulcers on her toes.  She was seen by Dr. Krause as well as Dr. Contreras.  Imaging does not seem very suspicious for osteomyelitis.  Work-up is currently ongoing.  She is able to tolerate p.o. and ambulate but does have significant neuropathy.    ROS:  Gen- No fevers, chills  CV- No chest pain, palpitations  Resp- No cough, dyspnea  GI- No N/V/D, abd pain         Objective   Objective     Vital Signs:   Temp:  [98.3 °F (36.8 °C)-98.9 °F (37.2 °C)] 98.5 °F (36.9 °C)  Heart Rate:  [80-97] 81  Resp:  [16-19] 18  BP: (115-138)/(71-81) 138/79  Flow (L/min):  [2] 2     Physical Exam:  Constitutional: No acute distress, awake, alert  HENT: NCAT, mucous membranes moist  Respiratory: Clear to auscultation bilaterally, respiratory effort normal   Cardiovascular: RRR  Gastrointestinal: Positive bowel sounds, soft, nontender, nondistended  Musculoskeletal: No bilateral ankle edema  Psychiatric: Appropriate affect, cooperative  Neurologic: Oriented x 3, strength symmetric in all extremities, Cranial Nerves grossly intact to confrontation, speech clear  Skin: Right foot with small black eschar under the toenails without drainage or erythema      Results Reviewed:  LAB RESULTS:      Lab 23  2219 23  1808 23  1555   WBC  --   --  7.22   HEMOGLOBIN  --   --  13.4   HEMATOCRIT  --   --  40.8   PLATELETS  --   --  233   NEUTROS ABS  --   --  4.48   IMMATURE GRANS (ABS)  --   --  0.04   LYMPHS ABS  --   --  1.94   MONOS ABS  --   --  0.51   EOS ABS  --   --  0.22   MCV  --   --  92.3   SED RATE  --   --  16   CRP  --   --  0.84*   PROCALCITONIN  --   --  0.07    LACTATE 1.6 2.2*  --    LDH  --   --  248*   D DIMER QUANT  --   --  0.44         Lab 03/01/23  1555   SODIUM 139   POTASSIUM 4.2   CHLORIDE 104   CO2 23.0   ANION GAP 12.0   BUN 11   CREATININE 0.67   EGFR 105.3   GLUCOSE 118*   CALCIUM 8.9   HEMOGLOBIN A1C 5.20         Lab 03/01/23  1555   TOTAL PROTEIN 6.0   ALBUMIN 3.1*   GLOBULIN 2.9   ALT (SGPT) 39*   AST (SGOT) 27   BILIRUBIN 0.4   ALK PHOS 213*         Lab 03/01/23  1555   PROBNP 640.5                 Brief Urine Lab Results  (Last result in the past 365 days)      Color   Clarity   Blood   Leuk Est   Nitrite   Protein   CREAT   Urine HCG        03/02/23 0400 Yellow   Turbid   Negative   Negative   Negative   Negative                 Microbiology Results Abnormal     Procedure Component Value - Date/Time    MRSA Screen, PCR (Inpatient) - Swab, Nares [[id number]]  (Normal) Collected: 03/01/23 1539    Lab Status: Final result Specimen: Swab from Nares Updated: 03/01/23 1704     MRSA PCR Negative    Narrative:      The negative predictive value of this diagnostic test is high and should only be used to consider de-escalating anti-MRSA therapy. A positive result may indicate colonization with MRSA and must be correlated clinically.  MRSA Negative          MRI Foot Right With & Without Contrast    Result Date: 3/2/2023  MRI FOOT RIGHT W WO CONTRAST Date of Exam: 3/2/2023 2:03 AM EST Indication: Osteomyelitis.  Comparison: None available. Technique:  Routine multiplanar/multisequence sequence images of the right foot were obtained before and after the uneventful administration of  20 mL Multihance.  Findings: Mildly motion degraded examination. Bones and joints: Marrow edema of the distal fifth metatarsal shaft and metatarsal head with associated periosteal edema. No definite fracture line is seen here. Small amount marrow edema and enhancement seen within the first, second and fourth distal phalanges without T1 signal abnormality or evidence of erosion. There is  flattening and collapse of the third metatarsal head most consistent with prior Freiberg's infraction. No substantial associated marrow edema. Mild scattered degenerative changes of  the forefoot most advanced the first metatarsophalangeal joint with minimal subchondral edema. No evidence of joint effusions. Soft tissues: Soft tissue edema of the second and fourth toes and to a lesser extent the first toe. Additional soft tissue edema of the lateral mid and forefoot as well as the dorsal foot. Visualized flexor and extensor tendons appear grossly intact. Disrupted appearance of the third plantar plate likely secondary to Freiberg's infraction. Remaining plantar plates appear intact Fatty atrophy of the intrinsic muscles of the foot. No evidence of rim-enhancing soft tissue collection or enhancing soft tissue mass. No definite evidence of intermetatarsal neuroma. Small amount of intermetatarsal bursal fluid at the first and second interspace.     Impression: Impression: Soft tissue edema of the second and fourth toes and to a lesser extent the first toe with minimal reactive marrow edema of the distal phalanges. No evidence of osseous erosions or T1 signal abnormality to suggest osteomyelitis. Marrow and periosteal edema of the distal fifth metatarsal without fracture line suggestive of stress injury. 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 No evidence of associated marrow edema. Electronically Signed: Familia Santos  3/2/2023 8:48 AM EST  Workstation ID: [id number]      Results for orders placed in visit on 11/19/18    SCANNED - ECHOCARDIOGRAM      Current medications:  Scheduled Meds:atenolol, 25 mg, Oral, Daily  cefTRIAXone, 2 g, Intravenous, Q24H  heparin (porcine), 5,000 Units, Subcutaneous, Q12H  insulin lispro, 0-7 Units, Subcutaneous, TID With Meals  levothyroxine, 25 mcg, Oral, Q AM  pregabalin, 150 mg, Oral, BID  rosuvastatin, 5 mg, Oral,  Nightly  sodium chloride, 10 mL, Intravenous, Q12H  zonisamide, 100 mg, Oral, Daily  zonisamide, 200 mg, Oral, Nightly      Continuous Infusions:Pharmacy to dose vancomycin,       PRN Meds:.•  acetaminophen  •  HYDROcodone-acetaminophen  •  Pharmacy to dose vancomycin  •  sodium chloride    Assessment & Plan   Assessment & Plan     Active Hospital Problems    Diagnosis  POA   • **Gangrene (HCC) [I96]  Yes   • Diabetes mellitus with neuropathy (HCC) [E11.40]  Yes   • Hypothyroidism [E03.9]  Yes   • Glucose intolerance [E74.39]  Yes   • Hypertension [I10]  Yes   • Anxiety and depression [F41.9, F32.A]  Yes      Resolved Hospital Problems   No resolved problems to display.        Brief Hospital Course to date:  Bryanna Schwartz is a 52 y.o. female with past medical history of diabetes mellitus with neuropathy, hypothyroidism, hypertension, anxiety depression, history of Lyme disease and [place] spotted fever who presented with gangrene on her right second and fourth toes just under the nail.    Gangrene of the right second and fourth toes  Concern for peripheral vascular disease  -Dr. Krause evaluated  -Also seen by Dr. Contreras with infectious disease  -Work-up ongoing but imaging not concerning for osteomyelitis  -Continue ceftriaxone per infectious disease recommendations    Diabetes mellitus with neuropathy  -Continue sliding scale insulin as needed  -Fingersticks ACHS    Hypothyroidism  -Continue Synthroid    History of vitamin B12 deficiency  -Last B12 level in June 2022 was 397    Hypertension  -Continue atenolol    Anxiety/depression  Altered mental status  -Holding home meds Neurontin Lyrica and Cymbalta    History of Lyme disease  History of [place] spotted fever  -Complicates care and diagnostic work-up for etiology of the gangrene of her toes    Expected Discharge Location and Transportation: Home, private car  Expected Discharge 3/4/2023  Expected Discharge Date and Time     Expected Discharge Date  Expected Discharge Time    Mar 10, 2023            DVT prophylaxis:  Medical DVT prophylaxis orders are present.     AM-PAC 6 Clicks Score (PT): 16 (03/02/23 0800)    CODE STATUS:   Code Status and Medical Interventions:   Ordered at: 03/01/23 1513     Code Status (Patient has no pulse and is not breathing):    CPR (Attempt to Resuscitate)     Medical Interventions (Patient has pulse or is breathing):    Full Support       Marsha Rosado MD  03/02/23

## 2023-03-02 NOTE — CASE MANAGEMENT/SOCIAL WORK
Discharge Planning Assessment  [facility]     Patient Name: Bryanna Schwartz  MRN: [id number]  Today's Date: 3/2/2023    Admit Date: 3/1/2023    Plan: Initial Discharge Plan Assessment   Discharge Needs Assessment     Row Name 03/02/23 1039       Living Environment    People in Home spouse;child(linden), dependent    Current Living Arrangements home    Primary Care Provided by self    Provides Primary Care For no one    Family Caregiver if Needed spouse    Quality of Family Relationships helpful;involved;supportive    Able to Return to Prior Arrangements yes       Transition Planning    Patient/Family Anticipates Transition to home    Transportation Anticipated family or friend will provide               Discharge Plan     Row Name 03/02/23 1039       Plan    Plan Initial Discharge Plan Assessment    Patient/Family in Agreement with Plan yes    Plan Comments i met with Mrs. Schwartz and her , at the bedside. Pt lives with her spouse and son in [place] [facility]. Dependent for all ADL's, pt states that she bathes herself. Pt uses a quad cane to ambulate. Pt states no other DME/HH/OPPT. PCP-CARRIE Harvey. Confirmed [place], [place] Medicaid, and prescriptions filled at [facility]. Pt plans to D/C home, spouse will transport.  will continue to follow.    Final Discharge Disposition Code 01 - home or self-care              Continued Care and Services - Admitted Since 3/1/2023    Coordination has not been started for this encounter.       Expected Discharge Date and Time     Expected Discharge Date Expected Discharge Time    Mar 10, 2023          Demographic Summary     Row Name 03/02/23 1038       General Information    Admission Type inpatient    Reason for Consult discharge planning       Contact Information    Permission Granted to Share Info With                Functional Status     Row Name 03/02/23 1038       Functional Status    Usual Activity Tolerance  fair       Functional Status, IADL    Medications completely dependent    Meal Preparation completely dependent    Housekeeping completely dependent    Laundry completely dependent    Shopping completely dependent    IADL Comments Pt states that she bathes herself               Psychosocial    No documentation.                Abuse/Neglect    No documentation.                Legal    No documentation.                Substance Abuse    No documentation.                Patient Forms    No documentation.                   Iris Long RN

## 2023-03-02 NOTE — CONSULTS
INFECTIOUS DISEASE CONSULT/INITIAL HOSPITAL VISIT    Bryanna Schwartz  1970  [id number]    Date of Consult: 3/2/2023    Admission Date: 3/1/2023      Requesting Provider: Sadaf Torrez MD  Evaluating Physician: Noah Contreras MD    Reason for Consultation: foot cellulitis    History of present illness:    Patient is a 52 y.o. female with arthritis, depression, diabetes mellitus type 2, GERD, hyperlipidemia, hypertension, vitamin B12 deficiency presents to [facility] with redness and pain of toes of bilateral extremities patient reports 2-week history of redness swelling pain.  Patient believes that she has had Lyme disease and Beasley spotted fever historically patient may have a chronic neuropathy.  Patient has been admitted to hospital for peripheral vascular disease work-up.    Patient denies fevers or chills.    Patient states that her diabetes mellitus is improving and patient recently went off her oral antihyperglycemic agents    Patient was referred to vascular surgery for gangrenous changes of right fourth toe and right second toe.    Past Medical History:   Diagnosis Date   • Arthritis    • Depression    • Diabetes (HCC)    • Environmental allergies    • Falls 10/24/2018   • GERD (gastroesophageal reflux disease)    • Hyperlipidemia    • Hypertension    • Hypothyroidism    • Lyme disease    • Migraine    • [place]vin [place] spotted fever    • Vitamin B 12 deficiency        Past Surgical History:   Procedure Laterality Date   • ACHILLES TENDON SURGERY Right    • BREAST CYST EXCISION Left    • CHOLECYSTECTOMY     • ENDOSCOPY N/A 10/16/2018    Procedure: ESOPHAGOGASTRODUODENOSCOPY;  Surgeon: Brunner, Mark I, MD;  Location: [facility] ENDOSCOPY;  Service: Gastroenterology   • HYSTERECTOMY         Family History   Problem Relation Age of Onset   • Hypertension Mother    • COPD Mother    • Heart attack Mother    • Other Father         / of exhaust fumes       Social  History     Socioeconomic History   • Marital status:    • Number of children: 2   Tobacco Use   • Smoking status: Never   • Smokeless tobacco: Never   Vaping Use   • Vaping Use: Never used   Substance and Sexual Activity   • Alcohol use: No   • Drug use: No   • Sexual activity: Defer     Partners: Male       Allergies   Allergen Reactions   • Gabapentin Hives         Medication:    Current Facility-Administered Medications:   •  acetaminophen (TYLENOL) tablet 650 mg, 650 mg, Oral, Q6H PRN, Charo De La Paz MD  •  atenolol (TENORMIN) tablet 25 mg, 25 mg, Oral, Daily, Charo De La Paz MD, 25 mg at 03/02/23 0856  •  heparin (porcine) 5000 UNIT/ML injection 5,000 Units, 5,000 Units, Subcutaneous, Q12H, Charo De La Paz MD, 5,000 Units at 03/02/23 1158  •  HYDROcodone-acetaminophen (NORCO) 5-325 MG per tablet 1 tablet, 1 tablet, Oral, Q6H PRN, Marsha Rosado MD, 1 tablet at 03/02/23 1251  •  Insulin Lispro (humaLOG) injection 0-7 Units, 0-7 Units, Subcutaneous, TID With Meals, Charo De La Paz MD  •  levothyroxine (SYNTHROID, LEVOTHROID) tablet 25 mcg, 25 mcg, Oral, Q AM, Charo De La Paz MD  •  Pharmacy to dose vancomycin, , Does not apply, Continuous PRN, Charo De La Paz MD  •  piperacillin-tazobactam (ZOSYN) 3.375 g in iso-osmotic dextrose 50 ml (premix), 3.375 g, Intravenous, Q8H, Charo De La Paz MD, 3.375 g at 03/02/23 1342  •  pregabalin (LYRICA) capsule 150 mg, 150 mg, Oral, BID, Charo De La Paz MD, 150 mg at 03/02/23 0856  •  rosuvastatin (CRESTOR) tablet 5 mg, 5 mg, Oral, Nightly, Charo De La Paz MD, 5 mg at 03/01/23 2042  •  sodium chloride 0.9 % flush 10 mL, 10 mL, Intravenous, Q12H, Charo De La Paz MD, 10 mL at 03/02/23 0902  •  sodium chloride 0.9 % flush 10 mL, 10 mL, Intravenous, PRN, Charo De La Paz MD  •  vancomycin 1250 mg/250 mL 0.9% NS IVPB ([facility]), 1,250 mg, Intravenous, Q12H, Charo De La Paz MD, 1,250 mg at 03/02/23 0330  •  zonisamide (ZONEGRAN) capsule 100 mg, 100 mg,  Oral, Daily, Jose Pardo, PharmD  •  zonisamide (ZONEGRAN) capsule 200 mg, 200 mg, Oral, Nightly, Jose Pardo, PharmD    Antibiotics:  Anti-Infectives (From admission, onward)    Ordered     Dose/Rate Route Frequency Start Stop    23  vancomycin 1250 mg/250 mL 0.9% NS IVPB ([facility])        Ordering Provider: Charo De La Paz MD    1,250 mg  over 90 Minutes Intravenous Every 12 Hours 23 0400 23 0359    23 1513  piperacillin-tazobactam (ZOSYN) 3.375 g in iso-osmotic dextrose 50 ml (premix)        Ordering Provider: Charo De La Paz MD    3.375 g  over 4 Hours Intravenous Every 8 Hours 23 2200 23 2159    23 1520  vancomycin IVPB 2000 mg in 0.9% Sodium Chloride (premix) 500 mL        Ordering Provider: Yung Hughes, PharmD    20 mg/kg × 102 kg Intravenous Once 23 1615 23 1640    23 1513  piperacillin-tazobactam (ZOSYN) 3.375 g in iso-osmotic dextrose 50 ml (premix)        Ordering Provider: Charo De La Paz MD    3.375 g  over 30 Minutes Intravenous Once 23 1600 23 1625    23 1513  Pharmacy to dose vancomycin        Ordering Provider: Charo De La Paz MD     Does not apply Continuous PRN 23 1512 23 1511            Review of Systems:  Denies fevers chills denies rash reports skin changes of toes of right foot, patient reports malaise sore throat leg swelling back pain headaches numbness depression insomnia rest of review of systems were discussed and were negative    Physical Exam:   Vital Signs  Temp (24hrs), Av.5 °F (36.9 °C), Min:98.3 °F (36.8 °C), Max:98.9 °F (37.2 °C)    Temp  Min: 98.3 °F (36.8 °C)  Max: 98.9 °F (37.2 °C)  BP  Min: 115/78  Max: 139/70  Pulse  Min: 79  Max: 97  Resp  Min: 16  Max: 19  SpO2  Min: 92 %  Max: 98 %    GENERAL: Awake and alert, in no acute distress.   HEENT: Normocephalic, atraumatic.  PERRL. EOMI. No conjunctival injection. No icterus.  No external oral lesions    HEART:  RRR; No murmur  LUNGS: Clear to auscultation bilaterally   ABDOMEN: Soft, nontender,   EXT:  No edema  :  Without Moralez catheter.  MSK: Right foot with some black appearance to the tip of the right fourth toe no ulceration no drainage no odor  SKIN: Warm and dry without cutaneous eruptions on Inspection/palpation.    NEURO: Oriented to PPT.  Motor 5/5 strength  PSYCHIATRIC: Normal insight and judgment. Cooperative with PE    Laboratory Data    Results from last 7 days   Lab Units 03/01/23  1555   WBC 10*3/mm3 7.22   HEMOGLOBIN g/dL 13.4   HEMATOCRIT % 40.8   PLATELETS 10*3/mm3 233     Results from last 7 days   Lab Units 03/01/23  1555   SODIUM mmol/L 139   POTASSIUM mmol/L 4.2   CHLORIDE mmol/L 104   CO2 mmol/L 23.0   BUN mg/dL 11   CREATININE mg/dL 0.67   GLUCOSE mg/dL 118*   CALCIUM mg/dL 8.9     Results from last 7 days   Lab Units 03/01/23  1555   ALK PHOS U/L 213*   BILIRUBIN mg/dL 0.4   ALT (SGPT) U/L 39*   AST (SGOT) U/L 27     Results from last 7 days   Lab Units 03/01/23  1555   SED RATE mm/hr 16     Results from last 7 days   Lab Units 03/01/23  1555   CRP mg/dL 0.84*     Results from last 7 days   Lab Units 03/01/23  2219   LACTATE mmol/L 1.6     Results from last 7 days   Lab Units 03/01/23  1555   CK TOTAL U/L 48         Estimated Creatinine Clearance: 128.9 mL/min (by C-G formula based on SCr of 0.67 mg/dL).      Microbiology:  No results found for: ACANTHNAEG, AFBCX, BPERTUSSISCX, BLOODCX  No results found for: BCIDPCR, CXREFLEX, CSFCX, CULTURETIS  No results found for: CULTURES, HSVCX, URCX  No results found for: EYECULTURE, GCCX, HSVCULTURE, LABHSV  No results found for: LEGIONELLA, MRSACX, MUMPSCX, MYCOPLASCX  No results found for: NOCARDIACX, STOOLCX  No results found for: THROATCX, UNSTIMCULT, URINECX, CULTURE, VZVCULTUR  No results found for: VIRALCULTU, WOUNDCX        Radiology:  Imaging Results (Last 72 Hours)     Procedure Component Value Units Date/Time    MRI Foot Right With & Without  Contrast [[id number]] Collected: 03/02/23 0807     Updated: 03/02/23 0851    Narrative:        MRI FOOT RIGHT W WO CONTRAST    Date of Exam: 3/2/2023 2:03 AM EST    Indication: Osteomyelitis.     Comparison: None available.    Technique:  Routine multiplanar/multisequence sequence images of the right foot were obtained before and after the uneventful administration of  20 mL Multihance.      Findings:   Mildly motion degraded examination.    Bones and joints: Marrow edema of the distal fifth metatarsal shaft and metatarsal head with associated periosteal edema. No definite fracture line is seen here. Small amount marrow edema and enhancement seen within the first, second and fourth distal   phalanges without T1 signal abnormality or evidence of erosion. There is flattening and collapse of the third metatarsal head most consistent with prior Freiberg's infraction. No substantial associated marrow edema. Mild scattered degenerative changes of   the forefoot most advanced the first metatarsophalangeal joint with minimal subchondral edema. No evidence of joint effusions.    Soft tissues: Soft tissue edema of the second and fourth toes and to a lesser extent the first toe. Additional soft tissue edema of the lateral mid and forefoot as well as the dorsal foot. Visualized flexor and extensor tendons appear grossly intact.   Disrupted appearance of the third plantar plate likely secondary to Freiberg's infraction. Remaining plantar plates appear intact Fatty atrophy of the intrinsic muscles of the foot. No evidence of rim-enhancing soft tissue collection or enhancing soft   tissue mass. No definite evidence of intermetatarsal neuroma. Small amount of intermetatarsal bursal fluid at the first and second interspace.      Impression:      Impression:   Soft tissue edema of the second and fourth toes and to a lesser extent the first toe with minimal reactive marrow edema of the distal phalanges. No evidence of osseous erosions  or T1 signal abnormality to suggest osteomyelitis.    Marrow and periosteal edema of the distal fifth metatarsal without fracture line suggestive of stress injury.    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 No evidence of associated marrow edema.    Electronically Signed: Familia JonesDanielle    3/2/2023 8:48 AM EST    Workstation ID: [id number]            Impression:   Right fourth toe gangrene  Right second toe gangrene  Diabetes mellitus type 2  Peripheral neuropathy  Vitamin B12 deficiency    L think history of Lyme disease recommends 5.  Relevant to patient's presentation    PLAN/RECOMMENDATIONS:   Thank you for asking us to see Bryanna Schwartz, I recommend the following:  MRIs been performed right foot which shows soft tissue edema of the second and fourth toes there is no bony erosion there is no description of osteomyelitis by the radiologist      After doing physical exam I am not highly suspicious of osteomyelitis on exam.    I do have concern about the blood supply to the foot and think that this is to be worked up, with ABIs versus venous duplex versus arteriogram    The patient has a long history of diabetes she is at risk for peripheral vascular disease    Given negative MRSA nasal PCR I will go ahead and stop the vancomycin    DC Zosyn    Start ceftriaxone 2 g IV daily, anticipate change to oral antibiotics upon further improvement    I discussed case with patient's family  Noah Contreras MD  3/2/2023  14:13 EST

## 2023-03-02 NOTE — PLAN OF CARE
Problem: Adult Inpatient Plan of Care  Goal: Plan of Care Review  Outcome: Ongoing, Progressing  Flowsheets (Taken 3/2/2023 8292)  Plan of Care Reviewed With:   patient   spouse  Goal: Patient-Specific Goal (Individualized)  Outcome: Ongoing, Progressing  Goal: Absence of Hospital-Acquired Illness or Injury  Outcome: Ongoing, Progressing  Goal: Optimal Comfort and Wellbeing  Outcome: Ongoing, Progressing  Goal: Readiness for Transition of Care  Outcome: Ongoing, Progressing     Problem: Diabetes Comorbidity  Goal: Blood Glucose Level Within Targeted Range  Outcome: Ongoing, Progressing     Problem: Hypertension Comorbidity  Goal: Blood Pressure in Desired Range  Outcome: Ongoing, Progressing     Problem: Pain Chronic (Persistent) (Comorbidity Management)  Goal: Acceptable Pain Control and Functional Ability  Outcome: Ongoing, Progressing   Goal Outcome Evaluation:  Plan of Care Reviewed With: patient, spouse

## 2023-03-02 NOTE — PAYOR COMM NOTE
"Bryanna Merrill (52 y.o. Female)     [id number]    Preeti Mathew RN  Utilization Review  [phone]  [phone]      Date of Birth   1970    Social Security Number       Address   [address] [facility] [place] [address]    Home Phone   [phone]    MRN   [id number]       [employer]   [employer]an    Marital Status                               Admission Date   3/1/23    Admission Type   Urgent    Admitting Provider   Marsha Rosado MD    Attending Provider   Marsha Rosado MD    Department, Room/Bed   [address], S[address]/1       Discharge Date       Discharge Disposition       Discharge Destination                               Attending Provider: Marsha Rosado MD    Allergies: Gabapentin    Isolation: None   Infection: None   Code Status: CPR    Ht: 175.3 cm (69\")   Wt: 102 kg (224 lb)    Admission Cmt: None   Principal Problem: Gangrene (HCC) [I96]                 Active Insurance as of 3/1/2023     Primary Coverage     Payor Plan Insurance Group Employer/Plan Group    ANTHEM MEDICARE REPLACEMENT ANTHEM MEDICARE ADVANTAGE [id number]     Payor Plan Address Payor Plan Phone Number Payor Plan Fax Number Effective Dates    [address] [phone]  6/1/2022 - None Entered    [facility] [id number]       Subscriber Name Subscriber Birth Date Member ID       BRYANNA MERRILL 1970 [id number]           Secondary Coverage     Payor Plan Insurance Group Employer/Plan Group    ANTHEM MEDICAID ANTHEM MEDICAID [id number]     Payor Plan Address Payor Plan Phone Number Payor Plan Fax Number Effective Dates    [address] [address] [phone]  6/1/2022 - None Entered    [facility] [phone]       Subscriber Name Subscriber Birth Date Member ID       BRYANNA MERRILL 1970 [id number]                 Emergency Contacts      (Rel.) Home Phone Work Phone Mobile Phone    EfrenMichael (Spouse) [phone] -- --    Padma Villalta (Sister) " [phone] -- --    Chanelle Caruso (Relative) [phone] -- --               History & Physical      Charo De La Paz MD at [address] [place] [address] Medicine Services  HISTORY AND PHYSICAL    Patient Name: Bryanna Schwartz  : 1970  MRN: [id number]  Primary Care Physician: Baljeet Manley APRN    Subjective   Subjective     Chief Complaint:foot pain    HPI:  Bryanna Schwartz is a 52 y.o. female who  has a past medical history of Arthritis, Depression, Diabetes (HCC), GERD, Hyperlipidemia, Hypertension, Hypothyroidism, Lyme disease, Migraine, Kelvin Mountain spotted fever, and Vitamin B 12 deficiency who presented worsening redness and pain in toes on both feet. She has had wound on her left toes for some time but over the last 2 weeks she reports increased intermttent redness, swelling and pain, some breakdown on the skin of her right foot.     Her  has also reported some confusion of late, recent MRi reportedly with no acute changes.  She says she has been unable to sleep due to the pain. She was sent to the hospital from Dr Oconnor office.    Review of Systems   Patient denies weight loss, headaches, changes in vision, fever, chills, sore throat, shortness of breath, cough, nausea or vomiting, diarrhea, abdominal pain or distension, change in urine output or habits, joint pain, rash, itching or bleeding.    Otherwise complete ROS is negative except as mentioned in the HPI.    Personal History     Past Medical History:   Diagnosis Date   • Arthritis    • Depression    • Diabetes (HCC)    • Environmental allergies    • Falls 10/24/2018   • GERD (gastroesophageal reflux disease)    • Hyperlipidemia    • Hypertension    • Hypothyroidism    • Lyme disease    • Migraine    • Kelvin Mountain spotted fever    • Vitamin B 12 deficiency      Past Surgical History:   Procedure Laterality Date   • ACHILLES TENDON SURGERY Right    • BREAST CYST EXCISION Left    •  CHOLECYSTECTOMY     • ENDOSCOPY N/A 10/16/2018    Procedure: ESOPHAGOGASTRODUODENOSCOPY;  Surgeon: Brunner, Mark I, MD;  [facility]: [facility] END[facility];  Service: Gastroenterology   • HYSTERECTOMY         Family History: family history includes COPD in her mother; Heart attack in her mother; Hypertension in her mother; Other in her father.     Social History:  reports that she has never smoked. She has never used smokeless tobacco. She reports that she does not drink alcohol and does not use drugs.  , disabled since her RMSF,Lyme disease 5 years ago when she had to learn to walk and talk all over again.  Medications:  DULoxetine, Vitamin D3, atenolol, budesonide-formoterol, celecoxib, cetirizine, famotidine, folic acid, levothyroxine, linaclotide, magnesium oxide, metFORMIN, ondansetron, pantoprazole, potassium chloride, pregabalin, rizatriptan, rosuvastatin, and zonisamide    Allergies   Allergen Reactions   • Gabapentin Hives       Objective   Objective     Vital Signs:   Temp:  [97.9 °F (36.6 °C)-98.9 °F (37.2 °C)] 98.9 °F (37.2 °C)  Heart Rate:  [79-97] 80  Resp:  [16-19] 16  BP: (116-139)/(70-86) 134/81    Constitutional: Awake, alert, interactive and pleasant-- she was asleep with sat 79% on RA when I entered the room.  Eyes: clear sclerae, no conjunctival injection  HENT: NCAT, mucous membranes dry pale  Neck: no masses or lymphadenopathy, trachea midline  Respiratory: good breath sounds bilaterally, respirations unlabored  Cardiovascular: RRR, only a flow murmur appreciated, palpable peripheral pulses  Abdomen:  soft, no HSM or masses palpable, not tender or distended but rotund  Musculoskeletal: No peripheral edema, clubbing or cyanosis  Neurologic: Oriented x 3,                       Strength symmetric in all extremities                     Cranial Nerves grossly intact, speech clear  Skin: multiple ulcers on her right toes, left toes are red and hot but no ulcers   Palpable DP and PT pulses, decreased  sensation  Psychiatric: Appropriate mood, insight      Result Review:  I have personally reviewed the results from the time of this admission   to 3/2/2023 00:51 EST and agree with these findings:  [x]  Laboratory  [x]  Microbiology  [x]  Radiology  [x]  EKG/Telemetry   []  Cardiology/Vascular   []  Pathology  [x]  Old records  []  Other:  Most notable findings include:overall normal findings in her lab work      LAB RESULTS:      Lab 03/01/23  2219 03/01/23  1808 03/01/23  1555   WBC  --   --  7.22   HEMOGLOBIN  --   --  13.4   HEMATOCRIT  --   --  40.8   PLATELETS  --   --  233   NEUTROS ABS  --   --  4.48   IMMATURE GRANS (ABS)  --   --  0.04   LYMPHS ABS  --   --  1.94   MONOS ABS  --   --  0.51   EOS ABS  --   --  0.22   MCV  --   --  92.3   SED RATE  --   --  16   CRP  --   --  0.84*   PROCALCITONIN  --   --  0.07   LACTATE 1.6 2.2*  --    LDH  --   --  248*   D DIMER QUANT  --   --  0.44         Lab 03/01/23  1555   SODIUM 139   POTASSIUM 4.2   CHLORIDE 104   CO2 23.0   ANION GAP 12.0   BUN 11   CREATININE 0.67   EGFR 105.3   GLUCOSE 118*   CALCIUM 8.9   HEMOGLOBIN A1C 5.20         Lab 03/01/23  1555   TOTAL PROTEIN 6.0   ALBUMIN 3.1*   GLOBULIN 2.9   ALT (SGPT) 39*   AST (SGOT) 27   BILIRUBIN 0.4   ALK PHOS 213*         Lab 03/01/23  1555   PROBNP 640.5                 Brief Urine Lab Results     None        No results found for: COVID19    No radiology results from the last 24 hrs    Results for orders placed in visit on 11/19/18    SCANNED - ECHOCARDIOGRAM      The patient has started, but not completed, their COVID-19 vaccination series.    Assessment & Plan   Assessment / Plan       Gangrene (HCC)    Anxiety and depression    Essential hypertension    Glucose intolerance      Assessment & Plan:    Bryanna Schwartz is a 52 y.o. female who  has a past medical history of Arthritis, Depression, Diabetes (HCC), GERD, Hyperlipidemia, Hypertension, Hypothyroidism, Lyme disease, Migraine, [place]automa spotted  fever, and Vitamin B 12 deficiency who presented worsening redness and pain in toes on both feet.    Gangrene of ischemic right toes  -reportedly aortogram is normal  -check echo of source of cardiac emboli  -MRI  -vanc and zosyn  -ID consult  -Dr Oconnor expertise    HTN, HLP  -cont atenolol, crestor    JUAN C with severe hypoxia  -needs real sleep study,CPAP, PRN oxygen in the hospital  -be careful with narcotics    HO Lyme disease and RMSF    Glucose intolerance  -monitor fsbs    Confusion  -seems clear tonight  -simplify her multiple meds for pain-- neurontin, lyrica, cymbalta  -check tsh, b12, ammonia      DVT prophylaxis:  Medical DVT prophylaxis orders are present.    CODE STATUS:  Code Status (Patient has no pulse and is not breathing): CPR (Attempt to Resuscitate)  Medical Interventions (Patient has pulse or is breathing): Full Support    Expected Discharge TBD      Electronically signed by Charo De La Paz MD 03/02/23 00:51 EST            Electronically signed by Charo De La Paz MD at 03/02/23 0056       Emergency Department Notes    No notes of this type exist for this encounter.         Vital Signs (last day)     Date/Time Temp Temp src Pulse Resp BP Patient Position SpO2    03/02/23 0854 -- -- 82 18 115/78 Lying 98    03/02/23 0310 98.3 (36.8) Oral 83 16 118/71 Lying 93    03/01/23 2330 98.9 (37.2) Oral 80 16 134/81 Lying 95    03/01/23 2041 -- -- 81 -- -- -- --    03/01/23 2005 98.4 (36.9) Oral -- 19 118/73 Sitting --    03/01/23 1730 -- -- 97 -- -- -- 95    03/01/23 1700 -- -- 81 -- -- -- 94    03/01/23 1630 -- -- 80 -- -- -- 97    03/01/23 1600 -- -- 81 -- -- -- 97    03/01/23 1530 -- -- 84 -- -- -- 95    03/01/23 1500 -- -- 88 -- -- -- 96    03/01/23 1436 -- -- 79 -- -- -- 95    03/01/23 1430 -- -- 84 -- -- -- 92    03/01/23 1421 98.5 (36.9) Oral 82 18 139/70 Sitting 94    03/01/23 1415 -- -- 88 -- -- -- 96          Oxygen Therapy (last day)     Date/Time SpO2 Device (Oxygen Therapy) Flow (L/min)  Oxygen Concentration (%) ETCO2 (mmHg)    03/02/23 0854 98 nasal cannula 2 -- --    03/02/23 0310 93 -- -- -- --    03/01/23 2330 95 -- -- -- --    03/01/23 2005 -- room air -- -- --    03/01/23 1730 95 -- -- -- --    03/01/23 1700 94 -- -- -- --    03/01/23 1630 97 -- -- -- --    03/01/23 1600 97 -- -- -- --    03/01/23 1530 95 -- -- -- --    03/01/23 1500 96 -- -- -- --    03/01/23 1436 95 room air -- -- --    03/01/23 1430 92 -- -- -- --    03/01/23 1421 94 room air -- -- --    03/01/23 1415 96 room air -- -- --          Lines, Drains & Airways     Active LDAs     Name Placement date Placement time Site Days    Peripheral IV 03/01/23 1516 Anterior;Proximal;Right Forearm 03/01/23  1516  Forearm  less than 1                  Current Facility-Administered Medications   Medication Dose Route Frequency Provider Last Rate Last Admin   • acetaminophen (TYLENOL) tablet 650 mg  650 mg Oral Q6H PRN Charo De La Paz MD       • atenolol (TENORMIN) tablet 25 mg  25 mg Oral Daily Charo De La Paz MD   25 mg at 03/02/23 0856   • heparin (porcine) 5000 UNIT/ML injection 5,000 Units  5,000 Units Subcutaneous Q12H Charo De La Paz MD   5,000 Units at 03/01/23 2042   • Insulin Lispro (humaLOG) injection 0-7 Units  0-7 Units Subcutaneous TID With Meals Charo De La Paz MD       • levothyroxine (SYNTHROID, LEVOTHROID) tablet 25 mcg  25 mcg Oral Q AM Charo De La Paz MD       • Pharmacy to dose vancomycin   Does not apply Continuous PRN Charo De La Paz MD       • piperacillin-tazobactam (ZOSYN) 3.375 g in iso-osmotic dextrose 50 ml (premix)  3.375 g Intravenous Q8H Charo De La Paz MD   3.375 g at 03/02/23 0532   • pregabalin (LYRICA) capsule 150 mg  150 mg Oral BID Charo De La Paz MD   150 mg at 03/02/23 0856   • rosuvastatin (CRESTOR) tablet 5 mg  5 mg Oral Nightly Charo De La Paz MD   5 mg at 03/01/23 2042   • sodium chloride 0.9 % flush 10 mL  10 mL Intravenous Q12H Charo De La Paz MD   10 mL at 03/02/23 0902   •  sodium chloride 0.9 % flush 10 mL  10 mL Intravenous PRN Charo De La Paz MD       • sodium chloride 0.9 % infusion  100 mL/hr Intravenous Continuous Charo De La Paz  mL/hr at 03/02/23 0035 100 mL/hr at 03/02/23 0035   • vancomycin 1250 mg/250 mL 0.9% NS IVPB ([facility])  1,250 mg Intravenous Q12H Charo De La Paz MD   1,250 mg at 03/02/23 0330   • zonisamide (ZONEGRAN) capsule 100 mg **PATIENT-SUPPLIED MED**  100 mg Oral TID Charo De La Paz MD           Lab Results (last 24 hours)     Procedure Component Value Units Date/Time    POC Glucose Once [[id number]]  (Normal) Collected: 03/02/23 0722    Specimen: Blood Updated: 03/02/23 0723     Glucose 103 mg/dL      Comment: Meter: [id number] : [id number] Pro       Urinalysis, Microscopic Only - Urine, Random Void [[id number]]  (Abnormal) Collected: 03/02/23 0400    Specimen: Urine, Random Void Updated: 03/02/23 0408     RBC, UA 3-6 /HPF      WBC, UA 0-2 /HPF      Comment: Urine culture not indicated.        Bacteria, UA None Seen /HPF      Squamous Epithelial Cells, UA 3-6 /HPF      Hyaline Casts, UA None Seen /LPF      Methodology Automated Microscopy    Urinalysis With Culture If Indicated - Urine, Random Void [[id number]]  (Abnormal) Collected: 03/02/23 0400    Specimen: Urine, Random Void Updated: 03/02/23 0407     Color, UA Yellow     Appearance, UA Turbid     pH, UA 5.5     Specific Gravity, UA 1.017     Glucose, UA Negative     Ketones, UA Negative     Bilirubin, UA Negative     Blood, UA Negative     Protein, UA Negative     Leuk Esterase, UA Negative     Nitrite, UA Negative     Urobilinogen, UA 0.2 E.U./dL    Narrative:      In absence of clinical symptoms, the presence of pyuria, bacteria, and/or nitrites on the urinalysis result does not correlate with infection.    STAT Lactic Acid, Reflex [[id number]]  (Normal) Collected: 03/01/23 2219    Specimen: Blood Updated: 03/01/23 2259     Lactate 1.6 mmol/L      Comment: Falsely depressed results may  "occur on samples drawn from patients receiving N-Acetylcysteine (NAC) or Metamizole.       POC Glucose Once [[id number]]  (Abnormal) Collected: 03/01/23 1946    Specimen: Blood Updated: 03/01/23 1947     Glucose 140 mg/dL      Comment: Meter: [id number] : [id number] [place] [employer] luis       Lactic Acid, Plasma [[id number]]  (Abnormal) Collected: 03/01/23 1808    Specimen: Blood Updated: 03/01/23 1847     Lactate 2.2 mmol/L      Comment: Falsely depressed results may occur on samples drawn from patients receiving N-Acetylcysteine (NAC) or Metamizole.       D-dimer, Quantitative [[id number]]  (Normal) Collected: 03/01/23 1555    Specimen: Blood Updated: 03/01/23 1716     D-Dimer, Quantitative 0.44 MCGFEU/mL     Narrative:      According to the assay 's published package insert, a normal (<0.50 MCGFEU/mL) D-dimer result in conjunction with a non-high clinical probability assessment, excludes deep vein thrombosis (DVT) and pulmonary embolism (PE) with high sensitivity.    D-dimer values increase with age and this can make VTE exclusion of an older population difficult. To address this, the American College of Phys[employer], based on best available evidence and recent guidelines, recommends that clinicians use age-adjusted D-dimer thresholds in patients greater than 50 years of age with: a) a low probability of PE who do not meet all Pulmonary Embolism Rule Out Criteria, or b) in those with intermediate probability of PE.   The formula for an age-adjusted D-dimer cut-off is \"age/100\".  For example, a 60 year old patient would have an age-adjusted cut-off of 0.60 MCGFEU/mL and an 80 year old 0.80 MCGFEU/mL.    Hemoglobin A1c [[id number]]  (Normal) Collected: 03/01/23 1555    Specimen: Blood Updated: 03/01/23 1714     Hemoglobin A1C 5.20 %     Narrative:      Hemoglobin A1C Ranges:    Increased Risk for Diabetes  5.7% to 6.4%  Diabetes                     >= 6.5%  Diabetic Goal                < 7.0%    Comprehensive " "Metabolic Panel [[id number]]  (Abnormal) Collected: 03/01/23 1555    Specimen: Blood Updated: 03/01/23 1711     Glucose 118 mg/dL      BUN 11 mg/dL      Creatinine 0.67 mg/dL      Sodium 139 mmol/L      Potassium 4.2 mmol/L      Comment: Slight hemolysis detected by analyzer. Results may be affected.        Chloride 104 mmol/L      CO2 23.0 mmol/L      Calcium 8.9 mg/dL      Total Protein 6.0 g/dL      Albumin 3.1 g/dL      ALT (SGPT) 39 U/L      AST (SGOT) 27 U/L      Alkaline Phosphatase 213 U/L      Total Bilirubin 0.4 mg/dL      Globulin 2.9 gm/dL      Comment: Calculated Result        A/G Ratio 1.1 g/dL      BUN/Creatinine Ratio 16.4     Anion Gap 12.0 mmol/L      eGFR 105.3 mL/min/1.73     Narrative:      GFR Normal >60  Chronic Kidney Disease <60  Kidney Failure <15      POC Glucose Once [[id number]]  (Normal) Collected: 03/01/23 1709    Specimen: Blood Updated: 03/01/23 1710     Glucose 102 mg/dL      Comment: Meter: [id number] : [id number] [facility]       Lactate Dehydrogenase [[id number]]  (Abnormal) Collected: 03/01/23 1555    Specimen: Blood Updated: 03/01/23 1710      U/L     Procalcitonin [[id number]]  (Normal) Collected: 03/01/23 1555    Specimen: Blood Updated: 03/01/23 1708     Procalcitonin 0.07 ng/mL     Narrative:      As a Marker for Sepsis (Non-Neonates):    1. <0.5 ng/mL represents a low risk of severe sepsis and/or septic shock.  2. >2 ng/mL represents a high risk of severe sepsis and/or septic shock.    As a Marker for Lower Respiratory Tract Infections that require antibiotic therapy:    PCT on Admission    Antibiotic Therapy       6-12 Hrs later    >0.5                Strongly Recommended  >0.25 - <0.5        Recommended   0.1 - 0.25          Discouraged              Remeasure/reassess PCT  <0.1                Strongly Discouraged     Remeasure/reassess PCT    As 28 day mortality risk marker: \"Change in Procalcitonin Result\" (>80% or <=80%) if Day 0 (or Day 1) and Day 4 " values are available. Refer to http://[email or web address]-pct-calculator.com    Change in PCT <=80%  A decrease of PCT levels below or equal to 80% defines a positive change in PCT test result representing a higher risk for 28-day all-cause mortality of patients diagnosed with severe sepsis for septic shock.    Change in PCT >80%  A decrease of PCT levels of more than 80% defines a negative change in PCT result representing a lower risk for 28-day all-cause mortality of patients diagnosed with severe sepsis or septic shock.       CK [[id number]]  (Normal) Collected: 03/01/23 1555    Specimen: Blood Updated: 03/01/23 1707     Creatine Kinase 48 U/L     C-reactive Protein [[id number]]  (Abnormal) Collected: 03/01/23 1555    Specimen: Blood Updated: 03/01/23 1707     C-Reactive Protein 0.84 mg/dL     MRSA Screen, PCR (Inpatient) - Swab, Nares [[id number]]  (Normal) Collected: 03/01/23 1539    Specimen: Swab from Nares Updated: 03/01/23 1704     MRSA PCR Negative    Narrative:      The negative predictive value of this diagnostic test is high and should only be used to consider de-escalating anti-MRSA therapy. A positive result may indicate colonization with MRSA and must be correlated clinically.  MRSA Negative    BNP [[id number]]  (Normal) Collected: 03/01/23 1555    Specimen: Blood Updated: 03/01/23 1703     proBNP 640.5 pg/mL     Narrative:      Among patients with dyspnea, NT-proBNP is highly sensitive for the detection of acute congestive heart failure. In addition NT-proBNP of <300 pg/ml effectively rules out acute congestive heart failure with 99% negative predictive value.    Results may be falsely decreased if patient taking Biotin.      Sedimentation Rate [[id number]]  (Normal) Collected: 03/01/23 1555    Specimen: Blood Updated: 03/01/23 1650     Sed Rate 16 mm/hr     Blood Culture - Blood, Arm, Left [[id number]] Collected: 03/01/23 1556    Specimen: Blood from Arm, Left Updated: 03/01/23 1643    Blood Culture - Blood,  Hand, Left [[id number]] Collected: 03/01/23 1556    Specimen: Blood from Hand, Left Updated: 03/01/23 1643    CBC Auto Differential [[id number]]  (Abnormal) Collected: 03/01/23 1555    Specimen: Blood Updated: 03/01/23 1641     WBC 7.22 10*3/mm3      RBC 4.42 10*6/mm3      Hemoglobin 13.4 g/dL      Hematocrit 40.8 %      MCV 92.3 fL      MCH 30.3 pg      MCHC 32.8 g/dL      RDW 13.2 %      RDW-SD 45.1 fl      MPV 10.2 fL      Platelets 233 10*3/mm3      Neutrophil % 62.0 %      Lymphocyte % 26.9 %      Monocyte % 7.1 %      Eosinophil % 3.0 %      Basophil % 0.4 %      Immature Grans % 0.6 %      Neutrophils, Absolute 4.48 10*3/mm3      Lymphocytes, Absolute 1.94 10*3/mm3      Monocytes, Absolute 0.51 10*3/mm3      Eosinophils, Absolute 0.22 10*3/mm3      Basophils, Absolute 0.03 10*3/mm3      Immature Grans, Absolute 0.04 10*3/mm3      nRBC 0.0 /100 WBC     CHAVA [[id number]] Collected: 03/01/23 1555    Specimen: Blood Updated: 03/01/23 1637    Peripheral Blood Smear [[id number]] Collected: 03/01/23 1555    Specimen: Blood Updated: 03/01/23 1637        Imaging Results (Last 24 Hours)     Procedure Component Value Units Date/Time    MRI Foot Right With & Without Contrast [[id number]] Collected: 03/02/23 0807     Updated: 03/02/23 0851    Narrative:        MRI FOOT RIGHT W WO CONTRAST    Date of Exam: 3/2/2023 2:03 AM EST    Indication: Osteomyelitis.     Comparison: None available.    Technique:  Routine multiplanar/multisequence sequence images of the right foot were obtained before and after the uneventful administration of  20 mL Multihance.      Findings:   Mildly motion degraded examination.    Bones and joints: Marrow edema of the distal fifth metatarsal shaft and metatarsal head with associated periosteal edema. No definite fracture line is seen here. Small amount marrow edema and enhancement seen within the first, second and fourth distal   phalanges without T1 signal abnormality or evidence of erosion. There is  flattening and collapse of the third metatarsal head most consistent with prior Freiberg's infraction. No substantial associated marrow edema. Mild scattered degenerative changes of   the forefoot most advanced the first metatarsophalangeal joint with minimal subchondral edema. No evidence of joint effusions.    Soft tissues: Soft tissue edema of the second and fourth toes and to a lesser extent the first toe. Additional soft tissue edema of the lateral mid and forefoot as well as the dorsal foot. Visualized flexor and extensor tendons appear grossly intact.   Disrupted appearance of the third plantar plate likely secondary to Freiberg's infraction. Remaining plantar plates appear intact Fatty atrophy of the intrinsic muscles of the foot. No evidence of rim-enhancing soft tissue collection or enhancing soft   tissue mass. No definite evidence of intermetatarsal neuroma. Small amount of intermetatarsal bursal fluid at the first and second interspace.      Impression:      Impression:   Soft tissue edema of the second and fourth toes and to a lesser extent the first toe with minimal reactive marrow edema of the distal phalanges. No evidence of osseous erosions or T1 signal abnormality to suggest osteomyelitis.    Marrow and periosteal edema of the distal fifth metatarsal without fracture line suggestive of stress injury.    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 No evidence of associated marrow edema.    Electronically Signed: Familia Santos    3/2/2023 8:48 AM EST    Workstation ID: [id number]        ECG/EMG Results (last 24 hours)     Procedure Component Value Units Date/Time    SCANNED - TELEMETRY   [[id number]] Resulted: 03/01/23     Updated: 03/01/23 2000             Physician Progress Notes (last 24 hours)      Michael Krause MD at 03/02/23 0534          Cardiothoracic Surgery Progress Note      POD #:     LOS: 1 day      Subjective: Asleep  this morning not really arousable.  Chief complaint: Right foot pain  Objective:  Vital Signs vital signs below noted Tmax past 24 hours 98.9 °F  Temp:  [97.9 °F (36.6 °C)-98.9 °F (37.2 °C)] 98.3 °F (36.8 °C)  Heart Rate:  [79-97] 83  Resp:  [16-19] 16  BP: (116-139)/(70-86) 118/71    Physical Exam:   General Appearance: Asleep this morning resting comfortably vital signs normal on monitor   Lungs   Heart:   Skin: Ulceration of right fourth toe and right second toe at the distal tuft stable at this time ulcerations painted with Betadine and covered with a 6 x 6 Optifoam   Incision:     Results:  Results from last 7 days   Lab Units 03/01/23  1555   WBC 10*3/mm3 7.22   HEMOGLOBIN g/dL 13.4   HEMATOCRIT % 40.8   PLATELETS 10*3/mm3 233     Results from last 7 days   Lab Units 03/01/23  1555   SODIUM mmol/L 139   POTASSIUM mmol/L 4.2   CHLORIDE mmol/L 104   CO2 mmol/L 23.0   BUN mg/dL 11   CREATININE mg/dL 0.67   GLUCOSE mg/dL 118*   CALCIUM mg/dL 8.9         Assessment: #1.  Gangrenous changes the right fourth toe at the distal tuft and gangrenous changes the right second toe at the distal tuft subungual area.  Etiology unclear at this time  2.  Prior history of both Lyme's disease and [place] spotted fever.  Hospitalized here for polyp proximal a month in 2018.  Had to go to [facility] to learn to walk again.  #3.  Recent carotid duplex with basically normal  4.  Recent CT angiogram with runoff read as normal with no obstructive disease.  5.  MRI of the brain shows some mild atrophy also done recently  #6.  Recent chest pain work-up in progress  Plan: Work-up in progress.  Infectious disease consultation.  Cardiology consultation.  Overall medical manage per hospitalist at this time.  I will place paper copies of the CT angiogram report as well as the MRI report as well as the carotid duplex report into the paper chart.  We are trying to get the actual images of the CT angiogram with runoff as well as the MRI  of the brain from the [facility] in which the studies were done.  Patient has a rather complex medical history      Michael Krause MD - 03/02/23 - 05:34 EST        Electronically signed by Michael Krause MD at 03/02/23 0538       Consult Notes (last 24 hours)  Notes from 03/01/23 1016 through 03/02/23 1016   No notes of this type exist for this encounter.

## 2023-03-03 ENCOUNTER — APPOINTMENT (OUTPATIENT)
Dept: OTHER | Facility: HOSPITAL | Age: 53
DRG: 300 | End: 2023-03-03
Payer: MEDICARE

## 2023-03-03 ENCOUNTER — APPOINTMENT (OUTPATIENT)
Dept: CARDIOLOGY | Facility: HOSPITAL | Age: 53
DRG: 300 | End: 2023-03-03
Payer: MEDICARE

## 2023-03-03 PROBLEM — I74.9 EMBOLIC INFARCTION: Status: ACTIVE | Noted: 2023-03-03

## 2023-03-03 PROBLEM — I50.22 CHRONIC SYSTOLIC CHF (CONGESTIVE HEART FAILURE): Chronic | Status: ACTIVE | Noted: 2023-03-03

## 2023-03-03 LAB
ANA SER QL: NEGATIVE
ANION GAP SERPL CALCULATED.3IONS-SCNC: 6 MMOL/L (ref 5–15)
BH CV LOWER ARTERIAL LEFT ABI RATIO: 1.14
BH CV LOWER ARTERIAL LEFT DORSALIS PEDIS SYS MAX: 176
BH CV LOWER ARTERIAL LEFT GREAT TOE SYS MAX: 124
BH CV LOWER ARTERIAL LEFT POST TIBIAL SYS MAX: 185
BH CV LOWER ARTERIAL LEFT TBI RATIO: 0.77
BH CV LOWER ARTERIAL RIGHT ABI RATIO: 1.14
BH CV LOWER ARTERIAL RIGHT DORSALIS PEDIS SYS MAX: 179
BH CV LOWER ARTERIAL RIGHT POST TIBIAL SYS MAX: 184
BUN SERPL-MCNC: 8 MG/DL (ref 6–20)
BUN/CREAT SERPL: 10.7 (ref 7–25)
CALCIUM SPEC-SCNC: 8.3 MG/DL (ref 8.6–10.5)
CHLORIDE SERPL-SCNC: 109 MMOL/L (ref 98–107)
CO2 SERPL-SCNC: 28 MMOL/L (ref 22–29)
CREAT SERPL-MCNC: 0.75 MG/DL (ref 0.57–1)
EGFRCR SERPLBLD CKD-EPI 2021: 95.9 ML/MIN/1.73
GLUCOSE BLDC GLUCOMTR-MCNC: 107 MG/DL (ref 70–130)
GLUCOSE BLDC GLUCOMTR-MCNC: 129 MG/DL (ref 70–130)
GLUCOSE BLDC GLUCOMTR-MCNC: 130 MG/DL (ref 70–130)
GLUCOSE SERPL-MCNC: 110 MG/DL (ref 65–99)
MAXIMAL PREDICTED HEART RATE: 168 BPM
POTASSIUM SERPL-SCNC: 3.2 MMOL/L (ref 3.5–5.2)
POTASSIUM SERPL-SCNC: 4.3 MMOL/L (ref 3.5–5.2)
SODIUM SERPL-SCNC: 143 MMOL/L (ref 136–145)
STRESS TARGET HR: 143 BPM
UPPER ARTERIAL LEFT ARM BRACHIAL SYS MAX: 162 MMHG

## 2023-03-03 PROCEDURE — 97162 PT EVAL MOD COMPLEX 30 MIN: CPT

## 2023-03-03 PROCEDURE — 25010000002 CEFTRIAXONE PER 250 MG: Performed by: INTERNAL MEDICINE

## 2023-03-03 PROCEDURE — 25010000002 ONDANSETRON PER 1 MG: Performed by: FAMILY MEDICINE

## 2023-03-03 PROCEDURE — 93923 UPR/LXTR ART STDY 3+ LVLS: CPT

## 2023-03-03 PROCEDURE — 99232 SBSQ HOSP IP/OBS MODERATE 35: CPT | Performed by: THORACIC SURGERY (CARDIOTHORACIC VASCULAR SURGERY)

## 2023-03-03 PROCEDURE — 80048 BASIC METABOLIC PNL TOTAL CA: CPT

## 2023-03-03 PROCEDURE — 93923 UPR/LXTR ART STDY 3+ LVLS: CPT | Performed by: INTERNAL MEDICINE

## 2023-03-03 PROCEDURE — 84132 ASSAY OF SERUM POTASSIUM: CPT | Performed by: FAMILY MEDICINE

## 2023-03-03 PROCEDURE — 93005 ELECTROCARDIOGRAM TRACING: CPT

## 2023-03-03 PROCEDURE — 93010 ELECTROCARDIOGRAM REPORT: CPT | Performed by: INTERNAL MEDICINE

## 2023-03-03 PROCEDURE — 99222 1ST HOSP IP/OBS MODERATE 55: CPT | Performed by: INTERNAL MEDICINE

## 2023-03-03 PROCEDURE — 82962 GLUCOSE BLOOD TEST: CPT

## 2023-03-03 PROCEDURE — 99232 SBSQ HOSP IP/OBS MODERATE 35: CPT | Performed by: FAMILY MEDICINE

## 2023-03-03 PROCEDURE — 25010000002 HEPARIN (PORCINE) PER 1000 UNITS: Performed by: INTERNAL MEDICINE

## 2023-03-03 RX ORDER — CLOPIDOGREL BISULFATE 75 MG/1
75 TABLET ORAL DAILY
Status: DISCONTINUED | OUTPATIENT
Start: 2023-03-03 | End: 2023-03-04

## 2023-03-03 RX ORDER — ONDANSETRON 2 MG/ML
4 INJECTION INTRAMUSCULAR; INTRAVENOUS EVERY 6 HOURS PRN
Status: DISCONTINUED | OUTPATIENT
Start: 2023-03-03 | End: 2023-03-06 | Stop reason: HOSPADM

## 2023-03-03 RX ORDER — POTASSIUM CHLORIDE 750 MG/1
40 CAPSULE, EXTENDED RELEASE ORAL EVERY 4 HOURS
Status: COMPLETED | OUTPATIENT
Start: 2023-03-03 | End: 2023-03-03

## 2023-03-03 RX ADMIN — HYDROCODONE BITARTRATE AND ACETAMINOPHEN 1 TABLET: 5; 325 TABLET ORAL at 05:13

## 2023-03-03 RX ADMIN — HEPARIN SODIUM 5000 UNITS: 5000 INJECTION INTRAVENOUS; SUBCUTANEOUS at 20:04

## 2023-03-03 RX ADMIN — HEPARIN SODIUM 5000 UNITS: 5000 INJECTION INTRAVENOUS; SUBCUTANEOUS at 08:59

## 2023-03-03 RX ADMIN — ONDANSETRON 4 MG: 2 INJECTION INTRAMUSCULAR; INTRAVENOUS at 17:38

## 2023-03-03 RX ADMIN — ROSUVASTATIN CALCIUM 5 MG: 10 TABLET, COATED ORAL at 20:04

## 2023-03-03 RX ADMIN — SENNOSIDES AND DOCUSATE SODIUM 2 TABLET: 8.6; 5 TABLET ORAL at 20:04

## 2023-03-03 RX ADMIN — ATENOLOL 25 MG: 25 TABLET ORAL at 08:59

## 2023-03-03 RX ADMIN — LEVOTHYROXINE SODIUM 25 MCG: 25 TABLET ORAL at 05:13

## 2023-03-03 RX ADMIN — CEFTRIAXONE 2 G: 2 INJECTION, POWDER, FOR SOLUTION INTRAMUSCULAR; INTRAVENOUS at 15:49

## 2023-03-03 RX ADMIN — SENNOSIDES AND DOCUSATE SODIUM 2 TABLET: 8.6; 5 TABLET ORAL at 08:59

## 2023-03-03 RX ADMIN — Medication 10 ML: at 09:12

## 2023-03-03 RX ADMIN — PREGABALIN 150 MG: 75 CAPSULE ORAL at 08:59

## 2023-03-03 RX ADMIN — HYDROCODONE BITARTRATE AND ACETAMINOPHEN 1 TABLET: 5; 325 TABLET ORAL at 10:33

## 2023-03-03 RX ADMIN — HYDROCODONE BITARTRATE AND ACETAMINOPHEN 1 TABLET: 5; 325 TABLET ORAL at 20:05

## 2023-03-03 RX ADMIN — ZONISAMIDE 100 MG: 100 CAPSULE ORAL at 09:06

## 2023-03-03 RX ADMIN — CLOPIDOGREL BISULFATE 75 MG: 75 TABLET ORAL at 13:52

## 2023-03-03 RX ADMIN — Medication 10 ML: at 20:07

## 2023-03-03 RX ADMIN — PREGABALIN 150 MG: 75 CAPSULE ORAL at 20:04

## 2023-03-03 RX ADMIN — POTASSIUM CHLORIDE 40 MEQ: 10 CAPSULE, COATED, EXTENDED RELEASE ORAL at 15:49

## 2023-03-03 RX ADMIN — POTASSIUM CHLORIDE 40 MEQ: 10 CAPSULE, COATED, EXTENDED RELEASE ORAL at 10:32

## 2023-03-03 RX ADMIN — ZONISAMIDE 200 MG: 100 CAPSULE ORAL at 22:57

## 2023-03-03 NOTE — CONSULTS
Cardiology Consult    Bryanna Schwartz  1970  [phone]  There is no work phone number on file.    03/03/23    DATE OF ADMISSION: 3/1/2023  [address]    Baljeet Manley, APRN  [address] / [place] [address]  Referring Provider: Sadaf Torrez MD     Reason for consult: Recent onset of chest pain as well as possible embolic phenomena to the right foot    Problem list:   1. Chest pain  a. 1 year history of intermittent mid sternum chest pain that radiates down right arm associated with episodes of heart racing, nausea, SOB, dizziness. Duration minutes to hours. Episodes worsened in January 2023 but have decreased in frequency recently.   b. Nuclear stress test 2015: No ischemia, no chest pain, low risk study  c. Echo 2018: LV normal wall thickness, chamber dimension and systolic function, LA size normal, normal valves  d. ER visit for chest pain 1/2023: Troponin negative, EKG with inferior and anterolateral ischemia, plan to follow-up with Dr. Christina outpatient   e. Echo 3/1/2023: EF 41 to 45%, valves normal, negative for atrial shunt.  2. HTN  3. HLD  4. JUAN C   5. DM2  6. Hypokalemia  7. Lymes disease and RMSF  8. Gangrene of right toes        History of Present Illness:   Bryanna Caruso is a 52-year-old female with above past medical history who was admitted to the hospital by Dr. Krause due to gangrenous changes of the right second and fourth toe.  Patient reports her episodes of CP started about a year ago, but they were rare and short lived so she did not seek medical attention. She describes episodes as feeling like her heart is racing with CP in her mid upper sternum, pain has been known to radiate down her right arm and she has associated SOB, nausea and dizziness. However in January 2023 the episodes became more frequent and worse in nature, they went from previously only lasting minutes to lasting hours and she presented to the [facility] ER.  At that time troponins negative, chest  x-ray negative, EKG showed possible inferior and anterolateral ischemia. She did not have any further CP while in the ER, so she was referred to Dr. Christina outpatient with plans for stress test. Her appt is coming up next week so she has not yet underwent stress test.  She does not remember a history of CP and says she has never seen a cardiologist except for in childhood for a murmur, however she did undergo a normal stress test in 2015 and normal echo 2018. She reports she has memory loss due to [place] Spotted fever 4-6 years ago so events prior to that time are difficult for her to recall.     Allergies   Allergen Reactions   • Gabapentin Hives       Prior to Admission Medications     Prescriptions Last Dose Informant Patient Reported? Taking?    atenolol (TENORMIN) 25 MG tablet Past Week  Yes Yes    Take 1 tablet by mouth Daily.    budesonide-formoterol (SYMBICORT) 80-4.5 MCG/ACT inhaler Past Week  Yes Yes    Inhale 2 puffs 2 (Two) Times a Day.    celecoxib (CeleBREX) 200 MG capsule Past Week  Yes Yes    Take 1 capsule by mouth Daily.    cetirizine (zyrTEC) 10 MG tablet Past Week  No Yes    Take 1 tablet by mouth Daily.    Cholecalciferol (Vitamin D3) 50 MCG (2000 UT) tablet Past Week  Yes Yes    Take 1 tablet by mouth Daily.    folic acid (FOLVITE) 1 MG tablet Past Week  Yes Yes    Take 1 tablet by mouth Daily.    levothyroxine (SYNTHROID, LEVOTHROID) 25 MCG tablet Past Week  Yes Yes    Take 1 tablet by mouth Every Morning.    linaclotide (LINZESS) 145 MCG capsule capsule Past Week  Yes Yes    Take 1 capsule by mouth Every Morning Before Breakfast.    magnesium oxide (MAGOX) 400 (241.3 Mg) MG tablet tablet Past Week Medication Bottle Yes Yes    Take 1 tablet by mouth 2 (Two) Times a Day.    ondansetron (ZOFRAN) 4 MG tablet Past Month  No Yes    Take 1 tablet by mouth Every 8 (Eight) Hours As Needed for Nausea or Vomiting.    pantoprazole (PROTONIX) 40 MG EC tablet Past Week  Yes Yes    Take 1 tablet  by mouth Daily.    pregabalin (LYRICA) 200 MG capsule Past Week  Yes Yes    Take 1 capsule by mouth 2 (Two) Times a Day. Takes w/50mg dose for BID total of 250mg    rizatriptan (MAXALT) 10 MG tablet More than a month  Yes No    Take 1 tablet by mouth As Needed for Migraine. Max of 3    rosuvastatin (CRESTOR) 5 MG tablet Past Week  Yes Yes    Take 1 tablet by mouth Daily.    albuterol sulfate  (90 Base) MCG/ACT inhaler Past Month Self, Pharmacy Yes Yes    Inhale 2 puffs 2 (Two) Times a Day As Needed for Wheezing or Shortness of Air.    montelukast (SINGULAIR) 10 MG tablet Past Week Self Yes Yes    Take 1 tablet by mouth Every Night.    pregabalin (LYRICA) 50 MG capsule Past Week  Yes Yes    Take 1 capsule by mouth 2 (Two) Times a Day. Takes w/200mg dose for BID total of 250mg    zonisamide (ZONEGRAN) 100 MG capsule More than a month  Yes No    Take  by mouth 2 (Two) Times a Day. 1 capsule (100mg) in AM, 2 capsules (200mg) in PM            Current Facility-Administered Medications:   •  acetaminophen (TYLENOL) tablet 650 mg, 650 mg, Oral, Q6H PRN, Charo De La Paz MD  •  atenolol (TENORMIN) tablet 25 mg, 25 mg, Oral, Daily, Charo De La Paz MD, 25 mg at 03/03/23 0859  •  sennosides-docusate (PERICOLACE) 8.6-50 MG per tablet 2 tablet, 2 tablet, Oral, BID, 2 tablet at 03/03/23 0859 **AND** polyethylene glycol (MIRALAX) packet 17 g, 17 g, Oral, Daily PRN **AND** bisacodyl (DULCOLAX) EC tablet 5 mg, 5 mg, Oral, Daily PRN **AND** bisacodyl (DULCOLAX) suppository 10 mg, 10 mg, Rectal, Daily PRN, Emily Ordoñez APRN  •  cefTRIAXone (ROCEPHIN) 2 g/100 mL 0.9% NS IVPB (MBP), 2 g, Intravenous, Q24H, Noah Contreras MD, 2 g at 03/02/23 1528  •  heparin (porcine) 5000 UNIT/ML injection 5,000 Units, 5,000 Units, Subcutaneous, Q12H, Charo De La Paz MD, 5,000 Units at 03/03/23 0859  •  HYDROcodone-acetaminophen (NORCO) 5-325 MG per tablet 1 tablet, 1 tablet, Oral, Q6H PRN, Marsha Rosado MD, 1 tablet at 03/03/23  0513  •  Insulin Lispro (humaLOG) injection 0-7 Units, 0-7 Units, Subcutaneous, TID With Meals, Charo De La Paz MD  •  levothyroxine (SYNTHROID, LEVOTHROID) tablet 25 mcg, 25 mcg, Oral, Q AM, Charo De La Paz MD, 25 mcg at 23 0513  •  pregabalin (LYRICA) capsule 150 mg, 150 mg, Oral, BID, Charo De La Paz MD, 150 mg at 23 0859  •  rosuvastatin (CRESTOR) tablet 5 mg, 5 mg, Oral, Nightly, Charo De La Paz MD, 5 mg at 23  •  sodium chloride 0.9 % flush 10 mL, 10 mL, Intravenous, Q12H, Charo De La Paz MD, 10 mL at 23 0912  •  sodium chloride 0.9 % flush 10 mL, 10 mL, Intravenous, PRN, Charo De La Paz MD  •  Sulfur Hexafluoride Microsph (LUMASON) 60.7-25 MG IV reconstituted suspension reconstituted suspension 3 mL, 3 mL, Intravenous, Once in imaging, Charo De La Paz MD  •  zonisamide (ZONEGRAN) capsule 100 mg, 100 mg, Oral, Daily, Jose Pardo PharmD, 100 mg at 23  •  zonisamide (ZONEGRAN) capsule 200 mg, 200 mg, Oral, Nightly, Jose Pardo PharmD, 200 mg at 23    Social History     Socioeconomic History   • Marital status:    • Number of children: 2   Tobacco Use   • Smoking status: Never   • Smokeless tobacco: Never   Vaping Use   • Vaping Use: Never used   Substance and Sexual Activity   • Alcohol use: No   • Drug use: No   • Sexual activity: Defer     Partners: Male       Family History   Problem Relation Age of Onset   • Hypertension Mother    • COPD Mother    • Heart attack Mother    • Other Father         / of exhaust fumes       REVIEW OF SYSTEMS:   CONSTITUTIONAL:         No weight loss, fever, chills, +weakness +fatigue.   HEENT:                            No visual loss, blurred vision, double vision, yellow sclerae.                                             No hearing loss, congestion, sore throat.   SKIN:                                No rashes, urticaria, ulcers, sores.     RESPIRATORY:               +shortness  of breath, -hemoptysis, cough, sputum.   GI:                                     No anorexia, +nausea, -vomiting, diarrhea. No abdominal pain, melena.   :                                   No burning on urination, hematuria or increased frequency.  ENDOCRINE:                   No diaphoresis, cold or heat intolerance. No polyuria or polydipsia.   NEURO:                            No headache, +dizziness, syncope, paralysis, ataxia, or parasthesias.                                            No change in bowel or bladder control. No history of CVA/TIA  MUSCULOSKELETAL:    No muscle, back pain, joint pain or stiffness.   HEMATOLOGY:               No anemia, bleeding, bruising. No history of DVT/PE.  PSYCH:                            No history of depression, +anxiety    Vitals:    03/03/23 0118 03/03/23 0405 03/03/23 0406 03/03/23 0859   BP: 123/64 123/64 123/64 115/78   BP Location: Left arm  Right arm Right arm   Patient Position: Lying  Lying Sitting   Pulse: 84 82 77 83   Resp: 18  18 20   Temp: 97.7 °F (36.5 °C)  97.9 °F (36.6 °C)    TempSrc: Oral  Oral    SpO2: 100% 98% 98% 97%   Weight:       Height:             Vital Sign Min/Max for last 24 hours  Temp  Min: 97.7 °F (36.5 °C)  Max: 98.5 °F (36.9 °C)   BP  Min: 106/63  Max: 138/79   Pulse  Min: 77  Max: 86   Resp  Min: 18  Max: 20   SpO2  Min: 94 %  Max: 100 %   No data recorded      Intake/Output Summary (Last 24 hours) at 3/3/2023 0941  Last data filed at 3/3/2023 0915  Gross per 24 hour   Intake 650 ml   Output 500 ml   Net 150 ml             Physical Exam:  GEN: Well nourished, Well- developed  No acute distress  HEENT: Normocephalic, Atraumatic, PERRLA, moist mucous membranes  NECK: supple, NO JVD, no thyromegaly, no lymphadenopathy  CARDIAC: S1S2  RRR gallop, rub  LUNGS: Clear to ausculation, normal respiratory effort  ABDOMEN: Soft, nontender, normal bowel sounds  EXTREMITIES:No gross deformities,  No clubbing, cyanosis, or edema  SKIN: Warm,  dry  NEURO: No focal deficits  PSYCHIATRIC: Normal affect and mood      I personally viewed and interpreted the patient's EKG/Telemetry/lab data    Data:   Results from last 7 days   Lab Units 03/01/23  1555   WBC 10*3/mm3 7.22   HEMOGLOBIN g/dL 13.4   HEMATOCRIT % 40.8   PLATELETS 10*3/mm3 233     Results from last 7 days   Lab Units 03/03/23  0432 03/01/23  1555   SODIUM mmol/L 143 139   POTASSIUM mmol/L 3.2* 4.2   CHLORIDE mmol/L 109* 104   CO2 mmol/L 28.0 23.0   BUN mg/dL 8 11   CREATININE mg/dL 0.75 0.67   GLUCOSE mg/dL 110* 118*      Results from last 7 days   Lab Units 03/01/23  1555   HEMOGLOBIN A1C % 5.20         Results from last 7 days   Lab Units 03/01/23  1555   PROBNP pg/mL 640.5         Results from last 7 days   Lab Units 03/01/23  1555   CK TOTAL U/L 48         Results from last 7 days   Lab Units 03/01/23  1555   PROBNP pg/mL 640.5       Intake/Output Summary (Last 24 hours) at 3/3/2023 0941  Last data filed at 3/3/2023 0915  Gross per 24 hour   Intake 650 ml   Output 500 ml   Net 150 ml         Telemetry: NSR, HR 77-88 bpm  EKG: No new EKG to review at this time        Assessment and Plan:   1. Intermittent chest pain  Mildly reduced EF  -1 year history of intermittent mid sternum chest pain that radiates down right arm associated with episodes of heart racing, nausea, SOB, dizziness. Duration minutes to hours. Episodes worsened in January but have decreased in frequency recently.   -Normal nuclear stress test 2015, normal echo 2018  -Echo 3/1/2023: EF 41 to 45%, valves normal, negative for atrial shunt.  - ECG ordered  -Will make NPO at midnight for stress test tomorrow. No caffeine 12 hours prior to stress test.     2. HTN   -well controlled on current regimen    3. H/O Lyme disease and [place] Spotted fever    4. Hypokalemia, Hypocalcemia   -agree with electrolyte replacement protocol     5. Gangrene of ischemic right toes  -Dr. Krause following, ID consulted   -Plan for [facility] Monday to  assess for possible embolic phenomena to the right foot    Scribed for Dr. Yasmani Alonso by CARRIE Rivera. 3/3/2023  10:46 EST    I personally performed the services described in this documentation as scribed by the above named individual in my presence, and it is both accurate and complete.    CUONG Alonso MD  03/03/23  12:40 EST

## 2023-03-03 NOTE — THERAPY EVALUATION
Patient Name: Bryanna Schwartz  : 1970    MRN: [id number]                              Today's Date: 3/3/2023       Admit Date: 3/1/2023    Visit Dx: No diagnosis found.  Patient Active Problem List   Diagnosis   • GERD without esophagitis   • Anxiety and depression   • Hyperlipidemia   • Hypertension   • Migraines   • Gangrene (HCC)   • Glucose intolerance   • Diabetes mellitus with neuropathy (HCC)   • Hypothyroidism     Past Medical History:   Diagnosis Date   • Arthritis    • Depression    • Diabetes (HCC)    • Environmental allergies    • Falls 10/24/2018   • GERD (gastroesophageal reflux disease)    • Hyperlipidemia    • Hypertension    • Hypothyroidism    • Lyme disease    • Migraine    • [place] spotted fever    • Vitamin B 12 deficiency      Past Surgical History:   Procedure Laterality Date   • ACHILLES TENDON SURGERY Right    • BREAST CYST EXCISION Left    • CHOLECYSTECTOMY     • ENDOSCOPY N/A 10/16/2018    Procedure: ESOPHAGOGASTRODUODENOSCOPY;  Surgeon: Brunner, Mark I, MD;  Location: [facility] ENDOSCOPY;  Service: Gastroenterology   • HYSTERECTOMY        General Information     Row Name 23 1133          Physical Therapy Time and Intention    Document Type evaluation  -AE     Mode of Treatment physical therapy  -AE     Row Name 23 1133          General Information    Patient Profile Reviewed yes  -AE     Prior Level of Function independent:;all household mobility;transfer;gait;bed mobility;dressing;bathing  Pt reports using quad cane at baseline. Spouse assists as needed.  -AE     Existing Precautions/Restrictions fall;cardiac;other (see comments)  R foot/toe pain  -AE     Barriers to Rehab medically complex;previous functional deficit  -AE     Row Name 23 1133          Living Environment    People in Home child(linden), adult;spouse  -AE     Row Name 23 1133          Home Main Entrance    Number of Stairs, Main Entrance none  -AE     Stair Railings, Main Entrance none   -AE     Row Name 03/03/23 1133          Stairs Within Home, Primary    Number of Stairs, Within Home, Primary none  -AE     Stair Railings, Within Home, Primary none  -AE     Row Name 03/03/23 1133          Cognition    Orientation Status (Cognition) oriented x 4  -AE     Row Name 03/03/23 1133          Safety Issues, Functional Mobility    Safety Issues Affecting Function (Mobility) awareness of need for assistance;insight into deficits/self-awareness;safety precaution awareness;safety precautions follow-through/compliance;sequencing abilities  -AE     Impairments Affecting Function (Mobility) balance;endurance/activity tolerance;postural/trunk control;sensation/sensory awareness;shortness of breath;strength;pain  -AE           User Key  (r) = Recorded By, (t) = Taken By, (c) = Cosigned By    Initials Name Provider Type    AE Rafael James, PT Physical Therapist               Mobility     Row Name 03/03/23 1138          Bed Mobility    Bed Mobility supine-sit;scooting/bridging  -AE     Scooting/Bridging [place] (Bed Mobility) standby assist  -AE     Supine-Sit [place] (Bed Mobility) standby assist  -AE     Assistive Device (Bed Mobility) head of bed elevated  -AE     Comment, (Bed Mobility) No cues required to complete bed mobility. Pt demo good sequencing of BLE and good core strength.  -AE     Row Name 03/03/23 1138          Transfers    Comment, (Transfers) VCs for hand placement and sequencing. Pt demo decreased balance initially with use of quad cane.  -AE     Row Name 03/03/23 1138          Sit-Stand Transfer    Sit-Stand [place] (Transfers) contact guard;1 person assist;verbal cues;nonverbal cues (demo/gesture)  -AE     Assistive Device (Sit-Stand Transfers) cane, quad  -AE     Row Name 03/03/23 1138          Gait/Stairs (Locomotion)    [place] Level (Gait) contact guard;1 person assist;verbal cues;nonverbal cues (demo/gesture)  -AE     Assistive Device (Gait) walker, front-wheeled   -AE     Distance in Feet (Gait) 200  -AE     Deviations/Abnormal Patterns (Gait) jasbir decreased;bilateral deviations;gait speed decreased;stride length decreased;base of support, wide  -AE     Bilateral Gait Deviations forward flexed posture  -AE     Comment, (Gait/Stairs) Pt demo step through gait pattern with slowed jasbir, decreased step length, and decreased gait speed. Pt demo decreased balance with use of quad cane with very short shuffled steps and significant forward flexed posture. Pt ambualted 20ft with quad cane and then given RW where balance and gait speed greatly improved.  -AE           User Key  (r) = Recorded By, (t) = Taken By, (c) = Cosigned By    Initials Name Provider Type    AE Rafael James, BETHANY Physical Therapist               Obj/Interventions     Row Name 03/03/23 1144          Range of Motion Comprehensive    General Range of Motion bilateral lower extremity ROM WFL  -AE     Row Name 03/03/23 1144          Strength Comprehensive (MMT)    General Manual Muscle Testing (MMT) Assessment lower extremity strength deficits identified  -AE     Comment, General Manual Muscle Testing (MMT) Assessment BLE grossly 4/5  -AE     Row Name 03/03/23 1144          Balance    Balance Assessment sitting static balance;sitting dynamic balance;sit to stand dynamic balance;standing static balance;standing dynamic balance  -AE     Static Sitting Balance standby assist  -AE     Dynamic Sitting Balance contact guard  -AE     Position, Sitting Balance unsupported;sitting edge of bed  -AE     Sit to Stand Dynamic Balance contact guard;1-person assist;verbal cues;non-verbal cues (demo/gesture)  -AE     Static Standing Balance contact guard  -AE     Dynamic Standing Balance contact guard  -AE     Position/Device Used, Standing Balance supported;walker, front-wheeled  -AE     Row Name 03/03/23 1144          Sensory Assessment (Somatosensory)    Sensory Assessment (Somatosensory) bilateral LE;right LE  -AE      Right LE Sensory Assessment light touch awareness;impaired  -AE     Bilateral LE Sensory Assessment general sensation;impaired  -AE           User Key  (r) = Recorded By, (t) = Taken By, (c) = Cosigned By    Initials Name Provider Type    AE Rafael James PT Physical Therapist               Goals/Plan     Row Name 03/03/23 1150          Bed Mobility Goal 1 (PT)    Activity/Assistive Device (Bed Mobility Goal 1, PT) sit to supine/supine to sit  -AE     [place] Level/Cues Needed (Bed Mobility Goal 1, PT) independent  -AE     Time Frame (Bed Mobility Goal 1, PT) long term goal (LTG);10 days  -AE     Progress/Outcomes (Bed Mobility Goal 1, PT) goal ongoing  -AE     Row Name 03/03/23 1150          Transfer Goal 1 (PT)    Activity/Assistive Device (Transfer Goal 1, PT) sit-to-stand/stand-to-sit;bed-to-chair/chair-to-bed  -AE     [place] Level/Cues Needed (Transfer Goal 1, PT) standby assist  -AE     Time Frame (Transfer Goal 1, PT) long term goal (LTG);10 days  -AE     Progress/Outcome (Transfer Goal 1, PT) goal ongoing  -AE     Row Name 03/03/23 1150          Gait Training Goal 1 (PT)    Activity/Assistive Device (Gait Training Goal 1, PT) gait (walking locomotion);assistive device use  -AE     [place] Level (Gait Training Goal 1, PT) standby assist  -AE     Distance (Gait Training Goal 1, PT) 450ft  -AE     Time Frame (Gait Training Goal 1, PT) long term goal (LTG);10 days  -AE     Progress/Outcome (Gait Training Goal 1, PT) goal ongoing  -AE     Row Name 03/03/23 1150          Therapy Assessment/Plan (PT)    Planned Therapy Interventions (PT) balance training;bed mobility training;gait training;home exercise program;patient/family education;postural re-education;ROM (range of motion);strengthening;transfer training  -AE           User Key  (r) = Recorded By, (t) = Taken By, (c) = Cosigned By    Initials Name Provider Type    AE Rafael James PT Physical Therapist               Clinical Impression      Row Name 03/03/23 1145          Pain    Pain Intervention(s) Repositioned;Ambulation/increased activity  -AE     Additional Documentation Pain Scale: FACES Pre/Post-Treatment (Group)  -AE     Row Name 03/03/23 1145          Pain Scale: FACES Pre/Post-Treatment    Pain: FACES Scale, Pretreatment 2-->hurts little bit  -AE     Posttreatment Pain Rating 4-->hurts little more  -AE     Pain Location - Side/Orientation Bilateral  -AE     Pain Location - foot  -AE     Pre/Posttreatment Pain Comment pt describes burning sensation; tolerated  -AE     Row Name 03/03/23 1145          Plan of Care Review    Plan of Care Reviewed With patient  -AE     Progress no change  -AE     Outcome Evaluation Pt presents with decreased functional mobility, decreased balance, and decreased endurance with mobility. Pt ambulated 20ft with CGA and quad cane. Pt demo significant balance deficits, pt given RW and demo improved balance, ambulating 180 ft with CGA. Recommend continued skilled IP PT interventions. Recommend D/C home with assist and OP PT services.  -AE     Row Name 03/03/23 1145          Therapy Assessment/Plan (PT)    Patient/Family Therapy Goals Statement (PT) Return home  -AE     Rehab Potential (PT) good, to achieve stated therapy goals  -AE     Criteria for Skilled Interventions Met (PT) yes  -AE     Therapy Frequency (PT) daily  -AE     Row Name 03/03/23 1145          Vital Signs    Pretreatment Heart Rate (beats/min) 82  -AE     Posttreatment Heart Rate (beats/min) 73  -AE     Pre SpO2 (%) 96  -AE     O2 Delivery Pre Treatment room air  -AE     O2 Delivery Intra Treatment room air  -AE     Post SpO2 (%) 95  -AE     O2 Delivery Post Treatment room air  -AE     Pre Patient Position Supine  -AE     Intra Patient Position Standing  -AE     Post Patient Position Sitting  -AE     Row Name 03/03/23 1145          Positioning and Restraints    Pre-Treatment Position in bed  -AE     Post Treatment Position chair  -AE     In Chair  notified nsg;reclined;call light within reach;encouraged to call for assist;with family/caregiver;legs elevated  -AE           User Key  (r) = Recorded By, (t) = Taken By, (c) = Cosigned By    Initials Name Provider Type    AE Rafael James, PT Physical Therapist               Outcome Measures     Row Name 03/03/23 1151 03/03/23 0859       How much help from another person do you currently need...    Turning from your back to your side while in flat bed without using bedrails? 4  -AE 4  -AC    Moving from lying on back to sitting on the side of a flat bed without bedrails? 3  -AE 3  -AC    Moving to and from a bed to a chair (including a wheelchair)? 3  -AE 3  -AC    Standing up from a chair using your arms (e.g., wheelchair, bedside chair)? 3  -AE 3  -AC    Climbing 3-5 steps with a railing? 2  -AE 2  -AC    To walk in hospital room? 3  -AE 2  -AC    AM-PAC 6 Clicks Score (PT) 18  -AE 17  -AC    Highest level of mobility 6 --> Walked 10 steps or more  -AE 5 --> Static standing  -AC    Row Name 03/03/23 1151          Functional Assessment    Outcome Measure Options AM-PAC 6 Clicks Basic Mobility (PT)  -AE           User Key  (r) = Recorded By, (t) = Taken By, (c) = Cosigned By    Initials Name Provider Type    Rafael Lewis, PT Physical Therapist    AC Zoe Rocha RN Registered Nurse                             Physical Therapy Education     Title: PT OT SLP Therapies (In Progress)     Topic: Physical Therapy (In Progress)     Point: Mobility training (Done)     Learning Progress Summary           Patient Acceptance, E, VU by AE at 3/3/2023 1025                   Point: Home exercise program (Not Started)     Learner Progress:  Not documented in this visit.          Point: Body mechanics (Done)     Learning Progress Summary           Patient Acceptance, E, VU by AE at 3/3/2023 1025                   Point: Precautions (Done)     Learning Progress Summary           Patient Acceptance, E, VU by AE at  3/3/2023 1025                               User Key     Initials Effective Dates Name Provider Type Discipline    AE 09/21/21 -  Rafael James PT Physical Therapist PT              PT Recommendation and Plan  Planned Therapy Interventions (PT): balance training, bed mobility training, gait training, home exercise program, patient/family education, postural re-education, ROM (range of motion), strengthening, transfer training  Plan of Care Reviewed With: patient  Progress: no change  Outcome Evaluation: Pt presents with decreased functional mobility, decreased balance, and decreased endurance with mobility. Pt ambulated 20ft with CGA and quad cane. Pt demo significant balance deficits, pt given RW and demo improved balance, ambulating 180 ft with CGA. Recommend continued skilled IP PT interventions. Recommend D/C home with assist and OP PT services.     Time Calculation:    PT Charges     Row Name 03/03/23 1153             Time Calculation    Start Time 1025  -AE      PT Received On 03/03/23  -AE      PT Goal Re-Cert Due Date 03/13/23  -AE         Untimed Charges    PT Eval/Re-eval Minutes 50  -AE         Total Minutes    Untimed Charges Total Minutes 50  -AE       Total Minutes 50  -AE            User Key  (r) = Recorded By, (t) = Taken By, (c) = Cosigned By    Initials Name Provider Type    AE Rafael James PT Physical Therapist              Therapy Charges for Today     Code Description Service Date Service Provider Modifiers Qty    55076687726 HC PT EVAL MOD COMPLEXITY 4 3/3/2023 Rafael James PT GP 1          PT G-Codes  Outcome Measure Options: AM-PAC 6 Clicks Basic Mobility (PT)  AM-PAC 6 Clicks Score (PT): 18  PT Discharge Summary  Anticipated Discharge Disposition (PT): home with assist, home with outpatient therapy services    Rafael James PT  3/3/2023

## 2023-03-03 NOTE — PROGRESS NOTES
[facility] Medicine Services  PROGRESS NOTE    Patient Name: Bryanna Schwartz  : 1970  MRN: [id number]    Date of Admission: 3/1/2023  Primary Care Physician: Baljeet Manley APRN    Subjective   Subjective     CC:  Foot ulcers    HPI:  3/2 - Patient is a 52-year-old diabetic with peripheral neuropathy who presented for evaluation of foot ulcers on her toes.  She was seen by Dr. Krause as well as Dr. Contreras.  Imaging does not seem very suspicious for osteomyelitis.  Work-up is currently ongoing.  She is able to tolerate p.o. and ambulate but does have significant neuropathy.    3/3 -patient was evaluated by cardiology today at the request of Dr. Krause.  They are planning a stress test tomorrow and likely NESSA on Monday.  Investigation for etiology of possible embolic infarcts to the distal toes.  Her transthoracic echo did reveal a reduced ejection fraction 41%.  She states the Norco has helped her pain.  She has pain at rest and with walking.  We discussed continued work-up for arterial insufficiency as well and I have ordered arterial Dopplers for bilateral lower extremities.    ROS:  Gen- No fevers, chills  CV- No chest pain, palpitations  Resp- No cough, dyspnea  GI- No N/V/D, abd pain         Objective   Objective     Vital Signs:   Temp:  [97.7 °F (36.5 °C)-98.5 °F (36.9 °C)] 97.7 °F (36.5 °C)  Heart Rate:  [72-86] 72  Resp:  [18-20] 20  BP: (106-139)/(63-79) 139/75     Physical Exam:  Constitutional: No acute distress, awake, alert  HENT: NCAT, mucous membranes moist  Respiratory: Clear to auscultation bilaterally, respiratory effort normal   Cardiovascular: RRR  Gastrointestinal: Positive bowel sounds, soft, nontender, nondistended  Musculoskeletal: No bilateral ankle edema  Psychiatric: Appropriate affect, cooperative  Neurologic: Oriented x 3, strength symmetric in all extremities, Cranial Nerves grossly intact to confrontation, speech clear  Skin: Right foot with 2 small  black eschar under the second and fourth toenails without drainage or erythema      Results Reviewed:  LAB RESULTS:      Lab 03/01/23  2219 03/01/23  1808 03/01/23  1555   WBC  --   --  7.22   HEMOGLOBIN  --   --  13.4   HEMATOCRIT  --   --  40.8   PLATELETS  --   --  233   NEUTROS ABS  --   --  4.48   IMMATURE GRANS (ABS)  --   --  0.04   LYMPHS ABS  --   --  1.94   MONOS ABS  --   --  0.51   EOS ABS  --   --  0.22   MCV  --   --  92.3   SED RATE  --   --  16   CRP  --   --  0.84*   PROCALCITONIN  --   --  0.07   LACTATE 1.6 2.2*  --    LDH  --   --  248*   D DIMER QUANT  --   --  0.44         Lab 03/03/23  0432 03/01/23  1555   SODIUM 143 139   POTASSIUM 3.2* 4.2   CHLORIDE 109* 104   CO2 28.0 23.0   ANION GAP 6.0 12.0   BUN 8 11   CREATININE 0.75 0.67   EGFR 95.9 105.3   GLUCOSE 110* 118*   CALCIUM 8.3* 8.9   HEMOGLOBIN A1C  --  5.20         Lab 03/01/23  1555   TOTAL PROTEIN 6.0   ALBUMIN 3.1*   GLOBULIN 2.9   ALT (SGPT) 39*   AST (SGOT) 27   BILIRUBIN 0.4   ALK PHOS 213*         Lab 03/01/23  1555   PROBNP 640.5                 Brief Urine Lab Results  (Last result in the past 365 days)      Color   Clarity   Blood   Leuk Est   Nitrite   Protein   CREAT   Urine HCG        03/02/23 0400 Yellow   Turbid   Negative   Negative   Negative   Negative                 Microbiology Results Abnormal     Procedure Component Value - Date/Time    Blood Culture - Blood, Hand, Left [[id number]]  (Normal) Collected: 03/01/23 1556    Lab Status: Preliminary result Specimen: Blood from Hand, Left Updated: 03/02/23 1645     Blood Culture No growth at 24 hours    Narrative:      Aerobic bottle only      Blood Culture - Blood, Arm, Left [[id number]]  (Normal) Collected: 03/01/23 1556    Lab Status: Preliminary result Specimen: Blood from Arm, Left Updated: 03/02/23 1645     Blood Culture No growth at 24 hours    Narrative:      Aerobic bottle only      MRSA Screen, PCR (Inpatient) - Swab, Nares [[id number]]  (Normal) Collected:  03/01/23 1539    Lab Status: Final result Specimen: Swab from Nares Updated: 03/01/23 1704     MRSA PCR Negative    Narrative:      The negative predictive value of this diagnostic test is high and should only be used to consider de-escalating anti-MRSA therapy. A positive result may indicate colonization with MRSA and must be correlated clinically.  MRSA Negative          Adult Transthoracic Echo Complete W/ Cont if Necessary Per Protocol    Result Date: 3/2/2023  •  Left ventricular ejection fraction appears to be 41 - 45%. •  The cardiac valves are anatomically and functionally normal. •  Saline test results are negative for right to left atrial level shunt.     MRI Foot Right With & Without Contrast    Result Date: 3/2/2023  MRI FOOT RIGHT W WO CONTRAST Date of Exam: 3/2/2023 2:03 AM EST Indication: Osteomyelitis.  Comparison: None available. Technique:  Routine multiplanar/multisequence sequence images of the right foot were obtained before and after the uneventful administration of  20 mL Multihance.  Findings: Mildly motion degraded examination. Bones and joints: Marrow edema of the distal fifth metatarsal shaft and metatarsal head with associated periosteal edema. No definite fracture line is seen here. Small amount marrow edema and enhancement seen within the first, second and fourth distal phalanges without T1 signal abnormality or evidence of erosion. There is flattening and collapse of the third metatarsal head most consistent with prior Freiberg's infraction. No substantial associated marrow edema. Mild scattered degenerative changes of  the forefoot most advanced the first metatarsophalangeal joint with minimal subchondral edema. No evidence of joint effusions. Soft tissues: Soft tissue edema of the second and fourth toes and to a lesser extent the first toe. Additional soft tissue edema of the lateral mid and forefoot as well as the dorsal foot. Visualized flexor and extensor tendons appear grossly  intact. Disrupted appearance of the third plantar plate likely secondary to Freiberg's infraction. Remaining plantar plates appear intact Fatty atrophy of the intrinsic muscles of the foot. No evidence of rim-enhancing soft tissue collection or enhancing soft tissue mass. No definite evidence of intermetatarsal neuroma. Small amount of intermetatarsal bursal fluid at the first and second interspace.     Impression: Impression: Soft tissue edema of the second and fourth toes and to a lesser extent the first toe with minimal reactive marrow edema of the distal phalanges. No evidence of osseous erosions or T1 signal abnormality to suggest osteomyelitis. Marrow and periosteal edema of the distal fifth metatarsal without fracture line suggestive of stress injury. 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 No evidence of associated marrow edema. Electronically Signed: Familai Santos  3/2/2023 8:48 AM EST  Workstation ID: [id number]    CT Outside Films    Result Date: 3/3/2023  This procedure was auto-finalized with no dictation required.      Results for orders placed during the hospital encounter of 03/01/23    Adult Transthoracic Echo Complete W/ Cont if Necessary Per Protocol    Interpretation Summary  •  Left ventricular ejection fraction appears to be 41 - 45%.  •  The cardiac valves are anatomically and functionally normal.  •  Saline test results are negative for right to left atrial level shunt.      Current medications:  Scheduled Meds:atenolol, 25 mg, Oral, Daily  cefTRIAXone, 2 g, Intravenous, Q24H  clopidogrel, 75 mg, Oral, Daily  heparin (porcine), 5,000 Units, Subcutaneous, Q12H  insulin lispro, 0-7 Units, Subcutaneous, TID With Meals  levothyroxine, 25 mcg, Oral, Q AM  potassium chloride, 40 mEq, Oral, Q4H  pregabalin, 150 mg, Oral, BID  rosuvastatin, 5 mg, Oral, Nightly  senna-docusate sodium, 2 tablet, Oral, BID  sodium chloride, 10 mL,  Intravenous, Q12H  Sulfur Hexafluoride Microsph, 3 mL, Intravenous, Once in imaging  zonisamide, 100 mg, Oral, Daily  zonisamide, 200 mg, Oral, Nightly      Continuous Infusions:   PRN Meds:.•  acetaminophen  •  senna-docusate sodium **AND** polyethylene glycol **AND** bisacodyl **AND** bisacodyl  •  Calcium Replacement - Initiate Nurse / BPA Driven Protocol  •  HYDROcodone-acetaminophen  •  Magnesium Standard Dose Replacement - Initiate Nurse / BPA Driven Protocol  •  Phosphorus Replacement - Initiate Nurse / BPA Driven Protocol  •  Potassium Replacement - Initiate Nurse / BPA Driven Protocol  •  sodium chloride    Assessment & Plan   Assessment & Plan     Active Hospital Problems    Diagnosis  POA   • **Gangrene (HCC) [I96]  Yes   • Chronic systolic CHF (congestive heart failure) (HCC) [I50.22]  Yes   • 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        Brief Hospital Course to date:  Bryanna Schwartz is a 52 y.o. female with past medical history of diabetes mellitus with neuropathy, hypothyroidism, hypertension, anxiety depression, history of Lyme disease and [place]vin [place] spotted fever who presented with gangrene on her right second and fourth toes just under the nail.    Gangrene of the right second and fourth toes  Concern for peripheral vascular disease  Concern for suspected embolic infarct  -Dr. Krause evaluated  -Also seen by Dr. Contreras with infectious disease  -Work-up ongoing but imaging not concerning for osteomyelitis  -Continue ceftriaxone per infectious disease recommendations  -Cardiology consult to evaluate for potential etiologies of embolic infarct, planning stress test on 3/4/2023 and NESSA on 3/6/2023  -Added Norco for pain  -Arterial  Dopplers bilateral lower extremities ordered    Chronic systolic congestive heart failure  - EF of 41%  -Cardiology has been consulted  -Planning stress test tomorrow and NESSA on Monday    Diabetes mellitus with neuropathy  -Continue sliding scale insulin as needed  -Fingersticks ACHS    Hypothyroidism  -Continue Synthroid    History of vitamin B12 deficiency  -Last B12 level in June 2022 was 397    Hypertension  -Continue atenolol    Anxiety/depression  Altered mental status  -Holding home meds Neurontin Lyrica and Cymbalta    History of Lyme disease  History of [place] spotted fever  -Complicates care and diagnostic work-up for etiology of the gangrene of her toes    Expected Discharge Location and Transportation: Home, private car  Expected Discharge 3/7/2023  Expected Discharge Date and Time     Expected Discharge Date Expected Discharge Time    Mar 10, 2023            DVT prophylaxis:  Medical DVT prophylaxis orders are present.     AM-PAC 6 Clicks Score (PT): 18 (03/03/23 1151)    CODE STATUS:   Code Status and Medical Interventions:   Ordered at: 03/01/23 1513     Code Status (Patient has no pulse and is not breathing):    CPR (Attempt to Resuscitate)     Medical Interventions (Patient has pulse or is breathing):    Full Support       Marsha Rosado MD  03/03/23

## 2023-03-03 NOTE — PROGRESS NOTES
Cardiothoracic Surgery Progress Note      POD #:     LOS: 2 days      Subjective: Asleep this morning resting comfortably I did not awaken her.  Vital signs were normal on the monitor    Objective:  Vital Signs vital signs below noted Tmax past 24 hours 98.5 °F  Temp:  [97.7 °F (36.5 °C)-98.5 °F (36.9 °C)] 97.9 °F (36.6 °C)  Heart Rate:  [77-86] 77  Resp:  [18] 18  BP: (106-138)/(63-79) 123/64    Physical Exam:   General Appearance: Asleep resting comfortably vital signs normal on the monitor   Lungs:   Heart:   Skin: Right foot ulcerative areas of the fourth toe and second toe painted with Betadine and cover with 6 6 Optifoam.  Both feet are well-perfused palpable pedal pulses bilaterally   Incision:     Results:  Results from last 7 days   Lab Units 03/01/23  1555   WBC 10*3/mm3 7.22   HEMOGLOBIN g/dL 13.4   HEMATOCRIT % 40.8   PLATELETS 10*3/mm3 233     Results from last 7 days   Lab Units 03/03/23  0432   SODIUM mmol/L 143   POTASSIUM mmol/L 3.2*   CHLORIDE mmol/L 109*   CO2 mmol/L 28.0   BUN mg/dL 8   CREATININE mg/dL 0.75   GLUCOSE mg/dL 110*   CALCIUM mg/dL 8.3*         Assessment: #1.  Gangrenous changes right fourth toe at the distal tuft and gangrenous changes the right second toe distal tuft.  Etiology unclear.  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  Prior history of Lyme disease and [place]vin [place] spotted fever was hospitalized here for approximately 1 month in 2018.  Had to go to [facility] to learn how to walk again  #3.  Basically normal carotid duplex studies recently as well as recent CT angiogram with runoff studies at outlying facility and recent MRI of the brain showed mild atrophy.  4.  Chest pain onset recently.  Plan: #1 cardiology consultation  2.  Discuss this patient's overall clinical situation with Dr. Noah Contreras of infectious disease and hospitalis.  t we may need a rheumatology consult for possible collagen vascular disease.  Patient  Hospital Medicine Daily Progress Note    Date of Service  12/27/2021    Chief Complaint  Jerzy Juan III is a 43 y.o. male admitted 12/25/2021 with fever, abdominal pain.    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  In ER noted to be febrile, tachycardic but hemodynamically stable.  Saturating well on room air.  Lab work significant for lactic acidosis, leukocytosis with WBCs of 18.9.  CT abdomen pelvis showed diffuse rectal and colonic wall thickening throughout concerning for proctitis and pancolitis.  Chest x-ray negative for acute cardiopulmonary process.  Started on oral vancomycin and IV Flagyl.  GI and general surgery consulted.  Recommended aggressive bowel regimen.  ID consulted as well.  Fidaxomicin requested but unfortunately not available in this facility or [facility].  Per GI it will be challenging to perform FMT considering FMT facility is closed for the holidays.  Interval Problem Update  12/26: Has been febrile overnight however afebrile this morning.  Still tachycardic.  Abdominal pain excruciating.  Patient have extensive stool output after GoLYTELY and enema given.  After discussion between the mother and patient, decision was made for total colectomy.  Dr. Yeboah to perform surgery today afternoon.  Patient made n.p.o.  12/27: Patient had laparoscopic abdominal colectomy done yesterday.  Tolerated surgery well.  Leukocytosis trending down.  Less tachycardic and hemodynamically stable.  Afebrile.  Urine culture grew E. coli ESBL.  Started on Merrem.  Blood cultures came back positive with coag negative staph 1 out of 2 bottles.  IV vancomycin discontinued.  Still with significant pain.  Oral and IV narcotics  added.  Patient's mother to contact the pain pump rep to restart the device.  No other issues to report.    I have personally seen and examined the patient at bedside. I discussed the plan of care with patient, family and Dr Zayra Harmon.    Consultants/Specialty  general surgery, GI and infectious disease    Code Status  DNAR/DNI    Disposition  Patient is not medically cleared for discharge.   Anticipate discharge to TBD.  I have placed the appropriate orders for post-discharge needs.    Review of Systems  Review of Systems   Unable to perform ROS: Medical condition   Constitutional: Positive for chills. Negative for fever.   HENT: Negative for ear pain, hearing loss and tinnitus.    Eyes: Negative for blurred vision and double vision.   Respiratory: Negative for cough.    Cardiovascular: Negative for chest pain.   Gastrointestinal: Positive for abdominal pain and diarrhea.   Genitourinary: Negative for dysuria.   Musculoskeletal: Negative for myalgias.   Skin: Negative for rash.   Neurological: Negative for dizziness and headaches.   Psychiatric/Behavioral: Negative for depression.        Physical Exam  Temp:  [36.4 °C (97.5 °F)-36.8 °C (98.2 °F)] 36.4 °C (97.5 °F)  Pulse:  [] 103  Resp:  [10-18] 14  BP: (120-139)/(83-98) 124/84  SpO2:  [93 %-98 %] 96 %    Physical Exam  Constitutional:       General: He is not in acute distress.     Appearance: He is ill-appearing.   HENT:      Head: Normocephalic and atraumatic.   Eyes:      Extraocular Movements: Extraocular movements intact.   Cardiovascular:      Rate and Rhythm: Regular rhythm. Tachycardia present.      Heart sounds: No friction rub.   Pulmonary:      Effort: Pulmonary effort is normal. No respiratory distress.      Breath sounds: No wheezing.   Abdominal:      General: There is distension.      Tenderness: There is abdominal tenderness. There is guarding.   Musculoskeletal:         General: No tenderness.   Neurological:      Mental Status: He is  definitelyneeds a cardiology consult as noted above due to recent chest pain and was already scheduled to see Ora in [place] this coming month.  Also needs an echo to rule out possible embolic phenomena to this right foot/toes  Addendum: With patient's metatarsal head fracture of the right second toe as well as the tuft ulcerations of the second toe and the fourth toe we will have PT fit for toe unloading shoe of all the toes of the right foot.        Michael Krause MD - 03/03/23 - 06:42 EST       alert.      Comments: Difficult to assess the light of acute distress from severe illness.   Psychiatric:      Comments: Difficult to assess.         Fluids    Intake/Output Summary (Last 24 hours) at 12/27/2021 1423  Last data filed at 12/27/2021 1152  Gross per 24 hour   Intake 2200 ml   Output 1860 ml   Net 340 ml       Laboratory  Recent Labs     12/25/21  2336 12/26/21  1142 12/27/21  0358   WBC 20.6* 20.5* 15.3*   RBC 5.27 5.47 4.41*   HEMOGLOBIN 15.9 16.3 13.2*   HEMATOCRIT 46.3 48.7 38.7*   MCV 87.9 89.0 87.8   MCH 30.2 29.8 29.9   MCHC 34.3 33.5* 34.1   RDW 49.5 49.4 49.6   PLATELETCT 252 192 196   MPV 9.3 9.0 9.5     Recent Labs     12/25/21  2336 12/26/21  1142 12/27/21  0358   SODIUM 127* 128* 136   POTASSIUM 3.5* 3.2* 3.0*   CHLORIDE 93* 91* 99   CO2 20 23 24   GLUCOSE 106* 91 100*   BUN 25* 21 15   CREATININE 0.66 0.68 0.53   CALCIUM 8.3* 7.8* 7.3*                   Imaging  DX-ABDOMEN FOR TUBE PLACEMENT   Final Result      Nasogastric tube tip at the gastric fundus.      CT-ABDOMEN-PELVIS WITH   Final Result         1. Diffuse rectal and colonic wall thickening throughout, in keeping with proctitis and pancolitis.   2. Large amount of stool in the rectum and sigmoid colon.      DX-CHEST-PORTABLE (1 VIEW)   Final Result         1. No acute cardiopulmonary abnormalities are identified.      CT-HEAD W/O    (Results Pending)        Assessment/Plan  * Sepsis (HCC)- (present on admission)  Assessment & Plan  This is Sepsis Present on admission  SIRS criteria identified on my evaluation include: Fever, with temperature greater than 101 deg F, Tachycardia, with heart rate greater than 90 BPM and Leukocytosis, with WBC greater than 12,000  Source is GI  Sepsis protocol initiated  Fluid resuscitation ordered per protocol  IV antibiotics as appropriate for source of sepsis  While organ dysfunction may be noted elsewhere in this problem list or in the chart, degree of organ dysfunction does not meet CMS criteria  for severe sepsis    12/26: Worsening.  Increasing bandemia.  Worsening leukocytosis.  Currently on oral vancomycin and IV Flagyl.    Positive blood cultures- (present on admission)  Assessment & Plan  Coag negative staph 1/2 likely skin contamination.  Discontinue IV vancomycin.    Diarrhea of presumed infectious origin- (present on admission)  Assessment & Plan  Likely secondary to C. difficile, history of recurrent infections  Restarted on oral vancomycin  Large amounts of stool seen on CT in the rectum and sigmoid colon.  May need disimpaction  12/25: Discussed the case with general surgery Dr. Jacobo who recommended aggressive bowel regimen.  12/26: FMS ordered to manage significant stool output.    Neurogenic dysfunction of the urinary bladder- (present on admission)  Assessment & Plan  Secondary to MS.  Has suprapubic catheter  He had urine drawn in the ED from the catheter he came in with.  Asked for catheter to be exchanged in the ED and urine drawn again after new 12/25: Suprapubic catheter has been replaced in ED.    Advance care planning  Assessment & Plan  Patient declining clinically.  Abdominal pain has been excruciating.  Patient and family members to discuss plan of care and decided on total colectomy recommended by surgery.     Acute colitis- (present on admission)  Assessment & Plan  Comes in complaining of diarrhea for the past 2 days, SIRS 4/4 on presentation  History of recurrent C. difficile infections  CT abdomen shows wall thickening consistent with proctitis and pancolitis  Most likely has recurrent C. difficile infection  We will continue oral vancomycin.  He will need an ID consult in the morning  Consider GI consult for another fecal transplant as it worked the first time  12/25:  Severe. On oral vancomycin and IV Flagyl.  Discussed the case with GI Dr. Azevedo who recommended to continue treatment with antibiotics for now.  Discussed the case with general surgery Dr. Aj, who  recommended aggressive bowel regimen.  12/26: Clinically worsening.  Mother consented for total colectomy will be done today afternoon by Dr. Yeboah.  12/27: Status post laparoscopic abdominal colectomy done on 12/26/2021.  Postop care.  Ostomy care per wound care team.  Surgery following.    Urinary tract infection- (present on admission)  Assessment & Plan  Urine culture grew E. coli ESBL.  Currently on Merrem.    Anxiety and depression- (present on admission)  Assessment & Plan  Continue sertraline and bupropion    MS (multiple sclerosis) ([facility])- (present on admission)  Assessment & Plan  Lives in a nursing home  History of neurogenic bladder secondary to this with suprapubic catheter  History of chronic pain with pain pump in place  Continue baclofen and vitamin D  Hold off on cladribine due to acute infection.  Restart once afebrile infection under control  12/25: Apparently MS has been debilitating for patient and has been declining despite being on second round of [place]. Hold off for now due to active cdiff colitis.        VTE prophylaxis: enoxaparin ppx    I have performed a physical exam and reviewed and updated ROS and Plan today (12/27/2021). In review of yesterday's note (12/26/2021), there are no changes except as documented above.

## 2023-03-03 NOTE — CASE MANAGEMENT/SOCIAL WORK
Continued Stay Note  [facility]     Patient Name: Bryanna Schwartz  MRN: [id number]  Today's Date: 3/3/2023    Admit Date: 3/1/2023    Plan: home   Discharge Plan     Row Name 03/03/23 1029       Plan    Plan home    Patient/Family in Agreement with Plan yes    Plan Comments I met with the patient at the bedside. She stated she thought she was to have had a procedure yesterday, but did not. She remains on IV antibiotics per ID MD. Her plan remains home with  to transport. CM will follow her plan of care.    Final Discharge Disposition Code 01 - home or self-care               Discharge Codes    No documentation.               Expected Discharge Date and Time     Expected Discharge Date Expected Discharge Time    Mar 10, 2023             Carissa Prado RN

## 2023-03-03 NOTE — PROGRESS NOTES
Clinical Nutrition     Nutrition Support Assessment  Reason for Visit: Identified at risk by screening criteria, MST score 2+, Unintentional weight loss      Patient Name: Bryanna Schwartz  YOB: 1970  MRN: [id number]  Date of Encounter: 03/03/23 13:37 EST  Admission date: 3/1/2023    Comments:        Nutrition Assessment   Admission Diagnosis:  Gangrene (HCC) [I96]      Problem List:    Gangrene (HCC)    Anxiety and depression    Hypertension    Glucose intolerance    Diabetes mellitus with neuropathy (HCC)    Hypothyroidism    Chronic systolic CHF (congestive heart failure) (HCC)    Embolic infarction (HCC)        PMH:   She  has a past medical history of Arthritis, Depression, Diabetes (HCC), Environmental allergies, Falls (10/24/2018), GERD (gastroesophageal reflux disease), Hyperlipidemia, Hypertension, Hypothyroidism, Lyme disease, Migraine, [place] spotted fever, and Vitamin B 12 deficiency.    PSH:  She  has a past surgical history that includes Cholecystectomy; Achilles tendon surgery (Right); Hysterectomy; Breast cyst excision (Left); and Esophagogastroduodenoscopy (N/A, 10/16/2018).      Applicable Nutrition Concerns:   Skin:  Oral:  GI:      Applicable Interval History:         Reported/Observed/Food/Nutrition Related History:   Patient and spouse present during visit. Spouse provides most of information. Patient was started on Ozempic in 1/2023 and it had to be discontinued 3 weeks ago due to side effects including poor appetite, nausea, and emesis. Pt was eating <50% of usual PO intake until 1 week ago (took 2 weeks for her appetite to return and  reports appetite has been great). PO intake was poor for 1-2 months total. Pt lost a significant amount of weight from 250 lbs, but she is unsure of the weight she reached. She thinks she has gained back a few lbs during the past couple of weeks. Currently weighs 224 lbs. Denies food allergies and difficulty  "chewing/swallowing. Reports good appetite/PO intake recently and currently; however, does not lunch intake not good r/t food preferences - settled on some chips she likes. Declines ONS during this admission.       Labs    Labs Reviewed: Yes     Results from last 7 days   Lab Units 03/03/23  0432 03/01/23  1555   GLUCOSE mg/dL 110* 118*   BUN mg/dL 8 11   CREATININE mg/dL 0.75 0.67   SODIUM mmol/L 143 139   CHLORIDE mmol/L 109* 104   POTASSIUM mmol/L 3.2* 4.2   ALT (SGPT) U/L  --  39*       Results from last 7 days   Lab Units 03/01/23  1555   ALBUMIN g/dL 3.1*   CRP mg/dL 0.84*       Results from last 7 days   Lab Units 03/03/23  1200 03/03/23  0710 03/02/23  1523 03/02/23  1223 03/02/23  0722 03/01/23  1946   GLUCOSE mg/dL 130 107 122 105 103 140*     Lab Results   Lab Value Date/Time    HGBA1C 5.20 03/01/2023 1555    HGBA1C 6.2 (H) 06/21/2022 1021         Results from last 7 days   Lab Units 03/01/23  1555   PROBNP pg/mL 640.5         Medications    Medications Reviewed: Yes  Pertinent: antibiotic, insulin  Infusion:  PRN:     Intake/Ouptut 24 hrs (0701 - 0700)   I&O's Reviewed: Yes       Anthropometrics     Flowsheet Rows    Flowsheet Row First Filed Value   Admission Height 175.3 cm (69\") Documented at 03/01/2023 1415   Admission Weight 102 kg (224 lb) Documented at 03/01/2023 1415          Height: Height: 180 cm (70.87\")  Last Filed Weight: Weight: 102 kg (224 lb) (03/02/23 1147)  Method: Weight Method: Standing scale  BMI: BMI (Calculated): 31.4  BMI classification: Obese Class I: 30-34.9kg/m2  IBW:   150 lbs    UBW: 250 lbs prior to 1/2023 per patient    Weight change: Based on reported UBW/timeframe and current standing weight, patient has unintentionally lost 26 lbs (10%) x 1.5-2 months. No weight hx available in EMR, unable to verify.    Nutrition Focused Physical Exam     Date:   Unable to perform exam due to: Defer pending indication    Current Nutrition Prescription     PO: Diet: Diabetic Diets; " Consistent Carbohydrate; Texture: Regular Texture (IDDSI 7); Fluid Consistency: Thin (IDDSI 0)  NPO Diet NPO Type: Strict NPO    Intake: 63% x 2 meals      Nutrition Diagnosis     Date:  Updated:   Problem No nutrition diagnosis at this time   Etiology    Signs/Symptoms    Status:     Goal:   General: Nutrition to support treatment  PO: Establish PO  EN/PN: N/A    Nutrition Intervention      Follow treatment progress, Care plan reviewed, Encourage intake, Supplement offered/refused      Monitoring/Evaluation:   Per protocol, I&O, PO intake, Pertinent labs, Weight, Skin status      Daiana Rodríguez RD  Time Spent: 45 min

## 2023-03-03 NOTE — PLAN OF CARE
Goal Outcome Evaluation:  Plan of Care Reviewed With: patient        Progress: no change  Outcome Evaluation: Pt presents with decreased functional mobility, decreased balance, and decreased endurance with mobility. Pt ambulated 20ft with CGA and quad cane. Pt demo significant balance deficits, pt given RW and demo improved balance, ambulating 180 ft with CGA. Recommend continued skilled IP PT interventions. Recommend D/C home with assist and OP PT services.

## 2023-03-03 NOTE — PROGRESS NOTES
INFECTIOUS DISEASE follow-up    Bryanna Schwartz  1970  [id number]    Date of Consult: 3/3/2023    Admission Date: 3/1/2023      Requesting Provider: Sadaf Torrez MD  Evaluating Physician: Noah Contreras MD    Reason for Consultation: foot cellulitis    History of present illness:    Patient is a 52 y.o. female with arthritis, depression, diabetes mellitus type 2, GERD, hyperlipidemia, hypertension, vitamin B12 deficiency presents to [facility] with redness and pain of toes of bilateral extremities patient reports 2-week history of redness swelling pain.  Patient believes that she has had Lyme disease and [place] spotted fever historically patient may have a chronic neuropathy.  Patient has been admitted to hospital for peripheral vascular disease work-up.    Patient denies fevers or chills.    Patient states that her diabetes mellitus is improving and patient recently went off her oral antihyperglycemic agents    Patient was referred to vascular surgery for gangrenous changes of right fourth toe and right second toe.    3/3/2023 afebrile normotensive no events overnight no complaints  Past Medical History:   Diagnosis Date   • Arthritis    • Depression    • Diabetes (HCC)    • Environmental allergies    • Falls 10/24/2018   • GERD (gastroesophageal reflux disease)    • Hyperlipidemia    • Hypertension    • Hypothyroidism    • Lyme disease    • Migraine    • [place] spotted fever    • Vitamin B 12 deficiency        Past Surgical History:   Procedure Laterality Date   • ACHILLES TENDON SURGERY Right    • BREAST CYST EXCISION Left    • CHOLECYSTECTOMY     • ENDOSCOPY N/A 10/16/2018    Procedure: ESOPHAGOGASTRODUODENOSCOPY;  Surgeon: Brunner, Mark I, MD;  Location: [facility] ENDOSCOPY;  Service: Gastroenterology   • HYSTERECTOMY         Family History   Problem Relation Age of Onset   • Hypertension Mother    • COPD Mother    • Heart attack Mother    • Other Father         truck  / of exhaust fumes       Social History     Socioeconomic History   • Marital status:    • Number of children: 2   Tobacco Use   • Smoking status: Never   • Smokeless tobacco: Never   Vaping Use   • Vaping Use: Never used   Substance and Sexual Activity   • Alcohol use: No   • Drug use: No   • Sexual activity: Defer     Partners: Male       Allergies   Allergen Reactions   • Gabapentin Hives         Medication:    Current Facility-Administered Medications:   •  acetaminophen (TYLENOL) tablet 650 mg, 650 mg, Oral, Q6H PRN, Charo De La Paz MD  •  atenolol (TENORMIN) tablet 25 mg, 25 mg, Oral, Daily, Charo De La Paz MD, 25 mg at 23 0859  •  sennosides-docusate (PERICOLACE) 8.6-50 MG per tablet 2 tablet, 2 tablet, Oral, BID, 2 tablet at 23 0859 **AND** polyethylene glycol (MIRALAX) packet 17 g, 17 g, Oral, Daily PRN **AND** bisacodyl (DULCOLAX) EC tablet 5 mg, 5 mg, Oral, Daily PRN **AND** bisacodyl (DULCOLAX) suppository 10 mg, 10 mg, Rectal, Daily PRN, Emily Ordoñez APRN  •  Calcium Replacement - Initiate Nurse / BPA Driven Protocol, , Does not apply, PRN, Marsha Rosado MD  •  cefTRIAXone (ROCEPHIN) 2 g/100 mL 0.9% NS IVPB (MBP), 2 g, Intravenous, Q24H, Noah Contreras MD, 2 g at 23 1528  •  clopidogrel (PLAVIX) tablet 75 mg, 75 mg, Oral, Daily, Marsha Rosado MD, 75 mg at 23 1352  •  heparin (porcine) 5000 UNIT/ML injection 5,000 Units, 5,000 Units, Subcutaneous, Q12H, Charo De La Paz MD, 5,000 Units at 23 0859  •  HYDROcodone-acetaminophen (NORCO) 5-325 MG per tablet 1 tablet, 1 tablet, Oral, Q6H PRN, Marsha Rosado MD, 1 tablet at 23 1033  •  Insulin Lispro (humaLOG) injection 0-7 Units, 0-7 Units, Subcutaneous, TID With Meals, Charo De La Paz MD  •  levothyroxine (SYNTHROID, LEVOTHROID) tablet 25 mcg, 25 mcg, Oral, Q AM, Charo De La Paz MD, 25 mcg at 23 0513  •  Magnesium Standard Dose Replacement - Initiate Nurse / BPA Driven  Protocol, , Does not apply, PRN, Marsha Rosado MD  •  Phosphorus Replacement - Initiate Nurse / BPA Driven Protocol, , Does not apply, PRN, Marsha Rosado MD  •  potassium chloride (MICRO-K) CR capsule 40 mEq, 40 mEq, Oral, Q4H, Marsha Rosado MD, 40 mEq at 03/03/23 1032  •  Potassium Replacement - Initiate Nurse / BPA Driven Protocol, , Does not apply, PRN, Marsha Rosado MD  •  pregabalin (LYRICA) capsule 150 mg, 150 mg, Oral, BID, Charo De La Paz MD, 150 mg at 03/03/23 0859  •  rosuvastatin (CRESTOR) tablet 5 mg, 5 mg, Oral, Nightly, Charo De La Paz MD, 5 mg at 03/02/23 2013  •  sodium chloride 0.9 % flush 10 mL, 10 mL, Intravenous, Q12H, Charo De La Paz MD, 10 mL at 03/03/23 0912  •  sodium chloride 0.9 % flush 10 mL, 10 mL, Intravenous, PRN, Charo De La Paz MD  •  Sulfur Hexafluoride Microsph (LUMASON) 60.7-25 MG IV reconstituted suspension reconstituted suspension 3 mL, 3 mL, Intravenous, Once in imaging, Charo De La Paz MD  •  zonisamide (ZONEGRAN) capsule 100 mg, 100 mg, Oral, Daily, Jose Pardo, PharmD, 100 mg at 03/03/23 0906  •  zonisamide (ZONEGRAN) capsule 200 mg, 200 mg, Oral, Nightly, Jose Pardo, PharmD, 200 mg at 03/02/23 2012    Antibiotics:  Anti-Infectives (From admission, onward)    Ordered     Dose/Rate Route Frequency Start Stop    03/02/23 1422  cefTRIAXone (ROCEPHIN) 2 g/100 mL 0.9% NS IVPB (MBP)        Ordering Provider: Noah Contreras MD    2 g  over 30 Minutes Intravenous Every 24 Hours 03/02/23 1600 03/09/23 1559    03/01/23 1520  vancomycin IVPB 2000 mg in 0.9% Sodium Chloride (premix) 500 mL        Ordering Provider: Yung Hughes, PharmD    20 mg/kg × 102 kg Intravenous Once 03/01/23 1615 03/01/23 1640    03/01/23 1513  piperacillin-tazobactam (ZOSYN) 3.375 g in iso-osmotic dextrose 50 ml (premix)        Ordering Provider: Charo De La Paz MD    3.375 g  over 30 Minutes Intravenous Once 03/01/23 1600 03/01/23 1625            Review of  Systems:  See HPI  Physical Exam:   Vital Signs  Temp (24hrs), Av.9 °F (36.6 °C), Min:97.7 °F (36.5 °C), Max:98.3 °F (36.8 °C)    Temp  Min: 97.7 °F (36.5 °C)  Max: 98.3 °F (36.8 °C)  BP  Min: 106/63  Max: 155/100  Pulse  Min: 72  Max: 86  Resp  Min: 18  Max: 20  SpO2  Min: 92 %  Max: 100 %    GENERAL: Awake and alert, in no acute distress.   HEENT: Normocephalic, atraumatic.  PERRL. EOMI. No conjunctival injection. No icterus.  No external oral lesions    HEART: RRR; No murmur  LUNGS: Clear to auscultation bilaterally   ABDOMEN: Soft, nontender,   EXT:  No edema  :  Without Moralez catheter.  MSK: Right foot wound is dressed  SKIN: Warm and dry without cutaneous eruptions on Inspection/palpation.    NEURO: Oriented to PPT.  Motor 5/5 strength  PSYCHIATRIC: Normal insight and judgment. Cooperative with PE    Laboratory Data    Results from last 7 days   Lab Units 23  1555   WBC 10*3/mm3 7.22   HEMOGLOBIN g/dL 13.4   HEMATOCRIT % 40.8   PLATELETS 10*3/mm3 233     Results from last 7 days   Lab Units 23  0432   SODIUM mmol/L 143   POTASSIUM mmol/L 3.2*   CHLORIDE mmol/L 109*   CO2 mmol/L 28.0   BUN mg/dL 8   CREATININE mg/dL 0.75   GLUCOSE mg/dL 110*   CALCIUM mg/dL 8.3*     Results from last 7 days   Lab Units 23  1555   ALK PHOS U/L 213*   BILIRUBIN mg/dL 0.4   ALT (SGPT) U/L 39*   AST (SGOT) U/L 27     Results from last 7 days   Lab Units 23  1555   SED RATE mm/hr 16     Results from last 7 days   Lab Units 23  1555   CRP mg/dL 0.84*     Results from last 7 days   Lab Units 23  2219   LACTATE mmol/L 1.6     Results from last 7 days   Lab Units 23  1555   CK TOTAL U/L 48         Estimated Creatinine Clearance: 115.1 mL/min (by C-G formula based on SCr of 0.75 mg/dL).      Microbiology:  No results found for: ACANTHNAEG, AFBCX, BPERTUSSISCX, BLOODCX  No results found for: BCIDPCR, CXREFLEX, CSFCX, CULTURETIS  No results found for: CULTURES, HSVCX, URCX  No results found  for: EYECULTURE, GCCX, HSVCULTURE, LABHSV  No results found for: LEGIONELLA, MRSACX, MUMPSCX, MYCOPLASCX  No results found for: NOCARDIACX, STOOLCX  No results found for: THROATCX, UNSTIMCULT, URINECX, CULTURE, VZVCULTUR  No results found for: VIRALCULTU, WOUNDCX        Radiology:  Imaging Results (Last 72 Hours)     Procedure Component Value Units Date/Time    CT Outside Films [[id number]] Resulted: 03/03/23 1216     Updated: 03/03/23 1216    Narrative:      This procedure was auto-finalized with no dictation required.    MRI Foot Right With & Without Contrast [[id number]] Collected: 03/02/23 0807     Updated: 03/02/23 0851    Narrative:        MRI FOOT RIGHT W WO CONTRAST    Date of Exam: 3/2/2023 2:03 AM EST    Indication: Osteomyelitis.     Comparison: None available.    Technique:  Routine multiplanar/multisequence sequence images of the right foot were obtained before and after the uneventful administration of  20 mL Multihance.      Findings:   Mildly motion degraded examination.    Bones and joints: Marrow edema of the distal fifth metatarsal shaft and metatarsal head with associated periosteal edema. No definite fracture line is seen here. Small amount marrow edema and enhancement seen within the first, second and fourth distal   phalanges without T1 signal abnormality or evidence of erosion. There is flattening and collapse of the third metatarsal head most consistent with prior Freiberg's infraction. No substantial associated marrow edema. Mild scattered degenerative changes of   the forefoot most advanced the first metatarsophalangeal joint with minimal subchondral edema. No evidence of joint effusions.    Soft tissues: Soft tissue edema of the second and fourth toes and to a lesser extent the first toe. Additional soft tissue edema of the lateral mid and forefoot as well as the dorsal foot. Visualized flexor and extensor tendons appear grossly intact.   Disrupted appearance of the third plantar plate  likely secondary to Freiberg's infraction. Remaining plantar plates appear intact Fatty atrophy of the intrinsic muscles of the foot. No evidence of rim-enhancing soft tissue collection or enhancing soft   tissue mass. No definite evidence of intermetatarsal neuroma. Small amount of intermetatarsal bursal fluid at the first and second interspace.      Impression:      Impression:   Soft tissue edema of the second and fourth toes and to a lesser extent the first toe with minimal reactive marrow edema of the distal phalanges. No evidence of osseous erosions or T1 signal abnormality to suggest osteomyelitis.    Marrow and periosteal edema of the distal fifth metatarsal without fracture line suggestive of stress injury.    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 No evidence of associated marrow edema.    Electronically Signed: Familia Santos    3/2/2023 8:48 AM EST    Workstation ID: [id number]            Impression:   Right fourth toe gangrene  Right second toe gangrene  Diabetes mellitus type 2  Peripheral neuropathy  Vitamin B12 deficiency    L think history of Lyme disease recommends 5.  Relevant to patient's presentation    PLAN/RECOMMENDATIONS:   Thank you for asking us to see Bryanna Schwartz, I recommend the following:  MRIs been performed right foot which shows soft tissue edema of the second and fourth toes there is no bony erosion there is no description of osteomyelitis by the radiologist    After discussion with Dr. Krause there is more concerned that patient has a fracture;      Patient may require offloading and may need to see a foot orthopedist to decide best way to manage fracture to prevent worsening.    I do not suspect patient has osteomyelitis    Probably can change IV antibiotics to oral antibiotics very soon    Upon further improvement and work-up of cardiac symptoms for chronic systolic congestive heart failure patient could be  changed to oral antibiotics such as doxycycline for a 7 to 14-day course  Noah Contreras MD  3/3/2023  15:51 EST

## 2023-03-04 ENCOUNTER — APPOINTMENT (OUTPATIENT)
Dept: CARDIOLOGY | Facility: HOSPITAL | Age: 53
DRG: 300 | End: 2023-03-04
Payer: MEDICARE

## 2023-03-04 LAB
ANION GAP SERPL CALCULATED.3IONS-SCNC: 8 MMOL/L (ref 5–15)
BASOPHILS # BLD AUTO: 0.03 10*3/MM3 (ref 0–0.2)
BASOPHILS NFR BLD AUTO: 0.6 % (ref 0–1.5)
BH CV REST NUCLEAR ISOTOPE DOSE: 9.7 MCI
BH CV STRESS BP STAGE 2: NORMAL
BH CV STRESS BP STAGE 3: NORMAL
BH CV STRESS COMMENTS STAGE 1: NORMAL
BH CV STRESS DOSE REGADENOSON STAGE 1: 0.4
BH CV STRESS DURATION MIN STAGE 1: 1
BH CV STRESS DURATION MIN STAGE 2: 1
BH CV STRESS DURATION MIN STAGE 3: 1
BH CV STRESS DURATION MIN STAGE 4: 1
BH CV STRESS DURATION SEC STAGE 1: 0
BH CV STRESS DURATION SEC STAGE 2: 0
BH CV STRESS DURATION SEC STAGE 3: 0
BH CV STRESS DURATION SEC STAGE 4: 0
BH CV STRESS HR STAGE 1: 81
BH CV STRESS HR STAGE 2: 113
BH CV STRESS HR STAGE 3: 93
BH CV STRESS HR STAGE 4: 93
BH CV STRESS NUCLEAR ISOTOPE DOSE: 32.5 MCI
BH CV STRESS O2 STAGE 1: 94
BH CV STRESS O2 STAGE 2: 97
BH CV STRESS O2 STAGE 3: 96
BH CV STRESS O2 STAGE 4: 96
BH CV STRESS PROTOCOL 1: NORMAL
BH CV STRESS RECOVERY BP: NORMAL MMHG
BH CV STRESS RECOVERY HR: 83 BPM
BH CV STRESS RECOVERY O2: 95 %
BH CV STRESS STAGE 1: 1
BH CV STRESS STAGE 2: 2
BH CV STRESS STAGE 3: 3
BH CV STRESS STAGE 4: 4
BUN SERPL-MCNC: 4 MG/DL (ref 6–20)
BUN/CREAT SERPL: 6.1 (ref 7–25)
CALCIUM SPEC-SCNC: 8.3 MG/DL (ref 8.6–10.5)
CHLORIDE SERPL-SCNC: 106 MMOL/L (ref 98–107)
CO2 SERPL-SCNC: 27 MMOL/L (ref 22–29)
CREAT SERPL-MCNC: 0.66 MG/DL (ref 0.57–1)
DEPRECATED RDW RBC AUTO: 42.9 FL (ref 37–54)
EGFRCR SERPLBLD CKD-EPI 2021: 105.7 ML/MIN/1.73
EOSINOPHIL # BLD AUTO: 0.23 10*3/MM3 (ref 0–0.4)
EOSINOPHIL NFR BLD AUTO: 4.6 % (ref 0.3–6.2)
ERYTHROCYTE [DISTWIDTH] IN BLOOD BY AUTOMATED COUNT: 13.1 % (ref 12.3–15.4)
GLUCOSE BLDC GLUCOMTR-MCNC: 101 MG/DL (ref 70–130)
GLUCOSE BLDC GLUCOMTR-MCNC: 102 MG/DL (ref 70–130)
GLUCOSE BLDC GLUCOMTR-MCNC: 108 MG/DL (ref 70–130)
GLUCOSE BLDC GLUCOMTR-MCNC: 135 MG/DL (ref 70–130)
GLUCOSE SERPL-MCNC: 103 MG/DL (ref 65–99)
HCT VFR BLD AUTO: 37.2 % (ref 34–46.6)
HGB BLD-MCNC: 12.1 G/DL (ref 12–15.9)
IMM GRANULOCYTES # BLD AUTO: 0.05 10*3/MM3 (ref 0–0.05)
IMM GRANULOCYTES NFR BLD AUTO: 1 % (ref 0–0.5)
LV EF NUC BP: 54 %
LYMPHOCYTES # BLD AUTO: 1.98 10*3/MM3 (ref 0.7–3.1)
LYMPHOCYTES NFR BLD AUTO: 39.5 % (ref 19.6–45.3)
MAXIMAL PREDICTED HEART RATE: 168 BPM
MCH RBC QN AUTO: 29.3 PG (ref 26.6–33)
MCHC RBC AUTO-ENTMCNC: 32.5 G/DL (ref 31.5–35.7)
MCV RBC AUTO: 90.1 FL (ref 79–97)
MONOCYTES # BLD AUTO: 0.34 10*3/MM3 (ref 0.1–0.9)
MONOCYTES NFR BLD AUTO: 6.8 % (ref 5–12)
NEUTROPHILS NFR BLD AUTO: 2.38 10*3/MM3 (ref 1.7–7)
NEUTROPHILS NFR BLD AUTO: 47.5 % (ref 42.7–76)
NRBC BLD AUTO-RTO: 0 /100 WBC (ref 0–0.2)
PERCENT MAX PREDICTED HR: 66.67 %
PLATELET # BLD AUTO: 229 10*3/MM3 (ref 140–450)
PMV BLD AUTO: 9.7 FL (ref 6–12)
POTASSIUM SERPL-SCNC: 3.6 MMOL/L (ref 3.5–5.2)
POTASSIUM SERPL-SCNC: 4.3 MMOL/L (ref 3.5–5.2)
RBC # BLD AUTO: 4.13 10*6/MM3 (ref 3.77–5.28)
SODIUM SERPL-SCNC: 141 MMOL/L (ref 136–145)
STRESS BASELINE BP: NORMAL MMHG
STRESS BASELINE HR: 75 BPM
STRESS O2 SAT REST: 94 %
STRESS PERCENT HR: 78 %
STRESS POST ESTIMATED WORKLOAD: 1 METS
STRESS POST EXERCISE DUR MIN: 4 MIN
STRESS POST EXERCISE DUR SEC: 0 SEC
STRESS POST O2 SAT PEAK: 97 %
STRESS POST PEAK BP: NORMAL MMHG
STRESS POST PEAK HR: 112 BPM
STRESS TARGET HR: 143 BPM
WBC NRBC COR # BLD: 5.01 10*3/MM3 (ref 3.4–10.8)

## 2023-03-04 PROCEDURE — 25010000002 CEFTRIAXONE PER 250 MG: Performed by: INTERNAL MEDICINE

## 2023-03-04 PROCEDURE — 0 TECHNETIUM SESTAMIBI

## 2023-03-04 PROCEDURE — 78452 HT MUSCLE IMAGE SPECT MULT: CPT

## 2023-03-04 PROCEDURE — 25010000002 REGADENOSON 0.4 MG/5ML SOLUTION

## 2023-03-04 PROCEDURE — 25010000002 HEPARIN (PORCINE) PER 1000 UNITS: Performed by: INTERNAL MEDICINE

## 2023-03-04 PROCEDURE — 84132 ASSAY OF SERUM POTASSIUM: CPT | Performed by: FAMILY MEDICINE

## 2023-03-04 PROCEDURE — 93017 CV STRESS TEST TRACING ONLY: CPT

## 2023-03-04 PROCEDURE — 80048 BASIC METABOLIC PNL TOTAL CA: CPT | Performed by: FAMILY MEDICINE

## 2023-03-04 PROCEDURE — A9500 TC99M SESTAMIBI: HCPCS

## 2023-03-04 PROCEDURE — 99231 SBSQ HOSP IP/OBS SF/LOW 25: CPT | Performed by: THORACIC SURGERY (CARDIOTHORACIC VASCULAR SURGERY)

## 2023-03-04 PROCEDURE — 82962 GLUCOSE BLOOD TEST: CPT

## 2023-03-04 PROCEDURE — 85025 COMPLETE CBC W/AUTO DIFF WBC: CPT | Performed by: FAMILY MEDICINE

## 2023-03-04 PROCEDURE — 99232 SBSQ HOSP IP/OBS MODERATE 35: CPT | Performed by: FAMILY MEDICINE

## 2023-03-04 RX ORDER — POTASSIUM CHLORIDE 750 MG/1
40 CAPSULE, EXTENDED RELEASE ORAL EVERY 4 HOURS
Status: COMPLETED | OUTPATIENT
Start: 2023-03-04 | End: 2023-03-04

## 2023-03-04 RX ADMIN — PREGABALIN 150 MG: 75 CAPSULE ORAL at 22:03

## 2023-03-04 RX ADMIN — ROSUVASTATIN CALCIUM 5 MG: 10 TABLET, COATED ORAL at 22:04

## 2023-03-04 RX ADMIN — SENNOSIDES AND DOCUSATE SODIUM 2 TABLET: 8.6; 5 TABLET ORAL at 09:57

## 2023-03-04 RX ADMIN — TECHNETIUM TC 99M SESTAMIBI 1 DOSE: 1 INJECTION INTRAVENOUS at 12:27

## 2023-03-04 RX ADMIN — Medication 10 ML: at 09:59

## 2023-03-04 RX ADMIN — LEVOTHYROXINE SODIUM 25 MCG: 25 TABLET ORAL at 05:31

## 2023-03-04 RX ADMIN — REGADENOSON 0.4 MG: 0.08 INJECTION, SOLUTION INTRAVENOUS at 12:27

## 2023-03-04 RX ADMIN — POTASSIUM CHLORIDE 40 MEQ: 10 CAPSULE, COATED, EXTENDED RELEASE ORAL at 09:57

## 2023-03-04 RX ADMIN — ZONISAMIDE 100 MG: 100 CAPSULE ORAL at 15:01

## 2023-03-04 RX ADMIN — PREGABALIN 150 MG: 75 CAPSULE ORAL at 09:57

## 2023-03-04 RX ADMIN — CLOPIDOGREL BISULFATE 75 MG: 75 TABLET ORAL at 09:57

## 2023-03-04 RX ADMIN — CEFTRIAXONE 2 G: 2 INJECTION, POWDER, FOR SOLUTION INTRAMUSCULAR; INTRAVENOUS at 13:45

## 2023-03-04 RX ADMIN — HEPARIN SODIUM 5000 UNITS: 5000 INJECTION INTRAVENOUS; SUBCUTANEOUS at 22:03

## 2023-03-04 RX ADMIN — TECHNETIUM TC 99M SESTAMIBI 1 DOSE: 1 INJECTION INTRAVENOUS at 11:10

## 2023-03-04 RX ADMIN — Medication 10 ML: at 22:04

## 2023-03-04 RX ADMIN — POTASSIUM CHLORIDE 40 MEQ: 10 CAPSULE, COATED, EXTENDED RELEASE ORAL at 13:45

## 2023-03-04 RX ADMIN — ATENOLOL 25 MG: 25 TABLET ORAL at 09:57

## 2023-03-04 NOTE — PROGRESS NOTES
"Cardiothoracic Surgery Progress Note      POD #:     LOS: 3 days      Subjective: Stress test planned for today per cardiology.  I discussed patient's overall situation Dr. Noah Contreras of infectious disease yesterday.  She has no imaging evidence or bedside evaluation evidence of any peripheral vascular disease to the lower extremities.  She has strong palpable pedal pulses and a CT angiogram which is normal.  She does have bony abnormalities of the right foot by MRI criteria and a classic Freiberg dis fraction of the metatarsal head of the right second toe.  Based on the medical literature, Freiberg dis fractures are secondary most likely to trauma.  Objective:  Vital Signs vital signs below noted Tmax past 24 hours 98.3 °F  Temp:  [97.7 °F (36.5 °C)-98.3 °F (36.8 °C)] 98.3 °F (36.8 °C)  Heart Rate:  [] 106  Resp:  [18-20] 20  BP: (115-156)/() 156/65    Physical Exam:   General Appearance:    Lungs:   Heart:   Skin:   Incision:     Results: Laboratory results below noted.    Results from last 7 days   Lab Units 03/04/23  0539   WBC 10*3/mm3 5.01   HEMOGLOBIN g/dL 12.1   HEMATOCRIT % 37.2   PLATELETS 10*3/mm3 229     Results from last 7 days   Lab Units 03/04/23  0539   SODIUM mmol/L 141   POTASSIUM mmol/L 3.6   CHLORIDE mmol/L 106   CO2 mmol/L 27.0   BUN mg/dL 4*   CREATININE mg/dL 0.66   GLUCOSE mg/dL 103*   CALCIUM mg/dL 8.3*         Assessment: #1.  Ulceration of the right fourth toe distal tuft and right second toe distal tuft of unclear etiology.  No clinical signs or imaging evidence of significant peripheral vascular disease as a source for these toe ulcerations.  2.  Fracture of the second metatarsal head right foot i.e. Freiberg's fractures  #3.  Morbid obesity  #4.  Chest pain of unclear etiology  5.  History of Lyme's disease and [place] spotted fever in 2018 resulting in severe weakness of lower extremities.  Patient had to go to [facility] to \"retrain to walk again\" in 2018  6.  " MRI brain showing mild atrophy  Plan: Cardiology work-up for possible ischemic coronary artery disease.  Overall medical manage per hospitalist.  Antibiotics per infectious disease.  Patient is to wear a toe unloading shoe on the right foot for treatment of her second metatarsal head fracture as well as the toe ulcerations.      Michael Krause MD - 03/04/23 - 07:24 EST

## 2023-03-04 NOTE — PROGRESS NOTES
INFECTIOUS DISEASE follow-up    Bryanna Schwartz  1970  [id number]    Date of Consult: 3/4/2023    Admission Date: 3/1/2023      Requesting Provider: Sadaf Torrez MD  Evaluating Physician: Noah Contreras MD    Reason for Consultation: foot cellulitis    History of present illness:    Patient is a 52 y.o. female with arthritis, depression, diabetes mellitus type 2, GERD, hyperlipidemia, hypertension, vitamin B12 deficiency presents to [facility] with redness and pain of toes of bilateral extremities patient reports 2-week history of redness swelling pain.  Patient believes that she has had Lyme disease and [place] spotted fever historically patient may have a chronic neuropathy.  Patient has been admitted to hospital for peripheral vascular disease work-up.    Patient denies fevers or chills.    Patient states that her diabetes mellitus is improving and patient recently went off her oral antihyperglycemic agents    Patient was referred to vascular surgery for gangrenous changes of right fourth toe and right second toe.    3/3/2023 afebrile normotensive no events overnight no complaints    3/4/23; no events overnight; no fever, rash, sore throat tolerating abx  Past Medical History:   Diagnosis Date   • Arthritis    • Depression    • Diabetes (HCC)    • Environmental allergies    • Falls 10/24/2018   • GERD (gastroesophageal reflux disease)    • Hyperlipidemia    • Hypertension    • Hypothyroidism    • Lyme disease    • Migraine    • [place] spotted fever    • Vitamin B 12 deficiency        Past Surgical History:   Procedure Laterality Date   • ACHILLES TENDON SURGERY Right    • BREAST CYST EXCISION Left    • CHOLECYSTECTOMY     • ENDOSCOPY N/A 10/16/2018    Procedure: ESOPHAGOGASTRODUODENOSCOPY;  Surgeon: Brunner, Mark I, MD;  Location: [facility] ENDOSCOPY;  Service: Gastroenterology   • HYSTERECTOMY         Family History   Problem Relation Age of Onset   • Hypertension Mother     • COPD Mother    • Heart attack Mother    • Other Father         / of exhaust fumes       Social History     Socioeconomic History   • Marital status:    • Number of children: 2   Tobacco Use   • Smoking status: Never   • Smokeless tobacco: Never   Vaping Use   • Vaping Use: Never used   Substance and Sexual Activity   • Alcohol use: No   • Drug use: No   • Sexual activity: Defer     Partners: Male       Allergies   Allergen Reactions   • Gabapentin Hives         Medication:    Current Facility-Administered Medications:   •  acetaminophen (TYLENOL) tablet 650 mg, 650 mg, Oral, Q6H PRN, Charo De La Paz MD  •  atenolol (TENORMIN) tablet 25 mg, 25 mg, Oral, Daily, Charo De La Paz MD, 25 mg at 23 0957  •  sennosides-docusate (PERICOLACE) 8.6-50 MG per tablet 2 tablet, 2 tablet, Oral, BID, 2 tablet at 23 0957 **AND** polyethylene glycol (MIRALAX) packet 17 g, 17 g, Oral, Daily PRN **AND** bisacodyl (DULCOLAX) EC tablet 5 mg, 5 mg, Oral, Daily PRN **AND** bisacodyl (DULCOLAX) suppository 10 mg, 10 mg, Rectal, Daily PRN, Emily Ordoñez APRN  •  Calcium Replacement - Initiate Nurse / BPA Driven Protocol, , Does not apply, PRN, Marsha Rosado MD  •  cefTRIAXone (ROCEPHIN) 2 g/100 mL 0.9% NS IVPB (MBP), 2 g, Intravenous, Q24H, Noah Contreras MD, 2 g at 23 1345  •  heparin (porcine) 5000 UNIT/ML injection 5,000 Units, 5,000 Units, Subcutaneous, Q12H, Charo De La Paz MD, 5,000 Units at 23  •  HYDROcodone-acetaminophen (NORCO) 5-325 MG per tablet 1 tablet, 1 tablet, Oral, Q6H PRN, Marsha Rosado MD, 1 tablet at 23  •  Insulin Lispro (humaLOG) injection 0-7 Units, 0-7 Units, Subcutaneous, TID With Meals, Charo De La Paz MD  •  levothyroxine (SYNTHROID, LEVOTHROID) tablet 25 mcg, 25 mcg, Oral, Q AM, Charo De La Paz MD, 25 mcg at 23 0531  •  Magnesium Standard Dose Replacement - Initiate Nurse / BPA Driven Protocol, , Does not apply, PRN,  Marsha Rosado MD  •  ondansetron (ZOFRAN) injection 4 mg, 4 mg, Intravenous, Q6H PRN, Marsha Rosado MD, 4 mg at 03/03/23 1738  •  Phosphorus Replacement - Initiate Nurse / BPA Driven Protocol, , Does not apply, PRN, Marsha Rosado MD  •  Potassium Replacement - Initiate Nurse / BPA Driven Protocol, , Does not apply, PRN, Marsha Rosado MD  •  pregabalin (LYRICA) capsule 150 mg, 150 mg, Oral, BID, Charo De La Paz MD, 150 mg at 03/04/23 0957  •  rosuvastatin (CRESTOR) tablet 5 mg, 5 mg, Oral, Nightly, Charo De La Paz MD, 5 mg at 03/03/23 2004  •  sodium chloride 0.9 % flush 10 mL, 10 mL, Intravenous, Q12H, Charo De La Paz MD, 10 mL at 03/04/23 0959  •  sodium chloride 0.9 % flush 10 mL, 10 mL, Intravenous, PRN, Charo De La Paz MD  •  Sulfur Hexafluoride Microsph (LUMASON) 60.7-25 MG IV reconstituted suspension reconstituted suspension 3 mL, 3 mL, Intravenous, Once in imaging, Charo De La Paz MD  •  zonisamide (ZONEGRAN) capsule 100 mg, 100 mg, Oral, Daily, Jose Pardo, PharmD, 100 mg at 03/04/23 1501  •  zonisamide (ZONEGRAN) capsule 200 mg, 200 mg, Oral, Nightly, Jose Pardo, PharmD, 200 mg at 03/03/23 2257    Antibiotics:  Anti-Infectives (From admission, onward)    Ordered     Dose/Rate Route Frequency Start Stop    03/02/23 1422  cefTRIAXone (ROCEPHIN) 2 g/100 mL 0.9% NS IVPB (MBP)        Ordering Provider: Noah Contreras MD    2 g  over 30 Minutes Intravenous Every 24 Hours 03/02/23 1600 03/09/23 1559    03/01/23 1520  vancomycin IVPB 2000 mg in 0.9% Sodium Chloride (premix) 500 mL        Ordering Provider: Yung Hughes, PharmD    20 mg/kg × 102 kg Intravenous Once 03/01/23 1615 03/01/23 1640    03/01/23 1513  piperacillin-tazobactam (ZOSYN) 3.375 g in iso-osmotic dextrose 50 ml (premix)        Ordering Provider: Charo De La Paz MD    3.375 g  over 30 Minutes Intravenous Once 03/01/23 1600 03/01/23 1625            Review of Systems:  See HPI  Physical Exam:   Vital  Signs  Temp (24hrs), Av.3 °F (36.8 °C), Min:97.8 °F (36.6 °C), Max:98.7 °F (37.1 °C)    Temp  Min: 97.8 °F (36.6 °C)  Max: 98.7 °F (37.1 °C)  BP  Min: 110/75  Max: 156/65  Pulse  Min: 75  Max: 106  Resp  Min: 18  Max: 20  SpO2  Min: 92 %  Max: 98 %    GENERAL: Awake and alert, in no acute distress.   HEENT: Normocephalic, atraumatic.  PERRL. EOMI. No conjunctival injection. No icterus.  No external oral lesions    HEART: RRR; No murmur  LUNGS: Clear to auscultation bilaterally   ABDOMEN: Soft, nontender,   EXT:  No edema  :  Without Moralez catheter.  MSK: Right foot wound 4th toe with gangrene on tip of toe  SKIN: Warm and dry without cutaneous eruptions on Inspection/palpation.    NEURO: Oriented to PPT.  Motor 5/5 strength  PSYCHIATRIC: Normal insight and judgment. Cooperative with PE    Laboratory Data    Results from last 7 days   Lab Units 23  0539 23  1555   WBC 10*3/mm3 5.01 7.22   HEMOGLOBIN g/dL 12.1 13.4   HEMATOCRIT % 37.2 40.8   PLATELETS 10*3/mm3 229 233     Results from last 7 days   Lab Units 23  1523 23  0539   SODIUM mmol/L  --  141   POTASSIUM mmol/L 4.3 3.6   CHLORIDE mmol/L  --  106   CO2 mmol/L  --  27.0   BUN mg/dL  --  4*   CREATININE mg/dL  --  0.66   GLUCOSE mg/dL  --  103*   CALCIUM mg/dL  --  8.3*     Results from last 7 days   Lab Units 23  1555   ALK PHOS U/L 213*   BILIRUBIN mg/dL 0.4   ALT (SGPT) U/L 39*   AST (SGOT) U/L 27     Results from last 7 days   Lab Units 23  1555   SED RATE mm/hr 16     Results from last 7 days   Lab Units 23  1555   CRP mg/dL 0.84*     Results from last 7 days   Lab Units 23  2219   LACTATE mmol/L 1.6     Results from last 7 days   Lab Units 23  1555   CK TOTAL U/L 48         Estimated Creatinine Clearance: 130.8 mL/min (by C-G formula based on SCr of 0.66 mg/dL).      Microbiology:  No results found for: ACANTHNAEG, AFBCX, BPERTUSSISCX, BLOODCX  No results found for: BCIDPCR, CXREFLEX, CSFCX,  CULTURETIS  No results found for: CULTURES, HSVCX, URCX  No results found for: EYECULTURE, GCCX, HSVCULTURE, LABHSV  No results found for: LEGIONELLA, MRSACX, MUMPSCX, MYCOPLASCX  No results found for: NOCARDIACX, STOOLCX  No results found for: THROATCX, UNSTIMCULT, URINECX, CULTURE, VZVCULTUR  No results found for: VIRALCULTU, WOUNDCX        Radiology:  Imaging Results (Last 72 Hours)     Procedure Component Value Units Date/Time    CT Outside Films [[id number]] Resulted: 03/03/23 1216     Updated: 03/03/23 1216    Narrative:      This procedure was auto-finalized with no dictation required.    MRI Foot Right With & Without Contrast [[id number]] Collected: 03/02/23 0807     Updated: 03/02/23 0851    Narrative:        MRI FOOT RIGHT W WO CONTRAST    Date of Exam: 3/2/2023 2:03 AM EST    Indication: Osteomyelitis.     Comparison: None available.    Technique:  Routine multiplanar/multisequence sequence images of the right foot were obtained before and after the uneventful administration of  20 mL Multihance.      Findings:   Mildly motion degraded examination.    Bones and joints: Marrow edema of the distal fifth metatarsal shaft and metatarsal head with associated periosteal edema. No definite fracture line is seen here. Small amount marrow edema and enhancement seen within the first, second and fourth distal   phalanges without T1 signal abnormality or evidence of erosion. There is flattening and collapse of the third metatarsal head most consistent with prior Freiberg's infraction. No substantial associated marrow edema. Mild scattered degenerative changes of   the forefoot most advanced the first metatarsophalangeal joint with minimal subchondral edema. No evidence of joint effusions.    Soft tissues: Soft tissue edema of the second and fourth toes and to a lesser extent the first toe. Additional soft tissue edema of the lateral mid and forefoot as well as the dorsal foot. Visualized flexor and extensor tendons  appear grossly intact.   Disrupted appearance of the third plantar plate likely secondary to Freiberg's infraction. Remaining plantar plates appear intact Fatty atrophy of the intrinsic muscles of the foot. No evidence of rim-enhancing soft tissue collection or enhancing soft   tissue mass. No definite evidence of intermetatarsal neuroma. Small amount of intermetatarsal bursal fluid at the first and second interspace.      Impression:      Impression:   Soft tissue edema of the second and fourth toes and to a lesser extent the first toe with minimal reactive marrow edema of the distal phalanges. No evidence of osseous erosions or T1 signal abnormality to suggest osteomyelitis.    Marrow and periosteal edema of the distal fifth metatarsal without fracture line suggestive of stress injury.    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 No evidence of associated marrow edema.    Electronically Signed: Familia Santos    3/2/2023 8:48 AM EST    Workstation ID: [id number]            Impression:   Right fourth toe gangrene  Right second toe gangrene  Diabetes mellitus type 2  Peripheral neuropathy  Vitamin B12 deficiency    L think history of Lyme disease recommends 5.  Relevant to patient's presentation    PLAN/RECOMMENDATIONS:   Thank you for asking us to see Bryanna Schwartz, I recommend the following:      After discussion with Dr. Krause there is more concerned that patient has a fracture;      Patient may require offloading and may need to see a foot orthopedist to decide best way to manage fracture to prevent worsening.    I do not suspect patient has osteomyelitis    Probably can change IV antibiotics to oral antibiotics very soon    Upon improvement, iv abx can be changed to oral antibiotics such as doxycycline for a 7 to 14-day course    Noah Contreras MD  3/4/2023  17:20 EST

## 2023-03-04 NOTE — PROGRESS NOTES
[facility] Medicine Services  PROGRESS NOTE    Patient Name: Bryanna Schwartz  : 1970  MRN: [id number]    Date of Admission: 3/1/2023  Primary Care Physician: Baljeet Manley APRN    Subjective   Subjective     CC:  Foot ulcers    HPI:  3/2 - Patient is a 52-year-old diabetic with peripheral neuropathy who presented for evaluation of foot ulcers on her toes.  She was seen by Dr. Krause as well as Dr. Contreras.  Imaging does not seem very suspicious for osteomyelitis.  Work-up is currently ongoing.  She is able to tolerate p.o. and ambulate but does have significant neuropathy.    3/3 -patient was evaluated by cardiology today at the request of Dr. Krause.  They are planning a stress test tomorrow and likely NESSA on Monday.  Investigation for etiology of possible embolic infarcts to the distal toes.  Her transthoracic echo did reveal a reduced ejection fraction 41%.  She states the Norco has helped her pain.  She has pain at rest and with walking.  We discussed continued work-up for arterial insufficiency as well and I have ordered arterial Dopplers for bilateral lower extremities.    3/4-reviewed results of ROSA lower extremities with patient that were essentially normal.  Discontinued Plavix as such.  She does think her pain is improved today compared to yesterday.  Reviewed notes from Dr. Krause.  Cardiology planning stress test and NESSA on Monday.  Patient was tolerating p.o. well, n.p.o. for stress test.    ROS:  Gen- No fevers, chills  CV- No chest pain, palpitations  Resp- No cough, dyspnea  GI- No N/V/D, abd pain         Objective   Objective     Vital Signs:   Temp:  [97.8 °F (36.6 °C)-98.7 °F (37.1 °C)] 98.7 °F (37.1 °C)  Heart Rate:  [] 90  Resp:  [18-20] 18  BP: (128-156)/() 134/71  Flow (L/min):  [2] 2     Physical Exam:  Constitutional: No acute distress, awake, alert  HENT: NCAT, mucous membranes moist  Respiratory: Clear to auscultation bilaterally, respiratory  effort normal   Cardiovascular: RRR  Gastrointestinal: Positive bowel sounds, soft, nontender, nondistended  Musculoskeletal: No bilateral ankle edema  Psychiatric: Appropriate affect, cooperative  Neurologic: Oriented x 3, strength symmetric in all extremities, Cranial Nerves grossly intact to confrontation, speech clear  Skin: Right foot with 2 small black eschar under the second and fourth toenails without drainage or erythema      Results Reviewed:  LAB RESULTS:      Lab 03/04/23  0539 03/01/23  2219 03/01/23  1808 03/01/23  1555   WBC 5.01  --   --  7.22   HEMOGLOBIN 12.1  --   --  13.4   HEMATOCRIT 37.2  --   --  40.8   PLATELETS 229  --   --  233   NEUTROS ABS 2.38  --   --  4.48   IMMATURE GRANS (ABS) 0.05  --   --  0.04   LYMPHS ABS 1.98  --   --  1.94   MONOS ABS 0.34  --   --  0.51   EOS ABS 0.23  --   --  0.22   MCV 90.1  --   --  92.3   SED RATE  --   --   --  16   CRP  --   --   --  0.84*   PROCALCITONIN  --   --   --  0.07   LACTATE  --  1.6 2.2*  --    LDH  --   --   --  248*   D DIMER QUANT  --   --   --  0.44         Lab 03/04/23  0539 03/03/23 2032 03/03/23  0432 03/01/23  1555   SODIUM 141  --  143 139   POTASSIUM 3.6 4.3 3.2* 4.2   CHLORIDE 106  --  109* 104   CO2 27.0  --  28.0 23.0   ANION GAP 8.0  --  6.0 12.0   BUN 4*  --  8 11   CREATININE 0.66  --  0.75 0.67   EGFR 105.7  --  95.9 105.3   GLUCOSE 103*  --  110* 118*   CALCIUM 8.3*  --  8.3* 8.9   HEMOGLOBIN A1C  --   --   --  5.20         Lab 03/01/23  1555   TOTAL PROTEIN 6.0   ALBUMIN 3.1*   GLOBULIN 2.9   ALT (SGPT) 39*   AST (SGOT) 27   BILIRUBIN 0.4   ALK PHOS 213*         Lab 03/01/23  1555   PROBNP 640.5                 Brief Urine Lab Results  (Last result in the past 365 days)      Color   Clarity   Blood   Leuk Est   Nitrite   Protein   CREAT   Urine HCG        03/02/23 0400 Yellow   Turbid   Negative   Negative   Negative   Negative                 Microbiology Results Abnormal     Procedure Component Value - Date/Time    Blood  Culture - Blood, Hand, Left [[id number]]  (Normal) Collected: 03/01/23 1556    Lab Status: Preliminary result Specimen: Blood from Hand, Left Updated: 03/03/23 1645     Blood Culture No growth at 2 days    Narrative:      Aerobic bottle only      Blood Culture - Blood, Arm, Left [[id number]]  (Normal) Collected: 03/01/23 1556    Lab Status: Preliminary result Specimen: Blood from Arm, Left Updated: 03/03/23 1645     Blood Culture No growth at 2 days    Narrative:      Aerobic bottle only      MRSA Screen, PCR (Inpatient) - Swab, Nares [[id number]]  (Normal) Collected: 03/01/23 1539    Lab Status: Final result Specimen: Swab from Nares Updated: 03/01/23 1704     MRSA PCR Negative    Narrative:      The negative predictive value of this diagnostic test is high and should only be used to consider de-escalating anti-MRSA therapy. A positive result may indicate colonization with MRSA and must be correlated clinically.  MRSA Negative          Doppler Arterial Multi Level Lower Extremity - Bilateral CAR    Result Date: 3/3/2023  •  Right lower extremity: Normal right ROSA at 1.14.  Multiphasic waveforms are seen throughout. •  Left lower extremity: Normal left ROSA at 1.14.  Multiphasic waveforms are seen throughout. •  No evidence of significant lower extremity arterial occlusive disease in the right or left legs.     CT Outside Films    Result Date: 3/3/2023  This procedure was auto-finalized with no dictation required.      Results for orders placed during the hospital encounter of 03/01/23    Adult Transthoracic Echo Complete W/ Cont if Necessary Per Protocol    Interpretation Summary  •  Left ventricular ejection fraction appears to be 41 - 45%.  •  The cardiac valves are anatomically and functionally normal.  •  Saline test results are negative for right to left atrial level shunt.      Current medications:  Scheduled Meds:atenolol, 25 mg, Oral, Daily  cefTRIAXone, 2 g, Intravenous, Q24H  heparin (porcine), 5,000 Units,  Subcutaneous, Q12H  insulin lispro, 0-7 Units, Subcutaneous, TID With Meals  levothyroxine, 25 mcg, Oral, Q AM  pregabalin, 150 mg, Oral, BID  rosuvastatin, 5 mg, Oral, Nightly  senna-docusate sodium, 2 tablet, Oral, BID  sodium chloride, 10 mL, Intravenous, Q12H  Sulfur Hexafluoride Microsph, 3 mL, Intravenous, Once in imaging  zonisamide, 100 mg, Oral, Daily  zonisamide, 200 mg, Oral, Nightly      Continuous Infusions:   PRN Meds:.•  acetaminophen  •  senna-docusate sodium **AND** polyethylene glycol **AND** bisacodyl **AND** bisacodyl  •  Calcium Replacement - Initiate Nurse / BPA Driven Protocol  •  HYDROcodone-acetaminophen  •  Magnesium Standard Dose Replacement - Initiate Nurse / BPA Driven Protocol  •  ondansetron  •  Phosphorus Replacement - Initiate Nurse / BPA Driven Protocol  •  Potassium Replacement - Initiate Nurse / BPA Driven Protocol  •  sodium chloride    Assessment & Plan   Assessment & Plan     Active Hospital Problems    Diagnosis  POA   • **Gangrene (HCC) [I96]  Yes   • Chronic systolic CHF (congestive heart failure) (HCC) [I50.22]  Yes   • 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        Brief Hospital Course to date:  Bryanna Schwartz is a 52 y.o. female with past medical history of diabetes mellitus with neuropathy, hypothyroidism, hypertension, anxiety depression, history of Lyme disease and [place] spotted fever who presented with gangrene on her right second and fourth toes just under the nail.    Gangrene of the right second and fourth toes  Concern for peripheral vascular disease  Concern for suspected embolic infarct  -Dr. Krause evaluated  -Also seen by Dr. Contreras with infectious disease  -Work-up ongoing but imaging not  concerning for osteomyelitis  -Continue ceftriaxone per infectious disease recommendations  -Cardiology consult to evaluate for potential etiologies of embolic infarct, planning stress test on 3/4/2023 and NESSA on 3/6/2023  -Added Norco for pain  -Arterial Dopplers bilateral lower extremities negative    Chronic systolic congestive heart failure  - EF of 41%  -Cardiology has been consulted  -Planning stress test Saturday and NESSA on Monday    Diabetes mellitus with neuropathy  -Continue sliding scale insulin as needed  -Fingersticks ACHS    Hypothyroidism  -Continue Synthroid    History of vitamin B12 deficiency  -Last B12 level in June 2022 was 397    Hypertension  -Continue atenolol    Anxiety/depression  Altered mental status  -Holding home meds Neurontin Lyrica and Cymbalta    History of Lyme disease  History of [place] spotted fever  -Complicates care and diagnostic work-up for etiology of the gangrene of her toes    Expected Discharge Location and Transportation: Home, private car  Expected Discharge 3/10/2023  Expected Discharge Date and Time     Expected Discharge Date Expected Discharge Time    Mar 10, 2023            DVT prophylaxis:  Medical DVT prophylaxis orders are present.     AM-PAC 6 Clicks Score (PT): 18 (03/03/23 2005)    CODE STATUS:   Code Status and Medical Interventions:   Ordered at: 03/01/23 1513     Code Status (Patient has no pulse and is not breathing):    CPR (Attempt to Resuscitate)     Medical Interventions (Patient has pulse or is breathing):    Full Support       Marsha Rosado MD  03/04/23

## 2023-03-04 NOTE — PLAN OF CARE
Goal Outcome Evaluation:  Plan of Care Reviewed With: patient        Progress: no change  Outcome Evaluation: Patient had a good shift. rested well. vitals remained stable. dressing to right foot clean/dry/intact. prn pain medication given x1 with good results noted. patient npo at midnight for stress test today - patient verbalized understanding. will monitor for changes and continue current plan of care. will update oncoming nurse.

## 2023-03-05 PROBLEM — M84.374A METATARSAL STRESS FRACTURE OF RIGHT FOOT: Status: ACTIVE | Noted: 2023-03-05

## 2023-03-05 PROBLEM — M84.375A METATARSAL STRESS FRACTURE OF LEFT FOOT: Status: ACTIVE | Noted: 2023-03-05

## 2023-03-05 LAB
ANION GAP SERPL CALCULATED.3IONS-SCNC: 11 MMOL/L (ref 5–15)
BASOPHILS # BLD AUTO: 0.03 10*3/MM3 (ref 0–0.2)
BASOPHILS NFR BLD AUTO: 0.5 % (ref 0–1.5)
BUN SERPL-MCNC: 4 MG/DL (ref 6–20)
BUN/CREAT SERPL: 5.3 (ref 7–25)
CALCIUM SPEC-SCNC: 8.9 MG/DL (ref 8.6–10.5)
CHLORIDE SERPL-SCNC: 106 MMOL/L (ref 98–107)
CO2 SERPL-SCNC: 24 MMOL/L (ref 22–29)
CREAT SERPL-MCNC: 0.76 MG/DL (ref 0.57–1)
DEPRECATED RDW RBC AUTO: 43 FL (ref 37–54)
EGFRCR SERPLBLD CKD-EPI 2021: 94.4 ML/MIN/1.73
EOSINOPHIL # BLD AUTO: 0.19 10*3/MM3 (ref 0–0.4)
EOSINOPHIL NFR BLD AUTO: 2.9 % (ref 0.3–6.2)
ERYTHROCYTE [DISTWIDTH] IN BLOOD BY AUTOMATED COUNT: 13 % (ref 12.3–15.4)
GLUCOSE BLDC GLUCOMTR-MCNC: 107 MG/DL (ref 70–130)
GLUCOSE BLDC GLUCOMTR-MCNC: 132 MG/DL (ref 70–130)
GLUCOSE BLDC GLUCOMTR-MCNC: 141 MG/DL (ref 70–130)
GLUCOSE BLDC GLUCOMTR-MCNC: 156 MG/DL (ref 70–130)
GLUCOSE SERPL-MCNC: 108 MG/DL (ref 65–99)
HCT VFR BLD AUTO: 39.8 % (ref 34–46.6)
HGB BLD-MCNC: 12.8 G/DL (ref 12–15.9)
IMM GRANULOCYTES # BLD AUTO: 0.04 10*3/MM3 (ref 0–0.05)
IMM GRANULOCYTES NFR BLD AUTO: 0.6 % (ref 0–0.5)
LYMPHOCYTES # BLD AUTO: 1.83 10*3/MM3 (ref 0.7–3.1)
LYMPHOCYTES NFR BLD AUTO: 28.3 % (ref 19.6–45.3)
MCH RBC QN AUTO: 29 PG (ref 26.6–33)
MCHC RBC AUTO-ENTMCNC: 32.2 G/DL (ref 31.5–35.7)
MCV RBC AUTO: 90.2 FL (ref 79–97)
MONOCYTES # BLD AUTO: 0.46 10*3/MM3 (ref 0.1–0.9)
MONOCYTES NFR BLD AUTO: 7.1 % (ref 5–12)
NEUTROPHILS NFR BLD AUTO: 3.91 10*3/MM3 (ref 1.7–7)
NEUTROPHILS NFR BLD AUTO: 60.6 % (ref 42.7–76)
NRBC BLD AUTO-RTO: 0 /100 WBC (ref 0–0.2)
PLATELET # BLD AUTO: 257 10*3/MM3 (ref 140–450)
PMV BLD AUTO: 9.6 FL (ref 6–12)
POTASSIUM SERPL-SCNC: 4.1 MMOL/L (ref 3.5–5.2)
RBC # BLD AUTO: 4.41 10*6/MM3 (ref 3.77–5.28)
SODIUM SERPL-SCNC: 141 MMOL/L (ref 136–145)
WBC NRBC COR # BLD: 6.46 10*3/MM3 (ref 3.4–10.8)

## 2023-03-05 PROCEDURE — 80048 BASIC METABOLIC PNL TOTAL CA: CPT | Performed by: FAMILY MEDICINE

## 2023-03-05 PROCEDURE — 99232 SBSQ HOSP IP/OBS MODERATE 35: CPT | Performed by: FAMILY MEDICINE

## 2023-03-05 PROCEDURE — 82962 GLUCOSE BLOOD TEST: CPT

## 2023-03-05 PROCEDURE — 99231 SBSQ HOSP IP/OBS SF/LOW 25: CPT | Performed by: THORACIC SURGERY (CARDIOTHORACIC VASCULAR SURGERY)

## 2023-03-05 PROCEDURE — 85025 COMPLETE CBC W/AUTO DIFF WBC: CPT | Performed by: FAMILY MEDICINE

## 2023-03-05 PROCEDURE — 25010000002 HEPARIN (PORCINE) PER 1000 UNITS: Performed by: INTERNAL MEDICINE

## 2023-03-05 PROCEDURE — 25010000002 CEFTRIAXONE PER 250 MG: Performed by: INTERNAL MEDICINE

## 2023-03-05 PROCEDURE — 63710000001 INSULIN LISPRO (HUMAN) PER 5 UNITS: Performed by: INTERNAL MEDICINE

## 2023-03-05 RX ORDER — ASPIRIN 325 MG
162 TABLET ORAL DAILY
Status: DISCONTINUED | OUTPATIENT
Start: 2023-03-05 | End: 2023-03-06 | Stop reason: HOSPADM

## 2023-03-05 RX ADMIN — ASPIRIN 162 MG: 325 TABLET ORAL at 11:57

## 2023-03-05 RX ADMIN — LEVOTHYROXINE SODIUM 25 MCG: 25 TABLET ORAL at 05:25

## 2023-03-05 RX ADMIN — SENNOSIDES AND DOCUSATE SODIUM 2 TABLET: 8.6; 5 TABLET ORAL at 20:03

## 2023-03-05 RX ADMIN — ZONISAMIDE 100 MG: 100 CAPSULE ORAL at 10:41

## 2023-03-05 RX ADMIN — Medication 10 ML: at 10:40

## 2023-03-05 RX ADMIN — HYDROCODONE BITARTRATE AND ACETAMINOPHEN 1 TABLET: 5; 325 TABLET ORAL at 08:23

## 2023-03-05 RX ADMIN — CEFTRIAXONE 2 G: 2 INJECTION, POWDER, FOR SOLUTION INTRAMUSCULAR; INTRAVENOUS at 17:32

## 2023-03-05 RX ADMIN — INSULIN LISPRO 2 UNITS: 100 INJECTION, SOLUTION INTRAVENOUS; SUBCUTANEOUS at 11:57

## 2023-03-05 RX ADMIN — ZONISAMIDE 200 MG: 100 CAPSULE ORAL at 20:03

## 2023-03-05 RX ADMIN — ATENOLOL 25 MG: 25 TABLET ORAL at 08:23

## 2023-03-05 RX ADMIN — Medication 10 ML: at 20:07

## 2023-03-05 RX ADMIN — PREGABALIN 150 MG: 75 CAPSULE ORAL at 08:23

## 2023-03-05 RX ADMIN — ROSUVASTATIN CALCIUM 5 MG: 10 TABLET, COATED ORAL at 20:02

## 2023-03-05 RX ADMIN — ZONISAMIDE 200 MG: 100 CAPSULE ORAL at 00:45

## 2023-03-05 RX ADMIN — HYDROCODONE BITARTRATE AND ACETAMINOPHEN 1 TABLET: 5; 325 TABLET ORAL at 20:02

## 2023-03-05 RX ADMIN — HEPARIN SODIUM 5000 UNITS: 5000 INJECTION INTRAVENOUS; SUBCUTANEOUS at 20:03

## 2023-03-05 RX ADMIN — PREGABALIN 150 MG: 75 CAPSULE ORAL at 20:02

## 2023-03-05 RX ADMIN — HEPARIN SODIUM 5000 UNITS: 5000 INJECTION INTRAVENOUS; SUBCUTANEOUS at 08:23

## 2023-03-05 NOTE — PROGRESS NOTES
Cardiothoracic Surgery Progress Note      POD #:     LOS: 4 days      Subjective: Patient asleep this morning not really arousable.    Objective:  Vital Signs vital signs below noted Tmax past 24 hours 90.8 °F  Temp:  [97.7 °F (36.5 °C)-98.8 °F (37.1 °C)] 98.8 °F (37.1 °C)  Heart Rate:  [75-98] 93  Resp:  [18] 18  BP: (110-149)/(71-91) 146/91    Physical Exam:   General Appearance: Resting comfortably asleep vital signs stable   Lungs:   Heart:   Skin: Skin ulcerations of right fourth toe and right second toe covered with 6-6 Optifoam and dry dressing.   Incision:     Results:  Results from last 7 days   Lab Units 03/05/23  0429   WBC 10*3/mm3 6.46   HEMOGLOBIN g/dL 12.8   HEMATOCRIT % 39.8   PLATELETS 10*3/mm3 257     Results from last 7 days   Lab Units 03/05/23  0429   SODIUM mmol/L 141   POTASSIUM mmol/L 4.1   CHLORIDE mmol/L 106   CO2 mmol/L 24.0   BUN mg/dL 4*   CREATININE mg/dL 0.76   GLUCOSE mg/dL 108*   CALCIUM mg/dL 8.9         Assessment: #1 ulceration of the right fourth toe distal tuft and right second toe distal pulp most likely due to trauma.  No sign of any significant vascular disease by CT angiogram with runoff or by clinical exam with palpable pedal pulses bilaterally.  2.  Fracture of second metatarsal head/Freiberg's dis fracture  #3.  Morbid obesity  4.  Chest pain of unclear etiology work-up in progress per cardiology  5.  History of Lyme disease [place] spotted fever  6.  MRI brain shows mild atrophy.    Plan: Cardiology work-up for chest pain.  Overall medical manage hospitalist.  Antibiotics infectious disease.  See no need for surgical intervention of the ulcerations distal tuft of this right fourth toe and right second toe at this time.  We will sign off on daily basis.  Call if needed.      Michael Krause MD - 03/05/23 - 06:20 EST

## 2023-03-05 NOTE — PROGRESS NOTES
"                                 [facility] Progress Note      LOS: 4 days   Patient Care Team:  Baljeet Manley APRN as PCP - General (Family Medicine)    Chief Complaint: Gangrene    Subjective     Interval History: Quiet night--Dr. Krause's note reviewed    Patient Complaints: No chest pain or dyspnea.      Review of Systems:   A 14 point review of systems was negative except as was stated in the HPI      Objective     Vital Sign Min/Max for last 24 hours  Temp  Min: 97.7 °F (36.5 °C)  Max: 98.8 °F (37.1 °C)   BP  Min: 110/75  Max: 149/80   Pulse  Min: 75  Max: 98   Resp  Min: 18  Max: 18   SpO2  Min: 92 %  Max: 98 %   No data recorded   Weight  Min: 102 kg (224 lb 13.9 oz)  Max: 102 kg (224 lb 13.9 oz)     Flowsheet Rows    Flowsheet Row First Filed Value   Admission Height 175.3 cm (69\") Documented at 03/01/2023 1415   Admission Weight 102 kg (224 lb) Documented at 03/01/2023 1415          Physical Exam:  General Appearance: Alert, appears stated age and cooperative  Lungs: Clear to auscultation  Heart:: RRR   No Murmurs, Rubs or Gallops  Abdomen: Soft and nontender with adequate bowel sounds.  No organomegaly  Extremities: Bandage right foot  Pulses: Pulses palpable and equal bilaterally  Skin: Warm and dry with no rash  Psych: Normal     Results Review:     I reviewed the patient's new clinical results.  Results from last 7 days   Lab Units 03/05/23  0429 03/04/23  1523 03/04/23  0539 03/03/23 2032 03/03/23  0432   SODIUM mmol/L 141  --  141  --  143   POTASSIUM mmol/L 4.1 4.3 3.6   < > 3.2*   CHLORIDE mmol/L 106  --  106  --  109*   CO2 mmol/L 24.0  --  27.0  --  28.0   BUN mg/dL 4*  --  4*  --  8   CREATININE mg/dL 0.76  --  0.66  --  0.75   GLUCOSE mg/dL 108*  --  103*  --  110*   CALCIUM mg/dL 8.9  --  8.3*  --  8.3*    < > = values in this interval not displayed.     Results from last 7 days   Lab Units 03/05/23  0429 03/04/23  0539 03/01/23  1555   WBC 10*3/mm3 6.46 5.01 7.22 "   HEMOGLOBIN g/dL 12.8 12.1 13.4   HEMATOCRIT % 39.8 37.2 40.8   PLATELETS 10*3/mm3 257 229 233     Lab Results   Lab Value Date/Time    TROPONINT <0.010 01/25/2023 1828    TROPONINT <0.010 01/25/2023 1525                       Medication Review: yes  Current Facility-Administered Medications   Medication Dose Route Frequency Provider Last Rate Last Admin   • acetaminophen (TYLENOL) tablet 650 mg  650 mg Oral Q6H PRN Charo De La Paz MD       • atenolol (TENORMIN) tablet 25 mg  25 mg Oral Daily Charo De La Paz MD   25 mg at 03/05/23 0823   • sennosides-docusate (PERICOLACE) 8.6-50 MG per tablet 2 tablet  2 tablet Oral BID Emily Ordoñez APRN   2 tablet at 03/04/23 0957    And   • polyethylene glycol (MIRALAX) packet 17 g  17 g Oral Daily PRN Emily Ordoñez APRN        And   • bisacodyl (DULCOLAX) EC tablet 5 mg  5 mg Oral Daily PRN Emily Ordoñez APRN        And   • bisacodyl (DULCOLAX) suppository 10 mg  10 mg Rectal Daily PRN Emily Ordoñez APRN       • Calcium Replacement - Initiate Nurse / BPA Driven Protocol   Does not apply PRN Marsha Rosado MD       • cefTRIAXone (ROCEPHIN) 2 g/100 mL 0.9% NS IVPB (MBP)  2 g Intravenous Q24H Noah Contreras MD   2 g at 03/04/23 1345   • heparin (porcine) 5000 UNIT/ML injection 5,000 Units  5,000 Units Subcutaneous Q12H Charo De La Paz MD   5,000 Units at 03/05/23 0823   • HYDROcodone-acetaminophen (NORCO) 5-325 MG per tablet 1 tablet  1 tablet Oral Q6H PRN Marsha Rosado MD   1 tablet at 03/05/23 0823   • Insulin Lispro (humaLOG) injection 0-7 Units  0-7 Units Subcutaneous TID With Meals Charo De La Paz MD       • levothyroxine (SYNTHROID, LEVOTHROID) tablet 25 mcg  25 mcg Oral Q AM Charo De La Paz MD   25 mcg at 03/05/23 0525   • Magnesium Standard Dose Replacement - Initiate Nurse / BPA Driven Protocol   Does not apply PRN Marsha Rosado MD       • ondansetron (ZOFRAN) injection 4 mg  4 mg Intravenous Q6H PRN Marsha Rosado MD   4 mg at  03/03/23 1738   • Phosphorus Replacement - Initiate Nurse / BPA Driven Protocol   Does not apply PRN Marsha Rosado MD       • Potassium Replacement - Initiate Nurse / BPA Driven Protocol   Does not apply PRN Marsha Rosado MD       • pregabalin (LYRICA) capsule 150 mg  150 mg Oral BID Cahro De La Paz MD   150 mg at 03/05/23 0823   • rosuvastatin (CRESTOR) tablet 5 mg  5 mg Oral Nightly Charo De La Paz MD   5 mg at 03/04/23 2204   • sodium chloride 0.9 % flush 10 mL  10 mL Intravenous Q12H Charo De La Paz MD   10 mL at 03/04/23 2204   • sodium chloride 0.9 % flush 10 mL  10 mL Intravenous PRN Charo De La Paz MD       • Sulfur Hexafluoride Microsph (LUMASON) 60.7-25 MG IV reconstituted suspension reconstituted suspension 3 mL  3 mL Intravenous Once in imaging Charo De La Paz MD       • zonisamide (ZONEGRAN) capsule 100 mg  100 mg Oral Daily Jose Pardo, PharmD   100 mg at 03/04/23 1501   • zonisamide (ZONEGRAN) capsule 200 mg  200 mg Oral Nightly Jose Pardo PharmD   200 mg at 03/05/23 0045         Gangrene (HCC)    Anxiety and depression    Hypertension    Glucose intolerance    Diabetes mellitus with neuropathy (HCC)    Hypothyroidism    Chronic systolic CHF (congestive heart failure) (HCC)    Embolic infarction (HCC)    Metatarsal stress fracture of right foot        Impression      Abnormal myocardial perfusion imaging 3/4/2023--intermediate risk study  Class II CCS angina      Plan     Continue beta-blocker and statin  Add aspirin daily  Fairly high likelihood that she has significant coronary artery disease.  She has an appointment to see Dr. Christina on Tuesday and she sounds as if she would like him to do cardiac catheterization.  I will defer this decision to [facility] in the a.m.      Andrei Hoyos MD   03/05/23  10:32 EST

## 2023-03-05 NOTE — PLAN OF CARE
Problem: Adult Inpatient Plan of Care  Goal: Plan of Care Review  Outcome: Ongoing, Progressing  Goal: Patient-Specific Goal (Individualized)  Outcome: Ongoing, Progressing  Goal: Absence of Hospital-Acquired Illness or Injury  Outcome: Ongoing, Progressing  Intervention: Identify and Manage Fall Risk  Recent Flowsheet Documentation  Taken 3/5/2023 0810 by Lopez Woodall RN  Safety Promotion/Fall Prevention:   activity supervised   clutter free environment maintained  Intervention: Prevent Skin Injury  Recent Flowsheet Documentation  Taken 3/5/2023 0810 by Lopez Woodall RN  Skin Protection:   adhesive use limited   incontinence pads utilized   transparent dressing maintained   tubing/devices free from skin contact  Intervention: Prevent and Manage VTE (Venous Thromboembolism) Risk  Recent Flowsheet Documentation  Taken 3/5/2023 0810 by Lopez Woodall RN  Activity Management: activity adjusted per tolerance  Intervention: Prevent Infection  Recent Flowsheet Documentation  Taken 3/5/2023 0810 by Lopez Woodall RN  Infection Prevention:   environmental surveillance performed   hand hygiene promoted   rest/sleep promoted   single patient room provided   visitors restricted/screened  Goal: Optimal Comfort and Wellbeing  Outcome: Ongoing, Progressing  Intervention: Monitor Pain and Promote Comfort  Recent Flowsheet Documentation  Taken 3/5/2023 0810 by Lopez Woodall RN  Pain Management Interventions: see MAR  Goal: Readiness for Transition of Care  Outcome: Ongoing, Progressing   Goal Outcome Evaluation:

## 2023-03-05 NOTE — PROGRESS NOTES
[facility] Medicine Services  PROGRESS NOTE    Patient Name: Bryanna Schwartz  : 1970  MRN: [id number]    Date of Admission: 3/1/2023  Primary Care Physician: Baljeet Manley APRN    Subjective   Subjective     CC:  Foot ulcers    HPI:  3/2 - Patient is a 52-year-old diabetic with peripheral neuropathy who presented for evaluation of foot ulcers on her toes.  She was seen by Dr. Krause as well as Dr. Contreras.  Imaging does not seem very suspicious for osteomyelitis.  Work-up is currently ongoing.  She is able to tolerate p.o. and ambulate but does have significant neuropathy.    3/3 -patient was evaluated by cardiology today at the request of Dr. Krause.  They are planning a stress test tomorrow and likely NESSA on Monday.  Investigation for etiology of possible embolic infarcts to the distal toes.  Her transthoracic echo did reveal a reduced ejection fraction 41%.  She states the Norco has helped her pain.  She has pain at rest and with walking.  We discussed continued work-up for arterial insufficiency as well and I have ordered arterial Dopplers for bilateral lower extremities.    3/4-reviewed results of ROSA lower extremities with patient that were essentially normal.  Discontinued Plavix as such.  She does think her pain is improved today compared to yesterday.  Reviewed notes from Dr. Krause.  Cardiology planning stress test and NESSA on Monday.  Patient was tolerating p.o. well, n.p.o. for stress test.    3/5 -patient had abnormal stress test yesterday.  Pending cardiology to review it.  She is planned to get a NESSA on Monday.  Discussed her care with Dr. Krause.  She can be weightbearing as tolerated on her right foot with a toe offloading boot in place.  She is tolerating p.o. she did have increased pain in her left foot that was completely resolved with [place] earlier this morning    ROS:  Gen- No fevers, chills  CV- No current chest pain, palpitations  Resp- No cough, dyspnea  GI- No  N/V/D, abd pain         Objective   Objective     Vital Signs:   Temp:  [97.7 °F (36.5 °C)-98.8 °F (37.1 °C)] 98 °F (36.7 °C)  Heart Rate:  [75-98] 94  Resp:  [18] 18  BP: (110-149)/(71-91) 136/82     Physical Exam:  Constitutional: No acute distress, awake, alert  HENT: NCAT, mucous membranes moist  Respiratory: Clear to auscultation bilaterally, respiratory effort normal   Cardiovascular: RRR  Gastrointestinal: Positive bowel sounds, soft, nontender, nondistended  Musculoskeletal: No bilateral ankle edema  Psychiatric: Appropriate affect, cooperative  Neurologic: Oriented x 3, strength symmetric in all extremities, Cranial Nerves grossly intact to confrontation, speech clear  Skin: Right foot with 2 small black eschar under the second and fourth toenails without drainage or erythema      Results Reviewed:  LAB RESULTS:      Lab 03/05/23  0429 03/04/23  0539 03/01/23  2219 03/01/23  1808 03/01/23  1555   WBC 6.46 5.01  --   --  7.22   HEMOGLOBIN 12.8 12.1  --   --  13.4   HEMATOCRIT 39.8 37.2  --   --  40.8   PLATELETS 257 229  --   --  233   NEUTROS ABS 3.91 2.38  --   --  4.48   IMMATURE GRANS (ABS) 0.04 0.05  --   --  0.04   LYMPHS ABS 1.83 1.98  --   --  1.94   MONOS ABS 0.46 0.34  --   --  0.51   EOS ABS 0.19 0.23  --   --  0.22   MCV 90.2 90.1  --   --  92.3   SED RATE  --   --   --   --  16   CRP  --   --   --   --  0.84*   PROCALCITONIN  --   --   --   --  0.07   LACTATE  --   --  1.6 2.2*  --    LDH  --   --   --   --  248*   D DIMER QUANT  --   --   --   --  0.44         Lab 03/05/23  0429 03/04/23  1523 03/04/23  0539 03/03/23 2032 03/03/23  0432 03/01/23  1555   SODIUM 141  --  141  --  143 139   POTASSIUM 4.1 4.3 3.6 4.3 3.2* 4.2   CHLORIDE 106  --  106  --  109* 104   CO2 24.0  --  27.0  --  28.0 23.0   ANION GAP 11.0  --  8.0  --  6.0 12.0   BUN 4*  --  4*  --  8 11   CREATININE 0.76  --  0.66  --  0.75 0.67   EGFR 94.4  --  105.7  --  95.9 105.3   GLUCOSE 108*  --  103*  --  110* 118*   CALCIUM 8.9   --  8.3*  --  8.3* 8.9   HEMOGLOBIN A1C  --   --   --   --   --  5.20         Lab 03/01/23  1555   TOTAL PROTEIN 6.0   ALBUMIN 3.1*   GLOBULIN 2.9   ALT (SGPT) 39*   AST (SGOT) 27   BILIRUBIN 0.4   ALK PHOS 213*         Lab 03/01/23  1555   PROBNP 640.5                 Brief Urine Lab Results  (Last result in the past 365 days)      Color   Clarity   Blood   Leuk Est   Nitrite   Protein   CREAT   Urine HCG        03/02/23 0400 Yellow   Turbid   Negative   Negative   Negative   Negative                 Microbiology Results Abnormal     Procedure Component Value - Date/Time    Blood Culture - Blood, Hand, Left [[id number]]  (Normal) Collected: 03/01/23 1556    Lab Status: Preliminary result Specimen: Blood from Hand, Left Updated: 03/04/23 1645     Blood Culture No growth at 3 days    Narrative:      Aerobic bottle only      Blood Culture - Blood, Arm, Left [[id number]]  (Normal) Collected: 03/01/23 1556    Lab Status: Preliminary result Specimen: Blood from Arm, Left Updated: 03/04/23 1645     Blood Culture No growth at 3 days    Narrative:      Aerobic bottle only      MRSA Screen, PCR (Inpatient) - Swab, Nares [[id number]]  (Normal) Collected: 03/01/23 1539    Lab Status: Final result Specimen: Swab from Nares Updated: 03/01/23 1704     MRSA PCR Negative    Narrative:      The negative predictive value of this diagnostic test is high and should only be used to consider de-escalating anti-MRSA therapy. A positive result may indicate colonization with MRSA and must be correlated clinically.  MRSA Negative          Stress Test With Myocardial Perfusion One Day    Result Date: 3/4/2023  •  Patient reported some midsternal chest tightness after the Lexiscan was given.  She rated the discomfort at 7/10 at its worst.  Symptom improved with oral caffeine during recovery. •  Poor ECG quality; no significant ST or T wave changes identified. •  Left ventricular ejection fraction is normal (Calculated EF = 54%). •  Attenuation  of isotope taupe uptake in the anteroseptal/anterior apical segments of the left ventricle; this is primarily a fixed defect with dana-infarct zone ischemia •  Intermediate risk study; clinical correlation is required     Doppler Arterial Multi Level Lower Extremity - Bilateral CAR    Result Date: 3/3/2023  •  Right lower extremity: Normal right ROSA at 1.14.  Multiphasic waveforms are seen throughout. •  Left lower extremity: Normal left ROSA at 1.14.  Multiphasic waveforms are seen throughout. •  No evidence of significant lower extremity arterial occlusive disease in the right or left legs.     CT Outside Films    Result Date: 3/3/2023  This procedure was auto-finalized with no dictation required.      Results for orders placed during the hospital encounter of 03/01/23    Adult Transthoracic Echo Complete W/ Cont if Necessary Per Protocol    Interpretation Summary  •  Left ventricular ejection fraction appears to be 41 - 45%.  •  The cardiac valves are anatomically and functionally normal.  •  Saline test results are negative for right to left atrial level shunt.      Current medications:  Scheduled Meds:atenolol, 25 mg, Oral, Daily  cefTRIAXone, 2 g, Intravenous, Q24H  heparin (porcine), 5,000 Units, Subcutaneous, Q12H  insulin lispro, 0-7 Units, Subcutaneous, TID With Meals  levothyroxine, 25 mcg, Oral, Q AM  pregabalin, 150 mg, Oral, BID  rosuvastatin, 5 mg, Oral, Nightly  senna-docusate sodium, 2 tablet, Oral, BID  sodium chloride, 10 mL, Intravenous, Q12H  Sulfur Hexafluoride Microsph, 3 mL, Intravenous, Once in imaging  zonisamide, 100 mg, Oral, Daily  zonisamide, 200 mg, Oral, Nightly      Continuous Infusions:   PRN Meds:.•  acetaminophen  •  senna-docusate sodium **AND** polyethylene glycol **AND** bisacodyl **AND** bisacodyl  •  Calcium Replacement - Initiate Nurse / BPA Driven Protocol  •  HYDROcodone-acetaminophen  •  Magnesium Standard Dose Replacement - Initiate Nurse / BPA Driven Protocol  •   ondansetron  •  Phosphorus Replacement - Initiate Nurse / BPA Driven Protocol  •  Potassium Replacement - Initiate Nurse / BPA Driven Protocol  •  sodium chloride    Assessment & Plan   Assessment & Plan     Active Hospital Problems    Diagnosis  POA   • **Gangrene (HCC) [I96]  Yes   • Metatarsal stress fracture of right foot [M84.374A]  Yes   • Chronic systolic CHF (congestive heart failure) (HCC) [I50.22]  Yes   • 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        Brief Hospital Course to date:  Bryanna Schwartz is a 52 y.o. female with past medical history of diabetes mellitus with neuropathy, hypothyroidism, hypertension, anxiety depression, history of Lyme disease and [place] spotted fever who presented with gangrene on her right second and fourth toes just under the nail.  She was found to have a metatarsal fracture of the second metatarsal on the right foot.  She underwent cardiac stress test which was abnormal on 3/4/2023.  Cardiology is following.    Gangrene of the right second and fourth toes  No evidence of peripheral vascular disease  Concern for suspected embolic infarct  Second metatarsal fracture of the right foot  -Dr. Krause evaluated-she needs to be weightbearing as tolerated with a toe offloading boot for the right foot  -Also seen by Dr. Contreras with infectious disease  -Work-up ongoing but imaging not concerning for osteomyelitis  -Continue ceftriaxone per infectious disease recommendations with likely transition to oral antibiotics soon  -Cardiology consult to evaluate for potential etiologies of embolic infarct, abnormal stress test on 3/4/2023 and planned NESSA on 3/6/2023  -Added Norco for pain which works well  -Arterial Dopplers  bilateral lower extremities negative    Chronic systolic congestive heart failure  - EF of 41%  -Cardiology has been consulted  -Abnormal stress test Saturday and planned NESSA on Monday    Diabetes mellitus with neuropathy  -Continue sliding scale insulin as needed  -Fingersticks ACHS    Hypothyroidism  -Continue Synthroid    History of vitamin B12 deficiency  -Last B12 level in June 2022 was 397    Hypertension  -Continue atenolol    Anxiety/depression  Altered mental status  -Holding home meds Neurontin Lyrica and Cymbalta    History of Lyme disease  History of [place] spotted fever  -Complicates care and diagnostic work-up for etiology of the gangrene of her toes    Expected Discharge Location and Transportation: Home, private car  Expected Discharge 3/10/2023  Expected Discharge Date and Time     Expected Discharge Date Expected Discharge Time    Mar 10, 2023            DVT prophylaxis:  Medical DVT prophylaxis orders are present.     AM-PAC 6 Clicks Score (PT): 18 (03/04/23 1734)    CODE STATUS:   Code Status and Medical Interventions:   Ordered at: 03/01/23 3423     Code Status (Patient has no pulse and is not breathing):    CPR (Attempt to Resuscitate)     Medical Interventions (Patient has pulse or is breathing):    Full Support       Marsha Rosado MD  03/05/23

## 2023-03-06 ENCOUNTER — READMISSION MANAGEMENT (OUTPATIENT)
Dept: CALL CENTER | Facility: HOSPITAL | Age: 53
End: 2023-03-06
Payer: MEDICARE

## 2023-03-06 ENCOUNTER — APPOINTMENT (OUTPATIENT)
Dept: CARDIOLOGY | Facility: HOSPITAL | Age: 53
DRG: 300 | End: 2023-03-06
Payer: MEDICARE

## 2023-03-06 VITALS
BODY MASS INDEX: 31.48 KG/M2 | OXYGEN SATURATION: 93 % | HEIGHT: 71 IN | HEART RATE: 87 BPM | WEIGHT: 224.87 LBS | SYSTOLIC BLOOD PRESSURE: 130 MMHG | TEMPERATURE: 98.3 F | DIASTOLIC BLOOD PRESSURE: 77 MMHG | RESPIRATION RATE: 14 BRPM

## 2023-03-06 LAB
BACTERIA SPEC AEROBE CULT: NORMAL
BACTERIA SPEC AEROBE CULT: NORMAL
GLUCOSE BLDC GLUCOMTR-MCNC: 101 MG/DL (ref 70–130)
GLUCOSE BLDC GLUCOMTR-MCNC: 119 MG/DL (ref 70–130)
GLUCOSE BLDC GLUCOMTR-MCNC: 95 MG/DL (ref 70–130)
MAXIMAL PREDICTED HEART RATE: 168 BPM
QT INTERVAL: 434 MS
QTC INTERVAL: 481 MS
STRESS TARGET HR: 143 BPM

## 2023-03-06 PROCEDURE — 76376 3D RENDER W/INTRP POSTPROCES: CPT

## 2023-03-06 PROCEDURE — 93325 DOPPLER ECHO COLOR FLOW MAPG: CPT

## 2023-03-06 PROCEDURE — 93312 ECHO TRANSESOPHAGEAL: CPT

## 2023-03-06 PROCEDURE — 76376 3D RENDER W/INTRP POSTPROCES: CPT | Performed by: INTERNAL MEDICINE

## 2023-03-06 PROCEDURE — 25010000002 MIDAZOLAM PER 1 MG: Performed by: INTERNAL MEDICINE

## 2023-03-06 PROCEDURE — 93321 DOPPLER ECHO F-UP/LMTD STD: CPT

## 2023-03-06 PROCEDURE — 97164 PT RE-EVAL EST PLAN CARE: CPT

## 2023-03-06 PROCEDURE — 93320 DOPPLER ECHO COMPLETE: CPT

## 2023-03-06 PROCEDURE — B24BZZ4 ULTRASONOGRAPHY OF HEART WITH AORTA, TRANSESOPHAGEAL: ICD-10-PCS | Performed by: INTERNAL MEDICINE

## 2023-03-06 PROCEDURE — 93312 ECHO TRANSESOPHAGEAL: CPT | Performed by: INTERNAL MEDICINE

## 2023-03-06 PROCEDURE — 93320 DOPPLER ECHO COMPLETE: CPT | Performed by: INTERNAL MEDICINE

## 2023-03-06 PROCEDURE — 97530 THERAPEUTIC ACTIVITIES: CPT

## 2023-03-06 PROCEDURE — 82962 GLUCOSE BLOOD TEST: CPT

## 2023-03-06 PROCEDURE — 99152 MOD SED SAME PHYS/QHP 5/>YRS: CPT

## 2023-03-06 PROCEDURE — 99239 HOSP IP/OBS DSCHRG MGMT >30: CPT | Performed by: FAMILY MEDICINE

## 2023-03-06 PROCEDURE — 93325 DOPPLER ECHO COLOR FLOW MAPG: CPT | Performed by: INTERNAL MEDICINE

## 2023-03-06 PROCEDURE — 25010000002 FENTANYL CITRATE (PF) 50 MCG/ML SOLUTION: Performed by: INTERNAL MEDICINE

## 2023-03-06 PROCEDURE — 99232 SBSQ HOSP IP/OBS MODERATE 35: CPT | Performed by: INTERNAL MEDICINE

## 2023-03-06 RX ORDER — ROSUVASTATIN CALCIUM 10 MG/1
10 TABLET, COATED ORAL NIGHTLY
Qty: 30 TABLET | Refills: 1 | Status: SHIPPED | OUTPATIENT
Start: 2023-03-06 | End: 2023-05-05

## 2023-03-06 RX ORDER — NALOXONE HCL 0.4 MG/ML
0.4 VIAL (ML) INJECTION ONCE AS NEEDED
Status: DISCONTINUED | OUTPATIENT
Start: 2023-03-06 | End: 2023-03-06 | Stop reason: HOSPADM

## 2023-03-06 RX ORDER — MIDAZOLAM HYDROCHLORIDE 1 MG/ML
2-8 INJECTION INTRAMUSCULAR; INTRAVENOUS ONCE AS NEEDED
Status: DISCONTINUED | OUTPATIENT
Start: 2023-03-06 | End: 2023-03-06 | Stop reason: HOSPADM

## 2023-03-06 RX ORDER — FENTANYL CITRATE 50 UG/ML
INJECTION, SOLUTION INTRAMUSCULAR; INTRAVENOUS
Status: COMPLETED | OUTPATIENT
Start: 2023-03-06 | End: 2023-03-06

## 2023-03-06 RX ORDER — ASPIRIN 81 MG/1
81 TABLET ORAL DAILY
Qty: 30 TABLET | Refills: 1 | Status: SHIPPED | OUTPATIENT
Start: 2023-03-06 | End: 2023-05-05

## 2023-03-06 RX ORDER — DOXYCYCLINE HYCLATE 100 MG/1
100 CAPSULE ORAL 2 TIMES DAILY
Qty: 14 CAPSULE | Refills: 0 | Status: SHIPPED | OUTPATIENT
Start: 2023-03-06 | End: 2023-03-13

## 2023-03-06 RX ORDER — ROSUVASTATIN CALCIUM 10 MG/1
10 TABLET, COATED ORAL NIGHTLY
Status: DISCONTINUED | OUTPATIENT
Start: 2023-03-06 | End: 2023-03-06 | Stop reason: HOSPADM

## 2023-03-06 RX ORDER — MIDAZOLAM HYDROCHLORIDE 1 MG/ML
INJECTION INTRAMUSCULAR; INTRAVENOUS
Status: COMPLETED | OUTPATIENT
Start: 2023-03-06 | End: 2023-03-06

## 2023-03-06 RX ORDER — FENTANYL CITRATE 50 UG/ML
50-100 INJECTION, SOLUTION INTRAMUSCULAR; INTRAVENOUS ONCE AS NEEDED
Status: DISCONTINUED | OUTPATIENT
Start: 2023-03-06 | End: 2023-03-06 | Stop reason: HOSPADM

## 2023-03-06 RX ORDER — FLUMAZENIL 0.1 MG/ML
0.5 INJECTION INTRAVENOUS ONCE AS NEEDED
Status: DISCONTINUED | OUTPATIENT
Start: 2023-03-06 | End: 2023-03-06 | Stop reason: HOSPADM

## 2023-03-06 RX ORDER — HYDROCODONE BITARTRATE AND ACETAMINOPHEN 5; 325 MG/1; MG/1
1 TABLET ORAL EVERY 6 HOURS PRN
Qty: 12 TABLET | Refills: 0 | Status: SHIPPED | OUTPATIENT
Start: 2023-03-06 | End: 2023-03-09

## 2023-03-06 RX ADMIN — FENTANYL CITRATE 25 MCG: 50 INJECTION, SOLUTION INTRAMUSCULAR; INTRAVENOUS at 15:14

## 2023-03-06 RX ADMIN — FENTANYL CITRATE 25 MCG: 50 INJECTION, SOLUTION INTRAMUSCULAR; INTRAVENOUS at 15:17

## 2023-03-06 RX ADMIN — MIDAZOLAM HYDROCHLORIDE 1 MG: 1 INJECTION, SOLUTION INTRAMUSCULAR; INTRAVENOUS at 15:14

## 2023-03-06 RX ADMIN — MIDAZOLAM HYDROCHLORIDE 1 MG: 1 INJECTION, SOLUTION INTRAMUSCULAR; INTRAVENOUS at 15:17

## 2023-03-06 RX ADMIN — Medication 10 ML: at 08:50

## 2023-03-06 RX ADMIN — ATENOLOL 25 MG: 25 TABLET ORAL at 08:54

## 2023-03-06 NOTE — PROGRESS NOTES
[facility] Cardiology  Inpatient Progress Note      Chief Complaint/Reason for consult: cardioembolic source for toe ulcers, intermittent chest pain         Subjective  Feeling well today, no acute events overnight    Review of Systems:   Negative for chest pain, shortness of breath, palpitations, fevers, chills, abdominal pain, nausea, vomiting       Vital Sign Min/Max for last 24 hours  Temp  Min: 97.5 °F (36.4 °C)  Max: 98.3 °F (36.8 °C)   BP  Min: 113/58  Max: 141/97   Pulse  Min: 77  Max: 97   Resp  Min: 16  Max: 18   SpO2  Min: 95 %  Max: 98 %   Flow (L/min)  Min: 2  Max: 2      Intake/Output Summary (Last 24 hours) at 3/6/2023 1006  Last data filed at 3/5/2023 1804  Gross per 24 hour   Intake 717 ml   Output --   Net 717 ml           Gen: well developed, lying in bed, comfortable appearing  HEENT: MMM, sclerae anicteric, conjunctivae normal  CV: regular rate, regular rhythm, no murmurs or rubs, normal S1, S2. 2+ radial and DP pulses  Pulm: RA, normal work of breathing, no wheezes, rales, rhonchi  Abd: soft, nontender, nondistended,  positive bowel sounds  Ext: normal bulk for age, normal tone, no dependent edema  Neuro: alert, oriented, face symmetrical, moving all extremities well  Psych: normal mood, appropriate affect    Tele:  SR, no arrhythmias seen overnigh    Results Review (reviewed the patient's recent labs in the electronic medical record):     EKG:  3/3/23 - NSR, septal infarct    Lab Results   Component Value Date    WBC 6.46 03/05/2023    HGB 12.8 03/05/2023    HCT 39.8 03/05/2023    MCV 90.2 03/05/2023     03/05/2023     Lab Results   Component Value Date    GLUCOSE 108 (H) 03/05/2023    CALCIUM 8.9 03/05/2023     03/05/2023    K 4.1 03/05/2023    CO2 24.0 03/05/2023     03/05/2023    BUN 4 (L) 03/05/2023    CREATININE 0.76 03/05/2023    EGFRIFNONA 79 11/07/2018    BCR 5.3 (L) 03/05/2023    ANIONGAP 11.0 03/05/2023     Lab Results   Component Value Date     TROPONINT <0.010 01/25/2023    TROPONINT <0.010 01/25/2023     Lab Results   Component Value Date    PROBNP 640.5 03/01/2023     3/2/23 - Transthoracic Echo Complete   •  Left ventricular ejection fraction appears to be 41 - 45%.  •  The cardiac valves are anatomically and functionally normal.  •  Saline test results are negative for right to left atrial level shunt.     3/4/23 - Stress PET MPI  •  Patient reported some midsternal chest tightness after the Lexiscan was given.  She rated the discomfort at 7/10 at its worst.  Symptom improved with oral caffeine during recovery.  •  Poor ECG quality; no significant ST or T wave changes identified.  •  Left ventricular ejection fraction is normal (Calculated EF = 54%).  •  Attenuation of isotope taupe uptake in the anteroseptal/anterior apical segments of the left ventricle; this is primarily a fixed defect with dana-infarct zone ischemia  •  Intermediate risk study; clinical correlation is required    Assessment     R toe ulcers, concern for possible cardioembolic source   - NESSA today    Mildly reduced EF, abnormal stress test  Intermittent chest pain   - concerning for chronic stable CAD with no current symptoms of chest pain   - EF appears better on MPI with evidence of previous MI with fixed defect. No urgent LHC, pt prefers to follow-up with Dr. Christina tomorrow if possible   - continue aspirin, increase statin with a plan to follow-up LFTs as an outpatient (only mildly above ULN currently)    CUONG Alonso MD  3/6/2023  10:06 EST

## 2023-03-06 NOTE — PLAN OF CARE
Problem: Adult Inpatient Plan of Care  Goal: Plan of Care Review  Outcome: Ongoing, Progressing  Flowsheets (Taken 3/6/2023 0348)  Outcome Evaluation: Patient had a good shift. Vitals remain stable. Dressing to right foot changed. Pain medication given once with good results. NPO for NESSA today.  Goal: Patient-Specific Goal (Individualized)  Outcome: Ongoing, Progressing  Goal: Absence of Hospital-Acquired Illness or Injury  Outcome: Ongoing, Progressing  Intervention: Identify and Manage Fall Risk  Recent Flowsheet Documentation  Taken 3/6/2023 0200 by Elidia Eugene RN  Safety Promotion/Fall Prevention:   activity supervised   assistive device/personal items within reach   clutter free environment maintained   room organization consistent   safety round/check completed   toileting scheduled  Taken 3/6/2023 0000 by Elidia Eugene RN  Safety Promotion/Fall Prevention:   activity supervised   assistive device/personal items within reach   clutter free environment maintained   room organization consistent   safety round/check completed   toileting scheduled   nonskid shoes/slippers when out of bed  Taken 3/5/2023 2002 by Elidia Eugene RN  Safety Promotion/Fall Prevention:   activity supervised   assistive device/personal items within reach   clutter free environment maintained   nonskid shoes/slippers when out of bed   room organization consistent   safety round/check completed   toileting scheduled  Intervention: Prevent Skin Injury  Recent Flowsheet Documentation  Taken 3/6/2023 0200 by Elidia Eugene RN  Body Position: position changed independently  Skin Protection:   adhesive use limited   tubing/devices free from skin contact  Taken 3/6/2023 0000 by Elidia Eugene RN  Body Position: position changed independently  Skin Protection:   tubing/devices free from skin contact   adhesive use limited  Taken 3/5/2023 2002 by Elidia Eugene RN  Body Position: position changed  independently  Skin Protection:   adhesive use limited   incontinence pads utilized   pulse oximeter probe site changed   tubing/devices free from skin contact  Intervention: Prevent and Manage VTE (Venous Thromboembolism) Risk  Recent Flowsheet Documentation  Taken 3/6/2023 0200 by Elidia Eugene RN  Activity Management:   activity adjusted per tolerance   activity encouraged  Taken 3/6/2023 0000 by Elidia Eugene RN  Activity Management: activity adjusted per tolerance  Taken 3/5/2023 2002 by Elidia Eugene RN  Activity Management:   activity adjusted per tolerance   activity encouraged  Range of Motion:   active ROM (range of motion) encouraged   ROM (range of motion) performed  Intervention: Prevent Infection  Recent Flowsheet Documentation  Taken 3/5/2023 2002 by Elidia Eugene RN  Infection Prevention:   environmental surveillance performed   hand hygiene promoted   personal protective equipment utilized   rest/sleep promoted  Goal: Optimal Comfort and Wellbeing  Outcome: Ongoing, Progressing  Intervention: Monitor Pain and Promote Comfort  Recent Flowsheet Documentation  Taken 3/5/2023 2002 by Elidia Eugene RN  Pain Management Interventions: see MAR  Intervention: Provide Person-Centered Care  Recent Flowsheet Documentation  Taken 3/5/2023 2002 by Elidia Eugene RN  Trust Relationship/Rapport:   care explained   choices provided   emotional support provided   questions answered   reassurance provided  Goal: Readiness for Transition of Care  Outcome: Ongoing, Progressing     Problem: Diabetes Comorbidity  Goal: Blood Glucose Level Within Targeted Range  Outcome: Ongoing, Progressing     Problem: Hypertension Comorbidity  Goal: Blood Pressure in Desired Range  Outcome: Ongoing, Progressing     Problem: Pain Chronic (Persistent) (Comorbidity Management)  Goal: Acceptable Pain Control and Functional Ability  Outcome: Ongoing, Progressing  Intervention: Develop Pain Management  Plan  Recent Flowsheet Documentation  Taken 3/5/2023 2002 by Elidia Eugene RN  Pain Management Interventions: see MAR     Problem: Skin Injury Risk Increased  Goal: Skin Health and Integrity  Outcome: Ongoing, Progressing  Intervention: Optimize Skin Protection  Recent Flowsheet Documentation  Taken 3/6/2023 0200 by Elidia Eugene RN  Pressure Reduction Techniques:   frequent weight shift encouraged   heels elevated off bed  Head of Bed (HOB) Positioning: HOB elevated  Pressure Reduction Devices: positioning supports utilized  Skin Protection:   adhesive use limited   tubing/devices free from skin contact  Taken 3/6/2023 0000 by Elidia Eugene RN  Pressure Reduction Techniques: frequent weight shift encouraged  Head of Bed (HOB) Positioning: HOB elevated  Pressure Reduction Devices: positioning supports utilized  Skin Protection:   tubing/devices free from skin contact   adhesive use limited  Taken 3/5/2023 2002 by Elidia Eugene RN  Pressure Reduction Techniques:   frequent weight shift encouraged   heels elevated off bed  Head of Bed (HOB) Positioning: HOB elevated  Pressure Reduction Devices: positioning supports utilized  Skin Protection:   adhesive use limited   incontinence pads utilized   pulse oximeter probe site changed   tubing/devices free from skin contact   Goal Outcome Evaluation:              Outcome Evaluation: Patient had a good shift. Vitals remain stable. Dressing to right foot changed. Pain medication given once with good results. NPO for NESSA today.

## 2023-03-06 NOTE — PAYOR COMM NOTE
"Bryanna Schwartz (52 y.o. Female)     [id number]    Preeti Mathew, GUERO  Utilization Review  Kxwwf-[phone]  [phone]      Date of Birth   1970    Social Security Number       Address   [address] [place] [address]    Home Phone   [phone]    MRN   [id number]       [employer]   [employer]    Marital Status                               Admission Date   3/1/23    Admission Type   Urgent    Admitting Provider   Marsha Rosado MD    Attending Provider   Marsha Rosado MD    Department, Room/Bed   [address], S585/1       Discharge Date       Discharge Disposition   Home or Self Care    Discharge Destination                               Attending Provider: Marsha Rosado MD    Allergies: Gabapentin    Isolation: None   Infection: None   Code Status: CPR    Ht: 180.1 cm (70.9\")   Wt: 102 kg (224 lb 13.9 oz)    Admission Cmt: None   Principal Problem: Gangrene (HCC) [I96]                 Active Insurance as of 3/1/2023     Primary Coverage     Payor Plan Insurance Group Employer/Plan Group    ANTHEM MEDICARE REPLACEMENT ANTHEM MEDICARE ADVANTAGE [id number]     Payor Plan Address Payor Plan Phone Number Payor Plan Fax Number Effective Dates    [address] [phone]  6/1/2022 - None Entered    [facility] [address]       Subscriber Name Subscriber Birth Date Member ID       BRYANNA SCHWARTZ 1970 [id number]           Secondary Coverage     Payor Plan Insurance Group Employer/Plan Group    ANTHEM MEDICAID ANTHEM MEDICAID [id number]     Payor Plan Address Payor Plan Phone Number Payor Plan Fax Number Effective Dates    [address] [address] [phone]  6/1/2022 - None Entered    [facility] [address]       Subscriber Name Subscriber Birth Date Member ID       BRYANNA SCHWARTZ 1970 [id number]                 Emergency Contacts      (Rel.) Home Phone Work Phone Mobile Phone    Michael Schwartz (Spouse) [phone] -- --    " Padma Villalta (Sister) [phone] -- --    Chanelle Caruso (Relative) [phone] -- --               Discharge Summary      Marsha Rosado MD at 23 1448              [facility] Medicine Services  DISCHARGE SUMMARY    Patient Name: Bryanna Schwartz  : 1970  MRN: [id number]    Date of Admission: 3/1/2023 12:57 PM  Date of Discharge:  23    Primary Care Physician: Baljeet Manley APRN    Consults     Date and Time Order Name Status Description    3/3/2023  6:46 AM Inpatient Cardiology Consult Completed     3/2/2023 12:34 AM Inpatient Infectious Diseases Consult Completed           Hospital Course     Presenting Problem:   Gangrene (HCC) [I96]    Active Hospital Problems    Diagnosis  POA   • **Gangrene (HCC) [I96]  Yes   • Metatarsal stress fracture of right foot [M84.374A]  Yes   • Chronic systolic CHF (congestive heart failure) (HCC) [I50.22]  Yes   • 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          Hospital Course:  Bryanna Schwartz is a 52 y.o. female with past medical history of diabetes mellitus with neuropathy, hypothyroidism, hypertension, anxiety depression, history of Lyme disease and [place] spotted fever who presented with gangrene on her right second and fourth toes just under the nail.  She was found to have a metatarsal fracture of the second metatarsal on the right foot.  She underwent cardiac stress test which was abnormal on 3/4/2023.  Cardiology was consulted.  She will follow-up with her outpatient cardiologist in [place] on 3/7/2023.  She will have a NESSA on 3/6/2023 prior to discharge.     Gangrene of the right second and fourth toes  No evidence of peripheral vascular  disease  Concern for suspected embolic infarct  Second metatarsal fracture of the right foot  -Dr. Krause evaluated-she needs to be weightbearing as tolerated with a toe offloading boot for the right foot  -Also seen by Dr. Contreras with infectious disease  -Work-up ongoing but imaging not concerning for osteomyelitis  -Treated with ceftriaxone per infectious disease recommendations with transition to oral doxycycline x7 days at discharge  -Cardiology consulted to evaluate for potential etiologies of embolic infarct, abnormal stress test on 3/4/2023 and planned NESSA on 3/6/2023  -Added Norco for pain which works well  -Arterial Dopplers bilateral lower extremities negative  -Patient to follow-up with her outpatient cardiologist on 3/7/2023 in [place]  -Discontinue migraine meds that could cause vasoconstriction at discharge     Chronic systolic congestive heart failure  - EF of 41%  -Cardiology has been consulted  -Abnormal stress test Saturday and planned NESSA on Monday  -Deferred ARB due to hypotension  -Continue atenolol     Diabetes mellitus with neuropathy  -Diet controlled     Hypothyroidism  -Continue Synthroid     History of vitamin B12 deficiency  -Last B12 level in June 2022 was 397     Hypertension  -Continue atenolol     Anxiety/depression  Altered mental status  -Held home meds Neurontin Lyrica and Cymbalta while admitted, okay to resume at discharge     History of Lyme disease  History of [place] spotted fever  -Complicates care and diagnostic work-up for etiology of the gangrene of her toes      Discharge Follow Up Recommendations for outpatient labs/diagnostics:  Follow-up with your cardiologist as scheduled on 3/7/2023  Follow-up with PCP in 1 to 2 weeks for overall review of medications and results of tests pending at the time of discharge      Day of Discharge     HPI:   Patient feeling fine today and wants to go home after NESSA scheduled for later this afternoon.  She has been tolerating p.o. well.   No new concerns    Review of Systems  Gen- No fevers, chills  CV- No chest pain, palpitations  Resp- No cough, dyspnea  GI- No N/V/D, abd pain        Vital Signs:   Temp:  [97.5 °F (36.4 °C)-98.3 °F (36.8 °C)] 97.5 °F (36.4 °C)  Heart Rate:  [79-97] 82  Resp:  [12-18] 12  BP: (116-141)/(67-97) 139/76  Flow (L/min):  [2] 2      Physical Exam:  Constitutional: No acute distress, awake, alert  HENT: NCAT, mucous membranes moist  Respiratory: Clear to auscultation bilaterally, respiratory effort normal   Cardiovascular: RRR  Gastrointestinal: Positive bowel sounds, soft, nontender, nondistended  Musculoskeletal: No bilateral ankle edema  Psychiatric: Appropriate affect, cooperative  Neurologic: Oriented x 3, strength symmetric in all extremities, Cranial Nerves grossly intact to confrontation, speech clear  Skin: Right foot with 2 small black eschar under the second and fourth toenails without drainage or erythema    Pertinent  and/or Most Recent Results     LAB RESULTS:      Lab 03/05/23  0429 03/04/23  0539 03/01/23  2219 03/01/23  1808 03/01/23  1555   WBC 6.46 5.01  --   --  7.22   HEMOGLOBIN 12.8 12.1  --   --  13.4   HEMATOCRIT 39.8 37.2  --   --  40.8   PLATELETS 257 229  --   --  233   NEUTROS ABS 3.91 2.38  --   --  4.48   IMMATURE GRANS (ABS) 0.04 0.05  --   --  0.04   LYMPHS ABS 1.83 1.98  --   --  1.94   MONOS ABS 0.46 0.34  --   --  0.51   EOS ABS 0.19 0.23  --   --  0.22   MCV 90.2 90.1  --   --  92.3   SED RATE  --   --   --   --  16   CRP  --   --   --   --  0.84*   PROCALCITONIN  --   --   --   --  0.07   LACTATE  --   --  1.6 2.2*  --    LDH  --   --   --   --  248*   D DIMER QUANT  --   --   --   --  0.44         Lab 03/05/23  0429 03/04/23  1523 03/04/23  0539 03/03/23  2032 03/03/23  0432 03/01/23  1555   SODIUM 141  --  141  --  143 139   POTASSIUM 4.1 4.3 3.6 4.3 3.2* 4.2   CHLORIDE 106  --  106  --  109* 104   CO2 24.0  --  27.0  --  28.0 23.0   ANION GAP 11.0  --  8.0  --  6.0 12.0   BUN 4*  --   4*  --  8 11   CREATININE 0.76  --  0.66  --  0.75 0.67   EGFR 94.4  --  105.7  --  95.9 105.3   GLUCOSE 108*  --  103*  --  110* 118*   CALCIUM 8.9  --  8.3*  --  8.3* 8.9   HEMOGLOBIN A1C  --   --   --   --   --  5.20         Lab 03/01/23  1555   TOTAL PROTEIN 6.0   ALBUMIN 3.1*   GLOBULIN 2.9   ALT (SGPT) 39*   AST (SGOT) 27   BILIRUBIN 0.4   ALK PHOS 213*         Lab 03/01/23  1555   PROBNP 640.5                 Brief Urine Lab Results  (Last result in the past 365 days)      Color   Clarity   Blood   Leuk Est   Nitrite   Protein   CREAT   Urine HCG        03/02/23 0400 Yellow   Turbid   Negative   Negative   Negative   Negative               Microbiology Results (last 10 days)     Procedure Component Value - Date/Time    Blood Culture - Blood, Hand, Left [[id number]]  (Normal) Collected: 03/01/23 1556    Lab Status: Preliminary result Specimen: Blood from Hand, Left Updated: 03/05/23 1645     Blood Culture No growth at 4 days    Narrative:      Aerobic bottle only      Blood Culture - Blood, Arm, Left [[id number]]  (Normal) Collected: 03/01/23 1556    Lab Status: Preliminary result Specimen: Blood from Arm, Left Updated: 03/05/23 1645     Blood Culture No growth at 4 days    Narrative:      Aerobic bottle only      MRSA Screen, PCR (Inpatient) - Swab, Nares [[id number]]  (Normal) Collected: 03/01/23 1539    Lab Status: Final result Specimen: Swab from Nares Updated: 03/01/23 1704     MRSA PCR Negative    Narrative:      The negative predictive value of this diagnostic test is high and should only be used to consider de-escalating anti-MRSA therapy. A positive result may indicate colonization with MRSA and must be correlated clinically.  MRSA Negative          Adult Transthoracic Echo Complete W/ Cont if Necessary Per Protocol    Result Date: 3/2/2023  •  Left ventricular ejection fraction appears to be 41 - 45%. •  The cardiac valves are anatomically and functionally normal. •  Saline test results are negative for  right to left atrial level shunt.     MRI Foot Right With & Without Contrast    Result Date: 3/2/2023  MRI FOOT RIGHT W WO CONTRAST Date of Exam: 3/2/2023 2:03 AM EST Indication: Osteomyelitis.  Comparison: None available. Technique:  Routine multiplanar/multisequence sequence images of the right foot were obtained before and after the uneventful administration of  20 mL Multihance.  Findings: Mildly motion degraded examination. Bones and joints: Marrow edema of the distal fifth metatarsal shaft and metatarsal head with associated periosteal edema. No definite fracture line is seen here. Small amount marrow edema and enhancement seen within the first, second and fourth distal phalanges without T1 signal abnormality or evidence of erosion. There is flattening and collapse of the third metatarsal head most consistent with prior Freiberg's infraction. No substantial associated marrow edema. Mild scattered degenerative changes of  the forefoot most advanced the first metatarsophalangeal joint with minimal subchondral edema. No evidence of joint effusions. Soft tissues: Soft tissue edema of the second and fourth toes and to a lesser extent the first toe. Additional soft tissue edema of the lateral mid and forefoot as well as the dorsal foot. Visualized flexor and extensor tendons appear grossly intact. Disrupted appearance of the third plantar plate likely secondary to Freiberg's infraction. Remaining plantar plates appear intact Fatty atrophy of the intrinsic muscles of the foot. No evidence of rim-enhancing soft tissue collection or enhancing soft tissue mass. No definite evidence of intermetatarsal neuroma. Small amount of intermetatarsal bursal fluid at the first and second interspace.     Impression: Soft tissue edema of the second and fourth toes and to a lesser extent the first toe with minimal reactive marrow edema of the distal phalanges. No evidence of osseous erosions or T1 signal abnormality to suggest  osteomyelitis. Marrow and periosteal edema of the distal fifth metatarsal without fracture line suggestive of stress injury. 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 No evidence of associated marrow edema. Electronically Signed: Familia JonesDanielle  3/2/2023 8:48 AM EST  Workstation ID: [id number]    Stress Test With Myocardial Perfusion One Day    Result Date: 3/4/2023  •  Patient reported some midsternal chest tightness after the Lexiscan was given.  She rated the discomfort at 7/10 at its worst.  Symptom improved with oral caffeine during recovery. •  Poor ECG quality; no significant ST or T wave changes identified. •  Left ventricular ejection fraction is normal (Calculated EF = 54%). •  Attenuation of isotope taupe uptake in the anteroseptal/anterior apical segments of the left ventricle; this is primarily a fixed defect with dana-infarct zone ischemia •  Intermediate risk study; clinical correlation is required     Doppler Arterial Multi Level Lower Extremity - Bilateral CAR    Result Date: 3/3/2023  •  Right lower extremity: Normal right ROSA at 1.14.  Multiphasic waveforms are seen throughout. •  Left lower extremity: Normal left ROSA at 1.14.  Multiphasic waveforms are seen throughout. •  No evidence of significant lower extremity arterial occlusive disease in the right or left legs.     CT Outside Films    Result Date: 3/3/2023  This procedure was auto-finalized with no dictation required.      Results for orders placed during the hospital encounter of 03/01/23    Doppler Arterial Multi Level Lower Extremity - Bilateral CAR    Interpretation Summary  •  Right lower extremity: Normal right ROSA at 1.14.  Multiphasic waveforms are seen throughout.  •  Left lower extremity: Normal left ROSA at 1.14.  Multiphasic waveforms are seen throughout.  •  No evidence of significant lower extremity arterial occlusive disease in the right or left  legs.      Results for orders placed during the hospital encounter of 03/01/23    Doppler Arterial Multi Level Lower Extremity - Bilateral CAR    Interpretation Summary  •  Right lower extremity: Normal right ROSA at 1.14.  Multiphasic waveforms are seen throughout.  •  Left lower extremity: Normal left ROSA at 1.14.  Multiphasic waveforms are seen throughout.  •  No evidence of significant lower extremity arterial occlusive disease in the right or left legs.      Results for orders placed during the hospital encounter of 03/01/23    Adult Transthoracic Echo Complete W/ Cont if Necessary Per Protocol    Interpretation Summary  •  Left ventricular ejection fraction appears to be 41 - 45%.  •  The cardiac valves are anatomically and functionally normal.  •  Saline test results are negative for right to left atrial level shunt.      Plan for Follow-up of Pending Labs/Results: Pending blood cultures, pending NESSA  Pending Labs     Order Current Status    Blood Culture - Blood, Arm, Left Preliminary result    Blood Culture - Blood, Hand, Left Preliminary result        Discharge Details        Discharge Medications      New Medications      Instructions Start Date   aspirin 81 MG EC tablet   81 mg, Oral, Daily      HYDROcodone-acetaminophen 5-325 MG per tablet  Commonly known as: NORCO   1 tablet, Oral, Every 6 Hours PRN         Changes to Medications      Instructions Start Date   rosuvastatin 10 MG tablet  Commonly known as: CRESTOR  What changed:   · medication strength  · how much to take  · when to take this   10 mg, Oral, Nightly         Continue These Medications      Instructions Start Date   albuterol sulfate  (90 Base) MCG/ACT inhaler  Commonly known as: PROVENTIL HFA;VENTOLIN HFA;PROAIR HFA   2 puffs, Inhalation, 2 Times Daily PRN      atenolol 25 MG tablet  Commonly known as: TENORMIN   25 mg, Oral, Daily      budesonide-formoterol 80-4.5 MCG/ACT inhaler  Commonly known as: SYMBICORT   2 puffs, Inhalation,  2 Times Daily - RT      cetirizine 10 MG tablet  Commonly known as: zyrTEC   10 mg, Oral, Daily      folic acid 1 MG tablet  Commonly known as: FOLVITE   1 mg, Oral, Daily      levothyroxine 25 MCG tablet  Commonly known as: SYNTHROID, LEVOTHROID   25 mcg, Oral, Every Early Morning      linaclotide 145 MCG capsule capsule  Commonly known as: LINZESS   145 mcg, Oral, Every Morning Before Breakfast      magnesium oxide 400 (241.3 Mg) MG tablet tablet  Commonly known as: MAGOX   400 mg, Oral, 2 Times Daily      montelukast 10 MG tablet  Commonly known as: SINGULAIR   10 mg, Oral, Nightly      ondansetron 4 MG tablet  Commonly known as: ZOFRAN   4 mg, Oral, Every 8 Hours PRN      pantoprazole 40 MG EC tablet  Commonly known as: PROTONIX   40 mg, Oral, Daily      pregabalin 200 MG capsule  Commonly known as: LYRICA   200 mg, Oral, 2 Times Daily, Takes w/50mg dose for BID total of 250mg      pregabalin 50 MG capsule  Commonly known as: LYRICA   50 mg, Oral, 2 Times Daily, Takes w/200mg dose for BID total of 250mg      Vitamin D3 50 MCG (2000 UT) tablet   50 mcg, Oral, Daily      zonisamide 100 MG capsule  Commonly known as: ZONEGRAN   Oral, 2 Times Daily, 1 capsule (100mg) in AM, 2 capsules (200mg) in PM         Stop These Medications    celecoxib 200 MG capsule  Commonly known as: CeleBREX     rizatriptan 10 MG tablet  Commonly known as: MAXALT            Allergies   Allergen Reactions   • Gabapentin Hives         Discharge Disposition:  Home or Self Care    Diet:  Hospital:  Diet Order   Procedures   • NPO Diet NPO Type: Strict NPO       Activity:  Activity Instructions     Other Activity Instructions      Activity Instructions: Nonweightbearing right foot except for while in toe offloading boot          Restrictions or Other Recommendations:  None       CODE STATUS:    Code Status and Medical Interventions:   Ordered at: 03/01/23 8692     Code Status (Patient has no pulse and is not breathing):    CPR (Attempt to  Resuscitate)     Medical Interventions (Patient has pulse or is breathing):    Full Support       Future Appointments   Date Time Provider Department Center   3/6/2023  3:00 PM [facility] CARD NESSA [facility]  [facility]   3/7/2023  9:15 AM Padma Kumari APRN [facility]   3/24/2023  8:30 AM COR SOUTH CT 1 BH COR CT IS [place]       Additional Instructions for the Follow-ups that You Need to Schedule     Discharge Follow-up with PCP   As directed       Currently Documented PCP:    Baljeet Manley APRN    PCP Phone Number:    [phone]     Follow Up Details: 1 week         Discharge Follow-up with Specified Provider: cardiologist as scheduled; 1 Day   As directed      To: cardiologist as scheduled    Follow Up: 1 Day                     Marsha Rosado MD  03/06/23      Time Spent on Discharge:  I spent  35  minutes on this discharge activity which included: face-to-face encounter with the patient, reviewing the data in the system, coordination of the care with the nursing staff as well as consultants, documentation, and entering orders.            Electronically signed by Marsha Rosado MD at 03/06/23 3456

## 2023-03-06 NOTE — THERAPY RE-EVALUATION
Patient Name: Bryanna Schwartz  : 1970    MRN: [id number]                              Today's Date: 3/6/2023       Admit Date: 3/1/2023    Visit Dx:     ICD-10-CM ICD-9-CM   1. Stress fracture of metatarsal bone of right foot with routine healing, subsequent encounter  M84.374D V54.29   2. Examination for normal comparison for clinical research  Z00.6 V70.7     Patient Active Problem List   Diagnosis   • GERD without esophagitis   • Anxiety and depression   • Hyperlipidemia   • Hypertension   • Migraines   • Gangrene (HCC)   • Glucose intolerance   • Diabetes mellitus with neuropathy (HCC)   • Hypothyroidism   • Chronic systolic CHF (congestive heart failure) (HCC)   • Embolic infarction (HCC)   • Metatarsal stress fracture of right foot     Past Medical History:   Diagnosis Date   • Arthritis    • Depression    • Diabetes (HCC)    • Environmental allergies    • Falls 10/24/2018   • GERD (gastroesophageal reflux disease)    • Hyperlipidemia    • Hypertension    • Hypothyroidism    • Lyme disease    • Migraine    • Kelvin Mountain spotted fever    • Vitamin B 12 deficiency      Past Surgical History:   Procedure Laterality Date   • ACHILLES TENDON SURGERY Right    • BREAST CYST EXCISION Left    • CHOLECYSTECTOMY     • ENDOSCOPY N/A 10/16/2018    Procedure: ESOPHAGOGASTRODUODENOSCOPY;  Surgeon: Brunner, Mark I, MD;  Location: [facility] ENDOSCOPY;  Service: Gastroenterology   • HYSTERECTOMY        General Information     Row Name 23 1018          Physical Therapy Time and Intention    Document Type re-evaluation  -     Mode of Treatment physical therapy  -     Row Name 23 1018          General Information    Patient Profile Reviewed yes  -     Prior Level of Function --  see IE  -     Existing Precautions/Restrictions fall;cardiac;other (see comments)  RLE non-weightbearing with offloading shoe  -     Barriers to Rehab medically complex;previous functional deficit  -     Row Name 23  1018          Safety Issues, Functional Mobility    Safety Issues Affecting Function (Mobility) awareness of need for assistance;insight into deficits/self-awareness;safety precaution awareness;safety precautions follow-through/compliance;sequencing abilities  -     Impairments Affecting Function (Mobility) balance;endurance/activity tolerance;postural/trunk control;sensation/sensory awareness;shortness of breath;strength;pain  -           User Key  (r) = Recorded By, (t) = Taken By, (c) = Cosigned By    Initials Name Provider Type     Sadaf Briones PT Physical Therapist               Mobility     Row Name 03/06/23 1021          Bed Mobility    Bed Mobility supine-sit  -HM     Supine-Sit [place] (Bed Mobility) standby assist  -     Assistive Device (Bed Mobility) head of bed elevated  -     Row Name 03/06/23 1021          Sit-Stand Transfer    Sit-Stand [place] (Transfers) moderate assist (50% patient effort);2 person assist  -     Assistive Device (Sit-Stand Transfers) walker, front-wheeled  -HM     Comment, (Sit-Stand Transfer) difficulty maintaing NWB on RLE throughout, cues for upright posture and utilizing BUE for stability on walker  -HM     Row Name 03/06/23 1021          Gait/Stairs (Locomotion)    [place] Level (Gait) moderate assist (50% patient effort);2 person assist  -     Assistive Device (Gait) walker, front-wheeled  -HM     Distance in Feet (Gait) 2  -HM     Deviations/Abnormal Patterns (Gait) bilateral deviations;gait speed decreased;jasbir decreased;stride length decreased  -     Bilateral Gait Deviations forward flexed posture;heel strike decreased  -     Comment, (Gait/Stairs) Pt initially attempted forward gait from EOB, however, became unsteady and sat back down. Pt cued for slow controlled movement for increased stability. pt then ambulated 2 feet to chair with Mod A x2 and FWx utilizing a method of hopping on LLE, cues for sequencing, upright posture, and  maintaining NWB on RLE. Mobility limited d/t fatigue and unsteadiness with gait  -HM     Row Name 03/06/23 1021          Mobility    Extremity Weight-bearing Status right lower extremity  -     Right Lower Extremity (Weight-bearing Status) non weight-bearing (NWB)  -           User Key  (r) = Recorded By, (t) = Taken By, (c) = Cosigned By    Initials Name Provider Type     Sadaf Briones PT Physical Therapist               Obj/Interventions     Row Name 03/06/23 1027          Balance    Balance Assessment sitting static balance;sitting dynamic balance;sit to stand dynamic balance;standing static balance;standing dynamic balance  -     Static Sitting Balance standby assist  -     Dynamic Sitting Balance standby assist  -     Position, Sitting Balance unsupported;sitting edge of bed  -     Sit to Stand Dynamic Balance moderate assist;2-person assist  -HM     Static Standing Balance minimal assist;2-person assist  -HM     Dynamic Standing Balance moderate assist;2-person assist  -HM     Position/Device Used, Standing Balance supported;walker, front-wheeled  -     Balance Interventions standing;dynamic;occupation based/functional task  -     Comment, Balance unsteadiness noted with NWB on RLE  -HM           User Key  (r) = Recorded By, (t) = Taken By, (c) = Cosigned By    Initials Name Provider Type     Sadaf Briones PT Physical Therapist               Goals/Plan     Row Name 03/06/23 1034          Bed Mobility Goal 1 (PT)    Activity/Assistive Device (Bed Mobility Goal 1, PT) sit to supine/supine to sit  -     [place] Level/Cues Needed (Bed Mobility Goal 1, PT) independent  -     Time Frame (Bed Mobility Goal 1, PT) long term goal (LTG);10 days  -     Progress/Outcomes (Bed Mobility Goal 1, PT) goal ongoing  -     Row Name 03/06/23 1034          Transfer Goal 1 (PT)    Activity/Assistive Device (Transfer Goal 1, PT) sit-to-stand/stand-to-sit;bed-to-chair/chair-to-bed  -      [place] Level/Cues Needed (Transfer Goal 1, PT) contact guard required  -HM     Time Frame (Transfer Goal 1, PT) long term goal (LTG);10 days  -HM     Progress/Outcome (Transfer Goal 1, PT) goal revised this date;medical status inhibiting progress  -     Row Name 03/06/23 1034          Gait Training Goal 1 (PT)    Activity/Assistive Device (Gait Training Goal 1, PT) gait (walking locomotion);assistive device use  -     [place] Level (Gait Training Goal 1, PT) minimum assist (75% or more patient effort)  -HM     Distance (Gait Training Goal 1, PT) 50  -HM     Time Frame (Gait Training Goal 1, PT) long term goal (LTG);10 days  -HM     Progress/Outcome (Gait Training Goal 1, PT) goal revised this date;medical status inhibiting progress  -     Row Name 03/06/23 1034          Therapy Assessment/Plan (PT)    Planned Therapy Interventions (PT) balance training;bed mobility training;gait training;home exercise program;neuromuscular re-education;patient/family education;postural re-education;strengthening;transfer training  -           User Key  (r) = Recorded By, (t) = Taken By, (c) = Cosigned By    Initials Name Provider Type     Sadaf Briones, PT Physical Therapist               Clinical Impression     Row Name 03/06/23 1029          Pain    Pretreatment Pain Rating 0/10 - no pain  -     Posttreatment Pain Rating 0/10 - no pain  -     Row Name 03/06/23 1029          Plan of Care Review    Plan of Care Reviewed With patient  -     Progress no change  -     Outcome Evaluation PT re-evaluation completed as pt is now NWB on RLE. Off loading shoe fit and donned on pt. Pt ambulated 2 feet to chair with Mod A x2 with cues for maintaining NWB on RLE, sequencing, upright posture, and slow controlled movements for safety. Pt demonstrated balance deficits, strength deficits, decreased independence with mobility, and decreased functional endurance placing pt at increased risk for falling at this time.  d/c rec IRF.  -     Row Name 03/06/23 1029          Therapy Assessment/Plan (PT)    Rehab Potential (PT) good, to achieve stated therapy goals  -     Criteria for Skilled Interventions Met (PT) yes  -     Therapy Frequency (PT) daily  -     Row Name 03/06/23 1029          Vital Signs    Pre Systolic BP Rehab 141  VSS, RN cleared  -     Pre Treatment Diastolic BP 97  -HM     Pretreatment Heart Rate (beats/min) 90  -HM     Pre SpO2 (%) 98  -HM     O2 Delivery Pre Treatment room air  -HM     O2 Delivery Intra Treatment room air  -HM     O2 Delivery Post Treatment room air  -HM     Pre Patient Position Supine  -     Intra Patient Position Standing  -HM     Post Patient Position Sitting  -     Row Name 03/06/23 1029          Positioning and Restraints    Pre-Treatment Position in bed  -HM     Post Treatment Position chair  -HM     In Chair notified nsg;reclined;sitting;call light within reach;encouraged to call for assist;waffle cushion;with family/caregiver  -           User Key  (r) = Recorded By, (t) = Taken By, (c) = Cosigned By    Initials Name Provider Type     Sadaf Briones, PT Physical Therapist               Outcome Measures     Row Name 03/06/23 1035          How much help from another person do you currently need...    Turning from your back to your side while in flat bed without using bedrails? 4  -HM     Moving from lying on back to sitting on the side of a flat bed without bedrails? 4  -HM     Moving to and from a bed to a chair (including a wheelchair)? 2  -HM     Standing up from a chair using your arms (e.g., wheelchair, bedside chair)? 2  -HM     Climbing 3-5 steps with a railing? 1  -HM     To walk in hospital room? 2  -HM     AM-PAC 6 Clicks Score (PT) 15  -     Highest level of mobility 4 --> Transferred to chair/commode  -     Row Name 03/06/23 1035          Functional Assessment    Outcome Measure Options AM-PAC 6 Clicks Basic Mobility (PT)  -           User Key  (r) =  Recorded By, (t) = Taken By, (c) = Cosigned By    Initials Name Provider Type     Sadaf Briones, BETHANY Physical Therapist                             Physical Therapy Education     Title: PT OT SLP Therapies (In Progress)     Topic: Physical Therapy (In Progress)     Point: Mobility training (Done)     Learning Progress Summary           Patient Acceptance, TB,E, VU,NR by  at 3/6/2023 1036    Acceptance, E, VU by AE at 3/3/2023 1025                   Point: Home exercise program (Not Started)     Learner Progress:  Not documented in this visit.          Point: Body mechanics (Done)     Learning Progress Summary           Patient Acceptance, TB,E, VU,NR by  at 3/6/2023 1036    Acceptance, E, VU by AE at 3/3/2023 1025                   Point: Precautions (Done)     Learning Progress Summary           Patient Acceptance, TB,E, VU,NR by  at 3/6/2023 1036    Acceptance, E, VU by AE at 3/3/2023 1025                               User Key     Initials Effective Dates Name Provider Type Discipline     09/21/21 -  Rafael James, PT Physical Therapist PT     09/22/22 -  Sadaf Briones PT Physical Therapist PT              PT Recommendation and Plan  Planned Therapy Interventions (PT): balance training, bed mobility training, gait training, home exercise program, neuromuscular re-education, patient/family education, postural re-education, strengthening, transfer training  Plan of Care Reviewed With: patient  Progress: no change  Outcome Evaluation: PT re-evaluation completed as pt is now NWB on RLE. Off loading shoe fit and donned on pt. Pt ambulated 2 feet to chair with Mod A x2 with cues for maintaining NWB on RLE, sequencing, upright posture, and slow controlled movements for safety. Pt demonstrated balance deficits, strength deficits, decreased independence with mobility, and decreased functional endurance placing pt at increased risk for falling at this time. d/c rec IRF.     Time Calculation:    PT  Charges     Row Name 03/06/23 1037             Time Calculation    Start Time 0940  -HM      PT Received On 03/06/23  -HM      PT Goal Re-Cert Due Date 03/16/23  -HM         Timed Charges    82528 - PT Therapeutic Activity Minutes 23  -HM         Untimed Charges    PT Eval/Re-eval Minutes 20  -HM         Total Minutes    Timed Charges Total Minutes 23  -HM      Untimed Charges Total Minutes 20  -HM       Total Minutes 43  -HM            User Key  (r) = Recorded By, (t) = Taken By, (c) = Cosigned By    Initials Name Provider Type     Sadaf Briones, PT Physical Therapist              Therapy Charges for Today     Code Description Service Date Service Provider Modifiers Qty    72809556518 HC PT THERAPEUTIC ACT EA 15 MIN 3/6/2023 Sadaf Briones, PT GP 2    26175870133 HC PT RE-EVAL ESTABLISHED PLAN 2 3/6/2023 Sadaf Briones, PT GP 1          PT G-Codes  Outcome Measure Options: AM-PAC 6 Clicks Basic Mobility (PT)  AM-PAC 6 Clicks Score (PT): 15  PT Discharge Summary  Anticipated Discharge Disposition (PT): inpatient rehabilitation facility    Sadaf Briones, BETHANY  3/6/2023

## 2023-03-06 NOTE — PLAN OF CARE
Goal Outcome Evaluation:  Plan of Care Reviewed With: patient        Progress: no change  Outcome Evaluation: PT re-evaluation completed as pt is now NWB on RLE. Off loading shoe fit and donned on pt. Pt ambulated 2 feet to chair with Mod A x2 with cues for maintaining NWB on RLE, sequencing, upright posture, and slow controlled movements for safety. Pt demonstrated balance deficits, strength deficits, decreased independence with mobility, and decreased functional endurance placing pt at increased risk for falling at this time. d/c rec IRF.

## 2023-03-06 NOTE — PROGRESS NOTES
INFECTIOUS DISEASE follow-up    Bryanna Schwartz  1970  [id number]    Date of Consult: 3/6/2023    Admission Date: 3/1/2023      Requesting Provider: Sadaf Torrez MD  Evaluating Physician: Noah Contreras MD    Reason for Consultation: foot cellulitis    History of present illness:    Patient is a 52 y.o. female with arthritis, depression, diabetes mellitus type 2, GERD, hyperlipidemia, hypertension, vitamin B12 deficiency presents to [facility] with redness and pain of toes of bilateral extremities patient reports 2-week history of redness swelling pain.  Patient believes that she has had Lyme disease and Bradley spotted fever historically patient may have a chronic neuropathy.  Patient has been admitted to hospital for peripheral vascular disease work-up.    Patient denies fevers or chills.    Patient states that her diabetes mellitus is improving and patient recently went off her oral antihyperglycemic agents    Patient was referred to vascular surgery for gangrenous changes of right fourth toe and right second toe.    3/3/2023 afebrile normotensive no events overnight no complaints    3/4/23; no events overnight; no fever, rash, sore throat tolerating abx    3/6/2023 no events overnight to have heart catheterization today denies fevers rash or sore throat  Past Medical History:   Diagnosis Date   • Arthritis    • Depression    • Diabetes (HCC)    • Environmental allergies    • Falls 10/24/2018   • GERD (gastroesophageal reflux disease)    • Hyperlipidemia    • Hypertension    • Hypothyroidism    • Lyme disease    • Migraine    • Kelvin Mountain spotted fever    • Vitamin B 12 deficiency        Past Surgical History:   Procedure Laterality Date   • ACHILLES TENDON SURGERY Right    • BREAST CYST EXCISION Left    • CHOLECYSTECTOMY     • ENDOSCOPY N/A 10/16/2018    Procedure: ESOPHAGOGASTRODUODENOSCOPY;  Surgeon: Brunner, Mark I, MD;  Location: [facility] ENDOSCOPY;  Service:  Gastroenterology   • HYSTERECTOMY         Family History   Problem Relation Age of Onset   • Hypertension Mother    • COPD Mother    • Heart attack Mother    • Other Father         / of exhaust fumes       Social History     Socioeconomic History   • Marital status:    • Number of children: 2   Tobacco Use   • Smoking status: Never   • Smokeless tobacco: Never   Vaping Use   • Vaping Use: Never used   Substance and Sexual Activity   • Alcohol use: No   • Drug use: No   • Sexual activity: Defer     Partners: Male       Allergies   Allergen Reactions   • Gabapentin Hives         Medication:    Current Facility-Administered Medications:   •  acetaminophen (TYLENOL) tablet 650 mg, 650 mg, Oral, Q6H PRN, Charo De La Paz MD  •  aspirin tablet 162 mg, 162 mg, Oral, Daily, Andrei Hoyos MD, 162 mg at 23 1157  •  atenolol (TENORMIN) tablet 25 mg, 25 mg, Oral, Daily, Charo De La Paz MD, 25 mg at 23 0854  •  sennosides-docusate (PERICOLACE) 8.6-50 MG per tablet 2 tablet, 2 tablet, Oral, BID, 2 tablet at 23 **AND** polyethylene glycol (MIRALAX) packet 17 g, 17 g, Oral, Daily PRN **AND** bisacodyl (DULCOLAX) EC tablet 5 mg, 5 mg, Oral, Daily PRN **AND** bisacodyl (DULCOLAX) suppository 10 mg, 10 mg, Rectal, Daily PRN, Emily Ordoñez APRN  •  Calcium Replacement - Initiate Nurse / BPA Driven Protocol, , Does not apply, PRN, Marsha Rosado MD  •  cefTRIAXone (ROCEPHIN) 2 g/100 mL 0.9% NS IVPB (MBP), 2 g, Intravenous, Q24H, Noah Contreras MD, 2 g at 23 1732  •  fentaNYL citrate (PF) (SUBLIMAZE) injection  mcg,  mcg, Intravenous, Once PRN, Yasmani Alonso MD  •  flumazenil (ROMAZICON) injection 0.5 mg, 0.5 mg, Intravenous, Once PRN, Yasmani Alonso MD  •  heparin (porcine) 5000 UNIT/ML injection 5,000 Units, 5,000 Units, Subcutaneous, Q12H, Charo De La Paz MD, 5,000 Units at 23  •  HYDROcodone-acetaminophen (NORCO) 5-325 MG per  tablet 1 tablet, 1 tablet, Oral, Q6H PRN, Marsha Rosado MD, 1 tablet at 03/05/23 2002  •  Insulin Lispro (humaLOG) injection 0-7 Units, 0-7 Units, Subcutaneous, TID With Meals, Charo De La Paz MD, 2 Units at 03/05/23 1157  •  levothyroxine (SYNTHROID, LEVOTHROID) tablet 25 mcg, 25 mcg, Oral, Q AM, Charo De La Paz MD, 25 mcg at 03/05/23 0525  •  Magnesium Standard Dose Replacement - Initiate Nurse / BPA Driven Protocol, , Does not apply, PRN, Marsha Rosado MD  •  methohexital (BREVITAL) injection  mg,  mg, Intravenous, Once PRN, Yasmani Alonso MD  •  midazolam (VERSED) injection 2-8 mg, 2-8 mg, Intravenous, Once PRN, Yasmani Alonso MD  •  naloxone (NARCAN) injection 0.4 mg, 0.4 mg, Intravenous, Once PRN, Yasmani Alonso MD  •  ondansetron (ZOFRAN) injection 4 mg, 4 mg, Intravenous, Q6H PRN, Marsha Rosado MD, 4 mg at 03/03/23 1738  •  Phosphorus Replacement - Initiate Nurse / BPA Driven Protocol, , Does not apply, PRN, Marsha oRsado MD  •  Potassium Replacement - Initiate Nurse / BPA Driven Protocol, , Does not apply, PRN, Marsha Rosado MD  •  pregabalin (LYRICA) capsule 150 mg, 150 mg, Oral, BID, Charo De La Paz MD, 150 mg at 03/05/23 2002  •  rosuvastatin (CRESTOR) tablet 10 mg, 10 mg, Oral, Nightly, Yasmani Alonso MD  •  sodium chloride 0.9 % flush 10 mL, 10 mL, Intravenous, Q12H, Charo De La Paz MD, 10 mL at 03/06/23 0850  •  sodium chloride 0.9 % flush 10 mL, 10 mL, Intravenous, PRN, Charo De La Paz MD  •  Sulfur Hexafluoride Microsph (LUMASON) 60.7-25 MG IV reconstituted suspension reconstituted suspension 3 mL, 3 mL, Intravenous, Once in imaging, Charo De La Paz MD  •  zonisamide (ZONEGRAN) capsule 100 mg, 100 mg, Oral, Daily, Jose Pardo, PharmD, 100 mg at 03/05/23 1041  •  zonisamide (ZONEGRAN) capsule 200 mg, 200 mg, Oral, Nightly, Jose Pardo, PharmD, 200 mg at 03/05/23 2003    Antibiotics:  Anti-Infectives (From admission, onward)    Ordered      Dose/Rate Route Frequency Start Stop    23 1422  cefTRIAXone (ROCEPHIN) 2 g/100 mL 0.9% NS IVPB (MBP)        Ordering Provider: Noah Contreras MD    2 g  over 30 Minutes Intravenous Every 24 Hours 23 1600 23 1559    23 1520  vancomycin IVPB 2000 mg in 0.9% Sodium Chloride (premix) 500 mL        Ordering Provider: Yung Hughes, PharmD    20 mg/kg × 102 kg Intravenous Once 23 1615 23 1640    23 1513  piperacillin-tazobactam (ZOSYN) 3.375 g in iso-osmotic dextrose 50 ml (premix)        Ordering Provider: Charo De La Paz MD    3.375 g  over 30 Minutes Intravenous Once 23 1600 23 1625            Review of Systems:  See HPI  Physical Exam:   Vital Signs  Temp (24hrs), Av.7 °F (36.5 °C), Min:97.5 °F (36.4 °C), Max:98.3 °F (36.8 °C)    Temp  Min: 97.5 °F (36.4 °C)  Max: 98.3 °F (36.8 °C)  BP  Min: 116/72  Max: 141/97  Pulse  Min: 79  Max: 97  Resp  Min: 16  Max: 18  SpO2  Min: 92 %  Max: 98 %    GENERAL: Awake and alert, in no acute distress.   HEENT: Normocephalic, atraumatic.  PERRL. EOMI. No conjunctival injection. No icterus.  No external oral lesions    HEART: RRR; No murmur  LUNGS: Clear to auscultation bilaterally   ABDOMEN: Soft, nontender,   EXT:  No edema  :  Without Moralez catheter.  MSK: Right foot is wrapped  SKIN: No rash    Laboratory Data    Results from last 7 days   Lab Units 23  0429 23  0539 23  1555   WBC 10*3/mm3 6.46 5.01 7.22   HEMOGLOBIN g/dL 12.8 12.1 13.4   HEMATOCRIT % 39.8 37.2 40.8   PLATELETS 10*3/mm3 257 229 233     Results from last 7 days   Lab Units 23  0429   SODIUM mmol/L 141   POTASSIUM mmol/L 4.1   CHLORIDE mmol/L 106   CO2 mmol/L 24.0   BUN mg/dL 4*   CREATININE mg/dL 0.76   GLUCOSE mg/dL 108*   CALCIUM mg/dL 8.9     Results from last 7 days   Lab Units 23  1555   ALK PHOS U/L 213*   BILIRUBIN mg/dL 0.4   ALT (SGPT) U/L 39*   AST (SGOT) U/L 27     Results from last 7 days   Lab  Units 03/01/23  1555   SED RATE mm/hr 16     Results from last 7 days   Lab Units 03/01/23  1555   CRP mg/dL 0.84*     Results from last 7 days   Lab Units 03/01/23  2219   LACTATE mmol/L 1.6     Results from last 7 days   Lab Units 03/01/23  1555   CK TOTAL U/L 48         Estimated Creatinine Clearance: 113.6 mL/min (by C-G formula based on SCr of 0.76 mg/dL).      Microbiology:  No results found for: ACANTHNAEG, AFBCX, BPERTUSSISCX, BLOODCX  No results found for: BCIDPCR, CXREFLEX, CSFCX, CULTURETIS  No results found for: CULTURES, HSVCX, URCX  No results found for: EYECULTURE, GCCX, HSVCULTURE, LABHSV  No results found for: LEGIONELLA, MRSACX, MUMPSCX, MYCOPLASCX  No results found for: NOCARDIACX, STOOLCX  No results found for: THROATCX, UNSTIMCULT, URINECX, CULTURE, VZVCULTUR  No results found for: VIRALCULTU, WOUNDCX        Radiology:  Imaging Results (Last 72 Hours)     ** No results found for the last 72 hours. **            Impression:   Right fourth toe gangrene  Right second toe gangrene  Diabetes mellitus type 2  Peripheral neuropathy  Vitamin B12 deficiency    L think history of Lyme disease recommends 5.  Relevant to patient's presentation    PLAN/RECOMMENDATIONS:   Thank you for asking us to see Bryanna Schwartz, I recommend the following:      After discussion with Dr. Krause there is more concerned that patient has a fracture;      Patient may require offloading and may need to see a foot orthopedist to decide best way to manage fracture to prevent worsening.    I do not suspect patient has osteomyelitis    Probably can change IV antibiotics to oral antibiotics very soon    Upon improvement, iv abx can be changed to oral antibiotics such as doxycycline for a 7 to 14-day course      Patient is to  Have NESSA today  Noah Contreras MD  3/6/2023  13:42 EST

## 2023-03-06 NOTE — CASE MANAGEMENT/SOCIAL WORK
Continued Stay Note  [facility]     Patient Name: Bryanna Schwartz  MRN: [id number]  Today's Date: 3/6/2023    Admit Date: 3/1/2023    Plan: home   Discharge Plan     Row Name 03/06/23 1026       Plan    Plan home    Patient/Family in Agreement with Plan yes    Plan Comments I met with the patient at the bedside. She is having NESSA today and had a stress test on Saturday. CM asked if she would like me to set up OP PT per PT recommendations, but she stated that her PCP has already started that process for her and there is no need for CM to set it up. CM will follow her plan of care.    Final Discharge Disposition Code 01 - home or self-care               Discharge Codes    No documentation.               Expected Discharge Date and Time     Expected Discharge Date Expected Discharge Time    Mar 10, 2023             Carissa Prado RN

## 2023-03-06 NOTE — DISCHARGE SUMMARY
[facility] Medicine Services  DISCHARGE SUMMARY    Patient Name: Bryanna Schwartz  : 1970  MRN: [id number]    Date of Admission: 3/1/2023 12:57 PM  Date of Discharge:  23    Primary Care Physician: Baljeet Manley APRN    Consults     Date and Time Order Name Status Description    3/3/2023  6:46 AM Inpatient Cardiology Consult Completed     3/2/2023 12:34 AM Inpatient Infectious Diseases Consult Completed           Hospital Course     Presenting Problem:   Gangrene (HCC) [I96]    Active Hospital Problems    Diagnosis  POA   • **Gangrene (HCC) [I96]  Yes   • Metatarsal stress fracture of right foot [M84.374A]  Yes   • Chronic systolic CHF (congestive heart failure) (HCC) [I50.22]  Yes   • 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          Hospital Course:  Bryanna Schwartz is a 52 y.o. female with past medical history of diabetes mellitus with neuropathy, hypothyroidism, hypertension, anxiety depression, history of Lyme disease and [place] spotted fever who presented with gangrene on her right second and fourth toes just under the nail.  She was found to have a metatarsal fracture of the second metatarsal on the right foot.  She underwent cardiac stress test which was abnormal on 3/4/2023.  Cardiology was consulted.  She will follow-up with her outpatient cardiologist in [place] on 3/7/2023.  She will have a NESSA on 3/6/2023 prior to discharge.     Gangrene of the right second and fourth toes  No evidence of peripheral vascular disease  Concern for suspected embolic infarct  Second metatarsal fracture of the right foot  -Dr. Krause evaluated-she needs to be weightbearing as tolerated with a toe offloading boot  for the right foot  -Also seen by Dr. Contreras with infectious disease  -Work-up ongoing but imaging not concerning for osteomyelitis  -Treated with ceftriaxone per infectious disease recommendations with transition to oral doxycycline x7 days at discharge  -Cardiology consulted to evaluate for potential etiologies of embolic infarct, abnormal stress test on 3/4/2023 and planned NESSA on 3/6/2023  -Added Norco for pain which works well  -Arterial Dopplers bilateral lower extremities negative  -Patient to follow-up with her outpatient cardiologist on 3/7/2023 in [place]  -Discontinue migraine meds that could cause vasoconstriction at discharge     Chronic systolic congestive heart failure  - EF of 41%  -Cardiology has been consulted  -Abnormal stress test Saturday and planned NESSA on Monday  -Deferred ARB due to hypotension  -Continue atenolol     Diabetes mellitus with neuropathy  -Diet controlled     Hypothyroidism  -Continue Synthroid     History of vitamin B12 deficiency  -Last B12 level in June 2022 was 397     Hypertension  -Continue atenolol     Anxiety/depression  Altered mental status  -Held home meds Neurontin Lyrica and Cymbalta while admitted, okay to resume at discharge     History of Lyme disease  History of [place] spotted fever  -Complicates care and diagnostic work-up for etiology of the gangrene of her toes      Discharge Follow Up Recommendations for outpatient labs/diagnostics:  Follow-up with your cardiologist as scheduled on 3/7/2023  Follow-up with PCP in 1 to 2 weeks for overall review of medications and results of tests pending at the time of discharge      Day of Discharge     HPI:   Patient feeling fine today and wants to go home after NESSA scheduled for later this afternoon.  She has been tolerating p.o. well.  No new concerns    Review of Systems  Gen- No fevers, chills  CV- No chest pain, palpitations  Resp- No cough, dyspnea  GI- No N/V/D, abd pain        Vital Signs:   Temp:  [97.5 °F  (36.4 °C)-98.3 °F (36.8 °C)] 97.5 °F (36.4 °C)  Heart Rate:  [79-97] 82  Resp:  [12-18] 12  BP: (116-141)/(67-97) 139/76  Flow (L/min):  [2] 2      Physical Exam:  Constitutional: No acute distress, awake, alert  HENT: NCAT, mucous membranes moist  Respiratory: Clear to auscultation bilaterally, respiratory effort normal   Cardiovascular: RRR  Gastrointestinal: Positive bowel sounds, soft, nontender, nondistended  Musculoskeletal: No bilateral ankle edema  Psychiatric: Appropriate affect, cooperative  Neurologic: Oriented x 3, strength symmetric in all extremities, Cranial Nerves grossly intact to confrontation, speech clear  Skin: Right foot with 2 small black eschar under the second and fourth toenails without drainage or erythema    Pertinent  and/or Most Recent Results     LAB RESULTS:      Lab 03/05/23  0429 03/04/23  0539 03/01/23  2219 03/01/23  1808 03/01/23  1555   WBC 6.46 5.01  --   --  7.22   HEMOGLOBIN 12.8 12.1  --   --  13.4   HEMATOCRIT 39.8 37.2  --   --  40.8   PLATELETS 257 229  --   --  233   NEUTROS ABS 3.91 2.38  --   --  4.48   IMMATURE GRANS (ABS) 0.04 0.05  --   --  0.04   LYMPHS ABS 1.83 1.98  --   --  1.94   MONOS ABS 0.46 0.34  --   --  0.51   EOS ABS 0.19 0.23  --   --  0.22   MCV 90.2 90.1  --   --  92.3   SED RATE  --   --   --   --  16   CRP  --   --   --   --  0.84*   PROCALCITONIN  --   --   --   --  0.07   LACTATE  --   --  1.6 2.2*  --    LDH  --   --   --   --  248*   D DIMER QUANT  --   --   --   --  0.44         Lab 03/05/23  0429 03/04/23  1523 03/04/23  0539 03/03/23  2032 03/03/23  0432 03/01/23  1555   SODIUM 141  --  141  --  143 139   POTASSIUM 4.1 4.3 3.6 4.3 3.2* 4.2   CHLORIDE 106  --  106  --  109* 104   CO2 24.0  --  27.0  --  28.0 23.0   ANION GAP 11.0  --  8.0  --  6.0 12.0   BUN 4*  --  4*  --  8 11   CREATININE 0.76  --  0.66  --  0.75 0.67   EGFR 94.4  --  105.7  --  95.9 105.3   GLUCOSE 108*  --  103*  --  110* 118*   CALCIUM 8.9  --  8.3*  --  8.3* 8.9    HEMOGLOBIN A1C  --   --   --   --   --  5.20         Lab 03/01/23  1555   TOTAL PROTEIN 6.0   ALBUMIN 3.1*   GLOBULIN 2.9   ALT (SGPT) 39*   AST (SGOT) 27   BILIRUBIN 0.4   ALK PHOS 213*         Lab 03/01/23  1555   PROBNP 640.5                 Brief Urine Lab Results  (Last result in the past 365 days)      Color   Clarity   Blood   Leuk Est   Nitrite   Protein   CREAT   Urine HCG        03/02/23 0400 Yellow   Turbid   Negative   Negative   Negative   Negative               Microbiology Results (last 10 days)     Procedure Component Value - Date/Time    Blood Culture - Blood, Hand, Left [[id number]]  (Normal) Collected: 03/01/23 1556    Lab Status: Preliminary result Specimen: Blood from Hand, Left Updated: 03/05/23 1645     Blood Culture No growth at 4 days    Narrative:      Aerobic bottle only      Blood Culture - Blood, Arm, Left [[id number]]  (Normal) Collected: 03/01/23 1556    Lab Status: Preliminary result Specimen: Blood from Arm, Left Updated: 03/05/23 1645     Blood Culture No growth at 4 days    Narrative:      Aerobic bottle only      MRSA Screen, PCR (Inpatient) - Swab, Nares [[id number]]  (Normal) Collected: 03/01/23 1539    Lab Status: Final result Specimen: Swab from Nares Updated: 03/01/23 1704     MRSA PCR Negative    Narrative:      The negative predictive value of this diagnostic test is high and should only be used to consider de-escalating anti-MRSA therapy. A positive result may indicate colonization with MRSA and must be correlated clinically.  MRSA Negative          Adult Transthoracic Echo Complete W/ Cont if Necessary Per Protocol    Result Date: 3/2/2023  •  Left ventricular ejection fraction appears to be 41 - 45%. •  The cardiac valves are anatomically and functionally normal. •  Saline test results are negative for right to left atrial level shunt.     MRI Foot Right With & Without Contrast    Result Date: 3/2/2023  MRI FOOT RIGHT W WO CONTRAST Date of Exam: 3/2/2023 2:03 AM EST  Indication: Osteomyelitis.  Comparison: None available. Technique:  Routine multiplanar/multisequence sequence images of the right foot were obtained before and after the uneventful administration of  20 mL Multihance.  Findings: Mildly motion degraded examination. Bones and joints: Marrow edema of the distal fifth metatarsal shaft and metatarsal head with associated periosteal edema. No definite fracture line is seen here. Small amount marrow edema and enhancement seen within the first, second and fourth distal phalanges without T1 signal abnormality or evidence of erosion. There is flattening and collapse of the third metatarsal head most consistent with prior Freiberg's infraction. No substantial associated marrow edema. Mild scattered degenerative changes of  the forefoot most advanced the first metatarsophalangeal joint with minimal subchondral edema. No evidence of joint effusions. Soft tissues: Soft tissue edema of the second and fourth toes and to a lesser extent the first toe. Additional soft tissue edema of the lateral mid and forefoot as well as the dorsal foot. Visualized flexor and extensor tendons appear grossly intact. Disrupted appearance of the third plantar plate likely secondary to Freiberg's infraction. Remaining plantar plates appear intact Fatty atrophy of the intrinsic muscles of the foot. No evidence of rim-enhancing soft tissue collection or enhancing soft tissue mass. No definite evidence of intermetatarsal neuroma. Small amount of intermetatarsal bursal fluid at the first and second interspace.     Impression: Soft tissue edema of the second and fourth toes and to a lesser extent the first toe with minimal reactive marrow edema of the distal phalanges. No evidence of osseous erosions or T1 signal abnormality to suggest osteomyelitis. Marrow and periosteal edema of the distal fifth metatarsal without fracture line suggestive of stress injury. Sequela of prior Freiberg's infraction of the  third metatarsal with complete collapse of the metatarsal head. No evidence of associated marrow edema. Electronically Signed: Familia Santos  3/2/2023 8:48 AM EST  Workstation ID: [id number]    Stress Test With Myocardial Perfusion One Day    Result Date: 3/4/2023  •  Patient reported some midsternal chest tightness after the Lexiscan was given.  She rated the discomfort at 7/10 at its worst.  Symptom improved with oral caffeine during recovery. •  Poor ECG quality; no significant ST or T wave changes identified. •  Left ventricular ejection fraction is normal (Calculated EF = 54%). •  Attenuation of isotope taupe uptake in the anteroseptal/anterior apical segments of the left ventricle; this is primarily a fixed defect with dana-infarct zone ischemia •  Intermediate risk study; clinical correlation is required     Doppler Arterial Multi Level Lower Extremity - Bilateral CAR    Result Date: 3/3/2023  •  Right lower extremity: Normal right ROSA at 1.14.  Multiphasic waveforms are seen throughout. •  Left lower extremity: Normal left ROSA at 1.14.  Multiphasic waveforms are seen throughout. •  No evidence of significant lower extremity arterial occlusive disease in the right or left legs.     CT Outside Films    Result Date: 3/3/2023  This procedure was auto-finalized with no dictation required.      Results for orders placed during the hospital encounter of 03/01/23    Doppler Arterial Multi Level Lower Extremity - Bilateral CAR    Interpretation Summary  •  Right lower extremity: Normal right ROSA at 1.14.  Multiphasic waveforms are seen throughout.  •  Left lower extremity: Normal left ROSA at 1.14.  Multiphasic waveforms are seen throughout.  •  No evidence of significant lower extremity arterial occlusive disease in the right or left legs.      Results for orders placed during the hospital encounter of 03/01/23    Doppler Arterial Multi Level Lower Extremity - Bilateral CAR    Interpretation Summary  •  Right lower  extremity: Normal right ROSA at 1.14.  Multiphasic waveforms are seen throughout.  •  Left lower extremity: Normal left ROSA at 1.14.  Multiphasic waveforms are seen throughout.  •  No evidence of significant lower extremity arterial occlusive disease in the right or left legs.      Results for orders placed during the hospital encounter of 03/01/23    Adult Transthoracic Echo Complete W/ Cont if Necessary Per Protocol    Interpretation Summary  •  Left ventricular ejection fraction appears to be 41 - 45%.  •  The cardiac valves are anatomically and functionally normal.  •  Saline test results are negative for right to left atrial level shunt.      Plan for Follow-up of Pending Labs/Results: Pending blood cultures, pending NESSA  Pending Labs     Order Current Status    Blood Culture - Blood, Arm, Left Preliminary result    Blood Culture - Blood, Hand, Left Preliminary result        Discharge Details        Discharge Medications      New Medications      Instructions Start Date   aspirin 81 MG EC tablet   81 mg, Oral, Daily      HYDROcodone-acetaminophen 5-325 MG per tablet  Commonly known as: NORCO   1 tablet, Oral, Every 6 Hours PRN         Changes to Medications      Instructions Start Date   rosuvastatin 10 MG tablet  Commonly known as: CRESTOR  What changed:   · medication strength  · how much to take  · when to take this   10 mg, Oral, Nightly         Continue These Medications      Instructions Start Date   albuterol sulfate  (90 Base) MCG/ACT inhaler  Commonly known as: PROVENTIL HFA;VENTOLIN HFA;PROAIR HFA   2 puffs, Inhalation, 2 Times Daily PRN      atenolol 25 MG tablet  Commonly known as: TENORMIN   25 mg, Oral, Daily      budesonide-formoterol 80-4.5 MCG/ACT inhaler  Commonly known as: SYMBICORT   2 puffs, Inhalation, 2 Times Daily - RT      cetirizine 10 MG tablet  Commonly known as: zyrTEC   10 mg, Oral, Daily      folic acid 1 MG tablet  Commonly known as: FOLVITE   1 mg, Oral, Daily       levothyroxine 25 MCG tablet  Commonly known as: SYNTHROID, LEVOTHROID   25 mcg, Oral, Every Early Morning      linaclotide 145 MCG capsule capsule  Commonly known as: LINZESS   145 mcg, Oral, Every Morning Before Breakfast      magnesium oxide 400 (241.3 Mg) MG tablet tablet  Commonly known as: MAGOX   400 mg, Oral, 2 Times Daily      montelukast 10 MG tablet  Commonly known as: SINGULAIR   10 mg, Oral, Nightly      ondansetron 4 MG tablet  Commonly known as: ZOFRAN   4 mg, Oral, Every 8 Hours PRN      pantoprazole 40 MG EC tablet  Commonly known as: PROTONIX   40 mg, Oral, Daily      pregabalin 200 MG capsule  Commonly known as: LYRICA   200 mg, Oral, 2 Times Daily, Takes w/50mg dose for BID total of 250mg      pregabalin 50 MG capsule  Commonly known as: LYRICA   50 mg, Oral, 2 Times Daily, Takes w/200mg dose for BID total of 250mg      Vitamin D3 50 MCG (2000 UT) tablet   50 mcg, Oral, Daily      zonisamide 100 MG capsule  Commonly known as: ZONEGRAN   Oral, 2 Times Daily, 1 capsule (100mg) in AM, 2 capsules (200mg) in PM         Stop These Medications    celecoxib 200 MG capsule  Commonly known as: CeleBREX     rizatriptan 10 MG tablet  Commonly known as: MAXALT            Allergies   Allergen Reactions   • Gabapentin Hives         Discharge Disposition:  Home or Self Care    Diet:  Hospital:  Diet Order   Procedures   • NPO Diet NPO Type: Strict NPO       Activity:  Activity Instructions     Other Activity Instructions      Activity Instructions: Nonweightbearing right foot except for while in toe offloading boot          Restrictions or Other Recommendations:  None       CODE STATUS:    Code Status and Medical Interventions:   Ordered at: 03/01/23 1513     Code Status (Patient has no pulse and is not breathing):    CPR (Attempt to Resuscitate)     Medical Interventions (Patient has pulse or is breathing):    Full Support       Future Appointments   Date Time Provider Department Center   3/6/2023  3:00 PM [facility]  [facility] NESSA [facility]  [facility]   3/7/2023  9:15 AM Padma Kumari APRN [facility]   3/24/2023  8:30 AM COR SOUTH CT 1  COR CT [facility] [place]       Additional Instructions for the Follow-ups that You Need to Schedule     Discharge Follow-up with PCP   As directed       Currently Documented PCP:    Baljeet Manley APRN    PCP Phone Number:    [phone]     Follow Up Details: 1 week         Discharge Follow-up with Specified Provider: cardiologist as scheduled; 1 Day   As directed      To: cardiologist as scheduled    Follow Up: 1 Day                     Marsha Rosado MD  03/06/23      Time Spent on Discharge:  I spent  35  minutes on this discharge activity which included: face-to-face encounter with the patient, reviewing the data in the system, coordination of the care with the nursing staff as well as consultants, documentation, and entering orders.

## 2023-03-07 ENCOUNTER — OFFICE VISIT (OUTPATIENT)
Dept: CARDIOLOGY | Facility: CLINIC | Age: 53
End: 2023-03-07
Payer: MEDICARE

## 2023-03-07 VITALS
BODY MASS INDEX: 33.83 KG/M2 | WEIGHT: 228.4 LBS | SYSTOLIC BLOOD PRESSURE: 118 MMHG | HEART RATE: 84 BPM | DIASTOLIC BLOOD PRESSURE: 84 MMHG | HEIGHT: 69 IN

## 2023-03-07 DIAGNOSIS — R94.31 ABNORMAL EKG: ICD-10-CM

## 2023-03-07 DIAGNOSIS — R94.39 ABNORMAL NUCLEAR STRESS TEST: Primary | ICD-10-CM

## 2023-03-07 DIAGNOSIS — H53.9 VISUAL CHANGES: ICD-10-CM

## 2023-03-07 DIAGNOSIS — I10 PRIMARY HYPERTENSION: ICD-10-CM

## 2023-03-07 DIAGNOSIS — R06.02 SHORTNESS OF BREATH: ICD-10-CM

## 2023-03-07 DIAGNOSIS — R00.2 PALPITATIONS: ICD-10-CM

## 2023-03-07 DIAGNOSIS — R53.83 OTHER FATIGUE: ICD-10-CM

## 2023-03-07 DIAGNOSIS — R06.00 PND (PAROXYSMAL NOCTURNAL DYSPNEA): ICD-10-CM

## 2023-03-07 DIAGNOSIS — R61 DIAPHORESIS: ICD-10-CM

## 2023-03-07 DIAGNOSIS — R42 DIZZINESS: ICD-10-CM

## 2023-03-07 DIAGNOSIS — R07.89 OTHER CHEST PAIN: ICD-10-CM

## 2023-03-07 DIAGNOSIS — E78.2 MIXED HYPERLIPIDEMIA: ICD-10-CM

## 2023-03-07 DIAGNOSIS — R11.0 NAUSEA: ICD-10-CM

## 2023-03-07 PROCEDURE — 99204 OFFICE O/P NEW MOD 45 MIN: CPT | Performed by: NURSE PRACTITIONER

## 2023-03-07 PROCEDURE — 93000 ELECTROCARDIOGRAM COMPLETE: CPT | Performed by: NURSE PRACTITIONER

## 2023-03-07 RX ORDER — NITROGLYCERIN 0.4 MG/1
TABLET SUBLINGUAL
Qty: 25 TABLET | Refills: 1 | Status: SHIPPED | OUTPATIENT
Start: 2023-03-07

## 2023-03-07 NOTE — OUTREACH NOTE
Prep Survey    Flowsheet Row Responses   [facility] facility patient discharged from? [facility]   Is LACE score < 7 ? No   Eligibility Readm Mgmt   Discharge diagnosis Gangrene of the right second and fourth toes   Does the patient have one of the following disease processes/diagnoses(primary or secondary)? Other   Does the patient have Home health ordered? No   Is there a DME ordered? No   Prep survey completed? Yes          Alena ALEGRE - Registered Nurse

## 2023-03-07 NOTE — PROGRESS NOTES
Subjective   Bryanna Schwartz is a 52 y.o. female seen in the office today to establish cardiac care.  Recently she was in [facility] after being referred for evaluation of gangrenous changes to her right foot.  She seen Dr. Brand in regards.  CTA with runoff did not stenosis of either leg or foot.  It was decided to to admit her at that time and consult with infectious disease due to her history of tick bite.  Patient experienced a tick bite approximately 5 years ago.  At that time she was symptomatic including GI and underwent cholecystectomy.  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  Due to significant debilitation she was referred to [facility].  With therapy she was able to again walk with use of a walker.  She now follows closely with neurologist due to severe neuropathy of lower legs and hands.  Due to mental acuity problems she underwent carotid ultrasound and MRI of the brain in February 2023.  No acute intracranial abnormality, mild atrophy noted.  According to patient Dr. Brand did not recommend vascular intervention for her lower leg issues.  During hospitalization she underwent cardiac work-up including echocardiogram with saline test that was negative and showed mildly diminished LV function.  She did have stress test showing anterior septal infarct with dana-infarct ischemia.  NESSA showed normal LVEF and no masses or thrombus noted.  Her past medical history includes hypertension, hypercholesterolemia, hypothyroidism, migraines, asthma, GERD, partial hysterectomy, and cholecystectomy.  She had also been treated for diabetes but off medications for approximately 2 weeks and glucose has remained normal.  She denies  problems.  No GI problems post cholecystectomy.  According to patient her mother told her she had heart murmur during childhood.  She is unaware of family cardiac history.  From a cardiac standpoint  "she admits to episodes of chest tightness and \"feels heavy\" associated with mild nausea, shortness of breath, and sometimes diaphoresis.  Her  states sometimes she admits to a sudden sharp chest pain.  Rest resolves the symptoms.  At times she has palpitations in the form of heart pounding or racing.  Syncope denied.  She admits to episodes of dizziness as well as visual changes.      Chief Complaint   Patient presents with   • Establish Care     Referred per PCP for chest pain . Had hospital stay at [facility] , was discharged from home yesterday .   • Chest Pain     Reports having sharp stabbing pain mid chest to right  breast area , has pain since December 2022   • Palpitations     Reports having palpitations but is worse when she gets anxious or nervous   • Shortness of Breath     Has increased SOA with exertion   • LABS and TESTING     Had Echo March 3,2023, NESSA March 6,2023  Stress test March 2,2023  Doppler legs March 3,2023  Labs March 2023 . No lipid panel  Has CTA Abdomen ordered /scheduled per PCP  at [facility]    • BP     Has no audible sounds with auscultation  in left arm.       Initial cardiac evaluation;    HPI    Cardiac History  Past Surgical History:   Procedure Laterality Date   • ACHILLES TENDON SURGERY Right    • BREAST CYST EXCISION Left    • CARDIOVASCULAR STRESS TEST  03/04/2023    anteroseptal, anterior infarct with dana-infarct ischemia   • CHOLECYSTECTOMY     • ECHO - CONVERTED  03/02/2023    EF 41-45%, saline test negative, valves normal   • ENDOSCOPY N/A 10/16/2018    Procedure: ESOPHAGOGASTRODUODENOSCOPY;  Surgeon: Brunner, Mark I, MD;  Location: [facility] ENDOSCOPY;  Service: Gastroenterology   • HYSTERECTOMY     • TRANSESOPHAGEAL ECHOCARDIOGRAM (NESSA)  03/07/2023    Dr. Alonso- EF 56-60%, no mass or thrombus, mild plaque aorrti arch and descending aorta       Current Outpatient Medications   Medication Sig Dispense Refill   • albuterol sulfate  (90 Base) MCG/ACT " inhaler Inhale 2 puffs 2 (Two) Times a Day As Needed for Wheezing or Shortness of Air.     • aspirin 81 MG EC tablet Take 1 tablet by mouth Daily for 60 days. 30 tablet 1   • atenolol (TENORMIN) 25 MG tablet Take 1 tablet by mouth Daily.     • budesonide-formoterol (SYMBICORT) 80-4.5 MCG/ACT inhaler Inhale 2 puffs 2 (Two) Times a Day.     • cetirizine (zyrTEC) 10 MG tablet Take 1 tablet by mouth Daily. 30 tablet 0   • Cholecalciferol (Vitamin D3) 50 MCG (2000 UT) tablet Take 1 tablet by mouth Daily.     • doxycycline (VIBRAMYCIN) 100 MG capsule Take 1 capsule by mouth 2 (Two) Times a Day for 7 days. 14 capsule 0   • folic acid (FOLVITE) 1 MG tablet Take 1 tablet by mouth Daily.     • HYDROcodone-acetaminophen (NORCO) 5-325 MG per tablet Take 1 tablet by mouth Every 6 (Six) Hours As Needed for Moderate Pain for up to 3 days. 12 tablet 0   • levothyroxine (SYNTHROID, LEVOTHROID) 25 MCG tablet Take 1 tablet by mouth Every Morning.     • linaclotide (LINZESS) 145 MCG capsule capsule Take 1 capsule by mouth Every Morning Before Breakfast.     • magnesium oxide (MAGOX) 400 (241.3 Mg) MG tablet tablet Take 1 tablet by mouth 2 (Two) Times a Day.     • montelukast (SINGULAIR) 10 MG tablet Take 1 tablet by mouth Every Night.     • ondansetron (ZOFRAN) 4 MG tablet Take 1 tablet by mouth Every 8 (Eight) Hours As Needed for Nausea or Vomiting. 12 tablet 0   • pantoprazole (PROTONIX) 40 MG EC tablet Take 1 tablet by mouth Daily.     • pregabalin (LYRICA) 200 MG capsule Take 1 capsule by mouth 2 (Two) Times a Day. Takes w/50mg dose for BID total of 250mg     • pregabalin (LYRICA) 50 MG capsule Take 1 capsule by mouth 2 (Two) Times a Day. Takes w/200mg dose for BID total of 250mg     • rosuvastatin (CRESTOR) 10 MG tablet Take 1 tablet by mouth Every Night for 60 days. 30 tablet 1   • zonisamide (ZONEGRAN) 100 MG capsule Take  by mouth 2 (Two) Times a Day. 1 capsule (100mg) in AM, 2 capsules (200mg) in PM     • nitroglycerin  "(NITROSTAT) 0.4 MG SL tablet 1 under the tongue as needed for angina, may repeat q5mins for up three doses 25 tablet 1     No current facility-administered medications for this visit.       Gabapentin    Past Medical History:   Diagnosis Date   • Arthritis    • Depression    • Diabetes (HCC)    • Environmental allergies    • Falls 10/24/2018   • GERD (gastroesophageal reflux disease)    • Heart murmur    • Hyperlipidemia    • Hypertension    • Hypothyroidism    • Lyme disease    • Migraine    • [place]vin [place] spotted fever    • Vitamin B 12 deficiency        Social History     Socioeconomic History   • Marital status:    • Number of children: 2   Tobacco Use   • Smoking status: Never   • Smokeless tobacco: Never   Vaping Use   • Vaping Use: Never used   Substance and Sexual Activity   • Alcohol use: No   • Drug use: No   • Sexual activity: Defer     Partners: Male       Family History   Problem Relation Age of Onset   • Hypertension Mother    • COPD Mother    • Heart attack Mother    • Other Father         / of exhaust fumes   • Cancer Sister    • Heart attack Brother        Review of Systems   Constitutional: Positive for fatigue. Negative for diaphoresis and fever.   HENT: Positive for congestion (sinus). Negative for hearing loss, nosebleeds, sinus pain, tinnitus and trouble swallowing.    Eyes: Positive for visual disturbance (tunnel vision at times).   Respiratory: Positive for cough, chest tightness, shortness of breath and wheezing.         PND   Cardiovascular: Positive for chest pain (\"real heavy and tight\" sometimes sharp, occurs randomly).   Gastrointestinal: Negative for blood in stool.   Endocrine: Negative for polydipsia, polyphagia and polyuria.   Genitourinary: Negative.    Musculoskeletal: Positive for arthralgias and gait problem.        Uses walker   Skin: Negative for color change.   Neurological: Positive for dizziness, light-headedness, numbness (hands and feet) and " "headaches. Negative for tremors, seizures, syncope and speech difficulty.        Some memory issues   Hematological: Does not bruise/bleed easily.   Psychiatric/Behavioral: Positive for decreased concentration and sleep disturbance. Negative for agitation and hallucinations. The patient is nervous/anxious.        BP Readings from Last 5 Encounters:   03/07/23 118/84   03/06/23 130/77   03/01/23 116/86   01/25/23 122/92   11/07/18 123/90       Wt Readings from Last 5 Encounters:   03/07/23 104 kg (228 lb 6.4 oz)   03/06/23 102 kg (224 lb 13.9 oz)   03/01/23 108 kg (237 lb)   01/25/23 106 kg (233 lb)   10/19/18 75.5 kg (166 lb 6.4 oz)          Objective      Labs 3/4/2023: Glucose 103, BUN 4, creatinine 0.66, sodium 141 potassium 3.6, chloride 106, CO2 27, calcium 8.3, .7    /84 (BP Location: Left arm, Patient Position: Sitting)   Pulse 84   Ht 175.3 cm (69\")   Wt 104 kg (228 lb 6.4 oz)   LMP  (LMP Unknown)   BMI 33.73 kg/m²     Physical Exam  Constitutional:       Appearance: She is obese. She is not diaphoretic.   HENT:      Head: Normocephalic.      Nose: Nose normal.   Eyes:      Conjunctiva/sclera: Conjunctivae normal.      Pupils: Pupils are equal, round, and reactive to light.   Neck:      Vascular: No carotid bruit.   Cardiovascular:      Rate and Rhythm: Normal rate and regular rhythm.      Pulses:           Radial pulses are 2+ on the right side and 2+ on the left side.      Comments: Slightly loud S2  Pulmonary:      Effort: Pulmonary effort is normal.      Breath sounds: Normal breath sounds. No wheezing or rales.   Abdominal:      General: Bowel sounds are normal. There is no distension.      Palpations: Abdomen is soft.      Tenderness: There is no abdominal tenderness.   Musculoskeletal:         General: No swelling.      Cervical back: Neck supple.   Feet:      Comments: Right foot with boot and drsg intact due to non-healing ulcers  Skin:     General: Skin is warm and dry.      " Coloration: Skin is pale. Skin is not jaundiced.      Comments: Reports ulcers to right foot, currently dressing in place   Neurological:      Mental Status: She is alert.      Gait: Gait abnormal.   Psychiatric:         Attention and Perception: Attention normal.         Mood and Affect: Mood normal.         Speech: Speech normal.         Behavior: Behavior is cooperative.           ECG 12 Lead    Date/Time: 3/7/2023 10:39 AM  Performed by: Padma Kumari APRN  Authorized by: Padma Kumari APRN   Comparison: compared with previous ECG from 3/3/2023  Similar to previous ECG  Rhythm: sinus rhythm  Rate: normal  BPM: 84  Q waves: V1, V2 and I                Assessment & Plan     Diagnoses and all orders for this visit:    1. Abnormal nuclear stress test (Primary)  -     [facility]; Future  -     nitroglycerin (NITROSTAT) 0.4 MG SL tablet; 1 under the tongue as needed for angina, may repeat q5mins for up three doses  Dispense: 25 tablet; Refill: 1    2. Abnormal EKG  -     nitroglycerin (NITROSTAT) 0.4 MG SL tablet; 1 under the tongue as needed for angina, may repeat q5mins for up three doses  Dispense: 25 tablet; Refill: 1    3. Other chest pain  -     ECG 12 Lead  -     [facility]; Future  -     nitroglycerin (NITROSTAT) 0.4 MG SL tablet; 1 under the tongue as needed for angina, may repeat q5mins for up three doses  Dispense: 25 tablet; Refill: 1    4. Primary hypertension  -     Overnight Sleep Oximetry Study; Future  -     [facility]; Future    5. Mixed hyperlipidemia  -     [facility]; Future    6. Dizziness  -     Cancel: US Carotid Bilateral; Future  -     [facility]; Future    7. Visual changes  -     Cancel: US Carotid Bilateral; Future    8. PND (paroxysmal nocturnal dyspnea)  -     Overnight Sleep Oximetry Study; Future    9. Other fatigue  -     [facility]; Future    10. Shortness of breath  -     [facility]; Future    11. Nausea  -      [facility]; Future    12. Diaphoresis  -     [facility]; Future    13. Palpitations  -     ECG 12 Lead      The reports of her recent cardiac work-up including echocardiogram, nuclear stress test, and NESSA were reviewed.  No valvular issues were noted.  Saline test was negative.  Nuclear imaging appear to show area of infarct and dana-infarct ischemia.  Her EKG is abnormal showing Q waves in anterior leads.  Patient admits to symptoms suggestive of ischemia.  We discussed proceeding with cardiac catheterization and patient agrees.  Orders placed.  Prescription for Nitrostat and informational handout provided.  Continue aspirin and statin.    Currently her blood pressure and heart rate are normal.  Continue current dose atenolol.    Patient has symptoms suggestive of nocturnal oxygen desaturation.  Overnight monitor ordered.    For hypercholesterolemia she is on statin therapy and tolerating well.  No changes advised at this time.    Her BMI is 33.  Heart healthy diet and decrease caloric intake for benefit of weight loss encouraged.    Further recommendations based on cardiac catheterization overnight oxygen results.

## 2023-03-08 ENCOUNTER — READMISSION MANAGEMENT (OUTPATIENT)
Dept: CALL CENTER | Facility: HOSPITAL | Age: 53
End: 2023-03-08
Payer: MEDICARE

## 2023-03-08 NOTE — OUTREACH NOTE
Medical Week 1 Survey    Flowsheet Row Responses   [facility] patient discharged from? [place]   Does the patient have one of the following disease processes/diagnoses(primary or secondary)? Other   Week 1 attempt successful? Yes   Call start time 1110   Call end time 1112   Discharge diagnosis Gangrene of the right second and fourth toes   Is patient permission given to speak with other caregiver? Yes   Person spoke with today (if not patient) and relationship [place]   Meds reviewed with patient/caregiver? Yes   Is the patient having any side effects they believe may be caused by any medication additions or changes? No   Does the patient have all medications ordered at discharge? Yes   Is the patient taking all medications as directed (includes completed medication regime)? Yes   Does the patient have a primary care provider?  Yes   Does the patient have an appointment with their PCP within 7 days of discharge? Yes   Has the patient kept scheduled appointments due by today? N/A   Comments Saw cards yesterday.   Has home health visited the patient within 72 hours of discharge? N/A   Psychosocial issues? No   Did the patient receive a copy of their discharge instructions? Yes   Nursing interventions Reviewed instructions with patient   What is the patient's perception of their health status since discharge? Improving   Is the patient/caregiver able to teach back signs and symptoms related to disease process for when to call PCP? Yes   Is the patient/caregiver able to teach back signs and symptoms related to disease process for when to call 911? Yes   Is the patient/caregiver able to teach back the hierarchy of who to call/visit for symptoms/problems? PCP, Specialist, Home health nurse, Urgent Care, ED, 911 Yes   Additional teach back comments She is eating better, sleeping, BS has been ok so far she says. No questions at this time.   Week 1 call completed? Yes   Is the patient interested in additional calls  from an ambulatory ?  NOTE:  applies to high risk patients requiring additional follow-up. No   Wrap up additional comments She is improving.          Lauren ZARATE - Registered Nurse

## 2023-03-09 ENCOUNTER — PATIENT ROUNDING (BHMG ONLY) (OUTPATIENT)
Dept: CARDIOLOGY | Facility: CLINIC | Age: 53
End: 2023-03-09
Payer: MEDICARE

## 2023-03-09 NOTE — PROGRESS NOTES
March 9, 2023    Hello, may I speak with Bryanna Schwartz?    My name is : Lucero GARDNER        I am  with [facility]  [facility]  [address] [id number] [phone].    Before we get started may I verify your date of birth? 1970    I am calling to officially welcome you to our practice and ask about your recent visit. Is this a good time to talk? yes    Tell me about your visit with us. What things went well?  I love Padma she is great -they ,golden me feel good I was really amazed. Padma covered everythign with me more than any other doctor-she explained on my tests       We're always looking for ways to make our patients' experiences even better. Do you have recommendations on ways we may improve?  no-I dont know ow you could improve everyone was great you guys could not be better -she is right on top -everyone in my family brags on you all -you all are wonderful     Overall were you satisfied with your first visit to our practice? yes       I appreciate you taking the time to speak with me today. Is there anything else I can do for you? no      Thank you, and have a great day.

## 2023-03-16 ENCOUNTER — OUTSIDE FACILITY SERVICE (OUTPATIENT)
Dept: CARDIOLOGY | Facility: CLINIC | Age: 53
End: 2023-03-16
Payer: MEDICARE

## 2023-03-16 ENCOUNTER — READMISSION MANAGEMENT (OUTPATIENT)
Dept: CALL CENTER | Facility: HOSPITAL | Age: 53
End: 2023-03-16
Payer: MEDICARE

## 2023-03-16 PROCEDURE — 93458 L HRT ARTERY/VENTRICLE ANGIO: CPT | Performed by: INTERNAL MEDICINE

## 2023-03-16 NOTE — OUTREACH NOTE
Medical Week 2 Survey    Flowsheet Row Responses   [facility] patient discharged from? [place]   Does the patient have one of the following disease processes/diagnoses(primary or secondary)? Other   Week 2 attempt successful? No   Unsuccessful attempts Attempt 1          Tonio TREVINO - Registered Nurse

## 2023-03-21 ENCOUNTER — READMISSION MANAGEMENT (OUTPATIENT)
Dept: CALL CENTER | Facility: HOSPITAL | Age: 53
End: 2023-03-21
Payer: MEDICARE

## 2023-03-21 NOTE — OUTREACH NOTE
Medical Week 2 Survey    Flowsheet Row Responses   [facility] patient discharged from? [place]   Does the patient have one of the following disease processes/diagnoses(primary or secondary)? Other   Week 2 attempt successful? Yes   Call start time 1558   Call end time 1600   Person spoke with today (if not patient) and relationship Patient and spouse Michael Frost reviewed with patient/caregiver? Yes   Prescription comments Went into detail about DC medications and what she is to continue to take.   Is the patient taking all medications as directed (includes completed medication regime)? Yes   Does the patient have a primary care provider?  Yes   Comments regarding PCP Spouse states patient has seen her pcp since DC. has CT scan this friday and Follows up with cardiothoracic surgeon on 04/05   Has the patient kept scheduled appointments due by today? N/A   Has home health visited the patient within 72 hours of discharge? N/A   Psychosocial issues? No   Did the patient receive a copy of their discharge instructions? Yes   Nursing interventions Reviewed instructions with patient   What is the patient's perception of their health status since discharge? Improving   Is the patient/caregiver able to teach back signs and symptoms related to disease process for when to call PCP? Yes   Is the patient/caregiver able to teach back signs and symptoms related to disease process for when to call 911? Yes   Is the patient/caregiver able to teach back the hierarchy of who to call/visit for symptoms/problems? PCP, Specialist, Home health nurse, Urgent Care, ED, 911 Yes   Week 2 Call Completed? Yes   Graduated Yes   Is the patient interested in additional calls from an ambulatory ?  NOTE:  applies to high risk patients requiring additional follow-up. No   Did the patient feel the follow up calls were helpful during their recovery period? Yes   Was the number of calls appropriate? Yes   Graduated/Revoked comments  No concerns or questions.   Wrap up additional comments Patient is doing very well overall, discussed medications and appts with spouse no other concerns or questions noted.          Stephanie BLANKENSHIP - Registered Nurse

## 2023-03-23 ENCOUNTER — TRANSCRIBE ORDERS (OUTPATIENT)
Dept: ADMINISTRATIVE | Facility: HOSPITAL | Age: 53
End: 2023-03-23
Payer: MEDICARE

## 2023-03-23 DIAGNOSIS — R32 URINARY INCONTINENCE, UNSPECIFIED TYPE: Primary | ICD-10-CM

## 2023-03-24 ENCOUNTER — HOSPITAL ENCOUNTER (OUTPATIENT)
Dept: CT IMAGING | Facility: HOSPITAL | Age: 53
Discharge: HOME | End: 2023-03-24
Admitting: NURSE PRACTITIONER
Payer: MEDICARE

## 2023-03-24 DIAGNOSIS — R32 URINARY INCONTINENCE, UNSPECIFIED TYPE: ICD-10-CM

## 2023-03-24 PROCEDURE — 74174 CTA ABD&PLVS W/CONTRAST: CPT

## 2023-03-24 PROCEDURE — 25510000001 IOPAMIDOL 61 % SOLUTION: Performed by: NURSE PRACTITIONER

## 2023-03-24 PROCEDURE — 74174 CTA ABD&PLVS W/CONTRAST: CPT | Performed by: RADIOLOGY

## 2023-03-24 RX ADMIN — IOPAMIDOL 100 ML: 612 INJECTION, SOLUTION INTRAVENOUS at 09:31

## 2023-04-05 ENCOUNTER — OFFICE VISIT (OUTPATIENT)
Dept: CARDIAC SURGERY | Facility: CLINIC | Age: 53
End: 2023-04-05
Payer: MEDICARE

## 2023-04-05 VITALS
WEIGHT: 232 LBS | SYSTOLIC BLOOD PRESSURE: 136 MMHG | DIASTOLIC BLOOD PRESSURE: 76 MMHG | HEART RATE: 72 BPM | BODY MASS INDEX: 34.36 KG/M2 | OXYGEN SATURATION: 95 % | TEMPERATURE: 97.7 F | HEIGHT: 69 IN

## 2023-04-05 DIAGNOSIS — I96 GANGRENE: Primary | ICD-10-CM

## 2023-04-05 PROCEDURE — 3075F SYST BP GE 130 - 139MM HG: CPT | Performed by: REGISTERED NURSE

## 2023-04-05 PROCEDURE — 3078F DIAST BP <80 MM HG: CPT | Performed by: REGISTERED NURSE

## 2023-04-05 PROCEDURE — 1160F RVW MEDS BY RX/DR IN RCRD: CPT | Performed by: REGISTERED NURSE

## 2023-04-05 PROCEDURE — 1159F MED LIST DOCD IN RCRD: CPT | Performed by: REGISTERED NURSE

## 2023-04-05 PROCEDURE — 99212 OFFICE O/P EST SF 10 MIN: CPT | Performed by: REGISTERED NURSE

## 2023-04-05 RX ORDER — TRAMADOL HYDROCHLORIDE 50 MG/1
1 TABLET ORAL EVERY 12 HOURS SCHEDULED
COMMUNITY
Start: 2023-03-06

## 2023-04-05 NOTE — PROGRESS NOTES
[facility] Cardiothoracic Surgery Office Follow Up Note    Date of Encounter: 2023     Name: Bryanna Schwartz  : 1970     Referred By: No ref. provider found  PCP: Baljeet Manley APRN    Chief Complaint:    Chief Complaint   Patient presents with   • Wound Check     Hospital follow up for necrosis toes on right foot.       Subjective      History of Present Illness:    It was nice to see Bryanna Schwartz in follow up.  She is a pleasant 53 y.o. female with PMH significant for chronic systolic heart failure, hypertension, embolic) for thyroidism, GERD with esophagitis, anxiety/depression, and gangrene.      Patient was originally seen on 3/1/2023 after being referred to Dr. Krause by Sury Manley, nurse practitioner in [facility] for onset of gangrenous changes of the right fourth toe at the distal tuft and right second toe.  Patient had a significant past history of having Lyme's disease as well as Kelvin Mountain spotted fever from a tick bite over 5 years ago.  Patient could not walk after the tick bite became symptomatic and she was hospitalized at [facility] in 2018 for over 3 weeks.  She was eventually sent to [facility] for rehab in order to be able to walk again.  Her  reported she had mentally not been the same since the diagnosis of Lyme disease and Kelvin Mountain spotted fever.  Necrosis of the toes according to the patient and her  began about a year prior but had been worsening over the past 2 months.  Due to mental acuity problems the patient underwent a carotid duplex on 2023 which was read and had no significant stenosis.  She also underwent an MRI of the brain that was done on 2023 and was read as no acute intracranial abnormality with mild atrophy.  Patient was at that time scheduled to see Dr. Peterson of cardiology at [facility] for vague symptoms of chest pain.  The patient also had been scheduled to see  neurology at  in April 2023 for mental acuity problems.    Patient was hospitalized at [facility] in March for gangrenous changes of the right fourth toe at the distal tuft and gangrenous changes to the right second toe at the distal tuft subungual area.  Infectious disease was consulted as well as cardiology.  Ultimately there was no need for surgical intervention of the ulcerations.    Ms. Schwartz presents to office today accompanied by her  for follow-up.  She denies fever or chills.  Patient does report malaise/fatigue.  Both patient and her  report the right fourth toe and right second toe have much improved since discharge.  Patient reports she did not have a follow-up with infectious disease.  She continues to use her offloading boot.  Reports following with PCP on a monthly basis.  Also reports following with neurology.          Review of Systems:  Review of Systems   Constitutional: Positive for malaise/fatigue. Negative for chills, decreased appetite, diaphoresis, fever, night sweats and weight loss.   HENT: Negative for congestion, hoarse voice, sore throat and stridor.    Cardiovascular: Positive for dyspnea on exertion. Negative for chest pain, claudication, irregular heartbeat, leg swelling, near-syncope, orthopnea, palpitations, paroxysmal nocturnal dyspnea and syncope.   Respiratory: Negative.  Negative for cough, hemoptysis, shortness of breath, sleep disturbances due to breathing, snoring, sputum production and wheezing.    Hematologic/Lymphatic: Negative.  Negative for adenopathy and bleeding problem. Does not bruise/bleed easily.   Skin: Negative.  Negative for color change, dry skin, itching, poor wound healing and rash.   Musculoskeletal: Positive for back pain and muscle cramps. Negative for arthritis, falls and muscle weakness.   Gastrointestinal: Negative.  Negative for abdominal pain, anorexia, constipation, diarrhea, hematochezia, melena, nausea and  vomiting.   Neurological: Positive for headaches, light-headedness and loss of balance. Negative for difficulty with concentration, disturbances in coordination, dizziness, numbness, seizures, vertigo and weakness.   Psychiatric/Behavioral: Negative for altered mental status, depression, memory loss and substance abuse. The patient is nervous/anxious. The patient does not have insomnia.    Allergic/Immunologic: Negative.  Negative for persistent infections.       I have reviewed the following portions of the patient's history: allergies, current medications, past family history, past medical history, past social history, past surgical history and problem list and confirm it's accurate.    Allergies:  Allergies   Allergen Reactions   • Gabapentin Hives       Medications:      Current Outpatient Medications:   •  albuterol sulfate  (90 Base) MCG/ACT inhaler, Inhale 2 puffs 2 (Two) Times a Day As Needed for Wheezing or Shortness of Air., Disp: , Rfl:   •  aspirin 81 MG EC tablet, Take 1 tablet by mouth Daily for 60 days., Disp: 30 tablet, Rfl: 1  •  atenolol (TENORMIN) 25 MG tablet, Take 1 tablet by mouth Daily., Disp: , Rfl:   •  budesonide-formoterol (SYMBICORT) 80-4.5 MCG/ACT inhaler, Inhale 2 puffs 2 (Two) Times a Day., Disp: , Rfl:   •  cetirizine (zyrTEC) 10 MG tablet, Take 1 tablet by mouth Daily., Disp: 30 tablet, Rfl: 0  •  Cholecalciferol (Vitamin D3) 50 MCG (2000 UT) tablet, Take 1 tablet by mouth Daily., Disp: , Rfl:   •  folic acid (FOLVITE) 1 MG tablet, Take 1 tablet by mouth Daily., Disp: , Rfl:   •  levothyroxine (SYNTHROID, LEVOTHROID) 25 MCG tablet, Take 1 tablet by mouth Every Morning., Disp: , Rfl:   •  linaclotide (LINZESS) 145 MCG capsule capsule, Take 1 capsule by mouth Every Morning Before Breakfast., Disp: , Rfl:   •  montelukast (SINGULAIR) 10 MG tablet, Take 1 tablet by mouth Every Night., Disp: , Rfl:   •  nitroglycerin (NITROSTAT) 0.4 MG SL tablet, 1 under the tongue as needed for  angina, may repeat q5mins for up three doses, Disp: 25 tablet, Rfl: 1  •  ondansetron (ZOFRAN) 4 MG tablet, Take 1 tablet by mouth Every 8 (Eight) Hours As Needed for Nausea or Vomiting., Disp: 12 tablet, Rfl: 0  •  pantoprazole (PROTONIX) 40 MG EC tablet, Take 1 tablet by mouth Daily., Disp: , Rfl:   •  pregabalin (LYRICA) 200 MG capsule, Take 1 capsule by mouth 2 (Two) Times a Day. Takes w/50mg dose for BID total of 250mg, Disp: , Rfl:   •  pregabalin (LYRICA) 50 MG capsule, Take 1 capsule by mouth 2 (Two) Times a Day. Takes w/200mg dose for BID total of 250mg, Disp: , Rfl:   •  rosuvastatin (CRESTOR) 10 MG tablet, Take 1 tablet by mouth Every Night for 60 days., Disp: 30 tablet, Rfl: 1  •  zonisamide (ZONEGRAN) 100 MG capsule, Take  by mouth 2 (Two) Times a Day. 1 capsule (100mg) in AM, 2 capsules (200mg) in PM, Disp: , Rfl:   •  magnesium oxide (MAGOX) 400 (241.3 Mg) MG tablet tablet, Take 1 tablet by mouth 2 (Two) Times a Day. (Patient not taking: Reported on 4/5/2023), Disp: , Rfl:   •  traMADol (ULTRAM) 50 MG tablet, Take 1 tablet by mouth Every 12 (Twelve) Hours., Disp: , Rfl:     History:   Past Medical History:   Diagnosis Date   • Arthritis    • Depression    • Diabetes    • Environmental allergies    • Falls 10/24/2018   • GERD (gastroesophageal reflux disease)    • Heart murmur    • Hyperlipidemia    • Hypertension    • Hypothyroidism    • Lyme disease    • Migraine    • Kelvin Mountain spotted fever    • Vitamin B 12 deficiency        Past Surgical History:   Procedure Laterality Date   • ACHILLES TENDON SURGERY Right    • BREAST CYST EXCISION Left    • CARDIAC CATHETERIZATION  03/16/2023    Normal coronaries.  EF 45%   • CARDIOVASCULAR STRESS TEST  03/04/2023    anteroseptal, anterior infarct with dana-infarct ischemia   • CHOLECYSTECTOMY     • ECHO - CONVERTED  03/02/2023    EF 41-45%, saline test negative, valves normal   • ENDOSCOPY N/A 10/16/2018    Procedure: ESOPHAGOGASTRODUODENOSCOPY;  Surgeon:  "Brunner, Mark I, MD;  Location: [facility] ENDOSCOPY;  Service: Gastroenterology   • HYSTERECTOMY     • TRANSESOPHAGEAL ECHOCARDIOGRAM (NESSA)  2023    Dr. Alonso- EF 56-60%, no mass or thrombus, mild plaque aorrti arch and descending aorta   • US CAROTID UNILATERAL  2022    < 50% Bilaterally       Social History     Socioeconomic History   • Marital status:    • Number of children: 2   Tobacco Use   • Smoking status: Never   • Smokeless tobacco: Never   Vaping Use   • Vaping Use: Never used   Substance and Sexual Activity   • Alcohol use: No   • Drug use: No   • Sexual activity: Defer     Partners: Male        Family History   Problem Relation Age of Onset   • Hypertension Mother    • COPD Mother    • Heart attack Mother    • Other Father         / of exhaust fumes   • Cancer Sister    • Heart attack Brother        Objective     Physical Exam:  Vitals:    23 1327   BP: 136/76   BP Location: Right arm   Patient Position: Sitting   Pulse: 72   Temp: 97.7 °F (36.5 °C)   SpO2: 95%   Weight: 105 kg (232 lb)   Height: 175.3 cm (69\")  Comment: patient reports      Body mass index is 34.26 kg/m².    Physical Exam  Vitals reviewed.   Constitutional:       General: She is not in acute distress.     Appearance: Normal appearance. She is not ill-appearing.   Cardiovascular:      Pulses:           Dorsalis pedis pulses are detected w/ Doppler on the right side.        Posterior tibial pulses are detected w/ Doppler on the right side.   Pulmonary:      Effort: Pulmonary effort is normal.   Neurological:      General: No focal deficit present.      Mental Status: She is alert and oriented to person, place, and time.   Psychiatric:         Mood and Affect: Mood normal.         Behavior: Behavior normal.         Thought Content: Thought content normal.         Judgment: Judgment normal.         Imaging/Labs:             Assessment / Plan      Assessment / Plan:   PMH significant for chronic " systolic heart failure, hypertension, embolic) for thyroidism, GERD with esophagitis, anxiety/depression, and gangrene.        1.  Gangrene of the right fourth toe at the distal tuft and right second toe.  · Originally seen on 3/1/2023 by Dr. Krause after being referred for onset of gangrenous changes of the right fourth toe at the distal tuft and right second toe.  · Significant past history of having Lyme's disease as well as Kelvin Mountain spotted fever from a tick bite over 5 years ago.  · Extensive complicated medical history, please see above HPI for details.  · Patient was hospitalized at [facility] in March for gangrenous changes.  ID was consulted as well as cardiology.  · Ultimately there was no need for surgical intervention of the ulcerations.  · Presents to office today accompanied by her  for follow-up.  · Denies fever or chills.  Does report malaise/fatigue.  · Both patient and her  report the right fourth toe and right second toe have much improved since discharge.  · Pulses attainable per Doppler.  · Areas of concern being painted with Betadine.  Absent of drainage, open wound, or foul odor.  · Reports she did not have a follow-up with infectious disease.  · Continues to use her offloading boot.  · Reports following with PCP on a monthly basis.  · Also reports following with neurology.    Follow Up:   We will follow on an as-needed basis as there is no surgical intervention needed at this time.  Patient follows closely with PCP.  Or sooner for any further concerns or worsening sign and symptoms.  Patient encouraged to call the office with any questions or concerns.     Thank you for allowing me to participate in your care.  Best Regards,    CARRIE Inman  [facility] Cardiothoracic Surgery  04/05/23  13:33 EDT

## 2023-05-04 ENCOUNTER — OFFICE VISIT (OUTPATIENT)
Dept: CARDIOLOGY | Facility: CLINIC | Age: 53
End: 2023-05-04
Payer: MEDICARE

## 2023-05-04 VITALS
WEIGHT: 222.4 LBS | DIASTOLIC BLOOD PRESSURE: 60 MMHG | BODY MASS INDEX: 32.94 KG/M2 | HEIGHT: 69 IN | HEART RATE: 70 BPM | SYSTOLIC BLOOD PRESSURE: 114 MMHG

## 2023-05-04 DIAGNOSIS — I51.9 LV DYSFUNCTION: ICD-10-CM

## 2023-05-04 DIAGNOSIS — I10 PRIMARY HYPERTENSION: ICD-10-CM

## 2023-05-04 DIAGNOSIS — E78.2 MIXED HYPERLIPIDEMIA: ICD-10-CM

## 2023-05-04 DIAGNOSIS — I42.8 NICM (NONISCHEMIC CARDIOMYOPATHY): Primary | ICD-10-CM

## 2023-05-04 NOTE — PROGRESS NOTES
"Chief Complaint   Patient presents with   • Hospital Follow Up Visit     Post cardiac cath    • LABS     March 2023 labs on chart   • Hypotension     Reports her BP had been running low and had been holding Atenolol since starting Entresto   • Med Refill     Needs refills on Entresto and Crestor 90 day supply to [facility]       Subjective       Bryanna Schwartz is a 53 y.o. female seen in March 2023 for initial cardiac consultation.  Her past medical history is complex including: gangrenous changes to the right foot with CTA negative for stenosis, severe neuropathy diagnosed ; tick bite requiring referral to ID and diagnosis included Lyme's disease and RMSF.  She was significantly debilitated and underwent rehab at [facility].  Secondary to mental acuity problems carotid ultrasound and MRI brain done in February 2023 and results showing mild atrophy.  During hospitalization she underwent cardiac work-up.  Echocardiogram with saline test was negative, LV function mildly diminished.  Stress test showed anterior septal infarct with dana-infarct ischemia.  NESSA was negative for mass or thrombus.    Due to persistent symptoms it was decided to proceed with cardiac catheterization.  On 3/17/2023 cardiac cath revealed normal coronaries with nonischemic cardiomyopathy.  Addition of Entresto advised.    Today she returns to the office for follow-up visit. She admits to \"feeling better\" since starting Entresto.  Her weight is down about 10 pounds.  Her  states she is \"getting around\" better and uses her cane for safety.  No recent falls.  She denies chest pain, palpitations, or increase shortness of breath.  Due to low blood pressure stopped atenolol.  According to patient she recently seen vascular and was released from care to return as needed.    Cardiac History:    Past Surgical History:   Procedure Laterality Date   • ACHILLES TENDON SURGERY Right    • BREAST CYST EXCISION Left    • CARDIAC " CATHETERIZATION  03/16/2023    Normal coronaries.  EF 45%   • CARDIOVASCULAR STRESS TEST  03/04/2023    anteroseptal, anterior infarct with dana-infarct ischemia   • CHOLECYSTECTOMY     • ECHO - CONVERTED  03/02/2023    EF 41-45%, saline test negative, valves normal   • ENDOSCOPY N/A 10/16/2018    Procedure: ESOPHAGOGASTRODUODENOSCOPY;  Surgeon: Brunner, Mark I, MD;  Location: [facility] ENDOSCOPY;  Service: Gastroenterology   • HYSTERECTOMY     • TRANSESOPHAGEAL ECHOCARDIOGRAM (NESSA)  03/07/2023    Dr. Alonso- EF 56-60%, no mass or thrombus, mild plaque aorrti arch and descending aorta   • US CAROTID UNILATERAL  12/17/2022    < 50% Bilaterally       Current Outpatient Medications   Medication Sig Dispense Refill   • albuterol sulfate  (90 Base) MCG/ACT inhaler Inhale 2 puffs 2 (Two) Times a Day As Needed for Wheezing or Shortness of Air.     • aspirin 81 MG EC tablet Take 1 tablet by mouth Daily for 60 days. 30 tablet 1   • budesonide-formoterol (SYMBICORT) 80-4.5 MCG/ACT inhaler Inhale 2 puffs 2 (Two) Times a Day.     • cetirizine (zyrTEC) 10 MG tablet Take 1 tablet by mouth Daily. 30 tablet 0   • Cholecalciferol (Vitamin D3) 50 MCG (2000 UT) tablet Take 1 tablet by mouth Daily.     • folic acid (FOLVITE) 1 MG tablet Take 1 tablet by mouth Daily.     • levothyroxine (SYNTHROID, LEVOTHROID) 25 MCG tablet Take 1 tablet by mouth Every Morning.     • linaclotide (LINZESS) 145 MCG capsule capsule Take 1 capsule by mouth Every Morning Before Breakfast. PRN     • montelukast (SINGULAIR) 10 MG tablet Take 1 tablet by mouth Every Night.     • nitroglycerin (NITROSTAT) 0.4 MG SL tablet 1 under the tongue as needed for angina, may repeat q5mins for up three doses 25 tablet 1   • ondansetron (ZOFRAN) 4 MG tablet Take 1 tablet by mouth Every 8 (Eight) Hours As Needed for Nausea or Vomiting. 12 tablet 0   • pantoprazole (PROTONIX) 40 MG EC tablet Take 1 tablet by mouth Daily.     • pregabalin (LYRICA) 200 MG capsule Take 1  capsule by mouth 2 (Two) Times a Day. Takes w/50mg dose for BID total of 250mg     • pregabalin (LYRICA) 50 MG capsule Take 1 capsule by mouth 2 (Two) Times a Day. Takes w/200mg dose for BID total of 250mg     • rosuvastatin (CRESTOR) 10 MG tablet Take 1 tablet by mouth Every Night for 60 days. 30 tablet 1   • sacubitril-valsartan (ENTRESTO) 24-26 MG tablet Take 1 tablet by mouth 2 (Two) Times a Day.     • traMADol (ULTRAM) 50 MG tablet Take 1 tablet by mouth Every 12 (Twelve) Hours.     • zonisamide (ZONEGRAN) 100 MG capsule Take  by mouth 2 (Two) Times a Day. 1 capsule (100mg) in AM, 2 capsules (200mg) in PM       No current facility-administered medications for this visit.       Gabapentin    Past Medical History:   Diagnosis Date   • Arthritis    • Depression    • Diabetes    • Environmental allergies    • Falls 10/24/2018   • GERD (gastroesophageal reflux disease)    • Heart murmur    • Hyperlipidemia    • Hypertension    • Hypothyroidism    • Lyme disease    • Migraine    • Kelvin Mountain spotted fever    • Vitamin B 12 deficiency        Social History     Socioeconomic History   • Marital status:    • Number of children: 2   Tobacco Use   • Smoking status: Never   • Smokeless tobacco: Never   Vaping Use   • Vaping Use: Never used   Substance and Sexual Activity   • Alcohol use: No   • Drug use: No   • Sexual activity: Defer     Partners: Male       Family History   Problem Relation Age of Onset   • Hypertension Mother    • COPD Mother    • Heart attack Mother    • Other Father         / of exhaust fumes   • Cancer Sister    • Heart attack Brother        Review of Systems   Constitutional: Positive for malaise/fatigue (improved). Negative for decreased appetite and diaphoresis.   HENT: Negative for nosebleeds.    Eyes: Negative for blurred vision.   Cardiovascular: Negative for chest pain, claudication, cyanosis, dyspnea on exertion, irregular heartbeat, leg swelling, near-syncope,  "orthopnea, palpitations, paroxysmal nocturnal dyspnea and syncope.   Respiratory: Negative for shortness of breath and snoring.    Endocrine: Negative for cold intolerance and heat intolerance.   Hematologic/Lymphatic: Negative for bleeding problem. Does not bruise/bleed easily.   Skin: Positive for poor wound healing. Negative for rash. Color change: right toes, improved from prior.   Musculoskeletal: Positive for joint pain and stiffness. Negative for myalgias.   Gastrointestinal: Negative for heartburn, melena and nausea.   Genitourinary: Negative for dysuria and hematuria.   Neurological: Positive for loss of balance (uses cane, wears boot right foot). Negative for dizziness and light-headedness. Numbness: r/t neuropathy LE.   Psychiatric/Behavioral: The patient does not have insomnia and is not nervous/anxious.         BP Readings from Last 5 Encounters:   05/04/23 114/60   04/05/23 136/76   03/07/23 118/84   03/06/23 130/77   03/01/23 116/86       Wt Readings from Last 5 Encounters:   05/04/23 101 kg (222 lb 6.4 oz)   04/05/23 105 kg (232 lb)   03/07/23 104 kg (228 lb 6.4 oz)   03/06/23 102 kg (224 lb 13.9 oz)   03/01/23 108 kg (237 lb)       Objective      Labs 3/4/2023: Glucose 103, BUN 4, creatinine 0.66, sodium 141 potassium 3.6, chloride 106, CO2 27, calcium 8.3, .7    /60 (BP Location: Left arm, Patient Position: Sitting)   Pulse 70   Ht 175.3 cm (69\")   Wt 101 kg (222 lb 6.4 oz)   LMP  (LMP Unknown)   BMI 32.84 kg/m²     Vitals and nursing note reviewed.   Constitutional:       Appearance: Well-groomed and not in distress. Chronically ill-appearing.   Eyes:      Conjunctiva/sclera: Conjunctivae normal.      Pupils: Pupils are equal, round, and reactive to light.   HENT:      Head: Normocephalic.   Neck:      Vascular: No carotid bruit.   Pulmonary:      Effort: Pulmonary effort is normal.      Breath sounds: Normal breath sounds.   Cardiovascular:      PMI at left midclavicular line. " Normal rate. Regular rhythm.      Murmurs: There is no murmur.   Pulses:     Decreased pulses.   Edema:     Peripheral edema absent.   Abdominal:      General: Bowel sounds are normal.      Palpations: Abdomen is soft.   Musculoskeletal: Normal range of motion.      Extremities: Clubbing (toes) present.     Cervical back: Normal range of motion and neck supple. Skin:     General: Skin is warm and dry.      Coloration: Skin is cyanotic (right toes, improved from prior).   Neurological:      Mental Status: Alert, oriented to person, place, and time and oriented to person, place and time.   Psychiatric:         Behavior: Behavior is cooperative.          Procedures: none today          Assessment & Plan   Diagnoses and all orders for this visit:    1. NICM (nonischemic cardiomyopathy) (Primary)  -     Adult Transthoracic Echo Limited W/ Cont if Necessary Per Protocol; Future    2. Primary hypertension    3. Mixed hyperlipidemia    4. LV dysfunction  -     Adult Transthoracic Echo Limited W/ Cont if Necessary Per Protocol; Future      The reports of her recent cardiac catheterization were reviewed.  NICM diagnosed.  Symptoms improved with Entresto.  Blood pressure heart rate and rhythm have remained normal.  Agree to hold atenolol at this time.  To relook at overall cardiac function a limited echocardiogram ordered to be done in 4 months prior to follow-up visit.  Patient agrees to monitor vital signs will call for any concerns.    She will follow with your office for lab orders/management.  For lipid management continue Crestor, as she appears to be tolerating it well.    Her weight is down 10 pounds.  I encouraged her on heart healthy diet, daily walks or routine exercise, and weight loss efforts.    A 4-month follow-up visit scheduled.  Please call sooner for cardiac concerns.

## 2023-07-24 ENCOUNTER — APPOINTMENT (OUTPATIENT)
Dept: CT IMAGING | Facility: HOSPITAL | Age: 53
End: 2023-07-24
Payer: MEDICARE

## 2023-07-24 ENCOUNTER — APPOINTMENT (OUTPATIENT)
Dept: GENERAL RADIOLOGY | Facility: HOSPITAL | Age: 53
End: 2023-07-24
Payer: MEDICARE

## 2023-07-24 ENCOUNTER — HOSPITAL ENCOUNTER (OUTPATIENT)
Facility: HOSPITAL | Age: 53
Setting detail: OBSERVATION
Discharge: HOME | End: 2023-07-28
Attending: EMERGENCY MEDICINE | Admitting: HOSPITALIST
Payer: MEDICARE

## 2023-07-24 DIAGNOSIS — R77.8 ELEVATED TROPONIN: ICD-10-CM

## 2023-07-24 DIAGNOSIS — R11.2 INTRACTABLE NAUSEA AND VOMITING: ICD-10-CM

## 2023-07-24 DIAGNOSIS — Z02.89 REFERRED BY HEALTH CARE PROFESSIONAL: ICD-10-CM

## 2023-07-24 DIAGNOSIS — R63.4 ABNORMAL LOSS OF WEIGHT: ICD-10-CM

## 2023-07-24 DIAGNOSIS — S09.90XA INJURY OF HEAD, INITIAL ENCOUNTER: ICD-10-CM

## 2023-07-24 DIAGNOSIS — R55 NEAR SYNCOPE: ICD-10-CM

## 2023-07-24 DIAGNOSIS — E83.42 HYPOMAGNESEMIA: ICD-10-CM

## 2023-07-24 DIAGNOSIS — R11.2 NAUSEA AND VOMITING, UNSPECIFIED VOMITING TYPE: ICD-10-CM

## 2023-07-24 DIAGNOSIS — E87.6 HYPOKALEMIA: Primary | ICD-10-CM

## 2023-07-24 DIAGNOSIS — R10.9 ABDOMINAL PAIN, UNSPECIFIED ABDOMINAL LOCATION: ICD-10-CM

## 2023-07-24 DIAGNOSIS — R11.2 NAUSEA & VOMITING: ICD-10-CM

## 2023-07-24 PROBLEM — M79.672 LEFT FOOT PAIN: Status: ACTIVE | Noted: 2023-07-24

## 2023-07-24 PROBLEM — R10.32 LEFT LOWER QUADRANT ABDOMINAL PAIN: Status: ACTIVE | Noted: 2023-07-24

## 2023-07-24 PROBLEM — E87.8 ELECTROLYTE ABNORMALITY: Status: ACTIVE | Noted: 2023-07-24

## 2023-07-24 PROBLEM — E78.2 MIXED HYPERLIPIDEMIA: Status: ACTIVE | Noted: 2018-10-15

## 2023-07-24 PROBLEM — E86.0 DEHYDRATION: Status: ACTIVE | Noted: 2023-07-24

## 2023-07-24 PROBLEM — K21.9 GERD WITHOUT ESOPHAGITIS: Status: ACTIVE | Noted: 2023-07-24

## 2023-07-24 LAB
ALBUMIN SERPL-MCNC: 3.9 G/DL (ref 3.5–5.2)
ALBUMIN/GLOB SERPL: 1.1 G/DL
ALP SERPL-CCNC: 154 U/L (ref 39–117)
ALT SERPL W P-5'-P-CCNC: 25 U/L (ref 1–33)
ANION GAP SERPL CALCULATED.3IONS-SCNC: 16 MMOL/L (ref 5–15)
AST SERPL-CCNC: 32 U/L (ref 1–32)
BACTERIA UR QL AUTO: ABNORMAL /HPF
BASOPHILS # BLD AUTO: 0.03 10*3/MM3 (ref 0–0.2)
BASOPHILS NFR BLD AUTO: 0.2 % (ref 0–1.5)
BILIRUB SERPL-MCNC: 1 MG/DL (ref 0–1.2)
BILIRUB UR QL STRIP: NEGATIVE
BUN SERPL-MCNC: 7 MG/DL (ref 6–20)
BUN/CREAT SERPL: 8.9 (ref 7–25)
CALCIUM SPEC-SCNC: 10 MG/DL (ref 8.6–10.5)
CHLORIDE SERPL-SCNC: 95 MMOL/L (ref 98–107)
CK SERPL-CCNC: 192 U/L (ref 20–180)
CLARITY UR: CLEAR
CO2 SERPL-SCNC: 31 MMOL/L (ref 22–29)
COLOR UR: YELLOW
CREAT SERPL-MCNC: 0.79 MG/DL (ref 0.57–1)
D-LACTATE SERPL-SCNC: 1.8 MMOL/L (ref 0.5–2)
DEPRECATED RDW RBC AUTO: 42.9 FL (ref 37–54)
EGFRCR SERPLBLD CKD-EPI 2021: 89.6 ML/MIN/1.73
EOSINOPHIL # BLD AUTO: 0.01 10*3/MM3 (ref 0–0.4)
EOSINOPHIL NFR BLD AUTO: 0.1 % (ref 0.3–6.2)
ERYTHROCYTE [DISTWIDTH] IN BLOOD BY AUTOMATED COUNT: 14.1 % (ref 12.3–15.4)
GEN 5 2HR TROPONIN T REFLEX: 31 NG/L
GLOBULIN UR ELPH-MCNC: 3.4 GM/DL
GLUCOSE SERPL-MCNC: 132 MG/DL (ref 65–99)
GLUCOSE UR STRIP-MCNC: NEGATIVE MG/DL
HCT VFR BLD AUTO: 49 % (ref 34–46.6)
HGB BLD-MCNC: 16.9 G/DL (ref 12–15.9)
HGB UR QL STRIP.AUTO: ABNORMAL
HOLD SPECIMEN: NORMAL
HYALINE CASTS UR QL AUTO: ABNORMAL /LPF
IMM GRANULOCYTES # BLD AUTO: 0.06 10*3/MM3 (ref 0–0.05)
IMM GRANULOCYTES NFR BLD AUTO: 0.5 % (ref 0–0.5)
KETONES UR QL STRIP: ABNORMAL
LEUKOCYTE ESTERASE UR QL STRIP.AUTO: ABNORMAL
LIPASE SERPL-CCNC: 18 U/L (ref 13–60)
LYMPHOCYTES # BLD AUTO: 1.25 10*3/MM3 (ref 0.7–3.1)
LYMPHOCYTES NFR BLD AUTO: 10.1 % (ref 19.6–45.3)
MAGNESIUM SERPL-MCNC: 1.6 MG/DL (ref 1.6–2.6)
MCH RBC QN AUTO: 29.3 PG (ref 26.6–33)
MCHC RBC AUTO-ENTMCNC: 34.5 G/DL (ref 31.5–35.7)
MCV RBC AUTO: 85.1 FL (ref 79–97)
MONOCYTES # BLD AUTO: 0.52 10*3/MM3 (ref 0.1–0.9)
MONOCYTES NFR BLD AUTO: 4.2 % (ref 5–12)
NEUTROPHILS NFR BLD AUTO: 10.51 10*3/MM3 (ref 1.7–7)
NEUTROPHILS NFR BLD AUTO: 84.9 % (ref 42.7–76)
NITRITE UR QL STRIP: NEGATIVE
NRBC BLD AUTO-RTO: 0 /100 WBC (ref 0–0.2)
NT-PROBNP SERPL-MCNC: 181.5 PG/ML (ref 0–900)
PH UR STRIP.AUTO: 7 [PH] (ref 5–8)
PLATELET # BLD AUTO: 447 10*3/MM3 (ref 140–450)
PMV BLD AUTO: 10.1 FL (ref 6–12)
POTASSIUM SERPL-SCNC: 2.9 MMOL/L (ref 3.5–5.2)
PROT SERPL-MCNC: 7.3 G/DL (ref 6–8.5)
PROT UR QL STRIP: ABNORMAL
QT INTERVAL: 418 MS
QTC INTERVAL: 573 MS
RBC # BLD AUTO: 5.76 10*6/MM3 (ref 3.77–5.28)
RBC # UR STRIP: ABNORMAL /HPF
REF LAB TEST METHOD: ABNORMAL
SODIUM SERPL-SCNC: 142 MMOL/L (ref 136–145)
SP GR UR STRIP: 1.08 (ref 1–1.03)
SQUAMOUS #/AREA URNS HPF: ABNORMAL /HPF
TROPONIN T DELTA: 0 NG/L
TROPONIN T SERPL HS-MCNC: 31 NG/L
TROPONIN T SERPL HS-MCNC: 38 NG/L
TSH SERPL DL<=0.05 MIU/L-ACNC: 4.05 UIU/ML (ref 0.27–4.2)
TSH SERPL DL<=0.05 MIU/L-ACNC: 4.05 UIU/ML (ref 0.27–4.2)
UROBILINOGEN UR QL STRIP: ABNORMAL
WBC # UR STRIP: ABNORMAL /HPF
WBC NRBC COR # BLD: 12.38 10*3/MM3 (ref 3.4–10.8)
WHOLE BLOOD HOLD COAG: NORMAL
WHOLE BLOOD HOLD SPECIMEN: NORMAL

## 2023-07-24 PROCEDURE — 74177 CT ABD & PELVIS W/CONTRAST: CPT

## 2023-07-24 PROCEDURE — 25010000002 ONDANSETRON PER 1 MG: Performed by: NURSE PRACTITIONER

## 2023-07-24 PROCEDURE — 94799 UNLISTED PULMONARY SVC/PX: CPT

## 2023-07-24 PROCEDURE — 82550 ASSAY OF CK (CPK): CPT | Performed by: NURSE PRACTITIONER

## 2023-07-24 PROCEDURE — G0378 HOSPITAL OBSERVATION PER HR: HCPCS

## 2023-07-24 PROCEDURE — 96368 THER/DIAG CONCURRENT INF: CPT

## 2023-07-24 PROCEDURE — 84443 ASSAY THYROID STIM HORMONE: CPT | Performed by: NURSE PRACTITIONER

## 2023-07-24 PROCEDURE — 84484 ASSAY OF TROPONIN QUANT: CPT | Performed by: PEDIATRICS

## 2023-07-24 PROCEDURE — 99285 EMERGENCY DEPT VISIT HI MDM: CPT

## 2023-07-24 PROCEDURE — 83605 ASSAY OF LACTIC ACID: CPT | Performed by: EMERGENCY MEDICINE

## 2023-07-24 PROCEDURE — 80053 COMPREHEN METABOLIC PANEL: CPT | Performed by: EMERGENCY MEDICINE

## 2023-07-24 PROCEDURE — 81001 URINALYSIS AUTO W/SCOPE: CPT | Performed by: EMERGENCY MEDICINE

## 2023-07-24 PROCEDURE — 96372 THER/PROPH/DIAG INJ SC/IM: CPT

## 2023-07-24 PROCEDURE — 83735 ASSAY OF MAGNESIUM: CPT | Performed by: NURSE PRACTITIONER

## 2023-07-24 PROCEDURE — 70450 CT HEAD/BRAIN W/O DYE: CPT

## 2023-07-24 PROCEDURE — 94640 AIRWAY INHALATION TREATMENT: CPT

## 2023-07-24 PROCEDURE — 85025 COMPLETE CBC W/AUTO DIFF WBC: CPT | Performed by: EMERGENCY MEDICINE

## 2023-07-24 PROCEDURE — 84484 ASSAY OF TROPONIN QUANT: CPT | Performed by: NURSE PRACTITIONER

## 2023-07-24 PROCEDURE — 96375 TX/PRO/DX INJ NEW DRUG ADDON: CPT

## 2023-07-24 PROCEDURE — 0 POTASSIUM CHLORIDE 10 MEQ/100ML SOLUTION: Performed by: NURSE PRACTITIONER

## 2023-07-24 PROCEDURE — 25010000002 LORAZEPAM PER 2 MG: Performed by: NURSE PRACTITIONER

## 2023-07-24 PROCEDURE — 25510000001 IOPAMIDOL 61 % SOLUTION: Performed by: EMERGENCY MEDICINE

## 2023-07-24 PROCEDURE — 25010000002 MAGNESIUM SULFATE IN D5W 1G/100ML (PREMIX) 1-5 GM/100ML-% SOLUTION: Performed by: NURSE PRACTITIONER

## 2023-07-24 PROCEDURE — 94761 N-INVAS EAR/PLS OXIMETRY MLT: CPT

## 2023-07-24 PROCEDURE — 73630 X-RAY EXAM OF FOOT: CPT

## 2023-07-24 PROCEDURE — 93005 ELECTROCARDIOGRAM TRACING: CPT | Performed by: NURSE PRACTITIONER

## 2023-07-24 PROCEDURE — 96365 THER/PROPH/DIAG IV INF INIT: CPT

## 2023-07-24 PROCEDURE — 25010000002 HEPARIN (PORCINE) PER 1000 UNITS: Performed by: NURSE PRACTITIONER

## 2023-07-24 PROCEDURE — 83880 ASSAY OF NATRIURETIC PEPTIDE: CPT | Performed by: NURSE PRACTITIONER

## 2023-07-24 PROCEDURE — 83690 ASSAY OF LIPASE: CPT | Performed by: EMERGENCY MEDICINE

## 2023-07-24 RX ORDER — SODIUM CHLORIDE 9 MG/ML
100 INJECTION, SOLUTION INTRAVENOUS CONTINUOUS
Status: ACTIVE | OUTPATIENT
Start: 2023-07-24 | End: 2023-07-25

## 2023-07-24 RX ORDER — LEVOTHYROXINE SODIUM 0.03 MG/1
25 TABLET ORAL
Status: DISCONTINUED | OUTPATIENT
Start: 2023-07-25 | End: 2023-07-28 | Stop reason: HOSPADM

## 2023-07-24 RX ORDER — ONDANSETRON 2 MG/ML
4 INJECTION INTRAMUSCULAR; INTRAVENOUS ONCE
Status: COMPLETED | OUTPATIENT
Start: 2023-07-24 | End: 2023-07-24

## 2023-07-24 RX ORDER — PREGABALIN 50 MG/1
50 CAPSULE ORAL 2 TIMES DAILY
Status: DISCONTINUED | OUTPATIENT
Start: 2023-07-24 | End: 2023-07-24

## 2023-07-24 RX ORDER — PREGABALIN 100 MG/1
200 CAPSULE ORAL 2 TIMES DAILY
Status: DISCONTINUED | OUTPATIENT
Start: 2023-07-24 | End: 2023-07-24

## 2023-07-24 RX ORDER — BUDESONIDE AND FORMOTEROL FUMARATE DIHYDRATE 160; 4.5 UG/1; UG/1
2 AEROSOL RESPIRATORY (INHALATION)
Status: DISCONTINUED | OUTPATIENT
Start: 2023-07-24 | End: 2023-07-28 | Stop reason: HOSPADM

## 2023-07-24 RX ORDER — MAGNESIUM SULFATE 1 G/100ML
1 INJECTION INTRAVENOUS ONCE
Status: COMPLETED | OUTPATIENT
Start: 2023-07-24 | End: 2023-07-24

## 2023-07-24 RX ORDER — ACETAMINOPHEN 160 MG/5ML
650 SOLUTION ORAL EVERY 4 HOURS PRN
Status: DISCONTINUED | OUTPATIENT
Start: 2023-07-24 | End: 2023-07-28 | Stop reason: HOSPADM

## 2023-07-24 RX ORDER — BISACODYL 5 MG/1
5 TABLET, DELAYED RELEASE ORAL DAILY PRN
Status: DISCONTINUED | OUTPATIENT
Start: 2023-07-24 | End: 2023-07-28 | Stop reason: HOSPADM

## 2023-07-24 RX ORDER — LORAZEPAM 2 MG/ML
0.5 INJECTION INTRAMUSCULAR EVERY 4 HOURS PRN
Status: DISPENSED | OUTPATIENT
Start: 2023-07-24 | End: 2023-07-26

## 2023-07-24 RX ORDER — HEPARIN SODIUM 5000 [USP'U]/ML
5000 INJECTION, SOLUTION INTRAVENOUS; SUBCUTANEOUS EVERY 8 HOURS SCHEDULED
Status: DISCONTINUED | OUTPATIENT
Start: 2023-07-24 | End: 2023-07-28 | Stop reason: HOSPADM

## 2023-07-24 RX ORDER — ACETAMINOPHEN 325 MG/1
650 TABLET ORAL EVERY 4 HOURS PRN
Status: DISCONTINUED | OUTPATIENT
Start: 2023-07-24 | End: 2023-07-28 | Stop reason: HOSPADM

## 2023-07-24 RX ORDER — SODIUM CHLORIDE 9 MG/ML
10 INJECTION INTRAVENOUS AS NEEDED
Status: DISCONTINUED | OUTPATIENT
Start: 2023-07-24 | End: 2023-07-28 | Stop reason: HOSPADM

## 2023-07-24 RX ORDER — SODIUM CHLORIDE 0.9 % (FLUSH) 0.9 %
10 SYRINGE (ML) INJECTION EVERY 12 HOURS SCHEDULED
Status: DISCONTINUED | OUTPATIENT
Start: 2023-07-24 | End: 2023-07-28 | Stop reason: HOSPADM

## 2023-07-24 RX ORDER — AMOXICILLIN 250 MG
2 CAPSULE ORAL 2 TIMES DAILY
Status: DISCONTINUED | OUTPATIENT
Start: 2023-07-24 | End: 2023-07-28 | Stop reason: HOSPADM

## 2023-07-24 RX ORDER — ROSUVASTATIN CALCIUM 10 MG/1
10 TABLET, COATED ORAL NIGHTLY
Status: DISCONTINUED | OUTPATIENT
Start: 2023-07-24 | End: 2023-07-28 | Stop reason: HOSPADM

## 2023-07-24 RX ORDER — POTASSIUM CHLORIDE 750 MG/1
20 CAPSULE, EXTENDED RELEASE ORAL ONCE
Status: DISCONTINUED | OUTPATIENT
Start: 2023-07-24 | End: 2023-07-26 | Stop reason: SDUPTHER

## 2023-07-24 RX ORDER — ACETAMINOPHEN 650 MG/1
650 SUPPOSITORY RECTAL EVERY 4 HOURS PRN
Status: DISCONTINUED | OUTPATIENT
Start: 2023-07-24 | End: 2023-07-28 | Stop reason: HOSPADM

## 2023-07-24 RX ORDER — SODIUM CHLORIDE 9 MG/ML
40 INJECTION, SOLUTION INTRAVENOUS AS NEEDED
Status: DISCONTINUED | OUTPATIENT
Start: 2023-07-24 | End: 2023-07-28 | Stop reason: HOSPADM

## 2023-07-24 RX ORDER — POLYETHYLENE GLYCOL 3350 17 G/17G
17 POWDER, FOR SOLUTION ORAL DAILY PRN
Status: DISCONTINUED | OUTPATIENT
Start: 2023-07-24 | End: 2023-07-28 | Stop reason: HOSPADM

## 2023-07-24 RX ORDER — SODIUM CHLORIDE 0.9 % (FLUSH) 0.9 %
10 SYRINGE (ML) INJECTION AS NEEDED
Status: DISCONTINUED | OUTPATIENT
Start: 2023-07-24 | End: 2023-07-28 | Stop reason: HOSPADM

## 2023-07-24 RX ORDER — POTASSIUM CHLORIDE 7.45 MG/ML
10 INJECTION INTRAVENOUS ONCE
Status: COMPLETED | OUTPATIENT
Start: 2023-07-24 | End: 2023-07-24

## 2023-07-24 RX ORDER — BISACODYL 10 MG
10 SUPPOSITORY, RECTAL RECTAL DAILY PRN
Status: DISCONTINUED | OUTPATIENT
Start: 2023-07-24 | End: 2023-07-28 | Stop reason: HOSPADM

## 2023-07-24 RX ADMIN — IOPAMIDOL 85 ML: 612 INJECTION, SOLUTION INTRAVENOUS at 15:47

## 2023-07-24 RX ADMIN — MAGNESIUM SULFATE HEPTAHYDRATE 1 G: 1 INJECTION, SOLUTION INTRAVENOUS at 16:44

## 2023-07-24 RX ADMIN — SODIUM CHLORIDE 1000 ML: 9 INJECTION, SOLUTION INTRAVENOUS at 14:58

## 2023-07-24 RX ADMIN — ROSUVASTATIN CALCIUM 10 MG: 10 TABLET, COATED ORAL at 20:04

## 2023-07-24 RX ADMIN — POTASSIUM CHLORIDE 10 MEQ: 7.46 INJECTION, SOLUTION INTRAVENOUS at 16:41

## 2023-07-24 RX ADMIN — HEPARIN SODIUM 5000 UNITS: 5000 INJECTION, SOLUTION INTRAVENOUS; SUBCUTANEOUS at 20:03

## 2023-07-24 RX ADMIN — PREGABALIN 250 MG: 100 CAPSULE ORAL at 20:03

## 2023-07-24 RX ADMIN — SODIUM CHLORIDE 100 ML/HR: 9 INJECTION, SOLUTION INTRAVENOUS at 19:06

## 2023-07-24 RX ADMIN — LORAZEPAM 0.5 MG: 2 INJECTION INTRAMUSCULAR; INTRAVENOUS at 20:04

## 2023-07-24 RX ADMIN — BUDESONIDE AND FORMOTEROL FUMARATE DIHYDRATE 2 PUFF: 160; 4.5 AEROSOL RESPIRATORY (INHALATION) at 22:59

## 2023-07-24 RX ADMIN — ONDANSETRON 4 MG: 2 INJECTION INTRAMUSCULAR; INTRAVENOUS at 15:03

## 2023-07-24 RX ADMIN — SACUBITRIL AND VALSARTAN 1 TABLET: 24; 26 TABLET, FILM COATED ORAL at 20:20

## 2023-07-24 RX ADMIN — Medication 10 ML: at 20:04

## 2023-07-24 RX ADMIN — SENNOSIDES AND DOCUSATE SODIUM 2 TABLET: 50; 8.6 TABLET ORAL at 20:03

## 2023-07-24 NOTE — PLAN OF CARE
Goal Outcome Evaluation:     Pt arrived from the ER in no acute distress with her  accompanying her to the floor. Pts vitals are stable and she denies any n/v. She remains in NSR. Radiology did a left foot xray at 1900hrs. Pt refused her clear liquid diet for dinner. Pt denies any complaints at this time..

## 2023-07-24 NOTE — ED PROVIDER NOTES
Subjective   History of Present Illness  Pleasant patient presents the ER for significant nausea vomiting for last several weeks and weight loss of around 30 pounds.  She also has diffuse abdominal tenderness.  History of Lyme disease as well as Dailey spotted fever around 5 years ago.  Sent here to the ER by her regular doctor for further evaluation.  Concerns of dehydration.      Review of Systems    Past Medical History:   Diagnosis Date    Arthritis     Depression     Diabetes     Environmental allergies     Falls 10/24/2018    GERD (gastroesophageal reflux disease)     Heart murmur     Hyperlipidemia     Hypertension     Hypothyroidism     Lyme disease     Migraine     Kelvin Mountain spotted fever     Vitamin B 12 deficiency        Allergies   Allergen Reactions    Gabapentin Hives       Past Surgical History:   Procedure Laterality Date    ACHILLES TENDON SURGERY Right     BREAST CYST EXCISION Left     CARDIAC CATHETERIZATION  2023    Normal coronaries.  EF 45%    CARDIOVASCULAR STRESS TEST  2023    anteroseptal, anterior infarct with dana-infarct ischemia    CHOLECYSTECTOMY      ECHO - CONVERTED  2023    EF 41-45%, saline test negative, valves normal    ENDOSCOPY N/A 10/16/2018    Procedure: ESOPHAGOGASTRODUODENOSCOPY;  Surgeon: Brunner, Mark I, MD;  Location: [facility];  Service: Gastroenterology    HYSTERECTOMY      TRANSESOPHAGEAL ECHOCARDIOGRAM (NESSA)  2023    Dr. Alonso- EF 56-60%, no mass or thrombus, mild plaque aorrti arch and descending aorta    US CAROTID UNILATERAL  2022    < 50% Bilaterally       Family History   Problem Relation Age of Onset    Hypertension Mother     COPD Mother     Heart attack Mother     Other Father         / of exhaust fumes    Cancer Sister     Heart attack Brother        Social History     Socioeconomic History    Marital status:     Number of children: 2   Tobacco Use    Smoking status: Never    Smokeless  tobacco: Never   Vaping Use    Vaping Use: Never used   Substance and Sexual Activity    Alcohol use: No    Drug use: No    Sexual activity: Defer     Partners: Male           Objective   Physical Exam  Constitutional:       Appearance: She is well-developed. She is ill-appearing.   HENT:      Head: Normocephalic and atraumatic.      Right Ear: External ear normal.      Left Ear: External ear normal.      Nose: Nose normal.   Eyes:      Conjunctiva/sclera: Conjunctivae normal.      Pupils: Pupils are equal, round, and reactive to light.   Cardiovascular:      Rate and Rhythm: Normal rate and regular rhythm.      Heart sounds: Normal heart sounds.   Pulmonary:      Effort: Pulmonary effort is normal.      Breath sounds: Normal breath sounds.   Abdominal:      General: Bowel sounds are normal.      Palpations: Abdomen is soft.   Musculoskeletal:         General: Normal range of motion.      Cervical back: Normal range of motion and neck supple.   Skin:     General: Skin is warm and dry.   Neurological:      Mental Status: She is alert and oriented to person, place, and time.   Psychiatric:         Behavior: Behavior normal.         Thought Content: Thought content normal.         Judgment: Judgment normal.       Procedures           ED Course  ED Course as of 07/24/23 1705   Mon Jul 24, 2023   1418 Broad differential including infection and dehydration.  Urinary tract infection bowel obstruction etc.  We will need to do some blood work nausea medications IV fluids. [JM]   1420 Patient also endorses significant weakness with falling.  She did fall earlier today hitting her head. No loc. [JM]   1630 Case discussed with Dr. Murphy.  Who reviewed the EKGs.  I have ordered IV potassium replacement.  We are awaiting CAT scans.  Patient will need be admitted to the hospital for further evaluation. [JM]      ED Course User Index  [JM] Yasmani Medrano APRN           CT Abdomen Pelvis With Contrast   Final Result   1.Gastric  wall thickening may represent gastritis or other etiologies. Follow-up recommended.   2.Urinary bladder wall thickening may represent underdistention, cystitis or other etiologies. Please correlate with urinalysis. Follow-up recommended.                     Electronically Signed: Klever Ali     7/24/2023 4:27 PM EDT     Workstation ID: [id number]      CT Head Without Contrast   Final Result   1.No acute intracranial hemorrhage. Calvarium is intact.               Electronically Signed: Klever Ali     7/24/2023 4:30 PM EDT     Workstation ID: [id number]                                        Medical Decision Making  Problems Addressed:  Abdominal pain, unspecified abdominal location: complicated acute illness or injury  Elevated troponin: complicated acute illness or injury  Hypokalemia: complicated acute illness or injury  Hypomagnesemia: complicated acute illness or injury  Injury of head, initial encounter: complicated acute illness or injury  Nausea and vomiting, unspecified vomiting type: complicated acute illness or injury  Near syncope: complicated acute illness or injury  Referred by health care professional: complicated acute illness or injury    Amount and/or Complexity of Data Reviewed  Labs: ordered.  Radiology: ordered.  ECG/medicine tests: ordered.    Risk  Prescription drug management.  Decision regarding hospitalization.        Final diagnoses:   Hypokalemia   Hypomagnesemia   Elevated troponin   Near syncope   Injury of head, initial encounter   Abdominal pain, unspecified abdominal location   Nausea and vomiting, unspecified vomiting type   Referred by health care professional       ED Disposition  ED Disposition       ED Disposition   Decision to Admit    Condition   --    Comment   Level of Care: Telemetry [5]   Diagnosis: Dehydration [276.51.ICD-9-CM]   Admitting Physician: VIRGINIE WILLETT [[id number]]   Attending Physician: VIRGINIE WILLETT [[id number]]                 No follow-up provider  specified.       Medication List      No changes were made to your prescriptions during this visit.            Yasmani Medrano, APRN  07/24/23 0164

## 2023-07-24 NOTE — H&P
[facility] Medicine Services  HISTORY AND PHYSICAL    Patient Name: Bryanna Schwartz  : 1970  MRN: [id number]  Primary Care Physician: Baljeet Manley APRN  Date of admission: 2023    Subjective   Subjective     Chief Complaint:  Intractable nausea, vomiting, abdominal pain, 60 pound weight loss    HPI:  Bryanna Schwartz is a 53 y.o. female MH HTN, HLD, GERD, CHF, hypothyroidism, migraine headaches, CHF presenting with intractable nausea vomiting abdominal pain and a 60 pound weight loss that started in April, worsening over the last 2 weeks.  Patient is scheduled to have a colonoscopy this week.  Today, patient was at her PCP office when she was so weak that she fell forward hitting her head on a stool and injuring her left foot.  PCP instructed her to come to the ED for further evaluation.  Patient reports at least 10 episodes of vomiting daily.  She states every time she eats or drinks, she vomits.  She reports constipation denies diarrhea.  Patient has been on Amitiza with little improvement of constipation.  Follows with gastroenterology Dr. Smith in [facility].  Patient is so weak that she has not been able to ambulate in 4-5 days. Pt states she has only be able to take her Lyrica, amitiza and entresto.       Review of Systems   Constitutional:  Positive for activity change, appetite change, fatigue and unexpected weight change.   HENT: Negative.     Eyes: Negative.    Respiratory: Negative.     Cardiovascular: Negative.    Gastrointestinal:  Positive for abdominal pain, constipation, nausea and vomiting.   Endocrine: Negative.    Genitourinary: Negative.    Musculoskeletal:  Positive for arthralgias and myalgias.   Skin: Negative.    Allergic/Immunologic: Negative.    Neurological:  Positive for dizziness, weakness and light-headedness.   Hematological: Negative.    Psychiatric/Behavioral: Negative.            Personal History     Past Medical History:   Diagnosis  Date    Arthritis     Depression     Diabetes     Environmental allergies     Falls 10/24/2018    GERD (gastroesophageal reflux disease)     Heart murmur     Hyperlipidemia     Hypertension     Hypothyroidism     Lyme disease     Migraine     Kelvin Mountain spotted fever     Vitamin B 12 deficiency              Past Surgical History:   Procedure Laterality Date    ACHILLES TENDON SURGERY Right     BREAST CYST EXCISION Left     CARDIAC CATHETERIZATION  03/16/2023    Normal coronaries.  EF 45%    CARDIOVASCULAR STRESS TEST  03/04/2023    anteroseptal, anterior infarct with dana-infarct ischemia    CHOLECYSTECTOMY      ECHO - CONVERTED  03/02/2023    EF 41-45%, saline test negative, valves normal    ENDOSCOPY N/A 10/16/2018    Procedure: ESOPHAGOGASTRODUODENOSCOPY;  Surgeon: Brunner, Mark I, MD;  Location: [facility] ENDOSCOPY;  Service: Gastroenterology    HYSTERECTOMY      TRANSESOPHAGEAL ECHOCARDIOGRAM (NESSA)  03/07/2023    Dr. Alonso- EF 56-60%, no mass or thrombus, mild plaque aorrti arch and descending aorta    US CAROTID UNILATERAL  12/17/2022    < 50% Bilaterally       Family History:  family history includes COPD in her mother; Cancer in her sister; Heart attack in her brother and mother; Hypertension in her mother; Other in her father.     Social History:  reports that she has never smoked. She has never used smokeless tobacco. She reports that she does not drink alcohol and does not use drugs.  Social History     Social History Narrative    Lives in [place]       Medications:  Vitamin D3, budesonide-formoterol, folic acid, levothyroxine, montelukast, nitroglycerin, pregabalin, rosuvastatin, sacubitril-valsartan, and traMADol    Allergies   Allergen Reactions    Gabapentin Hives       Objective   Objective     Vital Signs:   Temp:  [97.9 °F (36.6 °C)] 97.9 °F (36.6 °C)  Heart Rate:  [] 92  Resp:  [18] 18  BP: (105-127)/(74-98) 127/74    Physical Exam  Constitutional:       General: She is not in acute  distress.     Appearance: She is obese. She is ill-appearing.   HENT:      Head: Normocephalic and atraumatic.      Nose: Nose normal.   Eyes:      General: No scleral icterus.     Conjunctiva/sclera: Conjunctivae normal.   Cardiovascular:      Rate and Rhythm: Regular rhythm. Tachycardia present.      Heart sounds: No murmur heard.    No friction rub. No gallop.   Pulmonary:      Breath sounds: Normal breath sounds.   Abdominal:      Tenderness: There is abdominal tenderness. There is no guarding.      Comments: Tender with palpation in the LLQ/RLQ   Musculoskeletal:         General: No swelling.      Cervical back: Neck supple.      Comments: Left foot pain the flexion.    Skin:     General: Skin is warm and dry.   Neurological:      General: No focal deficit present.      Mental Status: She is alert and oriented to person, place, and time.   Psychiatric:         Mood and Affect: Mood normal.         Behavior: Behavior normal.          Result Review:  I have personally reviewed the results from the time of this admission to 7/24/2023 17:53 EDT and agree with these findings:  [x]  Laboratory list / accordion  []  Microbiology  [x]  Radiology  [x]  EKG/Telemetry   [x]  Cardiology/Vascular   []  Pathology  []  Old records  []  Other:  Most notable findings include: see below    LAB RESULTS:      Lab 07/24/23  1449   WBC 12.38*   HEMOGLOBIN 16.9*   HEMATOCRIT 49.0*   PLATELETS 447   NEUTROS ABS 10.51*   IMMATURE GRANS (ABS) 0.06*   LYMPHS ABS 1.25   MONOS ABS 0.52   EOS ABS 0.01   MCV 85.1   LACTATE 1.8         Lab 07/24/23  1449   SODIUM 142   POTASSIUM 2.9*   CHLORIDE 95*   CO2 31.0*   ANION GAP 16.0*   BUN 7   CREATININE 0.79   EGFR 89.6   GLUCOSE 132*   CALCIUM 10.0   MAGNESIUM 1.6         Lab 07/24/23  1449   TOTAL PROTEIN 7.3   ALBUMIN 3.9   GLOBULIN 3.4   ALT (SGPT) 25   AST (SGOT) 32   BILIRUBIN 1.0   ALK PHOS 154*   LIPASE 18         Lab 07/24/23  1449   PROBNP 181.5   HSTROP T 38*                 Brief Urine  Lab Results  (Last result in the past 365 days)        Color   Clarity   Blood   Leuk Est   Nitrite   Protein   CREAT   Urine HCG        07/24/23 1640 Yellow   Clear   Small (1+)   Trace   Negative   Trace                 Microbiology Results (last 10 days)       ** No results found for the last 240 hours. **            CT Head Without Contrast    Result Date: 7/24/2023  CT HEAD WO CONTRAST Date of Exam: 7/24/2023 3:35 PM EDT Indication: fall. head injury.. Comparison: None available. Technique: Axial CT images were obtained of the head without contrast administration.  Automated exposure control and iterative construction methods were used. Findings: *No acute intracranial hemorrhage. *No masses, mass effect, midline shift or hydrocephalus. *White matter is intact. *Calvarium is intact. *Visualized orbits and globes are unremarkable without radiopaque foreign bodies. *Visualized paranasal sinuses are clear. *Visualized mastoid air cells are clear.     Impression: 1.No acute intracranial hemorrhage. Calvarium is intact. Electronically Signed: Klever Perez  7/24/2023 4:30 PM EDT  Workstation ID: [id number]    CT Abdomen Pelvis With Contrast    Result Date: 7/24/2023  CT ABDOMEN PELVIS W CONTRAST Date of Exam: 7/24/2023 3:35 PM EDT Indication: abd pain. nv.. Comparison: 7/6/2023 Technique: Axial CT images were obtained of the abdomen and pelvis following the uneventful intravenous administration of 85 mL Isovue-300 . Reconstructed coronal and sagittal images were also obtained. Automated exposure control and iterative construction methods were used. Findings: *Lower Thorax: Mild dependent atelectasis. *Liver: Unremarkable. *Gallbladder: Cholecystectomy. *Pancreas: Normal. *Spleen: Normal. *Adrenal Glands: Normal. *Kidneys: No renal stones or hydronephrosis bilaterally. *Stomach: Gastric wall thickening. *Bowel: Nonobstructive bowel gas pattern. No bowel wall inflammation. *Appendix: Appendix is not visualized. No  secondary signs of appendicitis. *Peritoneal Cavity: No pneumoperitoneum.  No lymphadenopathy. *Urinary Bladder: Urinary bladder wall thickening. *Pelvis: No free pelvic fluid. *Bones: Unchanged anterior wedge compression deformity of T12 vertebral body. Multilevel spondylosis. Mild generalized osteopenia. *     Impression: 1.Gastric wall thickening may represent gastritis or other etiologies. Follow-up recommended. 2.Urinary bladder wall thickening may represent underdistention, cystitis or other etiologies. Please correlate with urinalysis. Follow-up recommended. Electronically Signed: Klever Perez  7/24/2023 4:27 PM EDT  Workstation ID: [id number]     Results for orders placed during the hospital encounter of 03/01/23    Adult Transesophageal Echo 3D (NESSA) W/ Cont If Necessary Per Protocol    Interpretation Summary    Left ventricular systolic function is normal. Left ventricular ejection fraction appears to be 56 - 60%.    No intracardiac mass or thrombus.    Valves are structurally normal.    Mild plaque seen in aortic arch and descending aorta. No mobile plaque or thrombus seen.      Assessment & Plan   Assessment & Plan       Dehydration    Electrolyte abnormality    Intractable nausea and vomiting    Left lower quadrant abdominal pain    Left foot pain    Mixed hyperlipidemia    Primary hypertension    Hypothyroidism (acquired)    GERD without esophagitis      Hospital Course to date:  Bryanna Schwartz is a 53 y.o. female MH HTN, HLD, GERD, CHF, hypothyroidism, migraine headaches, CHF presenting with intractable nausea vomiting abdominal pain and a 60 pound weight loss that started in April, worsening over the last 2 weeks.  Patient is scheduled to have a colonoscopy this week.  Today, patient was at her PCP office when she was so weak that she fell forward hitting her head on a stool and injuring her left foot.  PCP instructed her to come to the ED for further evaluation.  Patient reports at least 10 episodes  of vomiting daily.  She states every time she eats or drinks, she vomits.  She reports constipation denies diarrhea.  Patient has been on Amitiza with little improvement of constipation.  Follows with gastroenterology Dr. Smith in [facility].       Nausea, vomiting  Abdominal pain  Chronic constipation  - Consult GI  - Protonix  - GI diet  - QT/QTc 418/573; therefore will use lorazepam as needed for nausea  - CT abdomen pelvis shows gastric wall thickening may represent gastritis or other etiologies.  - Patient reports 1 colonoscopy approximately 10 years ago.    Fall  Left foot pain today in the office today when she fell.  She hit her head on the stool at [facility] office.  - X-ray left foot  - No acute intracranial process    Prolonged QT/QTc  - QT/QTc 418/573  -Avoid QT prolonging medications    HTN/HLD/CHF  - Entresto with hold parameters  -Rosuvastatin    Neuropathy  - Lyrica    Hypothyroidism  - TSH  - Patient has not been taking her thyroid medication.      Total time spent: 90 min  Time spent includes time reviewing chart, face-to-face time, counseling patient/family/caregiver, ordering medications/tests/procedures, communicating with other health care professionals, documenting clinical information in the electronic health record, and coordination of care.       DVT prophylaxis:  [facility]    CODE STATUS:    Level Of Support Discussed With: Patient  Code Status (Patient has no pulse and is not breathing): CPR (Attempt to Resuscitate)  Medical Interventions (Patient has pulse or is breathing): Full Support  Release to patient: Routine Release      Expected Discharge 07/28/2023    This note has been completed as part of a split-shared workflow.     Signature: Electronically signed by CARRIE Saucedo, 07/24/23, 5:48 PM EDT.

## 2023-07-25 ENCOUNTER — ANESTHESIA EVENT (OUTPATIENT)
Dept: TELEMETRY | Facility: HOSPITAL | Age: 53
End: 2023-07-25

## 2023-07-25 LAB
ALBUMIN SERPL-MCNC: 3 G/DL (ref 3.5–5.2)
ALBUMIN/GLOB SERPL: 1.4 G/DL
ALP SERPL-CCNC: 111 U/L (ref 39–117)
ALT SERPL W P-5'-P-CCNC: 18 U/L (ref 1–33)
ANION GAP SERPL CALCULATED.3IONS-SCNC: 11 MMOL/L (ref 5–15)
AST SERPL-CCNC: 23 U/L (ref 1–32)
BASOPHILS # BLD AUTO: 0.02 10*3/MM3 (ref 0–0.2)
BASOPHILS NFR BLD AUTO: 0.3 % (ref 0–1.5)
BILIRUB SERPL-MCNC: 0.7 MG/DL (ref 0–1.2)
BUN SERPL-MCNC: 6 MG/DL (ref 6–20)
BUN/CREAT SERPL: 9.5 (ref 7–25)
CALCIUM SPEC-SCNC: 8.4 MG/DL (ref 8.6–10.5)
CHLORIDE SERPL-SCNC: 101 MMOL/L (ref 98–107)
CHOLEST SERPL-MCNC: 108 MG/DL (ref 0–200)
CO2 SERPL-SCNC: 29 MMOL/L (ref 22–29)
CREAT SERPL-MCNC: 0.63 MG/DL (ref 0.57–1)
DEPRECATED RDW RBC AUTO: 43.7 FL (ref 37–54)
EGFRCR SERPLBLD CKD-EPI 2021: 106.2 ML/MIN/1.73
EOSINOPHIL # BLD AUTO: 0.06 10*3/MM3 (ref 0–0.4)
EOSINOPHIL NFR BLD AUTO: 0.9 % (ref 0.3–6.2)
ERYTHROCYTE [DISTWIDTH] IN BLOOD BY AUTOMATED COUNT: 14.1 % (ref 12.3–15.4)
GLOBULIN UR ELPH-MCNC: 2.2 GM/DL
GLUCOSE SERPL-MCNC: 94 MG/DL (ref 65–99)
HCT VFR BLD AUTO: 39.8 % (ref 34–46.6)
HDLC SERPL-MCNC: 33 MG/DL (ref 40–60)
HGB BLD-MCNC: 13.5 G/DL (ref 12–15.9)
IMM GRANULOCYTES # BLD AUTO: 0.05 10*3/MM3 (ref 0–0.05)
IMM GRANULOCYTES NFR BLD AUTO: 0.7 % (ref 0–0.5)
LDLC SERPL CALC-MCNC: 55 MG/DL (ref 0–100)
LDLC/HDLC SERPL: 1.61 {RATIO}
LYMPHOCYTES # BLD AUTO: 2.04 10*3/MM3 (ref 0.7–3.1)
LYMPHOCYTES NFR BLD AUTO: 29.9 % (ref 19.6–45.3)
MAGNESIUM SERPL-MCNC: 1.7 MG/DL (ref 1.6–2.6)
MCH RBC QN AUTO: 29.5 PG (ref 26.6–33)
MCHC RBC AUTO-ENTMCNC: 33.9 G/DL (ref 31.5–35.7)
MCV RBC AUTO: 86.9 FL (ref 79–97)
MONOCYTES # BLD AUTO: 0.6 10*3/MM3 (ref 0.1–0.9)
MONOCYTES NFR BLD AUTO: 8.8 % (ref 5–12)
NEUTROPHILS NFR BLD AUTO: 4.06 10*3/MM3 (ref 1.7–7)
NEUTROPHILS NFR BLD AUTO: 59.4 % (ref 42.7–76)
NRBC BLD AUTO-RTO: 0 /100 WBC (ref 0–0.2)
PLATELET # BLD AUTO: 265 10*3/MM3 (ref 140–450)
PMV BLD AUTO: 10.1 FL (ref 6–12)
POTASSIUM SERPL-SCNC: 2.5 MMOL/L (ref 3.5–5.2)
PROT SERPL-MCNC: 5.2 G/DL (ref 6–8.5)
RBC # BLD AUTO: 4.58 10*6/MM3 (ref 3.77–5.28)
SODIUM SERPL-SCNC: 141 MMOL/L (ref 136–145)
TRIGL SERPL-MCNC: 110 MG/DL (ref 0–150)
VLDLC SERPL-MCNC: 20 MG/DL (ref 5–40)
WBC NRBC COR # BLD: 6.83 10*3/MM3 (ref 3.4–10.8)

## 2023-07-25 PROCEDURE — 96366 THER/PROPH/DIAG IV INF ADDON: CPT

## 2023-07-25 PROCEDURE — 85025 COMPLETE CBC W/AUTO DIFF WBC: CPT | Performed by: PEDIATRICS

## 2023-07-25 PROCEDURE — 0 POTASSIUM CHLORIDE 10 MEQ/100ML SOLUTION: Performed by: NURSE PRACTITIONER

## 2023-07-25 PROCEDURE — 96375 TX/PRO/DX INJ NEW DRUG ADDON: CPT

## 2023-07-25 PROCEDURE — 99214 OFFICE O/P EST MOD 30 MIN: CPT | Performed by: PHYSICIAN ASSISTANT

## 2023-07-25 PROCEDURE — 97530 THERAPEUTIC ACTIVITIES: CPT

## 2023-07-25 PROCEDURE — G0378 HOSPITAL OBSERVATION PER HR: HCPCS

## 2023-07-25 PROCEDURE — 94799 UNLISTED PULMONARY SVC/PX: CPT

## 2023-07-25 PROCEDURE — 97161 PT EVAL LOW COMPLEX 20 MIN: CPT

## 2023-07-25 PROCEDURE — 83735 ASSAY OF MAGNESIUM: CPT | Performed by: HOSPITALIST

## 2023-07-25 PROCEDURE — 80053 COMPREHEN METABOLIC PANEL: CPT | Performed by: PEDIATRICS

## 2023-07-25 PROCEDURE — 96372 THER/PROPH/DIAG INJ SC/IM: CPT

## 2023-07-25 PROCEDURE — 80061 LIPID PANEL: CPT | Performed by: PEDIATRICS

## 2023-07-25 PROCEDURE — 25010000002 HEPARIN (PORCINE) PER 1000 UNITS: Performed by: NURSE PRACTITIONER

## 2023-07-25 PROCEDURE — 25010000002 THIAMINE PER 100 MG: Performed by: PHYSICIAN ASSISTANT

## 2023-07-25 RX ORDER — POTASSIUM CHLORIDE 7.45 MG/ML
10 INJECTION INTRAVENOUS
Status: COMPLETED | OUTPATIENT
Start: 2023-07-25 | End: 2023-07-25

## 2023-07-25 RX ORDER — POTASSIUM CHLORIDE 750 MG/1
40 CAPSULE, EXTENDED RELEASE ORAL ONCE
Status: DISCONTINUED | OUTPATIENT
Start: 2023-07-25 | End: 2023-07-25

## 2023-07-25 RX ORDER — THIAMINE HYDROCHLORIDE 100 MG/ML
200 INJECTION, SOLUTION INTRAMUSCULAR; INTRAVENOUS DAILY
Status: COMPLETED | OUTPATIENT
Start: 2023-07-25 | End: 2023-07-27

## 2023-07-25 RX ORDER — LUBIPROSTONE 24 UG/1
24 CAPSULE ORAL 2 TIMES DAILY WITH MEALS
Status: DISCONTINUED | OUTPATIENT
Start: 2023-07-25 | End: 2023-07-28 | Stop reason: HOSPADM

## 2023-07-25 RX ORDER — PANTOPRAZOLE SODIUM 40 MG/10ML
40 INJECTION, POWDER, LYOPHILIZED, FOR SOLUTION INTRAVENOUS
Status: DISCONTINUED | OUTPATIENT
Start: 2023-07-25 | End: 2023-07-28 | Stop reason: HOSPADM

## 2023-07-25 RX ADMIN — SACUBITRIL AND VALSARTAN 1 TABLET: 24; 26 TABLET, FILM COATED ORAL at 21:01

## 2023-07-25 RX ADMIN — PREGABALIN 250 MG: 100 CAPSULE ORAL at 09:03

## 2023-07-25 RX ADMIN — LEVOTHYROXINE SODIUM 25 MCG: 0.03 TABLET ORAL at 06:11

## 2023-07-25 RX ADMIN — HEPARIN SODIUM 5000 UNITS: 5000 INJECTION, SOLUTION INTRAVENOUS; SUBCUTANEOUS at 14:33

## 2023-07-25 RX ADMIN — SENNOSIDES AND DOCUSATE SODIUM 2 TABLET: 50; 8.6 TABLET ORAL at 21:02

## 2023-07-25 RX ADMIN — POTASSIUM CHLORIDE 10 MEQ: 7.46 INJECTION, SOLUTION INTRAVENOUS at 10:35

## 2023-07-25 RX ADMIN — POTASSIUM CHLORIDE 10 MEQ: 7.46 INJECTION, SOLUTION INTRAVENOUS at 12:07

## 2023-07-25 RX ADMIN — LUBIPROSTONE 24 MCG: 24 CAPSULE, GELATIN COATED ORAL at 10:36

## 2023-07-25 RX ADMIN — BUDESONIDE AND FORMOTEROL FUMARATE DIHYDRATE 2 PUFF: 160; 4.5 AEROSOL RESPIRATORY (INHALATION) at 20:00

## 2023-07-25 RX ADMIN — HEPARIN SODIUM 5000 UNITS: 5000 INJECTION, SOLUTION INTRAVENOUS; SUBCUTANEOUS at 21:01

## 2023-07-25 RX ADMIN — Medication 10 ML: at 21:04

## 2023-07-25 RX ADMIN — THIAMINE HYDROCHLORIDE 200 MG: 100 INJECTION, SOLUTION INTRAMUSCULAR; INTRAVENOUS at 10:36

## 2023-07-25 RX ADMIN — BUDESONIDE AND FORMOTEROL FUMARATE DIHYDRATE 2 PUFF: 160; 4.5 AEROSOL RESPIRATORY (INHALATION) at 11:54

## 2023-07-25 RX ADMIN — HEPARIN SODIUM 5000 UNITS: 5000 INJECTION, SOLUTION INTRAVENOUS; SUBCUTANEOUS at 06:11

## 2023-07-25 RX ADMIN — PANTOPRAZOLE SODIUM 40 MG: 40 INJECTION, POWDER, LYOPHILIZED, FOR SOLUTION INTRAVENOUS at 16:09

## 2023-07-25 RX ADMIN — SENNOSIDES AND DOCUSATE SODIUM 2 TABLET: 50; 8.6 TABLET ORAL at 09:03

## 2023-07-25 RX ADMIN — ROSUVASTATIN CALCIUM 10 MG: 10 TABLET, COATED ORAL at 21:04

## 2023-07-25 RX ADMIN — PREGABALIN 250 MG: 100 CAPSULE ORAL at 21:02

## 2023-07-25 RX ADMIN — SACUBITRIL AND VALSARTAN 1 TABLET: 24; 26 TABLET, FILM COATED ORAL at 09:03

## 2023-07-25 RX ADMIN — Medication 10 ML: at 09:03

## 2023-07-25 RX ADMIN — POTASSIUM CHLORIDE 10 MEQ: 7.46 INJECTION, SOLUTION INTRAVENOUS at 06:11

## 2023-07-25 RX ADMIN — LUBIPROSTONE 24 MCG: 24 CAPSULE, GELATIN COATED ORAL at 16:08

## 2023-07-25 RX ADMIN — POTASSIUM CHLORIDE 10 MEQ: 7.46 INJECTION, SOLUTION INTRAVENOUS at 09:02

## 2023-07-25 NOTE — CASE MANAGEMENT/SOCIAL WORK
Discharge Planning Assessment  [facility]     Patient Name: Bryanna Schwartz  MRN: [id number]  Today's Date: 7/25/2023    Admit Date: 7/24/2023    Plan: Home at DC   Discharge Needs Assessment       Row Name 07/25/23 1033       Living Environment    People in Home spouse    Current Living Arrangements home    Living Arrangement Comments Lives in a one level home with her spouse. Her spouse cooks and cleans. The pt is independent with her personal care. Has a ramp.       Discharge Needs Assessment    Equipment Currently Used at Home bath bench;cane, straight;ramp;walker, rolling;rollator    Equipment Needed After Discharge none    Discharge Coordination/Progress Has had HH and outpt [place] in the past but not current.                   Discharge Plan       Row Name 07/25/23 1035       Plan    Plan Home at DC    Patient/Family in Agreement with Plan yes    Plan Comments I spoke with the pt and her spouse. They are unusre of any DC needs at this time.    Final Discharge Disposition Code 01 - home or self-care      Row Name 07/25/23 0815       Plan    Final Discharge Disposition Code 01 - home or self-care                  Continued Care and Services - Admitted Since 7/24/2023    Coordination has not been started for this encounter.       Expected Discharge Date and Time       Expected Discharge Date Expected Discharge Time    Jul 27, 2023            Demographic Summary    No documentation.                  Functional Status       Row Name 07/25/23 1032       Functional Status    Usual Activity Tolerance moderate       Functional Status, IADL    Medications independent    Meal Preparation assistive person    Housekeeping assistive person    Laundry assistive person    Shopping assistive person                   Psychosocial    No documentation.                  Abuse/Neglect    No documentation.                  Legal    No documentation.                  Substance Abuse    No documentation.                  Patient Forms     No documentation.                     Coni Choudhury RN

## 2023-07-25 NOTE — PROGRESS NOTES
[facility] Medicine Services  PROGRESS NOTE    Patient Name: Bryanna Schwartz  : 1970  MRN: [id number]    Date of Admission: 2023  Primary Care Physician: Baljeet Manley APRN    Subjective   Subjective     CC: Nausea    HPI: Nausea, no emesis. Intermittent abdominal cramping. No f/c. NO dyspnea. Tired.    Review of Systems   Constitutional:  Positive for activity change, appetite change, fatigue and unexpected weight change.   HENT: Negative.     Respiratory: Negative.     Cardiovascular: Negative.    Gastrointestinal:  Positive for abdominal distention, abdominal pain, nausea and vomiting.   Neurological:  Positive for weakness.        Objective   Objective     Vital Signs:   Temp:  [97.8 °F (36.6 °C)-97.9 °F (36.6 °C)] 97.8 °F (36.6 °C)  Heart Rate:  [] 90  Resp:  [16-18] 16  BP: ()/(65-98) 110/67     Physical Exam:  NAD, alert and oriented  OP clear, dry MM  Neck supple  No LAD  RRR  CTAB  +BS, soft, ND  ZHANG  Normal affect    Results Reviewed:  LAB RESULTS:      Lab 23  0340 23  1449   WBC 6.83 12.38*   HEMOGLOBIN 13.5 16.9*   HEMATOCRIT 39.8 49.0*   PLATELETS 265 447   NEUTROS ABS 4.06 10.51*   IMMATURE GRANS (ABS) 0.05 0.06*   LYMPHS ABS 2.04 1.25   MONOS ABS 0.60 0.52   EOS ABS 0.06 0.01   MCV 86.9 85.1   LACTATE  --  1.8         Lab 23  0340 23  1449   SODIUM 141 142   POTASSIUM 2.5* 2.9*   CHLORIDE 101 95*   CO2 29.0 31.0*   ANION GAP 11.0 16.0*   BUN 6 7   CREATININE 0.63 0.79   EGFR 106.2 89.6   GLUCOSE 94 132*   CALCIUM 8.4* 10.0   MAGNESIUM  --  1.6   TSH  --  4.050  4.050         Lab 23  0340 23  1449   TOTAL PROTEIN 5.2* 7.3   ALBUMIN 3.0* 3.9   GLOBULIN 2.2 3.4   ALT (SGPT) 18 25   AST (SGOT) 23 32   BILIRUBIN 0.7 1.0   ALK PHOS 111 154*   LIPASE  --  18         Lab 23  2226 23  1952 23  1449   PROBNP  --   --  181.5   HSTROP T 31* 31* 38*         Lab 23  0340   CHOLESTEROL 108   LDL CHOL  55   HDL CHOL 33*   TRIGLYCERIDES 110             Brief Urine Lab Results  (Last result in the past 365 days)        Color   Clarity   Blood   Leuk Est   Nitrite   Protein   CREAT   Urine HCG        07/24/23 1640 Yellow   Clear   Small (1+)   Trace   Negative   Trace                   Microbiology Results Abnormal       None            XR Foot 3+ View Left    Result Date: 7/24/2023  XR FOOT 3+ VW LEFT Date of Exam: 7/24/2023 6:50 PM EDT Indication: left foot pain Comparison: 3/2/2023. Findings: There is no evidence of fracture. No evidence of dislocation. No erosions identified. No evidence of periostitis. No evidence of chondrocalcinosis. Moderate osteoarthritic changes are present within the interphalangeal joints as well as the first MTP joint and the midfoot. No focal soft tissue abnormalities identified.     Impression: Impression: No acute osseous abnormality. Moderate osteoarthritic changes are present. Electronically Signed: Kimberli Allen  7/24/2023 11:05 PM EDT  Workstation ID: [id number]    CT Head Without Contrast    Result Date: 7/24/2023  CT HEAD WO CONTRAST Date of Exam: 7/24/2023 3:35 PM EDT Indication: fall. head injury.. Comparison: None available. Technique: Axial CT images were obtained of the head without contrast administration.  Automated exposure control and iterative construction methods were used. Findings: *No acute intracranial hemorrhage. *No masses, mass effect, midline shift or hydrocephalus. *White matter is intact. *Calvarium is intact. *Visualized orbits and globes are unremarkable without radiopaque foreign bodies. *Visualized paranasal sinuses are clear. *Visualized mastoid air cells are clear.     Impression: 1.No acute intracranial hemorrhage. Calvarium is intact. Electronically Signed: Klever Perez  7/24/2023 4:30 PM EDT  Workstation ID: [id number]    CT Abdomen Pelvis With Contrast    Result Date: 7/24/2023  CT ABDOMEN PELVIS W CONTRAST Date of Exam: 7/24/2023 3:35 PM EDT Indication:  abd pain. nv.. Comparison: 7/6/2023 Technique: Axial CT images were obtained of the abdomen and pelvis following the uneventful intravenous administration of 85 mL Isovue-300 . Reconstructed coronal and sagittal images were also obtained. Automated exposure control and iterative construction methods were used. Findings: *Lower Thorax: Mild dependent atelectasis. *Liver: Unremarkable. *Gallbladder: Cholecystectomy. *Pancreas: Normal. *Spleen: Normal. *Adrenal Glands: Normal. *Kidneys: No renal stones or hydronephrosis bilaterally. *Stomach: Gastric wall thickening. *Bowel: Nonobstructive bowel gas pattern. No bowel wall inflammation. *Appendix: Appendix is not visualized. No secondary signs of appendicitis. *Peritoneal Cavity: No pneumoperitoneum.  No lymphadenopathy. *Urinary Bladder: Urinary bladder wall thickening. *Pelvis: No free pelvic fluid. *Bones: Unchanged anterior wedge compression deformity of T12 vertebral body. Multilevel spondylosis. Mild generalized osteopenia. *     Impression: 1.Gastric wall thickening may represent gastritis or other etiologies. Follow-up recommended. 2.Urinary bladder wall thickening may represent underdistention, cystitis or other etiologies. Please correlate with urinalysis. Follow-up recommended. Electronically Signed: Klever Perez  7/24/2023 4:27 PM EDT  Workstation ID: [id number]     Results for orders placed during the hospital encounter of 03/01/23    Adult Transesophageal Echo 3D (NESSA) W/ Cont If Necessary Per Protocol    Interpretation Summary    Left ventricular systolic function is normal. Left ventricular ejection fraction appears to be 56 - 60%.    No intracardiac mass or thrombus.    Valves are structurally normal.    Mild plaque seen in aortic arch and descending aorta. No mobile plaque or thrombus seen.      Current medications:  Scheduled Meds:budesonide-formoterol, 2 puff, Inhalation, BID - RT  heparin (porcine), 5,000 Units, Subcutaneous, Q8H  levothyroxine, 25  mcg, Oral, Q AM  Pharmacy Meds to Bed Consult, , Does not apply, Daily  potassium chloride, 20 mEq, Oral, Once  potassium chloride, 10 mEq, Intravenous, Q1H  pregabalin, 250 mg, Oral, BID  rosuvastatin, 10 mg, Oral, Nightly  sacubitril-valsartan, 1 tablet, Oral, BID  senna-docusate sodium, 2 tablet, Oral, BID  sodium chloride, 10 mL, Intravenous, Q12H      Continuous Infusions:sodium chloride, 100 mL/hr, Last Rate: 100 mL/hr (07/24/23 2020)      PRN Meds:.  acetaminophen **OR** acetaminophen **OR** acetaminophen    senna-docusate sodium **AND** polyethylene glycol **AND** bisacodyl **AND** bisacodyl    Calcium Replacement - Follow Nurse / BPA Driven Protocol    LORazepam    Magnesium Standard Dose Replacement - Follow Nurse / BPA Driven Protocol    Phosphorus Replacement - Follow Nurse / BPA Driven Protocol    Potassium Replacement - Follow Nurse / BPA Driven Protocol    Sodium Chloride (PF)    sodium chloride    sodium chloride    Assessment & Plan   Assessment & Plan     Active Hospital Problems    Diagnosis  POA    **Dehydration [E86.0]  Yes    Electrolyte abnormality [E87.8]  Yes    GERD without esophagitis [K21.9]  Unknown    Intractable nausea and vomiting [R11.2]  Unknown    Left lower quadrant abdominal pain [R10.32]  Unknown    Left foot pain [M79.672]  Unknown    Hypothyroidism (acquired) [E03.9]  Yes    Primary hypertension [I10]  Yes    Mixed hyperlipidemia [E78.2]  Yes      Resolved Hospital Problems   No resolved problems to display.        Brief Hospital Course to date:   Bryanna Schwartz is a 53 y.o. female MH HTN, HLD, GERD, CHF, hypothyroidism, migraine headaches, CHF presenting with intractable nausea vomiting abdominal pain and a 60 pound weight loss that started in April, worsening over the last 2 weeks.  Patient was scheduled to have a colonoscopy this week. Patient was at her PCP office when she was so weak that she fell forward hitting her head on a stool and injuring her left foot.  PCP  instructed her to come to the ED for further evaluation.  Patient reported at least 10 episodes of vomiting daily.  She stated every time she eats or drinks, she vomits.  She reports constipation denies diarrhea.  Patient has been on Amitiza with little improvement of constipation.  Follows with gastroenterology Dr. Smith in [facility].     Nausea/vomiting  Abdominal pain  Chronic constipation  Weight loss  -CT reviewed  -GI consulted    S/P Fall  -L foot xray unremarkable    HTN  HL  Hx of CHF  -on entresto  -on statin    Neuropathy  -on lyrica    Hypothyroidism  -TSH WNL, reportedly not taking home med    Expected Discharge Location and Transportation: Home  Expected Discharge   Expected Discharge Date: 7/27/2023; Expected Discharge Time:      DVT prophylaxis:  Medical DVT prophylaxis orders are present.     AM-PAC 6 Clicks Score (PT): 19 (07/24/23 8379)    CODE STATUS:   Code Status and Medical Interventions:   Ordered at: 07/24/23 5464     Level Of Support Discussed With:    Patient     Code Status (Patient has no pulse and is not breathing):    CPR (Attempt to Resuscitate)     Medical Interventions (Patient has pulse or is breathing):    Full Support     Release to patient:    Routine Release       Luis Barrios MD  07/25/23

## 2023-07-25 NOTE — CONSULTS
[facility] Consult    Referring Provider: Luis Barrios MD     PCP: Baljeet Manley APRN    Reason for Consultation:  Nausea, vomiting and weight loss     Chief complaint: Nausea and vomiting     History of present illness:    Bryanna Schwartz is a 53 y.o. female who is admitted with nausea and vomiting for three weeks.   This was preceded by a decrease in appetite that started six weeks ago.    She denies abdominal pain.  She has had a change in bowel habits over the last six months with constipation.  She is scheduled for an upcoming colonoscopy outpatient with her primary gastroenterologist, Dr. Smith in [place].        She is known to our inpatient gastroenterology service for a similar presentation in 2018.   EGD at that time showed LA Grade D reflux esophagitis.  She is not currently on a PPI.       Allergies:  Gabapentin    Scheduled Meds:  budesonide-formoterol, 2 puff, Inhalation, BID - RT  heparin (porcine), 5,000 Units, Subcutaneous, Q8H  levothyroxine, 25 mcg, Oral, Q AM  Pharmacy Meds to Bed Consult, , Does not apply, Daily  potassium chloride, 20 mEq, Oral, Once  potassium chloride, 10 mEq, Intravenous, Q1H  pregabalin, 250 mg, Oral, BID  rosuvastatin, 10 mg, Oral, Nightly  sacubitril-valsartan, 1 tablet, Oral, BID  senna-docusate sodium, 2 tablet, Oral, BID  sodium chloride, 10 mL, Intravenous, Q12H       Infusions:       PRN Meds:    acetaminophen **OR** acetaminophen **OR** acetaminophen    senna-docusate sodium **AND** polyethylene glycol **AND** bisacodyl **AND** bisacodyl    Calcium Replacement - Follow Nurse / BPA Driven Protocol    LORazepam    Magnesium Standard Dose Replacement - Follow Nurse / BPA Driven Protocol    Phosphorus Replacement - Follow Nurse / BPA Driven Protocol    Potassium Replacement - Follow Nurse / BPA Driven Protocol    Sodium Chloride (PF)    sodium chloride    sodium chloride    Home Meds:  Medications Prior to Admission   Medication Sig Dispense Refill Last  Dose    budesonide-formoterol (SYMBICORT) 80-4.5 MCG/ACT inhaler Inhale 2 puffs 2 (Two) Times a Day.       Cholecalciferol (Vitamin D3) 50 MCG (2000 UT) tablet Take 1 tablet by mouth Daily.       folic acid (FOLVITE) 1 MG tablet Take 1 tablet by mouth Daily.       levothyroxine (SYNTHROID, LEVOTHROID) 25 MCG tablet Take 1 tablet by mouth Every Morning.       montelukast (SINGULAIR) 10 MG tablet Take 1 tablet by mouth Every Night.       nitroglycerin (NITROSTAT) 0.4 MG SL tablet 1 under the tongue as needed for angina, may repeat q5mins for up three doses 25 tablet 1     pregabalin (LYRICA) 200 MG capsule Take 1 capsule by mouth 2 (Two) Times a Day. Takes w/50mg dose for BID total of 250mg       pregabalin (LYRICA) 50 MG capsule Take 1 capsule by mouth 2 (Two) Times a Day. Takes w/200mg dose for BID total of 250mg       rosuvastatin (CRESTOR) 10 MG tablet Take 1 tablet by mouth Every Night for 60 days. 30 tablet 1     sacubitril-valsartan (ENTRESTO) 24-26 MG tablet Take 1 tablet by mouth 2 (Two) Times a Day.       traMADol (ULTRAM) 50 MG tablet Take 1 tablet by mouth Every 12 (Twelve) Hours.          ROS: Review of Systems   Constitutional:  Positive for unexpected weight change.   HENT: Negative.     Eyes: Negative.    Respiratory: Negative.     Cardiovascular: Negative.    Gastrointestinal:  Positive for constipation, nausea and vomiting.   Endocrine: Negative.    Genitourinary: Negative.    Musculoskeletal: Negative.    Skin: Negative.    Allergic/Immunologic: Negative.    Neurological: Negative.    Hematological: Negative.    Psychiatric/Behavioral: Negative.       PAST MED HX:  Past Medical History:   Diagnosis Date    Arthritis     Depression     Diabetes     Environmental allergies     Falls 10/24/2018    GERD (gastroesophageal reflux disease)     Heart murmur     Hyperlipidemia     Hypertension     Hypothyroidism     Lyme disease     Migraine     Kelvin Mountain spotted fever     Vitamin B 12 deficiency   "      PAST SURG HX:  Past Surgical History:   Procedure Laterality Date    ACHILLES TENDON SURGERY Right     BREAST CYST EXCISION Left     CARDIAC CATHETERIZATION  2023    Normal coronaries.  EF 45%    CARDIOVASCULAR STRESS TEST  2023    anteroseptal, anterior infarct with dana-infarct ischemia    CHOLECYSTECTOMY      ECHO - CONVERTED  2023    EF 41-45%, saline test negative, valves normal    ENDOSCOPY N/A 10/16/2018    Procedure: ESOPHAGOGASTRODUODENOSCOPY;  Surgeon: Brunner, Mark I, MD;  Location: [facility] ENDOSCOPY;  Service: Gastroenterology    HYSTERECTOMY      TRANSESOPHAGEAL ECHOCARDIOGRAM (NESSA)  2023    Dr. Alonso- EF 56-60%, no mass or thrombus, mild plaque aorrti arch and descending aorta    US CAROTID UNILATERAL  2022    < 50% Bilaterally       FAM HX:  Family History   Problem Relation Age of Onset    Hypertension Mother     COPD Mother     Heart attack Mother     Other Father         / of exhaust fumes    Cancer Sister     Heart attack Brother        SOC HX:  Social History     Socioeconomic History    Marital status:     Number of children: 2   Tobacco Use    Smoking status: Never    Smokeless tobacco: Never   Vaping Use    Vaping Use: Never used   Substance and Sexual Activity    Alcohol use: No    Drug use: No    Sexual activity: Defer     Partners: Male       PHYSICAL EXAM  /67 (BP Location: Left arm, Patient Position: Lying)   Pulse 90   Temp 97.8 °F (36.6 °C) (Axillary)   Resp 16   Ht 175.3 cm (69\")   Wt 91.5 kg (201 lb 12.8 oz)   LMP  (LMP Unknown)   SpO2 96%   BMI 29.80 kg/m²   Wt Readings from Last 3 Encounters:   23 91.5 kg (201 lb 12.8 oz)   23 89.8 kg (198 lb)   23 101 kg (222 lb 6.4 oz)   ,body mass index is 29.8 kg/m².  Physical Exam  Constitutional:       General: She is not in acute distress.  HENT:      Head: Normocephalic and atraumatic.   Eyes:      General: No scleral icterus.  Cardiovascular:      Rate " and Rhythm: Regular rhythm.   Pulmonary:      Effort: Pulmonary effort is normal. No respiratory distress.      Breath sounds: Normal breath sounds.   Abdominal:      General: Bowel sounds are normal. There is no distension.      Palpations: Abdomen is soft.      Tenderness: There is no abdominal tenderness. There is no guarding.   Musculoskeletal:      Right lower leg: No edema.      Left lower leg: No edema.   Skin:     General: Skin is warm and dry.   Neurological:      Mental Status: She is alert and oriented to person, place, and time.   Psychiatric:         Mood and Affect: Mood is depressed.     Results Review:   I reviewed the patient's new clinical results.    Lab Results   Component Value Date    WBC 6.83 07/25/2023    HGB 13.5 07/25/2023    HGB 16.9 (H) 07/24/2023    HGB 14.2 07/06/2023    HCT 39.8 07/25/2023    MCV 86.9 07/25/2023     07/25/2023     No results found for: INR    Lab Results   Component Value Date    GLUCOSE 94 07/25/2023    BUN 6 07/25/2023    CREATININE 0.63 07/25/2023    EGFRIFNONA 79 11/07/2018    BCR 9.5 07/25/2023     07/25/2023    K 2.5 (C) 07/25/2023    CO2 29.0 07/25/2023    CALCIUM 8.4 (L) 07/25/2023    ALBUMIN 3.0 (L) 07/25/2023    ALKPHOS 111 07/25/2023    BILITOT 0.7 07/25/2023    ALT 18 07/25/2023    AST 23 07/25/2023     I personally reviewed her pertinent previous records including EGD report from October 208 which showed antral gastritis, LA grade D reflux esophagitis.      CT abdomen/pelvis without contrast:  Findings:  *Lower Thorax: Mild dependent atelectasis.  *Liver: Unremarkable.  *Gallbladder: Cholecystectomy.  *Pancreas: Normal.  *Spleen: Normal.  *Adrenal Glands: Normal.  *Kidneys: No renal stones or hydronephrosis bilaterally.  *Stomach: Gastric wall thickening.  *Bowel: Nonobstructive bowel gas pattern. No bowel wall inflammation.  *Appendix: Appendix is not visualized. No secondary signs of appendicitis.  *Peritoneal Cavity: No pneumoperitoneum.   No lymphadenopathy.   *Urinary Bladder: Urinary bladder wall thickening.  *Pelvis: No free pelvic fluid.  *Bones: Unchanged anterior wedge compression deformity of T12 vertebral body. Multilevel spondylosis. Mild generalized osteopenia.   IMPRESSION:  1.Gastric wall thickening may represent gastritis or other etiologies. Follow-up recommended.  2.Urinary bladder wall thickening may represent underdistention, cystitis or other etiologies. Please correlate with urinalysis. Follow-up recommended.    ASSESSMENTS/PLANS    Nausea and vomiting  Unintentional weight loss   Hypokalemia   Abnormal CT abdomen, gastric wall thickening.  Anorexia   Change in bowel habits, constipation     >> Recommend EGD tomorrow when hypokalemia is corrected.    >> Begin IV Protonix 40 mg BID.   She has history of severe reflux esophagitis.  >> Add IV Thiamine as she is at risk for deficiency    >> Outpatient colonoscopy with her primary gastroenterologist, Dr. Smith.  Alhaji britton.       I discussed the patient's findings and my recommendations with patient, her  whom is at the bedside as well as Dr. Barrios.       DION Marroquin  07/25/23  09:31 EDT

## 2023-07-25 NOTE — PLAN OF CARE
Goal Outcome Evaluation:  Plan of Care Reviewed With: patient, spouse        Progress: no change (PT IE)  Outcome Evaluation: PT eval complete. Pt presents with generalized weakness, L foot pain, and mild balance deficits warranting skilled IPPT services. FWW used for mobility this date due to decreased WB tolerance on LLE. Will continue to progress as able. PT rec home with assist at d/c.      Anticipated Discharge Disposition (PT): home with assist

## 2023-07-25 NOTE — THERAPY EVALUATION
Patient Name: Bryanna Schwartz  : 1970    MRN: [id number]                              Today's Date: 2023       Admit Date: 2023    Visit Dx:     ICD-10-CM ICD-9-CM   1. Hypokalemia  E87.6 276.8   2. Hypomagnesemia  E83.42 275.2   3. Elevated troponin  R77.8 790.6   4. Near syncope  R55 780.2   5. Injury of head, initial encounter  S09.90XA 959.01   6. Abdominal pain, unspecified abdominal location  R10.9 789.00   7. Nausea and vomiting, unspecified vomiting type  R11.2 787.01   8. Referred by health care professional  Z02.89 V68.89   9. Intractable nausea and vomiting  R11.2 536.2     Patient Active Problem List   Diagnosis    GERD without esophagitis    Anxiety and depression    Mixed hyperlipidemia    Primary hypertension    Migraines    Gangrene    Glucose intolerance    Hypothyroidism (acquired)    Chronic systolic CHF (congestive heart failure)    Embolic infarction    Metatarsal stress fracture of right foot    NICM (nonischemic cardiomyopathy)    Dehydration    Electrolyte abnormality    GERD without esophagitis    Intractable nausea and vomiting    Left lower quadrant abdominal pain    Left foot pain     Past Medical History:   Diagnosis Date    Arthritis     Depression     Diabetes     Environmental allergies     Falls 10/24/2018    GERD (gastroesophageal reflux disease)     Heart murmur     Hyperlipidemia     Hypertension     Hypothyroidism     Lyme disease     Migraine     Kelvin Mountain spotted fever     Vitamin B 12 deficiency      Past Surgical History:   Procedure Laterality Date    ACHILLES TENDON SURGERY Right     BREAST CYST EXCISION Left     CARDIAC CATHETERIZATION  2023    Normal coronaries.  EF 45%    CARDIOVASCULAR STRESS TEST  2023    anteroseptal, anterior infarct with dana-infarct ischemia    CHOLECYSTECTOMY      ECHO - CONVERTED  2023    EF 41-45%, saline test negative, valves normal    ENDOSCOPY N/A 10/16/2018    Procedure: ESOPHAGOGASTRODUODENOSCOPY;   Surgeon: Brunner, Mark I, MD;  Location: [facility] ENDOSCOPY;  Service: Gastroenterology    HYSTERECTOMY      TRANSESOPHAGEAL ECHOCARDIOGRAM (NESSA)  03/07/2023    Dr. Alonso- EF 56-60%, no mass or thrombus, mild plaque aorrti arch and descending aorta    US CAROTID UNILATERAL  12/17/2022    < 50% Bilaterally      General Information       Row Name 07/25/23 1011          Physical Therapy Time and Intention    Document Type evaluation  -ES     Mode of Treatment physical therapy  -ES       Row Name 07/25/23 1011          General Information    Patient Profile Reviewed yes  -ES     Prior Level of Function independent:;all household mobility;transfer;bed mobility;ADL's;dependent:;home management;driving;shopping  Uses [facility] at baseline. No recent falls.  performs home management & grocery shopping  -ES     Existing Precautions/Restrictions fall;other (see comments)  L foot pain  -ES     Barriers to Rehab previous functional deficit  -ES       Row Name 07/25/23 1011          Living Environment    People in Home child(linden), adult;spouse  -ES       Row Name 07/25/23 1011          Home Main Entrance    Number of Stairs, Main Entrance other (see comments)  ramp  -ES       Row Name 07/25/23 1011          Stairs Within Home, Primary    Number of Stairs, Within Home, Primary none  -ES       Row Name 07/25/23 1011          Cognition    Orientation Status (Cognition) oriented x 3  -ES       Row Name 07/25/23 1011          Safety Issues, Functional Mobility    Safety Issues Affecting Function (Mobility) awareness of need for assistance;insight into deficits/self-awareness;safety precaution awareness;safety precautions follow-through/compliance  -ES     Impairments Affecting Function (Mobility) balance;endurance/activity tolerance;pain;sensation/sensory awareness;strength  -ES     Comment, Safety Issues/Impairments (Mobility) up x1 assist with FWW  -ES               User Key  (r) = Recorded By, (t) = Taken By, (c) = Cosigned By       Initials Name Provider Type    Sharifa Solis PT Physical Therapist                   Mobility       Row Name 07/25/23 1018          Bed Mobility    Bed Mobility supine-sit  -ES     Supine-Sit Moore (Bed Mobility) contact guard;verbal cues  -ES     Assistive Device (Bed Mobility) bed rails;head of bed elevated  -ES     Comment, (Bed Mobility) c/o minor dizziness upon initial transition to sitting. BP stable, subsided quickly.  -ES       Row Name 07/25/23 1018          Sit-Stand Transfer    Sit-Stand Moore (Transfers) contact guard;verbal cues  -ES     Assistive Device (Sit-Stand Transfers) walker, front-wheeled  -ES     Comment, (Sit-Stand Transfer) STS x3: 2 from EOB, 1 from commode. v/c for hand placement with FWW.  -ES       Row Name 07/25/23 1018          Gait/Stairs (Locomotion)    Moore Level (Gait) contact guard  -ES     Assistive Device (Gait) walker, front-wheeled  -ES     Distance in Feet (Gait) 20+20  -ES     Deviations/Abnormal Patterns (Gait) bilateral deviations;gait speed decreased;stride length decreased;left sided deviations  -ES     Bilateral Gait Deviations forward flexed posture  -ES     Left Sided Gait Deviations weight shift ability decreased  -ES     Comment, (Gait/Stairs) Pt amb multiple short bouts within room with CGA and FWW. Pt demo'd forward flexed posture with decreased weight acceptance to LLE due to L foot pain. No LOB but required v/c for sequencing with FWW. Further mobility limited by increased L foot pain.  -ES               User Key  (r) = Recorded By, (t) = Taken By, (c) = Cosigned By      Initials Name Provider Type    Sharifa Solis PT Physical Therapist                   Obj/Interventions       Row Name 07/25/23 1020          Range of Motion Comprehensive    General Range of Motion bilateral lower extremity ROM WFL  -ES       Row Name 07/25/23 1020          Strength Comprehensive (MMT)    General Manual Muscle Testing (MMT) Assessment lower  extremity strength deficits identified  -ES     Comment, General Manual Muscle Testing (MMT) Assessment BLE grossly 4/5  -ES       Row Name 07/25/23 1020          Balance    Balance Assessment sitting static balance;sitting dynamic balance;sit to stand dynamic balance;standing static balance;standing dynamic balance  -ES     Static Sitting Balance standby assist  -ES     Dynamic Sitting Balance contact guard  -ES     Position, Sitting Balance supported;sitting in chair;sitting edge of bed;other (see comments)  commode  -ES     Sit to Stand Dynamic Balance contact guard;verbal cues  -ES     Static Standing Balance contact guard  -ES     Dynamic Standing Balance contact guard;verbal cues  -ES     Position/Device Used, Standing Balance supported;walker, front-wheeled  -ES     Balance Interventions sitting;standing;sit to stand;supported;static;dynamic;occupation based/functional task  -ES     Comment, Balance no LOB. Pt unsteady due to L foot pain  -ES       Row Name 07/25/23 1020          Sensory Assessment (Somatosensory)    Sensory Assessment (Somatosensory) bilateral LE  -ES     Bilateral LE Sensory Assessment impaired  -ES               User Key  (r) = Recorded By, (t) = Taken By, (c) = Cosigned By      Initials Name Provider Type    ES Sharifa Schaefer, PT Physical Therapist                   Goals/Plan       Row Name 07/25/23 1025          Bed Mobility Goal 1 (PT)    Activity/Assistive Device (Bed Mobility Goal 1, PT) sit to supine/supine to sit  -ES     Todd Level/Cues Needed (Bed Mobility Goal 1, PT) supervision required  -ES     Time Frame (Bed Mobility Goal 1, PT) long term goal (LTG);10 days  -ES       Row Name 07/25/23 1025          Transfer Goal 1 (PT)    Activity/Assistive Device (Transfer Goal 1, PT) sit-to-stand/stand-to-sit;bed-to-chair/chair-to-bed;cane, straight  -ES     Todd Level/Cues Needed (Transfer Goal 1, PT) supervision required  -ES     Time Frame (Transfer Goal 1, PT) long  term goal (LTG);10 days  -ES       Row Name 07/25/23 1025          Gait Training Goal 1 (PT)    Activity/Assistive Device (Gait Training Goal 1, PT) gait (walking locomotion);assistive device use;decrease fall risk;improve balance and speed;increase endurance/gait distance;cane, straight  -ES     [place] Level (Gait Training Goal 1, PT) supervision required  -ES     Distance (Gait Training Goal 1, PT) 100  -ES     Time Frame (Gait Training Goal 1, PT) long term goal (LTG);10 days  -ES       Row Name 07/25/23 1025          Therapy Assessment/Plan (PT)    Planned Therapy Interventions (PT) balance training;bed mobility training;gait training;patient/family education;transfer training;strengthening  -ES               User Key  (r) = Recorded By, (t) = Taken By, (c) = Cosigned By      Initials Name Provider Type    ES Sharifa Schaefer, PT Physical Therapist                   Clinical Impression       Row Name 07/25/23 1023          Pain    Pain Intervention(s) Repositioned;Ambulation/increased activity  -ES     Additional Documentation Pain Scale: FACES Pre/Post-Treatment (Group)  -ES       Row Name 07/25/23 1023          Pain Scale: FACES Pre/Post-Treatment    Pain: FACES Scale, Pretreatment 2-->hurts little bit  -ES     Posttreatment Pain Rating 2-->hurts little bit  -ES     Pain Location - Side/Orientation Left  -ES     Pain Location generalized  -ES     Pain Location - foot  -ES     Pre/Posttreatment Pain Comment all mobility limited by L foot pain  -ES       Row Name 07/25/23 1023          Plan of Care Review    Plan of Care Reviewed With patient;spouse  -ES     Progress no change  PT IE  -ES     Outcome Evaluation PT eval complete. Pt presents with generalized weakness, L foot pain, and mild balance deficits warranting skilled IPPT services. FWW used for mobility this date due to decreased WB tolerance on LLE. Will continue to progress as able. PT rec home with assist at d/c.  -ES       Row Name 07/25/23 1025           Therapy Assessment/Plan (PT)    Rehab Potential (PT) good, to achieve stated therapy goals  -ES     Criteria for Skilled Interventions Met (PT) yes;meets criteria;skilled treatment is necessary  -ES     Therapy Frequency (PT) daily  -ES       Row Name 07/25/23 1023          Vital Signs    Pre Systolic BP Rehab --  VSS  -ES     O2 Delivery Pre Treatment room air  -ES     O2 Delivery Intra Treatment room air  -ES     O2 Delivery Post Treatment room air  -ES     Pre Patient Position Supine  -ES     Intra Patient Position Standing  -ES     Post Patient Position Sitting  -ES       Row Name 07/25/23 1023          Positioning and Restraints    Pre-Treatment Position in bed  -ES     Post Treatment Position chair  -ES     In Chair notified nsg;reclined;sitting;call light within reach;encouraged to call for assist;exit alarm on;with family/caregiver;waffle cushion;legs elevated  -ES               User Key  (r) = Recorded By, (t) = Taken By, (c) = Cosigned By      Initials Name Provider Type    ES Sharifa Schaefer, PT Physical Therapist                   Outcome Measures       Row Name 07/25/23 1025          How much help from another person do you currently need...    Turning from your back to your side while in flat bed without using bedrails? 4  -ES     Moving from lying on back to sitting on the side of a flat bed without bedrails? 3  -ES     Moving to and from a bed to a chair (including a wheelchair)? 3  -ES     Standing up from a chair using your arms (e.g., wheelchair, bedside chair)? 3  -ES     Climbing 3-5 steps with a railing? 2  -ES     To walk in hospital room? 3  -ES     AM-PAC 6 Clicks Score (PT) 18  -ES     Highest level of mobility 6 --> Walked 10 steps or more  -ES       Row Name 07/25/23 1025          Functional Assessment    Outcome Measure Options AM-PAC 6 Clicks Basic Mobility (PT)  -ES               User Key  (r) = Recorded By, (t) = Taken By, (c) = Cosigned By      Initials Name Provider Type     Sharifa Solis, BETHANY Physical Therapist                                 Physical Therapy Education       Title: PT OT SLP Therapies (In Progress)       Topic: Physical Therapy (In Progress)       Point: Mobility training (Done)       Learning Progress Summary             Patient Acceptance, E,TB, VU by ES at 7/25/2023 1026   Significant Other Acceptance, E,TB, VU by ES at 7/25/2023 1026                         Point: Home exercise program (Not Started)       Learner Progress:  Not documented in this visit.              Point: Body mechanics (Done)       Learning Progress Summary             Patient Acceptance, E,TB, VU by ES at 7/25/2023 1026   Significant Other Acceptance, E,TB, VU by ES at 7/25/2023 1026                         Point: Precautions (Done)       Learning Progress Summary             Patient Acceptance, E,TB, VU by ES at 7/25/2023 1026   Significant Other Acceptance, E,TB, VU by ES at 7/25/2023 1026                                         User Key       Initials Effective Dates Name Provider Type Discipline    JOSSELINE 08/11/22 -  Sharifa Schaefer PT Physical Therapist PT                  PT Recommendation and Plan  Planned Therapy Interventions (PT): balance training, bed mobility training, gait training, patient/family education, transfer training, strengthening  Plan of Care Reviewed With: patient, spouse  Progress: no change (PT IE)  Outcome Evaluation: PT eval complete. Pt presents with generalized weakness, L foot pain, and mild balance deficits warranting skilled IPPT services. FWW used for mobility this date due to decreased WB tolerance on LLE. Will continue to progress as able. PT rec home with assist at d/c.     Time Calculation:   PT Evaluation Complexity  History, PT Evaluation Complexity: 1-2 personal factors and/or comorbidities  Examination of Body Systems (PT Eval Complexity): total of 3 or more elements  Clinical Presentation (PT Evaluation Complexity): stable  Clinical Decision  Making (PT Evaluation Complexity): low complexity  Overall Complexity (PT Evaluation Complexity): low complexity     PT Charges       Row Name 07/25/23 1027             Time Calculation    Start Time 0929  -ES      PT Received On 07/25/23  -ES      PT Goal Re-Cert Due Date 08/04/23  -ES         Time Calculation- PT    Total Timed Code Minutes- PT 20 minute(s)  -ES         Timed Charges    14333 - PT Therapeutic Activity Minutes 20  -ES         Untimed Charges    PT Eval/Re-eval Minutes 46  -ES         Total Minutes    Timed Charges Total Minutes 20  -ES      Untimed Charges Total Minutes 46  -ES       Total Minutes 66  -ES                User Key  (r) = Recorded By, (t) = Taken By, (c) = Cosigned By      Initials Name Provider Type    ES Sharifa Schaefer, PT Physical Therapist                  Therapy Charges for Today       Code Description Service Date Service Provider Modifiers Qty    72078891280 HC PT THERAPEUTIC ACT EA 15 MIN 7/25/2023 Sharifa Schaefer, PT GP 1    55612318395 HC PT EVAL LOW COMPLEXITY 4 7/25/2023 Sharifa Schaefer, PT GP 1            PT G-Codes  Outcome Measure Options: AM-PAC 6 Clicks Basic Mobility (PT)  AM-PAC 6 Clicks Score (PT): 18  PT Discharge Summary  Anticipated Discharge Disposition (PT): home with assist    Sharifa Schaefer PT  7/25/2023

## 2023-07-25 NOTE — PLAN OF CARE
Goal Outcome Evaluation:   Pt stable with  at bedside, denies pain. GCS 15. No nausea/vomiting this shift. Potassium replacement protocol initiated. Will continue to monitor.

## 2023-07-26 ENCOUNTER — ANESTHESIA (OUTPATIENT)
Dept: TELEMETRY | Facility: HOSPITAL | Age: 53
End: 2023-07-26

## 2023-07-26 ENCOUNTER — ANESTHESIA EVENT (OUTPATIENT)
Dept: GASTROENTEROLOGY | Facility: HOSPITAL | Age: 53
End: 2023-07-26
Payer: MEDICARE

## 2023-07-26 LAB
ANION GAP SERPL CALCULATED.3IONS-SCNC: 12 MMOL/L (ref 5–15)
BUN SERPL-MCNC: 5 MG/DL (ref 6–20)
BUN/CREAT SERPL: 9.4 (ref 7–25)
CALCIUM SPEC-SCNC: 8.4 MG/DL (ref 8.6–10.5)
CHLORIDE SERPL-SCNC: 106 MMOL/L (ref 98–107)
CO2 SERPL-SCNC: 25 MMOL/L (ref 22–29)
CREAT SERPL-MCNC: 0.53 MG/DL (ref 0.57–1)
DEPRECATED RDW RBC AUTO: 45.6 FL (ref 37–54)
EGFRCR SERPLBLD CKD-EPI 2021: 110.7 ML/MIN/1.73
ERYTHROCYTE [DISTWIDTH] IN BLOOD BY AUTOMATED COUNT: 14.5 % (ref 12.3–15.4)
GLUCOSE SERPL-MCNC: 80 MG/DL (ref 65–99)
HCT VFR BLD AUTO: 35.2 % (ref 34–46.6)
HGB BLD-MCNC: 11.8 G/DL (ref 12–15.9)
MAGNESIUM SERPL-MCNC: 1.6 MG/DL (ref 1.6–2.6)
MCH RBC QN AUTO: 29.4 PG (ref 26.6–33)
MCHC RBC AUTO-ENTMCNC: 33.5 G/DL (ref 31.5–35.7)
MCV RBC AUTO: 87.6 FL (ref 79–97)
PLATELET # BLD AUTO: 214 10*3/MM3 (ref 140–450)
PMV BLD AUTO: 10.1 FL (ref 6–12)
POTASSIUM SERPL-SCNC: 2.6 MMOL/L (ref 3.5–5.2)
POTASSIUM SERPL-SCNC: 2.8 MMOL/L (ref 3.5–5.2)
RBC # BLD AUTO: 4.02 10*6/MM3 (ref 3.77–5.28)
SODIUM SERPL-SCNC: 143 MMOL/L (ref 136–145)
WBC NRBC COR # BLD: 4.69 10*3/MM3 (ref 3.4–10.8)

## 2023-07-26 PROCEDURE — G0378 HOSPITAL OBSERVATION PER HR: HCPCS

## 2023-07-26 PROCEDURE — 96372 THER/PROPH/DIAG INJ SC/IM: CPT

## 2023-07-26 PROCEDURE — 0 POTASSIUM CHLORIDE 10 MEQ/100ML SOLUTION: Performed by: HOSPITALIST

## 2023-07-26 PROCEDURE — 25010000002 HEPARIN (PORCINE) PER 1000 UNITS: Performed by: NURSE PRACTITIONER

## 2023-07-26 PROCEDURE — 25010000002 THIAMINE PER 100 MG: Performed by: PHYSICIAN ASSISTANT

## 2023-07-26 PROCEDURE — 94799 UNLISTED PULMONARY SVC/PX: CPT

## 2023-07-26 PROCEDURE — 97116 GAIT TRAINING THERAPY: CPT

## 2023-07-26 PROCEDURE — 80048 BASIC METABOLIC PNL TOTAL CA: CPT | Performed by: HOSPITALIST

## 2023-07-26 PROCEDURE — 97110 THERAPEUTIC EXERCISES: CPT

## 2023-07-26 PROCEDURE — 97165 OT EVAL LOW COMPLEX 30 MIN: CPT

## 2023-07-26 PROCEDURE — 94664 DEMO&/EVAL PT USE INHALER: CPT

## 2023-07-26 PROCEDURE — 83735 ASSAY OF MAGNESIUM: CPT | Performed by: HOSPITALIST

## 2023-07-26 PROCEDURE — 85027 COMPLETE CBC AUTOMATED: CPT | Performed by: HOSPITALIST

## 2023-07-26 PROCEDURE — 99213 OFFICE O/P EST LOW 20 MIN: CPT | Performed by: PHYSICIAN ASSISTANT

## 2023-07-26 PROCEDURE — 84132 ASSAY OF SERUM POTASSIUM: CPT | Performed by: HOSPITALIST

## 2023-07-26 PROCEDURE — 94761 N-INVAS EAR/PLS OXIMETRY MLT: CPT

## 2023-07-26 PROCEDURE — 96376 TX/PRO/DX INJ SAME DRUG ADON: CPT

## 2023-07-26 RX ORDER — SODIUM CHLORIDE, SODIUM LACTATE, POTASSIUM CHLORIDE, CALCIUM CHLORIDE 600; 310; 30; 20 MG/100ML; MG/100ML; MG/100ML; MG/100ML
9 INJECTION, SOLUTION INTRAVENOUS CONTINUOUS
Status: CANCELLED | OUTPATIENT
Start: 2023-07-26

## 2023-07-26 RX ORDER — SODIUM CHLORIDE 0.9 % (FLUSH) 0.9 %
10 SYRINGE (ML) INJECTION EVERY 12 HOURS SCHEDULED
Status: CANCELLED | OUTPATIENT
Start: 2023-07-26

## 2023-07-26 RX ORDER — POTASSIUM CHLORIDE 750 MG/1
40 CAPSULE, EXTENDED RELEASE ORAL EVERY 4 HOURS
Status: DISPENSED | OUTPATIENT
Start: 2023-07-26 | End: 2023-07-27

## 2023-07-26 RX ORDER — POTASSIUM CHLORIDE 750 MG/1
40 CAPSULE, EXTENDED RELEASE ORAL
Status: COMPLETED | OUTPATIENT
Start: 2023-07-26 | End: 2023-07-26

## 2023-07-26 RX ORDER — POTASSIUM CHLORIDE 7.45 MG/ML
10 INJECTION INTRAVENOUS
Status: DISPENSED | OUTPATIENT
Start: 2023-07-26 | End: 2023-07-26

## 2023-07-26 RX ORDER — SODIUM CHLORIDE 9 MG/ML
40 INJECTION, SOLUTION INTRAVENOUS AS NEEDED
Status: CANCELLED | OUTPATIENT
Start: 2023-07-26

## 2023-07-26 RX ORDER — FAMOTIDINE 20 MG/1
20 TABLET, FILM COATED ORAL ONCE
Status: CANCELLED | OUTPATIENT
Start: 2023-07-26 | End: 2023-07-26

## 2023-07-26 RX ORDER — FAMOTIDINE 10 MG/ML
20 INJECTION, SOLUTION INTRAVENOUS ONCE
Status: CANCELLED | OUTPATIENT
Start: 2023-07-26 | End: 2023-07-26

## 2023-07-26 RX ORDER — LIDOCAINE HYDROCHLORIDE 10 MG/ML
0.5 INJECTION, SOLUTION EPIDURAL; INFILTRATION; INTRACAUDAL; PERINEURAL ONCE AS NEEDED
Status: CANCELLED | OUTPATIENT
Start: 2023-07-26

## 2023-07-26 RX ORDER — SODIUM CHLORIDE 0.9 % (FLUSH) 0.9 %
10 SYRINGE (ML) INJECTION AS NEEDED
Status: CANCELLED | OUTPATIENT
Start: 2023-07-26

## 2023-07-26 RX ADMIN — LEVOTHYROXINE SODIUM 25 MCG: 0.03 TABLET ORAL at 05:46

## 2023-07-26 RX ADMIN — THIAMINE HYDROCHLORIDE 200 MG: 100 INJECTION, SOLUTION INTRAMUSCULAR; INTRAVENOUS at 09:03

## 2023-07-26 RX ADMIN — POTASSIUM CHLORIDE 40 MEQ: 750 CAPSULE, EXTENDED RELEASE ORAL at 09:19

## 2023-07-26 RX ADMIN — HEPARIN SODIUM 5000 UNITS: 5000 INJECTION, SOLUTION INTRAVENOUS; SUBCUTANEOUS at 14:27

## 2023-07-26 RX ADMIN — PREGABALIN 250 MG: 100 CAPSULE ORAL at 09:03

## 2023-07-26 RX ADMIN — PANTOPRAZOLE SODIUM 40 MG: 40 INJECTION, POWDER, LYOPHILIZED, FOR SOLUTION INTRAVENOUS at 09:03

## 2023-07-26 RX ADMIN — Medication 10 ML: at 21:45

## 2023-07-26 RX ADMIN — POTASSIUM CHLORIDE 40 MEQ: 750 CAPSULE, EXTENDED RELEASE ORAL at 14:27

## 2023-07-26 RX ADMIN — LUBIPROSTONE 24 MCG: 24 CAPSULE, GELATIN COATED ORAL at 09:13

## 2023-07-26 RX ADMIN — BUDESONIDE AND FORMOTEROL FUMARATE DIHYDRATE 2 PUFF: 160; 4.5 AEROSOL RESPIRATORY (INHALATION) at 20:37

## 2023-07-26 RX ADMIN — BUDESONIDE AND FORMOTEROL FUMARATE DIHYDRATE 2 PUFF: 160; 4.5 AEROSOL RESPIRATORY (INHALATION) at 07:54

## 2023-07-26 RX ADMIN — ROSUVASTATIN CALCIUM 10 MG: 10 TABLET, COATED ORAL at 21:43

## 2023-07-26 RX ADMIN — PANTOPRAZOLE SODIUM 40 MG: 40 INJECTION, POWDER, LYOPHILIZED, FOR SOLUTION INTRAVENOUS at 18:43

## 2023-07-26 RX ADMIN — LUBIPROSTONE 24 MCG: 24 CAPSULE, GELATIN COATED ORAL at 18:43

## 2023-07-26 RX ADMIN — POTASSIUM CHLORIDE 10 MEQ: 7.46 INJECTION, SOLUTION INTRAVENOUS at 06:33

## 2023-07-26 RX ADMIN — HEPARIN SODIUM 5000 UNITS: 5000 INJECTION, SOLUTION INTRAVENOUS; SUBCUTANEOUS at 05:46

## 2023-07-26 RX ADMIN — POTASSIUM CHLORIDE 40 MEQ: 750 CAPSULE, EXTENDED RELEASE ORAL at 21:46

## 2023-07-26 RX ADMIN — PREGABALIN 250 MG: 100 CAPSULE ORAL at 21:42

## 2023-07-26 RX ADMIN — SENNOSIDES AND DOCUSATE SODIUM 2 TABLET: 50; 8.6 TABLET ORAL at 09:03

## 2023-07-26 RX ADMIN — Medication 10 ML: at 09:14

## 2023-07-26 RX ADMIN — HEPARIN SODIUM 5000 UNITS: 5000 INJECTION, SOLUTION INTRAVENOUS; SUBCUTANEOUS at 21:43

## 2023-07-26 NOTE — PLAN OF CARE
Goal Outcome Evaluation:  Plan of Care Reviewed With: patient, spouse        Progress: improving  Outcome Evaluation: pt able to ambulate 80', FWW, CGA but very shaky today and required chair follow.

## 2023-07-26 NOTE — PLAN OF CARE
Problem: Adult Inpatient Plan of Care  Goal: Plan of Care Review  Outcome: Ongoing, Progressing  Goal: Patient-Specific Goal (Individualized)  Outcome: Ongoing, Progressing  Goal: Absence of Hospital-Acquired Illness or Injury  Outcome: Ongoing, Progressing  Intervention: Identify and Manage Fall Risk  Recent Flowsheet Documentation  Taken 7/25/2023 2200 by Awais Gardner RN  Safety Promotion/Fall Prevention:   safety round/check completed   nonskid shoes/slippers when out of bed   assistive device/personal items within reach   clutter free environment maintained  Taken 7/25/2023 2000 by Awais Gardner RN  Safety Promotion/Fall Prevention:   safety round/check completed   assistive device/personal items within reach   clutter free environment maintained   nonskid shoes/slippers when out of bed  Intervention: Prevent Skin Injury  Recent Flowsheet Documentation  Taken 7/25/2023 2200 by Awais Gardner RN  Body Position: position changed independently  Taken 7/25/2023 2000 by Awais Gardner RN  Body Position: position changed independently  Skin Protection: (redness under breasts; baby powder applied) drying agents applied  Intervention: Prevent and Manage VTE (Venous Thromboembolism) Risk  Recent Flowsheet Documentation  Taken 7/25/2023 2200 by Awais Gardner RN  Activity Management: back to bed  Taken 7/25/2023 2000 by Awais Gardner RN  Activity Management:   activity encouraged   up in chair  Range of Motion: active ROM (range of motion) encouraged  Intervention: Prevent Infection  Recent Flowsheet Documentation  Taken 7/25/2023 2200 by Awais Gardner RN  Infection Prevention:   rest/sleep promoted   environmental surveillance performed   hand hygiene promoted  Taken 7/25/2023 2000 by Awais Gardner RN  Infection Prevention:   environmental surveillance performed   hand hygiene promoted   rest/sleep promoted  Goal: Optimal Comfort and Wellbeing  Outcome: Ongoing, Progressing  Intervention: Provide  Person-Centered Care  Recent Flowsheet Documentation  Taken 7/25/2023 2000 by Awais Gardner RN  Trust Relationship/Rapport: care explained  Goal: Readiness for Transition of Care  Outcome: Ongoing, Progressing     Problem: Fluid Volume Deficit  Goal: Fluid Balance  Outcome: Ongoing, Progressing     Problem: Diabetes Comorbidity  Goal: Blood Glucose Level Within Targeted Range  Outcome: Ongoing, Progressing     Problem: Skin Injury Risk Increased  Goal: Skin Health and Integrity  Outcome: Ongoing, Progressing  Intervention: Optimize Skin Protection  Recent Flowsheet Documentation  Taken 7/25/2023 2200 by Awais Gardner RN  Head of Bed (HOB) Positioning: HOB elevated  Taken 7/25/2023 2000 by Awais Gardner RN  Pressure Reduction Techniques: weight shift assistance provided  Head of Bed (HOB) Positioning: HOB elevated  Pressure Reduction Devices: heel offloading device utilized  Skin Protection: (redness under breasts; baby powder applied) drying agents applied     Problem: Fall Injury Risk  Goal: Absence of Fall and Fall-Related Injury  Outcome: Ongoing, Progressing  Intervention: Promote Injury-Free Environment  Recent Flowsheet Documentation  Taken 7/25/2023 2200 by Awais Gardner RN  Safety Promotion/Fall Prevention:   safety round/check completed   nonskid shoes/slippers when out of bed   assistive device/personal items within reach   clutter free environment maintained  Taken 7/25/2023 2000 by Awais Gardner RN  Safety Promotion/Fall Prevention:   safety round/check completed   assistive device/personal items within reach   clutter free environment maintained   nonskid shoes/slippers when out of bed   Goal Outcome Evaluation:

## 2023-07-26 NOTE — PROGRESS NOTES
"GI Daily Progress Note  Subjective:    Chief Complaint:  Follow up nausea and vomiting       Hungry.   Ate half a [employer] bar, a few sips of Mountain dew and some chips.   Denies emesis today.        EGD cancelled secondary to persistent hypokalemia.          Objective:    BP 98/59 (BP Location: Left arm, Patient Position: Lying)   Pulse 94   Temp 97.8 °F (36.6 °C) (Oral)   Resp 16   Ht 175.3 cm (69\")   Wt 91.5 kg (201 lb 12.8 oz)   LMP  (LMP Unknown)   SpO2 94%   BMI 29.80 kg/m²     Physical Exam  Constitutional:       General: She is not in acute distress.  Cardiovascular:      Rate and Rhythm: Normal rate and regular rhythm.   Pulmonary:      Effort: Pulmonary effort is normal. No respiratory distress.   Abdominal:      General: Bowel sounds are normal.      Palpations: Abdomen is soft.      Tenderness: There is no abdominal tenderness.   Neurological:      Mental Status: She is alert.       Lab  Lab Results   Component Value Date    WBC 4.69 07/26/2023    HGB 11.8 (L) 07/26/2023    HGB 13.5 07/25/2023    HGB 16.9 (H) 07/24/2023    MCV 87.6 07/26/2023     07/26/2023       Lab Results   Component Value Date    GLUCOSE 80 07/26/2023    BUN 5 (L) 07/26/2023    CREATININE 0.53 (L) 07/26/2023    EGFRIFNONA 79 11/07/2018    BCR 9.4 07/26/2023     07/26/2023    K 2.6 (C) 07/26/2023    CO2 25.0 07/26/2023    CALCIUM 8.4 (L) 07/26/2023    ALBUMIN 3.0 (L) 07/25/2023    ALKPHOS 111 07/25/2023    BILITOT 0.7 07/25/2023    ALT 18 07/25/2023    AST 23 07/25/2023       Assessment:    Nausea and vomiting  Unintentional weight loss   Hypokalemia   Abnormal CT abdomen, gastric wall thickening.  Anorexia   Change in bowel habits, constipation     Plan:    Patient has had improvement in symptoms on BID PPI since arrival, suggestive of reflux esophagitis etiology.   EGD cancelled today due to hypokalemia.        >> Replace K+  >> EGD tomorrow, to exclude differential of gastric malignancy given her weight loss " and gastric wall thickening on CT.     >> Continue PPI and Thiamine   >> BMP in the AM     DION Marroquin  07/26/23  15:00 EDT

## 2023-07-26 NOTE — PROGRESS NOTES
[facility] Medicine Services  PROGRESS NOTE    Patient Name: Bryanna Schwartz  : 1970  MRN: [id number]    Date of Admission: 2023  Primary Care Physician: Baljeet Manley APRN    Subjective   Subjective     CC: Nausea    HPI: Nausea, no emesis. Epigastric and RUQ discomfort.     Review of Systems   Constitutional:  Positive for activity change, appetite change, fatigue and unexpected weight change.   HENT: Negative.     Respiratory: Negative.     Cardiovascular: Negative.    Gastrointestinal:  Positive for abdominal distention, abdominal pain, nausea and vomiting.   Neurological:  Positive for weakness.    No change otherwise    Objective   Objective     Vital Signs:   Temp:  [97.5 °F (36.4 °C)-98.1 °F (36.7 °C)] 97.6 °F (36.4 °C)  Heart Rate:  [85-94] 91  Resp:  [16-18] 18  BP: (103-122)/(63-93) 121/75  Flow (L/min):  [2] 2     Physical Exam:  NAD, alert and oriented  OP clear, dry MM  Neck supple  No LAD  RRR  CTAB  +BS, soft, ND  ZHANG  Normal affect  No change from     Results Reviewed:  LAB RESULTS:      Lab 23  0424 23  0340 23  1449   WBC 4.69 6.83 12.38*   HEMOGLOBIN 11.8* 13.5 16.9*   HEMATOCRIT 35.2 39.8 49.0*   PLATELETS 214 265 447   NEUTROS ABS  --  4.06 10.51*   IMMATURE GRANS (ABS)  --  0.05 0.06*   LYMPHS ABS  --  2.04 1.25   MONOS ABS  --  0.60 0.52   EOS ABS  --  0.06 0.01   MCV 87.6 86.9 85.1   LACTATE  --   --  1.8           Lab 23  0424 23  0856 23  0340 23  1449   SODIUM 143  --  141 142   POTASSIUM 2.6*  --  2.5* 2.9*   CHLORIDE 106  --  101 95*   CO2 25.0  --  29.0 31.0*   ANION GAP 12.0  --  11.0 16.0*   BUN 5*  --  6 7   CREATININE 0.53*  --  0.63 0.79   EGFR 110.7  --  106.2 89.6   GLUCOSE 80  --  94 132*   CALCIUM 8.4*  --  8.4* 10.0   MAGNESIUM 1.6 1.7  --  1.6   TSH  --   --   --  4.050  4.050           Lab 23  0340 23  1449   TOTAL PROTEIN 5.2* 7.3   ALBUMIN 3.0* 3.9   GLOBULIN 2.2 3.4    ALT (SGPT) 18 25   AST (SGOT) 23 32   BILIRUBIN 0.7 1.0   ALK PHOS 111 154*   LIPASE  --  18           Lab 07/24/23  2226 07/24/23  1952 07/24/23  1449   PROBNP  --   --  181.5   HSTROP T 31* 31* 38*           Lab 07/25/23  0340   CHOLESTEROL 108   LDL CHOL 55   HDL CHOL 33*   TRIGLYCERIDES 110               Brief Urine Lab Results  (Last result in the past 365 days)        Color   Clarity   Blood   Leuk Est   Nitrite   Protein   CREAT   Urine HCG        07/24/23 1640 Yellow   Clear   Small (1+)   Trace   Negative   Trace                   Microbiology Results Abnormal       None            XR Foot 3+ View Left    Result Date: 7/24/2023  XR FOOT 3+ VW LEFT Date of Exam: 7/24/2023 6:50 PM EDT Indication: left foot pain Comparison: 3/2/2023. Findings: There is no evidence of fracture. No evidence of dislocation. No erosions identified. No evidence of periostitis. No evidence of chondrocalcinosis. Moderate osteoarthritic changes are present within the interphalangeal joints as well as the first MTP joint and the midfoot. No focal soft tissue abnormalities identified.     Impression: Impression: No acute osseous abnormality. Moderate osteoarthritic changes are present. Electronically Signed: Kimberli Allen  7/24/2023 11:05 PM EDT  Workstation ID: [id number]    CT Head Without Contrast    Result Date: 7/24/2023  CT HEAD WO CONTRAST Date of Exam: 7/24/2023 3:35 PM EDT Indication: fall. head injury.. Comparison: None available. Technique: Axial CT images were obtained of the head without contrast administration.  Automated exposure control and iterative construction methods were used. Findings: *No acute intracranial hemorrhage. *No masses, mass effect, midline shift or hydrocephalus. *White matter is intact. *Calvarium is intact. *Visualized orbits and globes are unremarkable without radiopaque foreign bodies. *Visualized paranasal sinuses are clear. *Visualized mastoid air cells are clear.     Impression: 1.No acute  intracranial hemorrhage. Calvarium is intact. Electronically Signed: Klever Ali  7/24/2023 4:30 PM EDT  Workstation ID: [id number]    CT Abdomen Pelvis With Contrast    Result Date: 7/24/2023  CT ABDOMEN PELVIS W CONTRAST Date of Exam: 7/24/2023 3:35 PM EDT Indication: abd pain. nv.. Comparison: 7/6/2023 Technique: Axial CT images were obtained of the abdomen and pelvis following the uneventful intravenous administration of 85 mL Isovue-300 . Reconstructed coronal and sagittal images were also obtained. Automated exposure control and iterative construction methods were used. Findings: *Lower Thorax: Mild dependent atelectasis. *Liver: Unremarkable. *Gallbladder: Cholecystectomy. *Pancreas: Normal. *Spleen: Normal. *Adrenal Glands: Normal. *Kidneys: No renal stones or hydronephrosis bilaterally. *Stomach: Gastric wall thickening. *Bowel: Nonobstructive bowel gas pattern. No bowel wall inflammation. *Appendix: Appendix is not visualized. No secondary signs of appendicitis. *Peritoneal Cavity: No pneumoperitoneum.  No lymphadenopathy. *Urinary Bladder: Urinary bladder wall thickening. *Pelvis: No free pelvic fluid. *Bones: Unchanged anterior wedge compression deformity of T12 vertebral body. Multilevel spondylosis. Mild generalized osteopenia. *     Impression: 1.Gastric wall thickening may represent gastritis or other etiologies. Follow-up recommended. 2.Urinary bladder wall thickening may represent underdistention, cystitis or other etiologies. Please correlate with urinalysis. Follow-up recommended. Electronically Signed: Klever Ali  7/24/2023 4:27 PM EDT  Workstation ID: [id number]     Results for orders placed during the hospital encounter of 03/01/23    Adult Transesophageal Echo 3D (NESSA) W/ Cont If Necessary Per Protocol    Interpretation Summary    Left ventricular systolic function is normal. Left ventricular ejection fraction appears to be 56 - 60%.    No intracardiac mass or thrombus.    Valves are  structurally normal.    Mild plaque seen in aortic arch and descending aorta. No mobile plaque or thrombus seen.      Current medications:  Scheduled Meds:budesonide-formoterol, 2 puff, Inhalation, BID - RT  heparin (porcine), 5,000 Units, Subcutaneous, Q8H  levothyroxine, 25 mcg, Oral, Q AM  lubiprostone, 24 mcg, Oral, BID With Meals  pantoprazole, 40 mg, Intravenous, BID AC  Pharmacy Meds to Bed Consult, , Does not apply, Daily  potassium chloride, 20 mEq, Oral, Once  potassium chloride, 10 mEq, Intravenous, Q1H  pregabalin, 250 mg, Oral, BID  rosuvastatin, 10 mg, Oral, Nightly  sacubitril-valsartan, 1 tablet, Oral, BID  senna-docusate sodium, 2 tablet, Oral, BID  sodium chloride, 10 mL, Intravenous, Q12H  thiamine (B-1) IV, 200 mg, Intravenous, Daily      Continuous Infusions:     PRN Meds:.  acetaminophen **OR** acetaminophen **OR** acetaminophen    senna-docusate sodium **AND** polyethylene glycol **AND** bisacodyl **AND** bisacodyl    Calcium Replacement - Follow Nurse / BPA Driven Protocol    LORazepam    Magnesium Standard Dose Replacement - Follow Nurse / BPA Driven Protocol    Phosphorus Replacement - Follow Nurse / BPA Driven Protocol    Potassium Replacement - Follow Nurse / BPA Driven Protocol    Sodium Chloride (PF)    sodium chloride    sodium chloride    Assessment & Plan   Assessment & Plan     Active Hospital Problems    Diagnosis  POA    **Dehydration [E86.0]  Yes    Electrolyte abnormality [E87.8]  Yes    GERD without esophagitis [K21.9]  Unknown    Intractable nausea and vomiting [R11.2]  Unknown    Left lower quadrant abdominal pain [R10.32]  Unknown    Left foot pain [M79.672]  Unknown    Hypothyroidism (acquired) [E03.9]  Yes    Primary hypertension [I10]  Yes    Mixed hyperlipidemia [E78.2]  Yes      Resolved Hospital Problems   No resolved problems to display.        Brief Hospital Course to date:   Bryanna Schwartz is a 53 y.o. female MH HTN, HLD, GERD, CHF, hypothyroidism, migraine  headaches, CHF presenting with intractable nausea vomiting abdominal pain and a 60 pound weight loss that started in April, worsening over the last 2 weeks.  Patient was scheduled to have a colonoscopy this week. Patient was at her PCP office when she was so weak that she fell forward hitting her head on a stool and injuring her left foot.  PCP instructed her to come to the ED for further evaluation.  Patient reported at least 10 episodes of vomiting daily.  She stated every time she eats or drinks, she vomits.  She reports constipation denies diarrhea.  Patient has been on Amitiza with little improvement of constipation.  Follows with gastroenterology Dr. Smith in [facility]     Nausea/vomiting  Abdominal pain  Chronic constipation  Weight loss  -CT reviewed  -GI consulted, for EGD today  -hx of severe esophagitis    S/P Fall  -L foot xray unremarkable    HTN  HL  Hx of CHF  -on entresto  -on statin    Neuropathy  -on lyrica    Hypothyroidism  -TSH WNL, reportedly not taking home med    Expected Discharge Location and Transportation: Home  Expected Discharge   Expected Discharge Date: 7/27/2023; Expected Discharge Time:      DVT prophylaxis:  Medical DVT prophylaxis orders are present.     AM-PAC 6 Clicks Score (PT): 18 (07/25/23 2445)    CODE STATUS:   Code Status and Medical Interventions:   Ordered at: 07/24/23 8352     Level Of Support Discussed With:    Patient     Code Status (Patient has no pulse and is not breathing):    CPR (Attempt to Resuscitate)     Medical Interventions (Patient has pulse or is breathing):    Full Support     Release to patient:    Routine Release       Luis Barrios MD  07/26/23

## 2023-07-26 NOTE — THERAPY TREATMENT NOTE
Patient Name: Bryanna Schwartz  : 1970    MRN: [id number]                              Today's Date: 2023       Admit Date: 2023    Visit Dx:     ICD-10-CM ICD-9-CM   1. Hypokalemia  E87.6 276.8   2. Hypomagnesemia  E83.42 275.2   3. Elevated troponin  R77.8 790.6   4. Near syncope  R55 780.2   5. Injury of head, initial encounter  S09.90XA 959.01   6. Abdominal pain, unspecified abdominal location  R10.9 789.00   7. Nausea and vomiting, unspecified vomiting type  R11.2 787.01   8. Referred by health care professional  Z02.89 V68.89   9. Intractable nausea and vomiting  R11.2 536.2     Patient Active Problem List   Diagnosis    GERD without esophagitis    Anxiety and depression    Mixed hyperlipidemia    Primary hypertension    Migraines    Gangrene    Glucose intolerance    Hypothyroidism (acquired)    Chronic systolic CHF (congestive heart failure)    Embolic infarction    Metatarsal stress fracture of right foot    NICM (nonischemic cardiomyopathy)    Dehydration    Electrolyte abnormality    GERD without esophagitis    Intractable nausea and vomiting    Left lower quadrant abdominal pain    Left foot pain     Past Medical History:   Diagnosis Date    Arthritis     Depression     Diabetes     Environmental allergies     Falls 10/24/2018    GERD (gastroesophageal reflux disease)     Heart murmur     Hyperlipidemia     Hypertension     Hypothyroidism     Lyme disease     Migraine     K[place]vin [place] spotted fever     Vitamin B 12 deficiency      Past Surgical History:   Procedure Laterality Date    ACHILLES TENDON SURGERY Right     BREAST CYST EXCISION Left     CARDIAC CATHETERIZATION  2023    Normal coronaries.  EF 45%    CARDIOVASCULAR STRESS TEST  2023    anteroseptal, anterior infarct with dana-infarct ischemia    CHOLECYSTECTOMY      ECHO - CONVERTED  2023    EF 41-45%, saline test negative, valves normal    ENDOSCOPY N/A 10/16/2018    Procedure: ESOPHAGOGASTRODUODENOSCOPY;   Surgeon: Brunner, Mark I, MD;  Location: [facility] ENDOSCOPY;  Service: Gastroenterology    HYSTERECTOMY      TRANSESOPHAGEAL ECHOCARDIOGRAM (NESSA)  03/07/2023    Dr. Alonso- EF 56-60%, no mass or thrombus, mild plaque aorrti arch and descending aorta    US CAROTID UNILATERAL  12/17/2022    < 50% Bilaterally      General Information       Row Name 07/26/23 1355          Physical Therapy Time and Intention    Document Type therapy note (daily note)  -KG     Mode of Treatment physical therapy  -KG       Row Name 07/26/23 1357          General Information    Patient Profile Reviewed yes  -KG     Existing Precautions/Restrictions fall;oxygen therapy device and L/min  L foot pain  -KG       Row Name 07/26/23 1355          Cognition    Orientation Status (Cognition) oriented x 3  -KG       Row Name 07/26/23 1353          Safety Issues, Functional Mobility    Impairments Affecting Function (Mobility) balance;coordination;pain;endurance/activity tolerance;postural/trunk control;sensation/sensory awareness;strength  -KG               User Key  (r) = Recorded By, (t) = Taken By, (c) = Cosigned By      Initials Name Provider Type    KG Oliva Madera Physical Therapist                   Mobility       Row Name 07/26/23 135          Bed Mobility    Bed Mobility supine-sit  -KG     Supine-Sit [employer] (Bed Mobility) verbal cues;contact guard  -KG     Assistive Device (Bed Mobility) bed rails;head of bed elevated  -KG       Row Name 07/26/23 0138          Transfers    Comment, (Transfers) Pt very shaky today, progressed from min-A to CGA during session  -KG       Row Name 07/26/23 1358          Sit-Stand Transfer    Sit-Stand [employer] (Transfers) contact guard;verbal cues;minimum assist (75% patient effort)  -KG     Assistive Device (Sit-Stand Transfers) walker, front-wheeled  -KG       Row Name 07/26/23 1352          Gait/Stairs (Locomotion)    [employer] Level (Gait) contact guard  -KG     Assistive Device (Gait)  walker, front-wheeled  -KG     Distance in Feet (Gait) 80  -KG     Deviations/Abnormal Patterns (Gait) bilateral deviations;gait speed decreased;stride length decreased;left sided deviations  -KG     Bilateral Gait Deviations forward flexed posture  -KG     Left Sided Gait Deviations weight shift ability decreased  -KG     Comment, (Gait/Stairs) pt able to ambulate 80', FWW, CGA but very shaky today and required chair follow  -KG               User Key  (r) = Recorded By, (t) = Taken By, (c) = Cosigned By      Initials Name Provider Type    Oliva Navarro Physical Therapist                   Obj/Interventions       Row Name 07/26/23 [facility]          Motor Skills    Therapeutic Exercise other (see comments)  LAQ, marching, heel raises x15  -KG       Row Name 07/26/23 [facility]          Balance    Dynamic Standing Balance minimal assist;contact guard  -KG     Position/Device Used, Standing Balance supported;walker, front-wheeled  -KG               User Key  (r) = Recorded By, (t) = Taken By, (c) = Cosigned By      Initials Name Provider Type    Oliva Navarro Physical Therapist                   Goals/Plan    No documentation.                  Clinical Impression       Row Name 07/26/23 [facility]          Pain    Pretreatment Pain Rating 0/10 - no pain  -KG     Posttreatment Pain Rating 0/10 - no pain  -KG       Row Name 07/26/23 [facility]          Plan of Care Review    Plan of Care Reviewed With patient;spouse  -KG     Progress improving  -KG     Outcome Evaluation pt able to ambulate 80', FWW, CGA but very shaky today and required chair follow.  -KG       Row Name 07/26/23 1350          Positioning and Restraints    Pre-Treatment Position in bed  -KG     Post Treatment Position chair  -KG     In Chair call light within reach;notified nsg;encouraged to call for assist;exit alarm on;with family/caregiver;legs elevated  -KG               User Key  (r) = Recorded By, (t) = Taken By, (c) = Cosigned By      Initials Name  Provider Type    Oliva Navarro Physical Therapist                   Outcome Measures       Row Name 07/26/23 1400 07/26/23 0900       How much help from another person do you currently need...    Turning from your back to your side while in flat bed without using bedrails? 4  -KG 4  -AS    Moving from lying on back to sitting on the side of a flat bed without bedrails? 3  -KG 3  -AS    Moving to and from a bed to a chair (including a wheelchair)? 3  -KG 3  -AS    Standing up from a chair using your arms (e.g., wheelchair, bedside chair)? 3  -KG 3  -AS    Climbing 3-5 steps with a railing? 2  -KG 3  -AS    To walk in hospital room? 3  -KG 3  -AS    AM-PAC 6 Clicks Score (PT) 18  -KG 19  -AS    Highest level of mobility 6 --> Walked 10 steps or more  -KG 6 --> Walked 10 steps or more  -AS      Row Name 07/26/23 1400 07/26/23 0835       Functional Assessment    Outcome Measure Options AM-PAC 6 Clicks Basic Mobility (PT)  -KG AM-PAC 6 Clicks Daily Activity (OT)  -AC              User Key  (r) = Recorded By, (t) = Taken By, (c) = Cosigned By      Initials Name Provider Type    AC Yas Lawrence, OT Occupational Therapist    KG Oliva Madera Physical Therapist    AS Elidia Robertson, RN Registered Nurse                                 Physical Therapy Education       Title: PT OT SLP Therapies (In Progress)       Topic: Physical Therapy (Done)       Point: Mobility training (Done)       Learning Progress Summary             Patient Acceptance, E, VU by KG at 7/26/2023 1400    Acceptance, E, NR by DS at 7/25/2023 2352    Acceptance, E,TB, VU by ES at 7/25/2023 1026   Significant Other Acceptance, E,TB, VU by ES at 7/25/2023 1026                         Point: Home exercise program (Done)       Learning Progress Summary             Patient Acceptance, E, VU by KG at 7/26/2023 1400    Acceptance, E, NR by DS at 7/25/2023 2352                         Point: Body mechanics (Done)       Learning Progress Summary              Patient Acceptance, E, VU by KG at 7/26/2023 1400    Acceptance, E, NR by DS at 7/25/2023 2352    Acceptance, E,TB, VU by ES at 7/25/2023 1026   Significant Other Acceptance, E,TB, VU by ES at 7/25/2023 1026                         Point: Precautions (Done)       Learning Progress Summary             Patient Acceptance, E, VU by KG at 7/26/2023 1400    Acceptance, E, NR by DS at 7/25/2023 2352    Acceptance, E,TB, VU by ES at 7/25/2023 1026   Significant Other Acceptance, E,TB, VU by ES at 7/25/2023 1026                                         User Key       Initials Effective Dates Name Provider Type Discipline    KG 01/04/23 -  Oliva Madera Physical Therapist PT    ES 08/11/22 -  Sharifa Schaefer, BETHANY Physical Therapist PT    DS 07/11/23 -  Awais Gardner, RN Registered Nurse Nurse                  PT Recommendation and Plan     Plan of Care Reviewed With: patient, spouse  Progress: improving  Outcome Evaluation: pt able to ambulate 80', FWW, CGA but very shaky today and required chair follow.     Time Calculation:         PT Charges       Row Name 07/26/23 1400             Time Calculation    Start Time 1323  -KG      PT Received On 07/26/23  -KG         Timed Charges    05822 - PT Therapeutic Exercise Minutes 10  -KG      63160 - Gait Training Minutes  15  -KG         Total Minutes    Timed Charges Total Minutes 25  -KG       Total Minutes 25  -KG                User Key  (r) = Recorded By, (t) = Taken By, (c) = Cosigned By      Initials Name Provider Type    KG Oliva Madera Physical Therapist                  Therapy Charges for Today       Code Description Service Date Service Provider Modifiers Qty    41817613931 HC PT THER PROC EA 15 MIN 7/26/2023 Oliva Mdaera GP 1    37123325456 HC GAIT TRAINING EA 15 MIN 7/26/2023 Oliva Madera GP 1            PT G-Codes  Outcome Measure Options: AM-PAC 6 Clicks Basic Mobility (PT)  AM-PAC 6 Clicks Score (PT): 18  AM-PAC 6 Clicks Score (OT): 18        Oliva Madera  7/26/2023

## 2023-07-26 NOTE — PLAN OF CARE
Goal Outcome Evaluation:  Plan of Care Reviewed With: patient           Outcome Evaluation: Pt presents below baseline with self care/mobility d/t L foot pain, decreased coordination, weakness and decreased balance.  Pt CGA and increased time LBD,  min A to ambulate in room with RW with knees buckling at times.  OT will follow to advance pt toward PLOF.  Recommend IP rehab upon d/c, but pt prefers outpatient therapy and home with assist upon d/c.      Anticipated Discharge Disposition (OT): other (see comments) (recommend Ip rehab, but pt prefers home with assist and outpatient therapy)

## 2023-07-26 NOTE — THERAPY EVALUATION
Patient Name: Bryanna Schwartz  : 1970    MRN: [id number]                              Today's Date: 2023       Admit Date: 2023    Visit Dx:     ICD-10-CM ICD-9-CM   1. Hypokalemia  E87.6 276.8   2. Hypomagnesemia  E83.42 275.2   3. Elevated troponin  R77.8 790.6   4. Near syncope  R55 780.2   5. Injury of head, initial encounter  S09.90XA 959.01   6. Abdominal pain, unspecified abdominal location  R10.9 789.00   7. Nausea and vomiting, unspecified vomiting type  R11.2 787.01   8. Referred by health care professional  Z02.89 V68.89   9. Intractable nausea and vomiting  R11.2 536.2     Patient Active Problem List   Diagnosis    GERD without esophagitis    Anxiety and depression    Mixed hyperlipidemia    Primary hypertension    Migraines    Gangrene    Glucose intolerance    Hypothyroidism (acquired)    Chronic systolic CHF (congestive heart failure)    Embolic infarction    Metatarsal stress fracture of right foot    NICM (nonischemic cardiomyopathy)    Dehydration    Electrolyte abnormality    GERD without esophagitis    Intractable nausea and vomiting    Left lower quadrant abdominal pain    Left foot pain     Past Medical History:   Diagnosis Date    Arthritis     Depression     Diabetes     Environmental allergies     Falls 10/24/2018    GERD (gastroesophageal reflux disease)     Heart murmur     Hyperlipidemia     Hypertension     Hypothyroidism     Lyme disease     Migraine     K[place]vin [place] spotted fever     Vitamin B 12 deficiency      Past Surgical History:   Procedure Laterality Date    ACHILLES TENDON SURGERY Right     BREAST CYST EXCISION Left     CARDIAC CATHETERIZATION  2023    Normal coronaries.  EF 45%    CARDIOVASCULAR STRESS TEST  2023    anteroseptal, anterior infarct with dana-infarct ischemia    CHOLECYSTECTOMY      ECHO - CONVERTED  2023    EF 41-45%, saline test negative, valves normal    ENDOSCOPY N/A 10/16/2018    Procedure: ESOPHAGOGASTRODUODENOSCOPY;   Surgeon: Brunner, Mark I, MD;  Location: [facility] ENDOSCOPY;  Service: Gastroenterology    HYSTERECTOMY      TRANSESOPHAGEAL ECHOCARDIOGRAM (NESSA)  03/07/2023    Dr. Alonso- EF 56-60%, no mass or thrombus, mild plaque aorrti arch and descending aorta    US CAROTID UNILATERAL  12/17/2022    < 50% Bilaterally      General Information       Row Name 07/26/23 0740          OT Time and Intention    Document Type evaluation  -AC     Mode of Treatment occupational therapy  -AC       Row Name 07/26/23 0740          General Information    Patient Profile Reviewed yes  -AC     Prior Level of Function independent:;all household mobility;min assist:;ADL's;dependent:;home management;driving;shopping  uses SPC or RW to ambulate, spouse assists with cooking, cleaning, shopping and transportation  -     Existing Precautions/Restrictions fall;oxygen therapy device and L/min  L foot pain  -     Barriers to Rehab previous functional deficit  -AC       Row Name 07/26/23 0740          Occupational Profile    Environmental Supports and Barriers (Occupational Profile) tub shower with shower chair  -AC       Row Name 07/26/23 0740          Living Environment    People in Home child(linden), adult;spouse  pt reports spouse or adult son is at home with pt at all times  -AC       Row Name 07/26/23 0740          Home Main Entrance    Number of Stairs, Main Entrance other (see comments)  entry ramp  -AC     Stair Railings, Main Entrance none  -AC       Row Name 07/26/23 0740          Stairs Within Home, Primary    Number of Stairs, Within Home, Primary none  -AC       Row Name 07/26/23 0740          Cognition    Orientation Status (Cognition) oriented x 3  -AC       Row Name 07/26/23 0740          Safety Issues, Functional Mobility    Safety Issues Affecting Function (Mobility) awareness of need for assistance;insight into deficits/self-awareness;safety precaution awareness;safety precautions follow-through/compliance  -AC     Impairments  Affecting Function (Mobility) balance;coordination;pain;endurance/activity tolerance;postural/trunk control;sensation/sensory awareness;strength  -               User Key  (r) = Recorded By, (t) = Taken By, (c) = Cosigned By      Initials Name Provider Type    Yas Rojas, OT Occupational Therapist                     Mobility/ADL's       Row Name 07/26/23 0825          Bed Mobility    Bed Mobility supine-sit  -     Supine-Sit [place] (Bed Mobility) verbal cues;contact guard  -     Assistive Device (Bed Mobility) bed rails;head of bed elevated  -       Row Name 07/26/23 0825          Transfers    Transfers sit-stand transfer  -       Row Name 07/26/23 0825          Sit-Stand Transfer    Sit-Stand [place] (Transfers) contact guard;verbal cues  -     Assistive Device (Sit-Stand Transfers) walker, front-wheeled  -       Row Name 07/26/23 0825          Functional Mobility    Functional Mobility- Ind. Level minimum assist (75% patient effort);verbal cues required  -     Functional Mobility- Device walker, front-wheeled  -     Functional Mobility-Distance (Feet) 30  -     Functional Mobility- Safety Issues step length decreased  -     Functional Mobility- Comment pt buckling at times, unsteady throughout  -       Row Name 07/26/23 0825          Activities of Daily Living    BADL Assessment/Intervention lower body dressing  -       Row Name 07/26/23 0825          Lower Body Dressing Assessment/Training    [place] Level (Lower Body Dressing) don;doff;socks;contact guard assist  -     Position (Lower Body Dressing) edge of bed sitting  -     Comment, (Lower Body Dressing) increased time and effort  -               User Key  (r) = Recorded By, (t) = Taken By, (c) = Cosigned By      Initials Name Provider Type    Yas Rojas, OT Occupational Therapist                   Obj/Interventions       Row Name 07/26/23 0827          Sensory Assessment (Somatosensory)    Sensory  Subjective Reports numbness;tingling  B hands  -AC       Row Name 07/26/23 0827          Vision Assessment/Intervention    Visual Impairment/Limitations WFL  -AC       Row Name 07/26/23 0827          Range of Motion Comprehensive    General Range of Motion bilateral upper extremity ROM WNL  -AC     Comment, General Range of Motion BUE mariangelly 4-/5  -AC       Row Name 07/26/23 0827          Motor Skills    Motor Skills coordination  tremulous  -AC       Row Name 07/26/23 0827          Balance    Balance Assessment sitting static balance;sitting dynamic balance;standing static balance;standing dynamic balance  -AC     Static Sitting Balance standby assist  -AC     Dynamic Sitting Balance contact guard  -AC     Position, Sitting Balance unsupported  -AC     Static Standing Balance contact guard  -AC     Dynamic Standing Balance minimal assist  -AC     Position/Device Used, Standing Balance supported;walker, rolling  -AC               User Key  (r) = Recorded By, (t) = Taken By, (c) = Cosigned By      Initials Name Provider Type    AC Yas Lawrence, OT Occupational Therapist                   Goals/Plan       Row Name 07/26/23 0834          Transfer Goal 1 (OT)    Activity/Assistive Device (Transfer Goal 1, OT) bed-to-chair/chair-to-bed;toilet;walker, rolling  -AC     [place] Level/Cues Needed (Transfer Goal 1, OT) standby assist  -AC     Time Frame (Transfer Goal 1, OT) by discharge  -AC     Progress/Outcome (Transfer Goal 1, OT) new goal;goal ongoing  -       Row Name 07/26/23 0834          Dressing Goal 1 (OT)    Activity/Device (Dressing Goal 1, OT) lower body dressing  -AC     [place]/Cues Needed (Dressing Goal 1, OT) supervision required;set-up required  -AC     Time Frame (Dressing Goal 1, OT) by discharge  -AC     Progress/Outcome (Dressing Goal 1, OT) new goal;goal ongoing  -       Row Name 07/26/23 0834          Toileting Goal 1 (OT)    Activity/Device (Toileting Goal 1, OT) adjust/manage  clothing;perform perineal hygiene  -AC     [place] Level/Cues Needed (Toileting Goal 1, OT) standby assist  -AC     Time Frame (Toileting Goal 1, OT) by discharge  -AC     Progress/Outcome (Toileting Goal 1, OT) goal ongoing  -AC       Row Name 07/26/23 0834          Grooming Goal 1 (OT)    Activity/Device (Grooming Goal 1, OT) oral care  -AC     [place] (Grooming Goal 1, OT) standby assist  -AC     Time Frame (Grooming Goal 1, OT) by discharge  -AC     Strategies/Barriers (Grooming Goal 1, OT) standing sinkside  -AC     Progress/Outcome (Grooming Goal 1, OT) new goal;goal ongoing  -AC       Row Name 07/26/23 0834          Therapy Assessment/Plan (OT)    Planned Therapy Interventions (OT) activity tolerance training;BADL retraining;functional balance retraining;occupation/activity based interventions;patient/caregiver education/training;strengthening exercise;transfer/mobility retraining  -AC               User Key  (r) = Recorded By, (t) = Taken By, (c) = Cosigned By      Initials Name Provider Type    AC Yas Lawrence, OT Occupational Therapist                   Clinical Impression       Row Name 07/26/23 0828          Pain Assessment    Pretreatment Pain Rating 2/10  -AC     Posttreatment Pain Rating 2/10  -AC     Pain Location - Side/Orientation Left  -AC     Pain Location - foot  -AC     Pain Intervention(s) Ambulation/increased activity;Repositioned  -AC       Row Name 07/26/23 0828          Plan of Care Review    Plan of Care Reviewed With patient  -AC     Outcome Evaluation Pt presents below baseline with self care/mobility d/t L foot pain, decreased coordination, weakness and decreased balance.  Pt CGA and increased time LBD,  min A to ambulate in room with RW with knees buckling at times.  OT will follow to advance pt toward PLOF.  Recommend IP rehab upon d/c, but pt prefers outpatient therapy and home with assist upon d/c.  -AC       Row Name 07/26/23 0828          Therapy Assessment/Plan (OT)     Rehab Potential (OT) good, to achieve stated therapy goals  -AC     Criteria for Skilled Therapeutic Interventions Met (OT) yes;skilled treatment is necessary  -AC     Therapy Frequency (OT) daily  -AC       Row Name 07/26/23 0828          Therapy Plan Review/Discharge Plan (OT)    Anticipated Discharge Disposition (OT) other (see comments)  recommend [facility] rehab, but pt prefers home with assist and outpatient therapy  -AC       Row Name 07/26/23 0828          Vital Signs    Pre Systolic BP Rehab 121  -AC     Pre Treatment Diastolic BP 75  -AC     Post Systolic BP Rehab 116  -AC     Post Treatment Diastolic BP 75  -AC     Pretreatment Heart Rate (beats/min) 90  -AC     Posttreatment Heart Rate (beats/min) 83  -AC     Pre SpO2 (%) 96  -AC     O2 Delivery Pre Treatment supplemental O2  -AC     O2 Delivery Intra Treatment supplemental O2  -AC     Post SpO2 (%) 97  -AC     O2 Delivery Post Treatment supplemental O2  -AC     Pre Patient Position Supine  -AC     Post Patient Position Sitting  -AC       Row Name 07/26/23 0828          Positioning and Restraints    Pre-Treatment Position in bed  -AC     Post Treatment Position chair  -AC     In Chair notified nsg;reclined;call light within reach;exit alarm on;waffle cushion  -AC               User Key  (r) = Recorded By, (t) = Taken By, (c) = Cosigned By      Initials Name Provider Type    AC Yas Lawrence, OT Occupational Therapist                   Outcome Measures       Row Name 07/26/23 0835          How much help from another is currently needed...    Putting on and taking off regular lower body clothing? 3  -AC     Bathing (including washing, rinsing, and drying) 3  -AC     Toileting (which includes using toilet bed pan or urinal) 3  -AC     Putting on and taking off regular upper body clothing 3  -AC     Taking care of personal grooming (such as brushing teeth) 3  -AC     Eating meals 3  currently NPO for test, but demo ability to feed self given setup  -AC      -[facility] 6 Clicks Score (OT) 18  -       Row Name 07/26/23 0835          Functional Assessment    Outcome Measure Options -[facility] 6 Clicks Daily Activity (OT)  -               User Key  (r) = Recorded By, (t) = Taken By, (c) = Cosigned By      Initials Name Provider Type     Yas Lawrence, OT Occupational Therapist                    Occupational Therapy Education       Title: PT OT SLP Therapies (In Progress)       Topic: Occupational Therapy (In Progress)       Point: ADL training (Done)       Description:   Instruct learner(s) on proper safety adaptation and remediation techniques during self care or transfers.   Instruct in proper use of assistive devices.                  Learning Progress Summary             Patient Acceptance, E, VU by  at 7/26/2023 0836                         Point: Home exercise program (Not Started)       Description:   Instruct learner(s) on appropriate technique for monitoring, assisting and/or progressing therapeutic exercises/activities.                  Learner Progress:  Not documented in this visit.              Point: Precautions (Not Started)       Description:   Instruct learner(s) on prescribed precautions during self-care and functional transfers.                  Learner Progress:  Not documented in this visit.              Point: Body mechanics (Not Started)       Description:   Instruct learner(s) on proper positioning and spine alignment during self-care, functional mobility activities and/or exercises.                  Learner Progress:  Not documented in this visit.                              User Key       Initials Effective Dates Name Provider Type [facility] 02/03/23 -  Yas Lawrence OT Occupational Therapist OT                  OT Recommendation and Plan  Planned Therapy Interventions (OT): activity tolerance training, BADL retraining, functional balance retraining, occupation/activity based interventions, patient/caregiver education/training,  strengthening exercise, transfer/mobility retraining  Therapy Frequency (OT): daily  Plan of Care Review  Plan of Care Reviewed With: patient  Outcome Evaluation: Pt presents below baseline with self care/mobility d/t L foot pain, decreased coordination, weakness and decreased balance.  Pt CGA and increased time LBD,  min A to ambulate in room with RW with knees buckling at times.  OT will follow to advance pt toward PLOF.  Recommend IP rehab upon d/c, but pt prefers outpatient therapy and home with assist upon d/c.     Time Calculation:   Evaluation Complexity (OT)  Review Occupational Profile/Medical/Therapy History Complexity: brief/low complexity  Assessment, Occupational Performance/Identification of Deficit Complexity: 1-3 performance deficits  Clinical Decision Making Complexity (OT): problem focused assessment/low complexity  Overall Complexity of Evaluation (OT): low complexity     Time Calculation- OT       Row Name 07/26/23 0740             Time Calculation- OT    OT Start Time 0740  -AC      OT Received On 07/26/23  -AC      OT Goal Re-Cert Due Date 08/05/23  -AC         Untimed Charges    OT Eval/Re-eval Minutes 50  -AC         Total Minutes    Untimed Charges Total Minutes 50  -AC       Total Minutes 50  -AC                User Key  (r) = Recorded By, (t) = Taken By, (c) = Cosigned By      Initials Name Provider Type    AC Yas Lawrence, OT Occupational Therapist                  Therapy Charges for Today       Code Description Service Date Service Provider Modifiers Qty    [id number] HC OT EVAL LOW COMPLEXITY 4 7/26/2023 Yas Lawrence OT GO 1                 Yas Lawrence OT  7/26/2023

## 2023-07-27 ENCOUNTER — ANESTHESIA (OUTPATIENT)
Dept: GASTROENTEROLOGY | Facility: HOSPITAL | Age: 53
End: 2023-07-27
Payer: MEDICARE

## 2023-07-27 LAB
ANION GAP SERPL CALCULATED.3IONS-SCNC: 10 MMOL/L (ref 5–15)
BUN SERPL-MCNC: 4 MG/DL (ref 6–20)
BUN/CREAT SERPL: 7 (ref 7–25)
CALCIUM SPEC-SCNC: 8 MG/DL (ref 8.6–10.5)
CHLORIDE SERPL-SCNC: 106 MMOL/L (ref 98–107)
CO2 SERPL-SCNC: 27 MMOL/L (ref 22–29)
CREAT SERPL-MCNC: 0.57 MG/DL (ref 0.57–1)
EGFRCR SERPLBLD CKD-EPI 2021: 108.8 ML/MIN/1.73
GLUCOSE SERPL-MCNC: 102 MG/DL (ref 65–99)
POTASSIUM SERPL-SCNC: 3.2 MMOL/L (ref 3.5–5.2)
SODIUM SERPL-SCNC: 143 MMOL/L (ref 136–145)

## 2023-07-27 PROCEDURE — G0378 HOSPITAL OBSERVATION PER HR: HCPCS

## 2023-07-27 PROCEDURE — 94799 UNLISTED PULMONARY SVC/PX: CPT

## 2023-07-27 PROCEDURE — 80048 BASIC METABOLIC PNL TOTAL CA: CPT | Performed by: PHYSICIAN ASSISTANT

## 2023-07-27 PROCEDURE — 43239 EGD BIOPSY SINGLE/MULTIPLE: CPT | Performed by: INTERNAL MEDICINE

## 2023-07-27 PROCEDURE — 25010000002 PROPOFOL 10 MG/ML EMULSION: Performed by: NURSE ANESTHETIST, CERTIFIED REGISTERED

## 2023-07-27 PROCEDURE — 94761 N-INVAS EAR/PLS OXIMETRY MLT: CPT

## 2023-07-27 PROCEDURE — 25010000002 HEPARIN (PORCINE) PER 1000 UNITS: Performed by: NURSE PRACTITIONER

## 2023-07-27 PROCEDURE — 88305 TISSUE EXAM BY PATHOLOGIST: CPT | Performed by: INTERNAL MEDICINE

## 2023-07-27 PROCEDURE — 25010000002 HEPARIN (PORCINE) PER 1000 UNITS: Performed by: INTERNAL MEDICINE

## 2023-07-27 PROCEDURE — 25010000002 THIAMINE PER 100 MG: Performed by: PHYSICIAN ASSISTANT

## 2023-07-27 PROCEDURE — 96372 THER/PROPH/DIAG INJ SC/IM: CPT

## 2023-07-27 PROCEDURE — 94664 DEMO&/EVAL PT USE INHALER: CPT

## 2023-07-27 PROCEDURE — 88342 IMHCHEM/IMCYTCHM 1ST ANTB: CPT | Performed by: INTERNAL MEDICINE

## 2023-07-27 RX ORDER — LIDOCAINE HYDROCHLORIDE 10 MG/ML
INJECTION, SOLUTION EPIDURAL; INFILTRATION; INTRACAUDAL; PERINEURAL AS NEEDED
Status: DISCONTINUED | OUTPATIENT
Start: 2023-07-27 | End: 2023-07-27 | Stop reason: SURG

## 2023-07-27 RX ORDER — ONDANSETRON 2 MG/ML
4 INJECTION INTRAMUSCULAR; INTRAVENOUS ONCE AS NEEDED
Status: DISCONTINUED | OUTPATIENT
Start: 2023-07-27 | End: 2023-07-27 | Stop reason: HOSPADM

## 2023-07-27 RX ORDER — SODIUM CHLORIDE 9 MG/ML
40 INJECTION, SOLUTION INTRAVENOUS AS NEEDED
Status: DISCONTINUED | OUTPATIENT
Start: 2023-07-27 | End: 2023-07-27 | Stop reason: HOSPADM

## 2023-07-27 RX ORDER — PROPOFOL 10 MG/ML
VIAL (ML) INTRAVENOUS AS NEEDED
Status: DISCONTINUED | OUTPATIENT
Start: 2023-07-27 | End: 2023-07-27 | Stop reason: SURG

## 2023-07-27 RX ORDER — IPRATROPIUM BROMIDE AND ALBUTEROL SULFATE 2.5; .5 MG/3ML; MG/3ML
3 SOLUTION RESPIRATORY (INHALATION) ONCE AS NEEDED
Status: DISCONTINUED | OUTPATIENT
Start: 2023-07-27 | End: 2023-07-27 | Stop reason: HOSPADM

## 2023-07-27 RX ORDER — LIDOCAINE HYDROCHLORIDE 10 MG/ML
0.5 INJECTION, SOLUTION EPIDURAL; INFILTRATION; INTRACAUDAL; PERINEURAL ONCE AS NEEDED
Status: DISCONTINUED | OUTPATIENT
Start: 2023-07-27 | End: 2023-07-27 | Stop reason: HOSPADM

## 2023-07-27 RX ORDER — SODIUM CHLORIDE, SODIUM LACTATE, POTASSIUM CHLORIDE, CALCIUM CHLORIDE 600; 310; 30; 20 MG/100ML; MG/100ML; MG/100ML; MG/100ML
9 INJECTION, SOLUTION INTRAVENOUS CONTINUOUS
Status: DISCONTINUED | OUTPATIENT
Start: 2023-07-27 | End: 2023-07-28 | Stop reason: HOSPADM

## 2023-07-27 RX ORDER — FAMOTIDINE 10 MG/ML
20 INJECTION, SOLUTION INTRAVENOUS ONCE
Status: COMPLETED | OUTPATIENT
Start: 2023-07-27 | End: 2023-07-27

## 2023-07-27 RX ORDER — SODIUM CHLORIDE 0.9 % (FLUSH) 0.9 %
10 SYRINGE (ML) INJECTION EVERY 12 HOURS SCHEDULED
Status: DISCONTINUED | OUTPATIENT
Start: 2023-07-27 | End: 2023-07-27 | Stop reason: HOSPADM

## 2023-07-27 RX ORDER — SODIUM CHLORIDE 0.9 % (FLUSH) 0.9 %
10 SYRINGE (ML) INJECTION AS NEEDED
Status: DISCONTINUED | OUTPATIENT
Start: 2023-07-27 | End: 2023-07-27 | Stop reason: HOSPADM

## 2023-07-27 RX ORDER — FAMOTIDINE 20 MG/1
20 TABLET, FILM COATED ORAL ONCE
Status: DISCONTINUED | OUTPATIENT
Start: 2023-07-27 | End: 2023-07-27 | Stop reason: HOSPADM

## 2023-07-27 RX ADMIN — THIAMINE HYDROCHLORIDE 200 MG: 100 INJECTION, SOLUTION INTRAMUSCULAR; INTRAVENOUS at 09:49

## 2023-07-27 RX ADMIN — HEPARIN SODIUM 5000 UNITS: 5000 INJECTION, SOLUTION INTRAVENOUS; SUBCUTANEOUS at 14:41

## 2023-07-27 RX ADMIN — Medication 10 ML: at 09:37

## 2023-07-27 RX ADMIN — PREGABALIN 250 MG: 100 CAPSULE ORAL at 09:49

## 2023-07-27 RX ADMIN — PROPOFOL 50 MG: 10 INJECTION, EMULSION INTRAVENOUS at 08:35

## 2023-07-27 RX ADMIN — SENNOSIDES AND DOCUSATE SODIUM 2 TABLET: 50; 8.6 TABLET ORAL at 21:03

## 2023-07-27 RX ADMIN — PANTOPRAZOLE SODIUM 40 MG: 40 INJECTION, POWDER, LYOPHILIZED, FOR SOLUTION INTRAVENOUS at 17:47

## 2023-07-27 RX ADMIN — SACUBITRIL AND VALSARTAN 1 TABLET: 24; 26 TABLET, FILM COATED ORAL at 21:04

## 2023-07-27 RX ADMIN — BUDESONIDE AND FORMOTEROL FUMARATE DIHYDRATE 2 PUFF: 160; 4.5 AEROSOL RESPIRATORY (INHALATION) at 10:55

## 2023-07-27 RX ADMIN — POTASSIUM CHLORIDE 40 MEQ: 750 CAPSULE, EXTENDED RELEASE ORAL at 00:47

## 2023-07-27 RX ADMIN — ROSUVASTATIN CALCIUM 10 MG: 10 TABLET, COATED ORAL at 21:03

## 2023-07-27 RX ADMIN — SACUBITRIL AND VALSARTAN 1 TABLET: 24; 26 TABLET, FILM COATED ORAL at 09:49

## 2023-07-27 RX ADMIN — FAMOTIDINE 20 MG: 10 INJECTION INTRAVENOUS at 07:43

## 2023-07-27 RX ADMIN — Medication 10 ML: at 21:04

## 2023-07-27 RX ADMIN — HEPARIN SODIUM 5000 UNITS: 5000 INJECTION, SOLUTION INTRAVENOUS; SUBCUTANEOUS at 21:04

## 2023-07-27 RX ADMIN — ACETAMINOPHEN 650 MG: 325 TABLET ORAL at 17:48

## 2023-07-27 RX ADMIN — BUDESONIDE AND FORMOTEROL FUMARATE DIHYDRATE 2 PUFF: 160; 4.5 AEROSOL RESPIRATORY (INHALATION) at 18:32

## 2023-07-27 RX ADMIN — PROPOFOL 50 MG: 10 INJECTION, EMULSION INTRAVENOUS at 08:37

## 2023-07-27 RX ADMIN — PROPOFOL 50 MG: 10 INJECTION, EMULSION INTRAVENOUS at 08:33

## 2023-07-27 RX ADMIN — HEPARIN SODIUM 5000 UNITS: 5000 INJECTION, SOLUTION INTRAVENOUS; SUBCUTANEOUS at 05:48

## 2023-07-27 RX ADMIN — PREGABALIN 250 MG: 100 CAPSULE ORAL at 21:03

## 2023-07-27 RX ADMIN — LUBIPROSTONE 24 MCG: 24 CAPSULE, GELATIN COATED ORAL at 17:48

## 2023-07-27 RX ADMIN — SODIUM CHLORIDE, POTASSIUM CHLORIDE, SODIUM LACTATE AND CALCIUM CHLORIDE 9 ML/HR: 600; 310; 30; 20 INJECTION, SOLUTION INTRAVENOUS at 07:44

## 2023-07-27 RX ADMIN — ACETAMINOPHEN 650 MG: 325 TABLET ORAL at 11:50

## 2023-07-27 RX ADMIN — SENNOSIDES AND DOCUSATE SODIUM 2 TABLET: 50; 8.6 TABLET ORAL at 09:49

## 2023-07-27 RX ADMIN — LIDOCAINE HYDROCHLORIDE 50 MG: 10 INJECTION, SOLUTION EPIDURAL; INFILTRATION; INTRACAUDAL; PERINEURAL at 08:31

## 2023-07-27 RX ADMIN — PANTOPRAZOLE SODIUM 40 MG: 40 INJECTION, POWDER, LYOPHILIZED, FOR SOLUTION INTRAVENOUS at 09:36

## 2023-07-27 RX ADMIN — LUBIPROSTONE 24 MCG: 24 CAPSULE, GELATIN COATED ORAL at 09:50

## 2023-07-27 NOTE — PROGRESS NOTES
[facility] Medicine Services  PROGRESS NOTE    Patient Name: Bryanna Schwartz  : 1970  MRN: [id number]    Date of Admission: 2023  Primary Care Physician: Baljeet Manley APRN    Subjective   Subjective     CC: Nausea    HPI: No nausea/emesis/pain. S/P EGD    Review of Systems   Constitutional:  Positive for activity change, appetite change, fatigue and unexpected weight change.   HENT: Negative.     Respiratory: Negative.     Cardiovascular: Negative.    Gastrointestinal:  Positive for abdominal distention, abdominal pain, nausea and vomiting.   Neurological:  Positive for weakness.    No change otherwise    Objective   Objective     Vital Signs:   Temp:  [97.5 °F (36.4 °C)-98 °F (36.7 °C)] 97.6 °F (36.4 °C)  Heart Rate:  [] 81  Resp:  [16-18] 18  BP: ()/(41-80) 113/59  Flow (L/min):  [2] 2     Physical Exam:  NAD, alert and oriented  OP clear, dry MM  Neck supple  No LAD  RRR  CTAB  +BS, soft, ND  ZHANG  Normal affect  No change from     Results Reviewed:  LAB RESULTS:      Lab 23  0424 23  0340 23  1449   WBC 4.69 6.83 12.38*   HEMOGLOBIN 11.8* 13.5 16.9*   HEMATOCRIT 35.2 39.8 49.0*   PLATELETS 214 265 447   NEUTROS ABS  --  4.06 10.51*   IMMATURE GRANS (ABS)  --  0.05 0.06*   LYMPHS ABS  --  2.04 1.25   MONOS ABS  --  0.60 0.52   EOS ABS  --  0.06 0.01   MCV 87.6 86.9 85.1   LACTATE  --   --  1.8         Lab 23  0325 23  1926 23  0424 23  0856 23  0340 23  1449   SODIUM 143  --  143  --  141 142   POTASSIUM 3.2* 2.8* 2.6*  --  2.5* 2.9*   CHLORIDE 106  --  106  --  101 95*   CO2 27.0  --  25.0  --  29.0 31.0*   ANION GAP 10.0  --  12.0  --  11.0 16.0*   BUN 4*  --  5*  --  6 7   CREATININE 0.57  --  0.53*  --  0.63 0.79   EGFR 108.8  --  110.7  --  106.2 89.6   GLUCOSE 102*  --  80  --  94 132*   CALCIUM 8.0*  --  8.4*  --  8.4* 10.0   MAGNESIUM  --   --  1.6 1.7  --  1.6   TSH  --   --   --   --   --   4.050  4.050         Lab 07/25/23  0340 07/24/23  1449   TOTAL PROTEIN 5.2* 7.3   ALBUMIN 3.0* 3.9   GLOBULIN 2.2 3.4   ALT (SGPT) 18 25   AST (SGOT) 23 32   BILIRUBIN 0.7 1.0   ALK PHOS 111 154*   LIPASE  --  18         Lab 07/24/23  2226 07/24/23  1952 07/24/23  1449   PROBNP  --   --  181.5   HSTROP T 31* 31* 38*         Lab 07/25/23  0340   CHOLESTEROL 108   LDL CHOL 55   HDL CHOL 33*   TRIGLYCERIDES 110             Brief Urine Lab Results  (Last result in the past 365 days)        Color   Clarity   Blood   Leuk Est   Nitrite   Protein   CREAT   Urine HCG        07/24/23 1640 Yellow   Clear   Small (1+)   Trace   Negative   Trace                   Microbiology Results Abnormal       None            No radiology results from the last 24 hrs    Results for orders placed during the hospital encounter of 03/01/23    Adult Transesophageal Echo 3D (NESSA) W/ Cont If Necessary Per Protocol    Interpretation Summary    Left ventricular systolic function is normal. Left ventricular ejection fraction appears to be 56 - 60%.    No intracardiac mass or thrombus.    Valves are structurally normal.    Mild plaque seen in aortic arch and descending aorta. No mobile plaque or thrombus seen.      Current medications:  Scheduled Meds:budesonide-formoterol, 2 puff, Inhalation, BID - RT  heparin (porcine), 5,000 Units, Subcutaneous, Q8H  levothyroxine, 25 mcg, Oral, Q AM  lubiprostone, 24 mcg, Oral, BID With Meals  pantoprazole, 40 mg, Intravenous, BID   Pharmacy Meds to Bed Consult, , Does not apply, Daily  pregabalin, 250 mg, Oral, BID  rosuvastatin, 10 mg, Oral, Nightly  sacubitril-valsartan, 1 tablet, Oral, BID  senna-docusate sodium, 2 tablet, Oral, BID  sodium chloride, 10 mL, Intravenous, Q12H      Continuous Infusions:lactated ringers, 9 mL/hr, Last Rate: Stopped (07/27/23 0855)      PRN Meds:.  acetaminophen **OR** acetaminophen **OR** acetaminophen    senna-docusate sodium **AND** polyethylene glycol **AND** bisacodyl  **AND** bisacodyl    Calcium Replacement - Follow Nurse / BPA Driven Protocol    Magnesium Standard Dose Replacement - Follow Nurse / BPA Driven Protocol    Phosphorus Replacement - Follow Nurse / BPA Driven Protocol    Potassium Replacement - Follow Nurse / BPA Driven Protocol    Sodium Chloride (PF)    sodium chloride    sodium chloride    Assessment & Plan   Assessment & Plan     Active Hospital Problems    Diagnosis  POA    **Dehydration [E86.0]  Yes    Electrolyte abnormality [E87.8]  Yes    GERD without esophagitis [K21.9]  Unknown    Intractable nausea and vomiting [R11.2]  Unknown    Left lower quadrant abdominal pain [R10.32]  Unknown    Left foot pain [M79.672]  Unknown    Hypothyroidism (acquired) [E03.9]  Yes    Primary hypertension [I10]  Yes    Mixed hyperlipidemia [E78.2]  Yes      Resolved Hospital Problems   No resolved problems to display.        Brief Hospital Course to date:   Bryanna Schwartz is a 53 y.o. female MH HTN, HLD, GERD, CHF, hypothyroidism, migraine headaches, CHF presenting with intractable nausea vomiting abdominal pain and a 60 pound weight loss that started in April, worsening over the last 2 weeks.  Patient was scheduled to have a colonoscopy this week. Patient was at her PCP office when she was so weak that she fell forward hitting her head on a stool and injuring her left foot.  PCP instructed her to come to the ED for further evaluation.  Patient reported at least 10 episodes of vomiting daily.  She stated every time she eats or drinks, she vomits.  She reports constipation denies diarrhea.  Patient has been on Amitiza with little improvement of constipation.  Follows with gastroenterology Dr. Smith in [facility].     Nausea/vomiting  Abdominal pain  Chronic constipation  Weight loss  -CT reviewed  -s/p EGD, with biopsy, now awaiting GES    S/P Fall  -L foot xray unremarkable    HTN  HL  Hx of CHF  -on entresto  -on statin    Neuropathy  -on lyrica    Hypothyroidism  -TSH  WNL, reportedly not taking home med    Expected Discharge Location and Transportation: Home  Expected Discharge   Expected Discharge Date: 7/28/2023; Expected Discharge Time:      DVT prophylaxis:  Medical DVT prophylaxis orders are present.     AM-PAC 6 Clicks Score (PT): 20 (07/27/23 0715)    CODE STATUS:   Code Status and Medical Interventions:   Ordered at: 07/24/23 6263     Level Of Support Discussed With:    Patient     Code Status (Patient has no pulse and is not breathing):    CPR (Attempt to Resuscitate)     Medical Interventions (Patient has pulse or is breathing):    Full Support     Release to patient:    Routine Release       Luis Barrios MD  07/27/23

## 2023-07-27 NOTE — POST-PROCEDURE NOTE
EGD    See provation     Antral erythema sp biopsy  Plan   Continue PPI   OK to advance diet as tolerated  Gastric emptying scan ordered  Follow up pathology  Updated family

## 2023-07-27 NOTE — CASE MANAGEMENT/SOCIAL WORK
Continued Stay Note  [facility]     Patient Name: Bryanna Schwartz  MRN: [id number]  Today's Date: 7/27/2023    Admit Date: 7/24/2023    Plan: Home at DC   Discharge Plan       Row Name 07/27/23 1145       Plan    Plan Home at DC    Patient/Family in Agreement with Plan yes    Plan Comments I spoke with the pt and her spouse in the room. The pt is not interested in inpt rehab at [place]. She wants to continue outpt [place] at [facility] in [place] at [place]. She denies any other DC needs at this time.    Final Discharge Disposition Code 01 - home or self-care                   Discharge Codes    No documentation.                 Expected Discharge Date and Time       Expected Discharge Date Expected Discharge Time    Jul 28, 2023               Coni Choudhury RN

## 2023-07-27 NOTE — PLAN OF CARE
Problem: Adult Inpatient Plan of Care  Goal: Plan of Care Review  Outcome: Ongoing, Progressing  Goal: Patient-Specific Goal (Individualized)  Outcome: Ongoing, Progressing  Goal: Absence of Hospital-Acquired Illness or Injury  Outcome: Ongoing, Progressing  Intervention: Identify and Manage Fall Risk  Recent Flowsheet Documentation  Taken 7/27/2023 0400 by Ricci Vergara RN  Safety Promotion/Fall Prevention:   activity supervised   assistive device/personal items within reach   clutter free environment maintained   toileting scheduled   safety round/check completed   room organization consistent   nonskid shoes/slippers when out of bed  Taken 7/27/2023 0200 by Ricci Vergara RN  Safety Promotion/Fall Prevention:   activity supervised   assistive device/personal items within reach   clutter free environment maintained   toileting scheduled   safety round/check completed   room organization consistent   nonskid shoes/slippers when out of bed  Taken 7/27/2023 0000 by Ricci Vergara RN  Safety Promotion/Fall Prevention:   activity supervised   assistive device/personal items within reach   clutter free environment maintained   toileting scheduled   safety round/check completed   room organization consistent   nonskid shoes/slippers when out of bed  Taken 7/26/2023 2200 by Ricic Vergara RN  Safety Promotion/Fall Prevention:   activity supervised   assistive device/personal items within reach   clutter free environment maintained   toileting scheduled   safety round/check completed   room organization consistent   nonskid shoes/slippers when out of bed  Taken 7/26/2023 2000 by Ricci Vergara RN  Safety Promotion/Fall Prevention:   activity supervised   assistive device/personal items within reach   clutter free environment maintained   toileting scheduled   safety round/check completed   room organization consistent   nonskid shoes/slippers when out of bed  Intervention: Prevent Skin Injury  Recent  Flowsheet Documentation  Taken 7/27/2023 0400 by Ricci Vergara RN  Body Position: position changed independently  Taken 7/27/2023 0200 by Ricci Vergara RN  Body Position: position changed independently  Taken 7/27/2023 0000 by Ricci Vergara RN  Body Position: position changed independently  Taken 7/26/2023 2200 by Ricci Vergara RN  Body Position: position changed independently  Taken 7/26/2023 2000 by Ricci Vergara RN  Body Position: position changed independently  Intervention: Prevent and Manage VTE (Venous Thromboembolism) Risk  Recent Flowsheet Documentation  Taken 7/27/2023 0400 by Ricci Vergara RN  Activity Management: activity encouraged  Taken 7/27/2023 0200 by Ricci Vergara RN  Activity Management: activity encouraged  Taken 7/27/2023 0000 by Ricci Vergara RN  Activity Management: activity encouraged  Taken 7/26/2023 2200 by Ricci Vergara RN  Activity Management: activity encouraged  Taken 7/26/2023 2000 by Ricci Vergara RN  Activity Management: activity encouraged  Range of Motion: active ROM (range of motion) encouraged  Intervention: Prevent Infection  Recent Flowsheet Documentation  Taken 7/27/2023 0400 by Ricci Vergara RN  Infection Prevention:   cohorting utilized   environmental surveillance performed   equipment surfaces disinfected   single patient room provided   rest/sleep promoted   hand hygiene promoted  Taken 7/27/2023 0200 by Ricci Vergara RN  Infection Prevention:   cohorting utilized   environmental surveillance performed   equipment surfaces disinfected   hand hygiene promoted   rest/sleep promoted   single patient room provided  Taken 7/27/2023 0000 by Ricci Vergara RN  Infection Prevention:   cohorting utilized   environmental surveillance performed   equipment surfaces disinfected   hand hygiene promoted   rest/sleep promoted   single patient room provided  Taken 7/26/2023 2200 by Ricci Vergara RN  Infection  Prevention:   cohorting utilized   environmental surveillance performed   equipment surfaces disinfected   visitors restricted/screened   single patient room provided   rest/sleep promoted  Taken 7/26/2023 2000 by Ricci Vergara RN  Infection Prevention:   cohorting utilized   environmental surveillance performed   equipment surfaces disinfected   visitors restricted/screened   single patient room provided   rest/sleep promoted   hand hygiene promoted  Goal: Optimal Comfort and Wellbeing  Outcome: Ongoing, Progressing  Intervention: Provide Person-Centered Care  Recent Flowsheet Documentation  Taken 7/26/2023 2000 by Ricci Vergara RN  Trust Relationship/Rapport:   care explained   choices provided   emotional support provided   empathic listening provided   questions answered   questions encouraged   reassurance provided   thoughts/feelings acknowledged  Goal: Readiness for Transition of Care  Outcome: Ongoing, Progressing     Problem: Fluid Volume Deficit  Goal: Fluid Balance  Outcome: Ongoing, Progressing     Problem: Diabetes Comorbidity  Goal: Blood Glucose Level Within Targeted Range  Outcome: Ongoing, Progressing     Problem: Skin Injury Risk Increased  Goal: Skin Health and Integrity  Outcome: Ongoing, Progressing  Intervention: Optimize Skin Protection  Recent Flowsheet Documentation  Taken 7/27/2023 0400 by Ricci Vergara RN  Head of Bed (HOB) Positioning: HOB elevated  Taken 7/27/2023 0200 by Ricci Vergara RN  Head of Bed (HOB) Positioning: HOB elevated  Taken 7/27/2023 0000 by Ricci Vergara RN  Head of Bed (HOB) Positioning: HOB elevated  Taken 7/26/2023 2200 by Ricci Vergara RN  Head of Bed (HOB) Positioning: HOB elevated  Taken 7/26/2023 2000 by Ricci Vergara RN  Head of Bed (HOB) Positioning: HOB elevated     Problem: Fall Injury Risk  Goal: Absence of Fall and Fall-Related Injury  Outcome: Ongoing, Progressing  Intervention: Identify and Manage Contributors  Recent  Flowsheet Documentation  Taken 7/27/2023 0400 by Ricci Vergara RN  Medication Review/Management: medications reviewed  Taken 7/27/2023 0200 by Ricci Vergara RN  Medication Review/Management: medications reviewed  Taken 7/27/2023 0000 by Ricci Vergara RN  Medication Review/Management: medications reviewed  Taken 7/26/2023 2200 by Ricci Vergara RN  Medication Review/Management: medications reviewed  Taken 7/26/2023 2000 by Ricci Vergara RN  Medication Review/Management: medications reviewed  Intervention: Promote Injury-Free Environment  Recent Flowsheet Documentation  Taken 7/27/2023 0400 by Ricci Vergara RN  Safety Promotion/Fall Prevention:   activity supervised   assistive device/personal items within reach   clutter free environment maintained   toileting scheduled   safety round/check completed   room organization consistent   nonskid shoes/slippers when out of bed  Taken 7/27/2023 0200 by Ricci Vergara RN  Safety Promotion/Fall Prevention:   activity supervised   assistive device/personal items within reach   clutter free environment maintained   toileting scheduled   safety round/check completed   room organization consistent   nonskid shoes/slippers when out of bed  Taken 7/27/2023 0000 by Ricci Vergara RN  Safety Promotion/Fall Prevention:   activity supervised   assistive device/personal items within reach   clutter free environment maintained   toileting scheduled   safety round/check completed   room organization consistent   nonskid shoes/slippers when out of bed  Taken 7/26/2023 2200 by Ricci Vergara RN  Safety Promotion/Fall Prevention:   activity supervised   assistive device/personal items within reach   clutter free environment maintained   toileting scheduled   safety round/check completed   room organization consistent   nonskid shoes/slippers when out of bed  Taken 7/26/2023 2000 by Ricci Vergara RN  Safety Promotion/Fall Prevention:   activity  supervised   assistive device/personal items within reach   clutter free environment maintained   toileting scheduled   safety round/check completed   room organization consistent   nonskid shoes/slippers when out of bed   Goal Outcome Evaluation:     Pt. resting in bed. NPO since midnight. Sinus tachycardia on monitor. On RA. No complaints at this time.

## 2023-07-27 NOTE — ANESTHESIA POSTPROCEDURE EVALUATION
Patient: Bryanna Schwartz    Procedure Summary       Date: 07/27/23 Room / Location:  [facility] /  [facility]    Anesthesia Start: 0827 Anesthesia Stop: 0859    Procedure: ESOPHAGOGASTRODUODENOSCOPY Diagnosis:     Surgeons: Yas Murphy MD Provider: Reese Veloz MD    Anesthesia Type: general ASA Status: 3            Anesthesia Type: general    Vitals  Vitals Value Taken Time   /74 07/27/23 0900   Temp 97.5 °F (36.4 °C) 07/27/23 0856   Pulse 87 07/27/23 0902   Resp 18 07/27/23 0856   SpO2 86 % 07/27/23 0902   Vitals shown include unvalidated device data.        Post Anesthesia Care and Evaluation    Patient location during evaluation: PACU  Patient participation: complete - patient participated  Level of consciousness: sleepy but conscious  Pain score: 0  Pain management: adequate    Airway patency: patent  Anesthetic complications: No anesthetic complications  PONV Status: none  Cardiovascular status: hemodynamically stable and acceptable  Respiratory status: nonlabored ventilation, acceptable, nasal cannula and spontaneous ventilation  Hydration status: acceptable

## 2023-07-27 NOTE — ANESTHESIA PREPROCEDURE EVALUATION
Anesthesia Evaluation     Patient summary reviewed and Nursing notes reviewed   NPO Solid Status: > 8 hours  NPO Liquid Status: > 2 hours           Airway   Mallampati: I  TM distance: >3 FB  Neck ROM: full  No difficulty expected  Dental    (+) upper dentures    Pulmonary    (+) asthma (bid MDI),  (-) shortness of breath, recent URI, sleep apnea, not a smoker  Cardiovascular     ECG reviewed    (+) hypertensionCHF Diastolic >=55%, hyperlipidemia  (-) valvular problems/murmurs (see ECHO Valves OK), past MI, dysrhythmias, angina, cardiac stents    ROS comment: ECG Wide QRS rhythm RBBB LVH vs normal variant Possible Lateral infarct  ECHO EF >41 %.valves  normal.   Saline negative  GXT 2023 no significant ST or TW changes EF = 54%. fixed defect with dana-infarct zone ischemia. Intermediate risk study;      Neuro/Psych  (+) headaches, psychiatric history  (-) seizures, CVA  GI/Hepatic/Renal/Endo    (+) GERD, thyroid problem   (-) liver disease, no renal disease, diabetes (PRE DIABETES aIc <6)    Musculoskeletal     Abdominal    Substance History      OB/GYN          Other   arthritis,     ROS/Med Hx Other: Intractable N and V -but none last 24 hours plus    K up from <3 to 3.2                   Anesthesia Plan    ASA 3     general     (PFL )  intravenous induction     Anesthetic plan, risks, benefits, and alternatives have been provided, discussed and informed consent has been obtained with: patient.    Plan discussed with CRNA.      CODE STATUS:    Level Of Support Discussed With: Patient  Code Status (Patient has no pulse and is not breathing): CPR (Attempt to Resuscitate)  Medical Interventions (Patient has pulse or is breathing): Full Support  Release to patient: Routine Release

## 2023-07-27 NOTE — ADDENDUM NOTE
Addendum  created 07/27/23 1017 by Reese Veloz MD    Clinical Note Signed, Review and Sign - Ready for Procedure

## 2023-07-27 NOTE — PLAN OF CARE
Problem: Adult Inpatient Plan of Care  Goal: Plan of Care Review  Outcome: Ongoing, Progressing  Flowsheets  Taken 7/27/2023 1641 by Vanessa Zee RN  Plan of Care Reviewed With: patient  Outcome Evaluation: Pt tolerated EGD. Resting in bed with some abdominal discomfort.  Taken 7/26/2023 1358 by Oliva Madera  Progress: improving  Goal: Patient-Specific Goal (Individualized)  Outcome: Ongoing, Progressing  Flowsheets (Taken 7/27/2023 1641)  Patient-Specific Goals (Include Timeframe): pt will remain free of falls injury  Individualized Care Needs: meds, safety  Anxieties, Fears or Concerns: none   Goal Outcome Evaluation:  Plan of Care Reviewed With: patient           Outcome Evaluation: Pt tolerated EGD. Resting in bed with some abdominal discomfort.

## 2023-07-28 ENCOUNTER — APPOINTMENT (OUTPATIENT)
Dept: NUCLEAR MEDICINE | Facility: HOSPITAL | Age: 53
End: 2023-07-28
Payer: MEDICARE

## 2023-07-28 ENCOUNTER — READMISSION MANAGEMENT (OUTPATIENT)
Dept: CALL CENTER | Facility: HOSPITAL | Age: 53
End: 2023-07-28
Payer: MEDICARE

## 2023-07-28 VITALS
DIASTOLIC BLOOD PRESSURE: 90 MMHG | TEMPERATURE: 97.8 F | HEART RATE: 89 BPM | WEIGHT: 198.8 LBS | RESPIRATION RATE: 16 BRPM | BODY MASS INDEX: 29.44 KG/M2 | HEIGHT: 69 IN | SYSTOLIC BLOOD PRESSURE: 115 MMHG | OXYGEN SATURATION: 98 %

## 2023-07-28 PROBLEM — I50.22 CHRONIC SYSTOLIC HEART FAILURE: Status: ACTIVE | Noted: 2023-07-28

## 2023-07-28 PROCEDURE — 94664 DEMO&/EVAL PT USE INHALER: CPT

## 2023-07-28 PROCEDURE — G0378 HOSPITAL OBSERVATION PER HR: HCPCS

## 2023-07-28 PROCEDURE — 0 TECHNETIUM SULFUR COLLOID: Performed by: HOSPITALIST

## 2023-07-28 PROCEDURE — 97116 GAIT TRAINING THERAPY: CPT

## 2023-07-28 PROCEDURE — 99212 OFFICE O/P EST SF 10 MIN: CPT | Performed by: PHYSICIAN ASSISTANT

## 2023-07-28 PROCEDURE — 25010000002 HEPARIN (PORCINE) PER 1000 UNITS: Performed by: INTERNAL MEDICINE

## 2023-07-28 PROCEDURE — 78264 GASTRIC EMPTYING IMG STUDY: CPT

## 2023-07-28 PROCEDURE — 97110 THERAPEUTIC EXERCISES: CPT

## 2023-07-28 PROCEDURE — 94799 UNLISTED PULMONARY SVC/PX: CPT

## 2023-07-28 PROCEDURE — A9541 TC99M SULFUR COLLOID: HCPCS | Performed by: HOSPITALIST

## 2023-07-28 RX ORDER — PANTOPRAZOLE SODIUM 40 MG/1
40 TABLET, DELAYED RELEASE ORAL DAILY
Qty: 30 TABLET | Refills: 2 | Status: SHIPPED | OUTPATIENT
Start: 2023-07-28

## 2023-07-28 RX ORDER — LUBIPROSTONE 24 UG/1
24 CAPSULE ORAL 2 TIMES DAILY WITH MEALS
Qty: 60 CAPSULE | Refills: 0 | Status: SHIPPED | OUTPATIENT
Start: 2023-07-28 | End: 2023-07-28 | Stop reason: HOSPADM

## 2023-07-28 RX ADMIN — LUBIPROSTONE 24 MCG: 24 CAPSULE, GELATIN COATED ORAL at 08:39

## 2023-07-28 RX ADMIN — HEPARIN SODIUM 5000 UNITS: 5000 INJECTION, SOLUTION INTRAVENOUS; SUBCUTANEOUS at 06:37

## 2023-07-28 RX ADMIN — PREGABALIN 250 MG: 100 CAPSULE ORAL at 08:39

## 2023-07-28 RX ADMIN — LEVOTHYROXINE SODIUM 25 MCG: 0.03 TABLET ORAL at 06:37

## 2023-07-28 RX ADMIN — PANTOPRAZOLE SODIUM 40 MG: 40 INJECTION, POWDER, LYOPHILIZED, FOR SOLUTION INTRAVENOUS at 08:40

## 2023-07-28 RX ADMIN — BUDESONIDE AND FORMOTEROL FUMARATE DIHYDRATE 2 PUFF: 160; 4.5 AEROSOL RESPIRATORY (INHALATION) at 07:33

## 2023-07-28 RX ADMIN — SENNOSIDES AND DOCUSATE SODIUM 2 TABLET: 50; 8.6 TABLET ORAL at 08:40

## 2023-07-28 RX ADMIN — TECHNETIUM TC 99M SULFUR COLLOID 1 DOSE: KIT at 09:23

## 2023-07-28 RX ADMIN — Medication 10 ML: at 08:43

## 2023-07-28 NOTE — THERAPY TREATMENT NOTE
Patient Name: Bryanna Schwartz  : 1970    MRN: [id number]                              Today's Date: 2023       Admit Date: 2023    Visit Dx:     ICD-10-CM ICD-9-CM   1. Hypokalemia  E87.6 276.8   2. Hypomagnesemia  E83.42 275.2   3. Elevated troponin  R77.8 790.6   4. Near syncope  R55 780.2   5. Injury of head, initial encounter  S09.90XA 959.01   6. Abdominal pain, unspecified abdominal location  R10.9 789.00   7. Nausea and vomiting, unspecified vomiting type  R11.2 787.01   8. Referred by health care professional  Z02.89 V68.89   9. Intractable nausea and vomiting  R11.2 536.2   10. Nausea & vomiting  R11.2 787.01   11. Abnormal loss of weight  R63.4 783.21     Patient Active Problem List   Diagnosis    GERD without esophagitis    Anxiety and depression    Mixed hyperlipidemia    Primary hypertension    Migraines    Gangrene    Glucose intolerance    Hypothyroidism (acquired)    Chronic systolic CHF (congestive heart failure)    Embolic infarction    Metatarsal stress fracture of right foot    NICM (nonischemic cardiomyopathy)    Dehydration    Electrolyte abnormality    GERD without esophagitis    Intractable nausea and vomiting    Left lower quadrant abdominal pain    Left foot pain     Past Medical History:   Diagnosis Date    Arthritis     Depression     Diabetes     Environmental allergies     Falls 10/24/2018    GERD (gastroesophageal reflux disease)     Heart murmur     Hyperlipidemia     Hypertension     Hypothyroidism     Lyme disease     Migraine     Kelvin [place] spotted fever     Vitamin B 12 deficiency      Past Surgical History:   Procedure Laterality Date    ACHILLES TENDON SURGERY Right     BREAST CYST EXCISION Left     CARDIAC CATHETERIZATION  2023    Normal coronaries.  EF 45%    CARDIOVASCULAR STRESS TEST  2023    anteroseptal, anterior infarct with dana-infarct ischemia    CHOLECYSTECTOMY      ECHO - CONVERTED  2023    EF 41-45%, saline test negative,  valves normal    ENDOSCOPY N/A 10/16/2018    Procedure: ESOPHAGOGASTRODUODENOSCOPY;  Surgeon: Brunner, Mark I, MD;  Location:  [facility];  Service: Gastroenterology    ENDOSCOPY N/A 7/27/2023    Procedure: ESOPHAGOGASTRODUODENOSCOPY;  Surgeon: Yas Murphy MD;  Location:  [facility];  Service: Gastroenterology;  Laterality: N/A;    HYSTERECTOMY      TRANSESOPHAGEAL ECHOCARDIOGRAM (NESSA)  03/07/2023    Dr. Alonso- EF 56-60%, no mass or thrombus, mild plaque aorrti arch and descending aorta    US CAROTID UNILATERAL  12/17/2022    < 50% Bilaterally      General Information       Row Name 07/28/23 0815          Physical Therapy Time and Intention    Document Type therapy note (daily note)  -AS     Mode of Treatment physical therapy  -AS       Row Name 07/28/23 0815          General Information    Patient Profile Reviewed yes  -AS     Existing Precautions/Restrictions fall  L foot pain  -AS     Barriers to Rehab previous functional deficit  -AS       Row Name 07/28/23 0815          Cognition    Orientation Status (Cognition) oriented x 3  -AS       Row Name 07/28/23 0815          Safety Issues, Functional Mobility    Safety Issues Affecting Function (Mobility) awareness of need for assistance;insight into deficits/self-awareness;safety precaution awareness;positioning of assistive device  -AS     Impairments Affecting Function (Mobility) balance;coordination;pain;endurance/activity tolerance;postural/trunk control;sensation/sensory awareness;strength  -AS     Comment, Safety Issues/Impairments (Mobility) ALERT AND FOLLOWING COMMANDS  -AS               User Key  (r) = Recorded By, (t) = Taken By, (c) = Cosigned By      Initials Name Provider Type    AS Vanessa Espino PTA Physical Therapist Assistant                   Mobility       Row Name 07/28/23 0816          Bed Mobility    Supine-Sit [place] (Bed Mobility) standby assist  -AS     Assistive Device (Bed Mobility) head of bed elevated;bed rails   -AS     Comment, (Bed Mobility) no c/o dizziness this session  -AS       Row Name 07/28/23 0816          Transfers    Comment, (Transfers) cues to push up from bed and not to pull up on walker  -AS       Row Name 07/28/23 0816          Bed-Chair Transfer    Bed-Chair [place] (Transfers) verbal cues;contact guard;1 person assist  -AS     Assistive Device (Bed-Chair Transfers) walker, front-wheeled  -AS       Row Name 07/28/23 0816          Sit-Stand Transfer    Sit-Stand [place] (Transfers) verbal cues;contact guard;1 person assist  -AS     Assistive Device (Sit-Stand Transfers) walker, front-wheeled  -AS     Comment, (Sit-Stand Transfer) cues for hand placement  -AS       Row Name 07/28/23 0816          Gait/Stairs (Locomotion)    [place] Level (Gait) verbal cues;contact guard;1 person assist  -AS     Assistive Device (Gait) walker, front-wheeled  -AS     Distance in Feet (Gait) 80  -AS     Deviations/Abnormal Patterns (Gait) bilateral deviations;gait speed decreased;stride length decreased;left sided deviations  -AS     Bilateral Gait Deviations forward flexed posture  -AS     Left Sided Gait Deviations weight shift ability decreased  -AS     Comment, (Gait/Stairs) patient ambulated 80' with CGA x1 and rolling walker for support, verbal cues for walker placement and improvement in posture. Patient reported increased strength with less shakiness this session compared to last. Distance limited by weakness and fatigue.  -AS               User Key  (r) = Recorded By, (t) = Taken By, (c) = Cosigned By      Initials Name Provider Type    AS Vanessa Espino PTA Physical Therapist Assistant                   Obj/Interventions       Row Name 07/28/23 0819          Motor Skills    Therapeutic Exercise knee;ankle;shoulder  -AS       Row Name 07/28/23 0819          Shoulder (Therapeutic Exercise)    Shoulder (Therapeutic Exercise) AROM (active range of motion)  -AS     Shoulder AROM (Therapeutic Exercise)  bilateral;flexion;extension;aBduction;aDduction;sitting;10 repetitions  bicep curls  -AS       Row Name 07/28/23 0819          Knee (Therapeutic Exercise)    Knee (Therapeutic Exercise) strengthening exercise  -AS     Knee Strengthening (Therapeutic Exercise) bilateral;marching while seated;LAQ (long arc quad);sitting;10 repetitions  -AS       Row Name 07/28/23 0819          Ankle (Therapeutic Exercise)    Ankle (Therapeutic Exercise) AROM (active range of motion)  -AS     Ankle AROM (Therapeutic Exercise) bilateral;dorsiflexion;plantarflexion;sitting;10 repetitions  -AS       Row Name 07/28/23 0819          Balance    Dynamic Standing Balance verbal cues;contact guard;1-person assist  -AS     Position/Device Used, Standing Balance supported;walker, front-wheeled  -AS     Comment, Balance no LOB or unsteadiness this date  -AS               User Key  (r) = Recorded By, (t) = Taken By, (c) = Cosigned By      Initials Name Provider Type    AS Vanessa Espino PTA Physical Therapist Assistant                   Goals/Plan    No documentation.                  Clinical Impression       Row Name 07/28/23 0820          Pain    Pretreatment Pain Rating 0/10 - no pain  -AS     Posttreatment Pain Rating 0/10 - no pain  -AS       [facility] Name 07/28/23 0820          Plan of Care Review    Plan of Care Reviewed With patient  -AS     Progress improving  -AS     Outcome Evaluation Patient ambulated 80' with CGA x1 and rolling walker for support, verbal cues for walker placement and improvement in posture. Patient reported increased strength with less shakiness this session compared to last. Distance limited by weakness and fatigue. Recommend home with assist and OPPT.  -AS       Row Name 07/28/23 0820          Positioning and Restraints    Pre-Treatment Position in bed  -AS     Post Treatment Position chair  -AS     In Chair reclined;call light within reach;encouraged to call for assist;waffle cushion;legs elevated  -AS                User Key  (r) = Recorded By, (t) = Taken By, (c) = Cosigned By      Initials Name Provider Type    AS Vanessa Espino PTA Physical Therapist Assistant                   Outcome Measures       Row Name 07/28/23 0822          How much help from another person do you currently need...    Turning from your back to your side while in flat bed without using bedrails? 4  -AS     Moving from lying on back to sitting on the side of a flat bed without bedrails? 4  -AS     Moving to and from a bed to a chair (including a wheelchair)? 3  -AS     Standing up from a chair using your arms (e.g., wheelchair, bedside chair)? 3  -AS     Climbing 3-5 steps with a railing? 3  -AS     To walk in hospital room? 3  -AS     AM-PAC 6 Clicks Score (PT) 20  -AS     Highest level of mobility 6 --> Walked 10 steps or more  -AS       Row Name 07/28/23 0822          Functional Assessment    Outcome Measure Options AM-PAC 6 Clicks Basic Mobility (PT)  -AS               User Key  (r) = Recorded By, (t) = Taken By, (c) = Cosigned By      Initials Name Provider Type    AS Vanessa Espion PTA Physical Therapist Assistant                                 Physical Therapy Education       Title: PT OT SLP Therapies (In Progress)       Topic: Physical Therapy (In Progress)       Point: Mobility training (In Progress)       Learning Progress Summary             Patient Acceptance, E, NR by AS at 7/28/2023 0822    Acceptance, E, VU by KG at 7/26/2023 1400    Acceptance, E, NR by DS at 7/25/2023 2352    Acceptance, E,TB, VU by ES at 7/25/2023 1026   Significant Other Acceptance, E,TB, VU by ES at 7/25/2023 1026                         Point: Home exercise program (In Progress)       Learning Progress Summary             Patient Acceptance, E, NR by AS at 7/28/2023 0822    Acceptance, E, VU by KG at 7/26/2023 1400    Acceptance, E, NR by DS at 7/25/2023 2352                         Point: Body mechanics (In Progress)       Learning Progress  Summary             Patient Acceptance, E, NR by AS at 7/28/2023 0822    Acceptance, E, VU by KG at 7/26/2023 1400    Acceptance, E, NR by DS at 7/25/2023 2352    Acceptance, E,TB, VU by ES at 7/25/2023 1026   Significant Other Acceptance, E,TB, VU by ES at 7/25/2023 1026                         Point: Precautions (In Progress)       Learning Progress Summary             Patient Acceptance, E, NR by AS at 7/28/2023 0822    Acceptance, E, VU by KG at 7/26/2023 1400    Acceptance, E, NR by DS at 7/25/2023 2352    Acceptance, E,TB, VU by ES at 7/25/2023 1026   Significant Other Acceptance, E,TB, VU by ES at 7/25/2023 1026                                         User Key       Initials Effective Dates Name Provider Type Discipline    AS 04/28/23 -  Vanessa Espino, PTA Physical Therapist Assistant PT    KG 01/04/23 -  Oliva Madera Physical Therapist PT    ES 08/11/22 -  Sharifa Schaefer, BETHANY Physical Therapist PT    DS 07/11/23 -  Awais Gardner, RN Registered Nurse Nurse                  PT Recommendation and Plan     Plan of Care Reviewed With: patient  Progress: improving  Outcome Evaluation: Patient ambulated 80' with CGA x1 and rolling walker for support, verbal cues for walker placement and improvement in posture. Patient reported increased strength with less shakiness this session compared to last. Distance limited by weakness and fatigue. Recommend home with assist and OPPT.     Time Calculation:         PT Charges       Row Name 07/28/23 0823             Time Calculation    Start Time 0750  -AS      PT Received On 07/28/23  -AS      PT Goal Re-Cert Due Date 08/04/23  -AS         Timed Charges    45991 - PT Therapeutic Exercise Minutes 10  -AS      09865 - Gait Training Minutes  14  -AS         Total Minutes    Timed Charges Total Minutes 24  -AS       Total Minutes 24  -AS                User Key  (r) = Recorded By, (t) = Taken By, (c) = Cosigned By      Initials Name Provider Type    AS Vanessa Espino  ZULEIMA Mtichell Physical Therapist Assistant                  Therapy Charges for Today       Code Description Service Date Service Provider Modifiers Qty    17281799977 HC PT THER PROC EA 15 MIN 7/28/2023 Vanessa Espino, ZULEIMA GP 1    40350628025 HC GAIT TRAINING EA 15 MIN 7/28/2023 Vanessa Espino, ZULEIMA GP 1            PT G-Codes  Outcome Measure Options: AM-PAC 6 Clicks Basic Mobility (PT)  AM-PAC 6 Clicks Score (PT): 20  AM-PAC 6 Clicks Score (OT): 18       Vanessa Espino PTA  7/28/2023

## 2023-07-28 NOTE — PLAN OF CARE
Goal Outcome Evaluation:  Plan of Care Reviewed With: patient        Progress: improving  Outcome Evaluation: Patient ambulated 80' with CGA x1 and rolling walker for support, verbal cues for walker placement and improvement in posture. Patient reported increased strength with less shakiness this session compared to last. Distance limited by weakness and fatigue. Recommend home with assist and OPPT.

## 2023-07-28 NOTE — PLAN OF CARE
Goal Outcome Evaluation:  Plan of Care Reviewed With: patient           Outcome Evaluation: Pt tolerated EGD. Resting in bed with some abdominal discomfort.

## 2023-07-28 NOTE — PROGRESS NOTES
[facility] Medicine Services  PROGRESS NOTE    Patient Name: Bryanna Schwartz  : 1970  MRN: [id number]    Date of Admission: 2023  Primary Care Physician: Baljeet Manley APRN    Subjective   Subjective     CC: Nausea    HPI: No nausea/emesis/pain. Better on PPI. No issues. Awaiting GES.     Review of Systems   Constitutional:  Positive for activity change, appetite change, fatigue and unexpected weight change.   HENT: Negative.     Respiratory: Negative.     Cardiovascular: Negative.    Gastrointestinal:  Positive for abdominal distention, abdominal pain, nausea and vomiting.   Neurological:  Positive for weakness.    No change otherwise    Objective   Objective     Vital Signs:   Temp:  [97.3 °F (36.3 °C)-97.8 °F (36.6 °C)] 97.8 °F (36.6 °C)  Heart Rate:  [81-99] 86  Resp:  [15-18] 16  BP: ()/(56-91) 117/76     Physical Exam:  NAD, alert and oriented  OP clear, dry MM  Neck supple  No LAD  RRR  CTAB  +BS, soft, ND  ZHANG  Normal affect  No change from     Results Reviewed:  LAB RESULTS:      Lab 23  0424 23  0340 23  1449   WBC 4.69 6.83 12.38*   HEMOGLOBIN 11.8* 13.5 16.9*   HEMATOCRIT 35.2 39.8 49.0*   PLATELETS 214 265 447   NEUTROS ABS  --  4.06 10.51*   IMMATURE GRANS (ABS)  --  0.05 0.06*   LYMPHS ABS  --  2.04 1.25   MONOS ABS  --  0.60 0.52   EOS ABS  --  0.06 0.01   MCV 87.6 86.9 85.1   LACTATE  --   --  1.8           Lab 23  0325 23  1926 23  0424 23  0856 23  0340 23  1449   SODIUM 143  --  143  --  141 142   POTASSIUM 3.2* 2.8* 2.6*  --  2.5* 2.9*   CHLORIDE 106  --  106  --  101 95*   CO2 27.0  --  25.0  --  29.0 31.0*   ANION GAP 10.0  --  12.0  --  11.0 16.0*   BUN 4*  --  5*  --  6 7   CREATININE 0.57  --  0.53*  --  0.63 0.79   EGFR 108.8  --  110.7  --  106.2 89.6   GLUCOSE 102*  --  80  --  94 132*   CALCIUM 8.0*  --  8.4*  --  8.4* 10.0   MAGNESIUM  --   --  1.6 1.7  --  1.6   TSH  --   --    --   --   --  4.050  4.050           Lab 07/25/23  0340 07/24/23  1449   TOTAL PROTEIN 5.2* 7.3   ALBUMIN 3.0* 3.9   GLOBULIN 2.2 3.4   ALT (SGPT) 18 25   AST (SGOT) 23 32   BILIRUBIN 0.7 1.0   ALK PHOS 111 154*   LIPASE  --  18           Lab 07/24/23  2226 07/24/23  1952 07/24/23  1449   PROBNP  --   --  181.5   HSTROP T 31* 31* 38*           Lab 07/25/23  0340   CHOLESTEROL 108   LDL CHOL 55   HDL CHOL 33*   TRIGLYCERIDES 110               Brief Urine Lab Results  (Last result in the past 365 days)        Color   Clarity   Blood   Leuk Est   Nitrite   Protein   CREAT   Urine HCG        07/24/23 1640 Yellow   Clear   Small (1+)   Trace   Negative   Trace                   Microbiology Results Abnormal       None            No radiology results from the last 24 hrs    Results for orders placed during the hospital encounter of 03/01/23    Adult Transesophageal Echo 3D (NESSA) W/ Cont If Necessary Per Protocol    Interpretation Summary    Left ventricular systolic function is normal. Left ventricular ejection fraction appears to be 56 - 60%.    No intracardiac mass or thrombus.    Valves are structurally normal.    Mild plaque seen in aortic arch and descending aorta. No mobile plaque or thrombus seen.      Current medications:  Scheduled Meds:budesonide-formoterol, 2 puff, Inhalation, BID - RT  heparin (porcine), 5,000 Units, Subcutaneous, Q8H  levothyroxine, 25 mcg, Oral, Q AM  lubiprostone, 24 mcg, Oral, BID With Meals  pantoprazole, 40 mg, Intravenous, BID   Pharmacy Meds to Bed Consult, , Does not apply, Daily  pregabalin, 250 mg, Oral, BID  rosuvastatin, 10 mg, Oral, Nightly  sacubitril-valsartan, 1 tablet, Oral, BID  senna-docusate sodium, 2 tablet, Oral, BID  sodium chloride, 10 mL, Intravenous, Q12H      Continuous Infusions:lactated ringers, 9 mL/hr, Last Rate: Stopped (07/27/23 0855)      PRN Meds:.  acetaminophen **OR** acetaminophen **OR** acetaminophen    senna-docusate sodium **AND** polyethylene  glycol **AND** bisacodyl **AND** bisacodyl    Calcium Replacement - Follow Nurse / BPA Driven Protocol    Magnesium Standard Dose Replacement - Follow Nurse / BPA Driven Protocol    Phosphorus Replacement - Follow Nurse / BPA Driven Protocol    Potassium Replacement - Follow Nurse / BPA Driven Protocol    Sodium Chloride (PF)    sodium chloride    sodium chloride    Assessment & Plan   Assessment & Plan     Active Hospital Problems    Diagnosis  POA    **Dehydration [E86.0]  Yes    Electrolyte abnormality [E87.8]  Yes    GERD without esophagitis [K21.9]  Unknown    Intractable nausea and vomiting [R11.2]  Unknown    Left lower quadrant abdominal pain [R10.32]  Unknown    Left foot pain [M79.672]  Unknown    Hypothyroidism (acquired) [E03.9]  Yes    Primary hypertension [I10]  Yes    Mixed hyperlipidemia [E78.2]  Yes      Resolved Hospital Problems   No resolved problems to display.        Brief Hospital Course to date:   Bryanna Schwartz is a 53 y.o. female MH HTN, HLD, GERD, CHF, hypothyroidism, migraine headaches, CHF presenting with intractable nausea vomiting abdominal pain and a 60 pound weight loss that started in April, worsening over the last 2 weeks.  Patient was scheduled to have a colonoscopy this week. Patient was at her PCP office when she was so weak that she fell forward hitting her head on a stool and injuring her left foot.  PCP instructed her to come to the ED for further evaluation.  Patient reported at least 10 episodes of vomiting daily.  She stated every time she eats or drinks, she vomits.  She reports constipation denies diarrhea.  Patient has been on Amitiza with little improvement of constipation.  Follows with gastroenterology Dr. Smith in [facility].     Nausea/vomiting  Abdominal pain  Chronic constipation  Weight loss  -CT reviewed  -s/p EGD, with biopsy, now awaiting GES  -better on PPI    S/P Fall  -L foot xray unremarkable    HTN  HL  Hx of CHF  -on entresto  -on  statin    Neuropathy  -on lyrica    Hypothyroidism  -TSH WNL, reportedly not taking home med    Expected Discharge Location and Transportation: Home  Expected Discharge   Expected Discharge Date: 7/28/2023; Expected Discharge Time:      DVT prophylaxis:  Medical DVT prophylaxis orders are present.     AM-PAC 6 Clicks Score (PT): 20 (07/28/23 0822)    CODE STATUS:   Code Status and Medical Interventions:   Ordered at: 07/24/23 1742     Level Of Support Discussed With:    Patient     Code Status (Patient has no pulse and is not breathing):    CPR (Attempt to Resuscitate)     Medical Interventions (Patient has pulse or is breathing):    Full Support     Release to patient:    Routine Release       Luis Barrios MD  07/28/23

## 2023-07-28 NOTE — DISCHARGE SUMMARY
[facility] Medicine Services  DISCHARGE SUMMARY    Patient Name: Bryanna Schwartz  : 1970  MRN: [id number]    Date of Admission: 2023  3:59 PM  Date of Discharge:  2023  Primary Care Physician: Baljeet Manley APRN    Consults       Date and Time Order Name Status Description    2023  6:03 PM Inpatient Gastroenterology Consult Completed             Hospital Course     Presenting Problem: N/V    Active Hospital Problems    Diagnosis  POA    **Dehydration [E86.0]  Yes    Chronic systolic heart failure [I50.22]  Unknown    Electrolyte abnormality [E87.8]  Yes    GERD without esophagitis [K21.9]  Unknown    Intractable nausea and vomiting [R11.2]  Unknown    Left lower quadrant abdominal pain [R10.32]  Unknown    Left foot pain [M79.672]  Unknown    Hypothyroidism (acquired) [E03.9]  Yes    Primary hypertension [I10]  Yes    Mixed hyperlipidemia [E78.2]  Yes      Resolved Hospital Problems   No resolved problems to display.          Hospital Course:  Bryanna Schwartz is a 53 y.o. female that presented with n/v/weight loss and FTT. She underwent EGD with unremarkable results. GES demonstrated delayed gastric emptying. She improved with PPI therapy. Additionally GI recommended geoff root TID and motegrity 2 mg daily. She is now tolerating PO without incident.      Discharge Follow Up Recommendations for outpatient labs/diagnostics:   1-2 weeks    Day of Discharge     HPI:   No nausea/emesis/pain. Better on PPI. No issues. Awaiting GES.      Review of Systems   Constitutional:  Positive for activity change, appetite change, fatigue and unexpected weight change.   HENT: Negative.     Respiratory: Negative.     Cardiovascular: Negative.    Gastrointestinal:  Positive for abdominal distention, abdominal pain, nausea and vomiting.   Neurological:  Positive for weakness.    No change otherwise           Objective      Objective      Vital Signs:   Temp:  [97.3 °F (36.3 °C)-97.8  °F (36.6 °C)] 97.8 °F (36.6 °C)  Heart Rate:  [81-99] 86  Resp:  [15-18] 16  BP: ()/(56-91) 117/76     Physical Exam:  NAD, alert and oriented  OP clear, dry MM  Neck supple  No LAD  RRR  CTAB  +BS, soft, ND  ZHANG  Normal affect  Pertinent  and/or Most Recent Results     LAB RESULTS:      Lab 07/26/23  0424 07/25/23  0340 07/24/23  1449   WBC 4.69 6.83 12.38*   HEMOGLOBIN 11.8* 13.5 16.9*   HEMATOCRIT 35.2 39.8 49.0*   PLATELETS 214 265 447   NEUTROS ABS  --  4.06 10.51*   IMMATURE GRANS (ABS)  --  0.05 0.06*   LYMPHS ABS  --  2.04 1.25   MONOS ABS  --  0.60 0.52   EOS ABS  --  0.06 0.01   MCV 87.6 86.9 85.1   LACTATE  --   --  1.8         Lab 07/27/23  0325 07/26/23  1926 07/26/23  0424 07/25/23  0856 07/25/23  0340 07/24/23  1449   SODIUM 143  --  143  --  141 142   POTASSIUM 3.2* 2.8* 2.6*  --  2.5* 2.9*   CHLORIDE 106  --  106  --  101 95*   CO2 27.0  --  25.0  --  29.0 31.0*   ANION GAP 10.0  --  12.0  --  11.0 16.0*   BUN 4*  --  5*  --  6 7   CREATININE 0.57  --  0.53*  --  0.63 0.79   EGFR 108.8  --  110.7  --  106.2 89.6   GLUCOSE 102*  --  80  --  94 132*   CALCIUM 8.0*  --  8.4*  --  8.4* 10.0   MAGNESIUM  --   --  1.6 1.7  --  1.6   TSH  --   --   --   --   --  4.050  4.050         Lab 07/25/23  0340 07/24/23  1449   TOTAL PROTEIN 5.2* 7.3   ALBUMIN 3.0* 3.9   GLOBULIN 2.2 3.4   ALT (SGPT) 18 25   AST (SGOT) 23 32   BILIRUBIN 0.7 1.0   ALK PHOS 111 154*   LIPASE  --  18         Lab 07/24/23  2226 07/24/23  1952 07/24/23  1449   PROBNP  --   --  181.5   HSTROP T 31* 31* 38*         Lab 07/25/23  0340   CHOLESTEROL 108   LDL CHOL 55   HDL CHOL 33*   TRIGLYCERIDES 110             Brief Urine Lab Results  (Last result in the past 365 days)        Color   Clarity   Blood   Leuk Est   Nitrite   Protein   CREAT   Urine HCG        07/24/23 1640 Yellow   Clear   Small (1+)   Trace   Negative   Trace                 Microbiology Results (last 10 days)       ** No results found for the last 240 hours. **             XR Foot 3+ View Left    Result Date: 7/24/2023  XR FOOT 3+ VW LEFT Date of Exam: 7/24/2023 6:50 PM EDT Indication: left foot pain Comparison: 3/2/2023. Findings: There is no evidence of fracture. No evidence of dislocation. No erosions identified. No evidence of periostitis. No evidence of chondrocalcinosis. Moderate osteoarthritic changes are present within the interphalangeal joints as well as the first MTP joint and the midfoot. No focal soft tissue abnormalities identified.     Impression: No acute osseous abnormality. Moderate osteoarthritic changes are present. Electronically Signed: Kimberli Allen  7/24/2023 11:05 PM EDT  Workstation ID: [id number]    CT Head Without Contrast    Result Date: 7/24/2023  CT HEAD WO CONTRAST Date of Exam: 7/24/2023 3:35 PM EDT Indication: fall. head injury.. Comparison: None available. Technique: Axial CT images were obtained of the head without contrast administration.  Automated exposure control and iterative construction methods were used. Findings: *No acute intracranial hemorrhage. *No masses, mass effect, midline shift or hydrocephalus. *White matter is intact. *Calvarium is intact. *Visualized orbits and globes are unremarkable without radiopaque foreign bodies. *Visualized paranasal sinuses are clear. *Visualized mastoid air cells are clear.     1.No acute intracranial hemorrhage. Calvarium is intact. Electronically Signed: Klever Perez  7/24/2023 4:30 PM EDT  Workstation ID: [id number]    NM Gastric Emptying    Result Date: 7/28/2023  DATE OF EXAM: 7/28/2023 9:23 AM EDT PROCEDURE: NM GASTRIC EMPTYING INDICATIONS: Nausea/vomiting COMPARISON: No comparisons available. TECHNIQUE: The patient was given a meal which included 1.1 mCi of technetium sulfur colloid.  Counts were obtained over the stomach in the anterior and posterior projection to evaluate gastric emptying. FINDINGS: Time to half emptying of the stomach was calculated at 173 minutes. Range of normal is considered  40 to 110 minutes.     Delayed gastric emptying.: Electronically Signed: Ashlee Castaneda MD  7/28/2023 11:39 AM EDT  Workstation ID: [id number]    CT Abdomen Pelvis With Contrast    Result Date: 7/24/2023  CT ABDOMEN PELVIS W CONTRAST Date of Exam: 7/24/2023 3:35 PM EDT Indication: abd pain. nv.. Comparison: 7/6/2023 Technique: Axial CT images were obtained of the abdomen and pelvis following the uneventful intravenous administration of 85 mL Isovue-300 . Reconstructed coronal and sagittal images were also obtained. Automated exposure control and iterative construction methods were used. Findings: *Lower Thorax: Mild dependent atelectasis. *Liver: Unremarkable. *Gallbladder: Cholecystectomy. *Pancreas: Normal. *Spleen: Normal. *Adrenal Glands: Normal. *Kidneys: No renal stones or hydronephrosis bilaterally. *Stomach: Gastric wall thickening. *Bowel: Nonobstructive bowel gas pattern. No bowel wall inflammation. *Appendix: Appendix is not visualized. No secondary signs of appendicitis. *Peritoneal Cavity: No pneumoperitoneum.  No lymphadenopathy. *Urinary Bladder: Urinary bladder wall thickening. *Pelvis: No free pelvic fluid. *Bones: Unchanged anterior wedge compression deformity of T12 vertebral body. Multilevel spondylosis. Mild generalized osteopenia. *     1.Gastric wall thickening may represent gastritis or other etiologies. Follow-up recommended. 2.Urinary bladder wall thickening may represent underdistention, cystitis or other etiologies. Please correlate with urinalysis. Follow-up recommended. Electronically Signed: Klever Perez  7/24/2023 4:27 PM EDT  Workstation ID: [id number]     Results for orders placed during the hospital encounter of 03/01/23    Doppler Arterial Multi Level Lower Extremity - Bilateral CAR    Interpretation Summary    Right lower extremity: Normal right ROSA at 1.14.  Multiphasic waveforms are seen throughout.    Left lower extremity: Normal left ROSA at 1.14.  Multiphasic waveforms are seen  throughout.    No evidence of significant lower extremity arterial occlusive disease in the right or left legs.      Results for orders placed during the hospital encounter of 03/01/23    Doppler Arterial Multi Level Lower Extremity - Bilateral CAR    Interpretation Summary    Right lower extremity: Normal right ROSA at 1.14.  Multiphasic waveforms are seen throughout.    Left lower extremity: Normal left ROSA at 1.14.  Multiphasic waveforms are seen throughout.    No evidence of significant lower extremity arterial occlusive disease in the right or left legs.      Results for orders placed during the hospital encounter of 03/01/23    Adult Transesophageal Echo 3D (NESSA) W/ Cont If Necessary Per Protocol    Interpretation Summary    Left ventricular systolic function is normal. Left ventricular ejection fraction appears to be 56 - 60%.    No intracardiac mass or thrombus.    Valves are structurally normal.    Mild plaque seen in aortic arch and descending aorta. No mobile plaque or thrombus seen.      Plan for Follow-up of Pending Labs/Results: Reviewed  Pending Labs       Order Current Status    Tissue Pathology Exam In process          Discharge Details        Discharge Medications        New Medications        Instructions Start Date   Padma Root 250 MG capsule   500 mg, Oral, 3 Times Daily      pantoprazole 40 MG EC tablet  Commonly known as: Protonix   40 mg, Oral, Daily      PHARMACY MEDS TO BED CONSULT   Does not apply, Daily   Start Date: July 29, 2023     Prucalopride Succinate 1 MG tablet   2 mg, Oral, Daily             Continue These Medications        Instructions Start Date   budesonide-formoterol 80-4.5 MCG/ACT inhaler  Commonly known as: SYMBICORT   2 puffs, Inhalation, 2 Times Daily - RT      folic acid 1 MG tablet  Commonly known as: FOLVITE   1 mg, Oral, Daily      levothyroxine 25 MCG tablet  Commonly known as: SYNTHROID, LEVOTHROID   25 mcg, Oral, Every Early Morning      montelukast 10 MG  tablet  Commonly known as: SINGULAIR   10 mg, Oral, Nightly      nitroglycerin 0.4 MG SL tablet  Commonly known as: NITROSTAT   1 under the tongue as needed for angina, may repeat q5mins for up three doses      pregabalin 200 MG capsule  Commonly known as: LYRICA   200 mg, Oral, 2 Times Daily, Takes w/50mg dose for BID total of 250mg      pregabalin 50 MG capsule  Commonly known as: LYRICA   50 mg, Oral, 2 Times Daily, Takes w/200mg dose for BID total of 250mg      rosuvastatin 10 MG tablet  Commonly known as: CRESTOR   10 mg, Oral, Nightly      sacubitril-valsartan 24-26 MG tablet  Commonly known as: ENTRESTO   1 tablet, Oral, 2 Times Daily      traMADol 50 MG tablet  Commonly known as: ULTRAM   1 tablet, Oral, Every 12 Hours Scheduled      Vitamin D3 50 MCG (2000 UT) tablet   50 mcg, Oral, Daily               Allergies   Allergen Reactions    Gabapentin Hives         Discharge Disposition:  Home or Self Care    Diet:  Hospital:  Diet Order   Procedures    Diet: Gastrointestinal Diets; Fiber-Restricted; Texture: Regular Texture (IDDSI 7); Fluid Consistency: Thin (IDDSI 0)       Activity:      Restrictions or Other Recommendations:         CODE STATUS:    Code Status and Medical Interventions:   Ordered at: 07/24/23 1742     Level Of Support Discussed With:    Patient     Code Status (Patient has no pulse and is not breathing):    CPR (Attempt to Resuscitate)     Medical Interventions (Patient has pulse or is breathing):    Full Support     Release to patient:    Routine Release       Future Appointments   Date Time Provider Department Center   8/14/2023 11:00 AM [facility]  [facility]   9/5/2023  1:30 PM Padma Kumari APRN [facility] THANN ADRIANE Barrios MD  07/28/23      Time Spent on Discharge:  I spent  40  minutes on this discharge activity which included: face-to-face encounter with the patient, reviewing the data in the system, coordination of the care with the nursing  staff as well as consultants, documentation, and entering orders.

## 2023-07-28 NOTE — PROGRESS NOTES
"GI Daily Progress Note  Subjective:    Chief Complaint:  Follow up nausea and vomiting     Feeling better today, tolerating PO intake.       Objective:    /90 (BP Location: Right arm, Patient Position: Lying)   Pulse 89   Temp 97.7 °F (36.5 °C) (Oral)   Resp 16   Ht 175.3 cm (69\")   Wt 90.2 kg (198 lb 12.8 oz)   LMP  (LMP Unknown)   SpO2 98%   BMI 29.36 kg/m²     Physical Exam  Constitutional:       General: She is not in acute distress.  Cardiovascular:      Rate and Rhythm: Normal rate and regular rhythm.   Pulmonary:      Effort: Pulmonary effort is normal. No respiratory distress.   Abdominal:      General: Bowel sounds are normal. There is no distension.      Palpations: Abdomen is soft.      Tenderness: There is no abdominal tenderness.   Skin:     General: Skin is warm and dry.   Neurological:      Mental Status: She is alert and oriented to person, place, and time.       Lab  Lab Results   Component Value Date    WBC 4.69 07/26/2023    HGB 11.8 (L) 07/26/2023    HGB 13.5 07/25/2023    HGB 16.9 (H) 07/24/2023    MCV 87.6 07/26/2023     07/26/2023       Lab Results   Component Value Date    GLUCOSE 102 (H) 07/27/2023    BUN 4 (L) 07/27/2023    CREATININE 0.57 07/27/2023    EGFRIFNONA 79 11/07/2018    BCR 7.0 07/27/2023     07/27/2023    K 3.2 (L) 07/27/2023    CO2 27.0 07/27/2023    CALCIUM 8.0 (L) 07/27/2023    ALBUMIN 3.0 (L) 07/25/2023    ALKPHOS 111 07/25/2023    BILITOT 0.7 07/25/2023    ALT 18 07/25/2023    AST 23 07/25/2023     Gastric emptying study:   FINDINGS:  Time to half emptying of the stomach was calculated at 173 minutes. Range of normal is considered 40 to 110 minutes.   IMPRESSION:  Delayed gastric emptying.    Assessment:    Gastroparesis   Nausea and vomiting, related to above.     Unintentional weight loss   Constipation     Plan:    Gastric emptying study is consistent with gastroparesis.       >> Recommend OTC Padma Root 500 mg TID  >> BID PPI  >> Discussed a " gastroparesis diet with small frequent meals throughout the day   >> Discontinue Amitiza and begin Prucalopride 2 mg daily     Patient is scheduled to undergo local colonoscopy with Dr. Smith.   Recommend she keep follow up with him regarding gastroparesis.        DION Marroquin  07/28/23  12:11 EDT

## 2023-07-29 NOTE — OUTREACH NOTE
Prep Survey      Flowsheet Row Responses   [facility] patient discharged from? [facility]   Is LACE score < 7 ? No   Eligibility Readm Mgmt   Discharge diagnosis Dehydration   Does the patient have one of the following disease processes/diagnoses(primary or secondary)? Other   Does the patient have Home health ordered? No   Is there a DME ordered? No   Prep survey completed? Yes            Tracy MARTINEZ - Registered Nurse

## 2023-07-31 LAB
CYTO UR: NORMAL
LAB AP CASE REPORT: NORMAL
LAB AP CLINICAL INFORMATION: NORMAL
PATH REPORT.FINAL DX SPEC: NORMAL
PATH REPORT.GROSS SPEC: NORMAL

## 2023-08-01 ENCOUNTER — READMISSION MANAGEMENT (OUTPATIENT)
Dept: CALL CENTER | Facility: HOSPITAL | Age: 53
End: 2023-08-01
Payer: MEDICARE

## 2023-08-08 ENCOUNTER — READMISSION MANAGEMENT (OUTPATIENT)
Dept: CALL CENTER | Facility: HOSPITAL | Age: 53
End: 2023-08-08
Payer: MEDICARE

## 2023-08-08 NOTE — OUTREACH NOTE
Medical Week 2 Survey      Flowsheet Row Responses   [facility] patient discharged from? [facility]   Does the patient have one of the following disease processes/diagnoses(primary or secondary)? Other   Week 2 attempt successful? Yes   Call start time 1438   Discharge diagnosis Dehydration   Call end time 1442   Meds reviewed with patient/caregiver? Yes   Is the patient having any side effects they believe may be caused by any medication additions or changes? No   Does the patient have all medications ordered at discharge? Yes   Is the patient taking all medications as directed (includes completed medication regime)? Yes   Comments regarding appointments 9/5/23 Cards apt   Does the patient have a primary care provider?  Yes   Has the patient kept scheduled appointments due by today? N/A   Has home health visited the patient within 72 hours of discharge? N/A   Psychosocial issues? No   Did the patient receive a copy of their discharge instructions? Yes   Nursing interventions Reviewed instructions with patient   What is the patient's perception of their health status since discharge? Improving   Is the patient/caregiver able to teach back signs and symptoms related to disease process for when to call PCP? Yes   Is the patient/caregiver able to teach back signs and symptoms related to disease process for when to call 911? Yes   Is the patient/caregiver able to teach back the hierarchy of who to call/visit for symptoms/problems? PCP, Specialist, Home health nurse, Urgent Care, ED, 911 Yes   If the patient is a current smoker, are they able to teach back resources for cessation? Not a smoker   Week 2 Call Completed? Yes   Graduated Yes   Did the patient feel the follow up calls were helpful during their recovery period? Yes   Was the number of calls appropriate? Yes   Graduated/Revoked comments Doing well per pt   Call end time 1442            Zoe NASH - Registered Nurse

## 2023-08-14 ENCOUNTER — HOSPITAL ENCOUNTER (OUTPATIENT)
Dept: CARDIOLOGY | Facility: HOSPITAL | Age: 53
Discharge: HOME | End: 2023-08-14
Admitting: NURSE PRACTITIONER
Payer: MEDICARE

## 2023-08-14 VITALS — BODY MASS INDEX: 29.45 KG/M2 | HEIGHT: 69 IN | WEIGHT: 198.85 LBS

## 2023-08-14 DIAGNOSIS — I42.8 NICM (NONISCHEMIC CARDIOMYOPATHY): ICD-10-CM

## 2023-08-14 DIAGNOSIS — I51.9 LV DYSFUNCTION: ICD-10-CM

## 2023-08-14 LAB
BH CV ECHO MEAS - ACS: 1.23 CM
BH CV ECHO MEAS - AO ROOT DIAM: 2.8 CM
BH CV ECHO MEAS - EDV(CUBED): 102.5 ML
BH CV ECHO MEAS - EDV(MOD-SP4): 103 ML
BH CV ECHO MEAS - EF(MOD-BP): 56 %
BH CV ECHO MEAS - EF(MOD-SP4): 57.4 %
BH CV ECHO MEAS - ESV(CUBED): 36.5 ML
BH CV ECHO MEAS - ESV(MOD-SP4): 43.9 ML
BH CV ECHO MEAS - FS: 29.1 %
BH CV ECHO MEAS - IVS/LVPW: 0.77 CM
BH CV ECHO MEAS - IVSD: 0.85 CM
BH CV ECHO MEAS - LA DIMENSION: 4 CM
BH CV ECHO MEAS - LV DIASTOLIC VOL/BSA (35-75): 50.1 CM2
BH CV ECHO MEAS - LV MASS(C)D: 158.9 GRAMS
BH CV ECHO MEAS - LV SYSTOLIC VOL/BSA (12-30): 21.3 CM2
BH CV ECHO MEAS - LVIDD: 4.7 CM
BH CV ECHO MEAS - LVIDS: 3.3 CM
BH CV ECHO MEAS - LVPWD: 1.11 CM
BH CV ECHO MEAS - SI(MOD-SP4): 28.7 ML/M2
BH CV ECHO MEAS - SV(MOD-SP4): 59.1 ML

## 2023-08-14 PROCEDURE — 93308 TTE F-UP OR LMTD: CPT | Performed by: INTERNAL MEDICINE

## 2023-08-14 PROCEDURE — 93308 TTE F-UP OR LMTD: CPT

## 2023-08-15 ENCOUNTER — NURSE TRIAGE (OUTPATIENT)
Dept: CALL CENTER | Facility: HOSPITAL | Age: 53
End: 2023-08-15
Payer: MEDICARE

## 2023-08-15 NOTE — TELEPHONE ENCOUNTER
"She was discharged from [facility] on 07/28/23- with Dehydration and hypokalemia. She is wanting the name of the GI provider she saw, and the phone number. [facility]  [address]   [address]  [place] [address]  P: [phone]  F: [phone]    Yas Murphy MD.  Reason for Disposition   Health Information question, no triage required and triager able to answer question    Additional Information   Negative: [1] Caller is not with the adult (patient) AND [2] reporting urgent symptoms   Negative: Lab result questions   Negative: Medication questions   Negative: Caller can't be reached by phone   Negative: Caller has already spoken to PCP or another triager   Negative: RN needs further essential information from caller in order to complete triage   Negative: Requesting regular office appointment   Negative: [1] Caller requesting NON-URGENT health information AND [2] PCP's office is the best resource    Answer Assessment - Initial Assessment Questions  1. REASON FOR CALL or QUESTION: \"What is your reason for calling today?\" or \"How can I best help you?\" or \"What question do you have that I can help answer?\"      See detailed note.    Protocols used: Information Only Call - No Triage-ADULT-AH    "

## 2023-08-15 NOTE — TELEPHONE ENCOUNTER
She was discharged from [facility] on 07/28/23- with Dehydration and hypokalemia. She is wanting the name of the GI provider she saw, and the phone number. [facility]  [facility] [address] [address] [address]  P: [phone]  F: [phone]     Yas Murphy MD.   Phone number was provided, they are able to call the provider.

## 2023-08-16 ENCOUNTER — OFFICE VISIT (OUTPATIENT)
Dept: CARDIOLOGY | Facility: CLINIC | Age: 53
End: 2023-08-16
Payer: MEDICARE

## 2023-08-16 VITALS
DIASTOLIC BLOOD PRESSURE: 70 MMHG | HEART RATE: 78 BPM | BODY MASS INDEX: 30.66 KG/M2 | WEIGHT: 207 LBS | SYSTOLIC BLOOD PRESSURE: 126 MMHG | HEIGHT: 69 IN

## 2023-08-16 DIAGNOSIS — I10 PRIMARY HYPERTENSION: ICD-10-CM

## 2023-08-16 DIAGNOSIS — I50.22 CHRONIC SYSTOLIC CHF (CONGESTIVE HEART FAILURE): Chronic | ICD-10-CM

## 2023-08-16 DIAGNOSIS — I42.8 NICM (NONISCHEMIC CARDIOMYOPATHY): Primary | ICD-10-CM

## 2023-08-16 DIAGNOSIS — E78.2 MIXED HYPERLIPIDEMIA: ICD-10-CM

## 2023-08-16 PROCEDURE — 3074F SYST BP LT 130 MM HG: CPT | Performed by: NURSE PRACTITIONER

## 2023-08-16 PROCEDURE — 1159F MED LIST DOCD IN RCRD: CPT | Performed by: NURSE PRACTITIONER

## 2023-08-16 PROCEDURE — 3078F DIAST BP <80 MM HG: CPT | Performed by: NURSE PRACTITIONER

## 2023-08-16 PROCEDURE — 1160F RVW MEDS BY RX/DR IN RCRD: CPT | Performed by: NURSE PRACTITIONER

## 2023-08-16 PROCEDURE — 99213 OFFICE O/P EST LOW 20 MIN: CPT | Performed by: NURSE PRACTITIONER

## 2023-08-16 NOTE — PROGRESS NOTES
Chief Complaint   Patient presents with    Follow-up     Cardiac management . Had hospital stay at [facility] for  delayed gastric emptying .  She has new diagnosis of Alpha gal allergy.   Has increased fatigued and weakness.  PCP has ordered Physical therapy at [facility]    LABS     July 2023 results on chart  Had Echo August 14, 2023     Med Refill     No refills needed today       Subjective       Bryanna Schwartz is a 53 y.o. femaleseen in March 2023 for initial cardiac consultation.  Her past medical history is complex including: gangrenous changes to the right foot with CTA negative for stenosis, severe neuropathy diagnosed ; tick bite requiring referral to ID and diagnosis included Lyme's disease and RMSF.  She was significantly debilitated and underwent rehab at [facility].  Secondary to mental acuity problems carotid ultrasound and MRI brain done in February 2023 and results showing mild atrophy.  During hospitalization she underwent cardiac work-up.  Echocardiogram with saline test was negative, LV function mildly diminished.  Stress test showed anterior septal infarct with dana-infarct ischemia.  NESSA was negative for mass or thrombus.     Due to persistent symptoms it was decided to proceed with cardiac catheterization.  On 3/17/2023 cardiac cath revealed normal coronaries with nonischemic cardiomyopathy.  Addition of Entresto advised.    In July 2023 she was hospitalized at [facility] secondary to persistent nausea, vomiting, and hypokalemia. GI work-up revealed significant gastroparesis.    Today she returns to the office for follow-up visit.  For surveillance of LV function, echocardiogram was advised at last visit. On 8/14/2023 echocardiogram showed LVEF around 51-55%. She admits to increased fatigue and shortness of breath but relates to GE issues. She admits to recurrence of constipation and plans to call GI in regards.       Cardiac History:    Past Surgical History:   Procedure  Laterality Date    ACHILLES TENDON SURGERY Right     BREAST CYST EXCISION Left     CARDIAC CATHETERIZATION  03/16/2023    Normal coronaries.  EF 45%    CARDIOVASCULAR STRESS TEST  03/04/2023    anteroseptal, anterior infarct with dana-infarct ischemia    CHOLECYSTECTOMY      ECHO - CONVERTED  03/02/2023    EF 41-45%, saline test negative, valves normal    ECHO - CONVERTED  08/14/2023    Limited - EF 55%. LA- 4.0. Trace PE    ENDOSCOPY N/A 10/16/2018    Procedure: ESOPHAGOGASTRODUODENOSCOPY;  Surgeon: Brunner, Mark I, MD;  Location:  [facility];  Service: Gastroenterology    ENDOSCOPY N/A 07/27/2023    Procedure: ESOPHAGOGASTRODUODENOSCOPY;  Surgeon: Yas Murphy MD;  Location:  [facility];  Service: Gastroenterology;  Laterality: N/A;    HYSTERECTOMY      TRANSESOPHAGEAL ECHOCARDIOGRAM (NESSA)  03/07/2023    Dr. Alonso- EF 56-60%, no mass or thrombus, mild plaque aorrti arch and descending aorta    US CAROTID UNILATERAL  12/17/2022    < 50% Bilaterally       Current Outpatient Medications   Medication Sig Dispense Refill    budesonide-formoterol (SYMBICORT) 80-4.5 MCG/ACT inhaler Inhale 2 puffs 2 (Two) Times a Day.      Cholecalciferol (Vitamin D3) 50 MCG (2000 UT) tablet Take 1 tablet by mouth Daily.      folic acid (FOLVITE) 1 MG tablet Take 1 tablet by mouth Daily.      Ginger, Zingiber officinalis, (Ginger Root) 250 MG capsule Take 2 capsules by mouth 3 (Three) Times a Day. 90 capsule 1    levothyroxine (SYNTHROID, LEVOTHROID) 25 MCG tablet Take 1 tablet by mouth Every Morning.      montelukast (SINGULAIR) 10 MG tablet Take 1 tablet by mouth Every Night.      nitroglycerin (NITROSTAT) 0.4 MG SL tablet 1 under the tongue as needed for angina, may repeat q5mins for up three doses 25 tablet 1    pantoprazole (Protonix) 40 MG EC tablet Take 1 tablet by mouth Daily. 30 tablet 2    PHARMACY MEDS TO BED CONSULT Daily.      pregabalin (LYRICA) 200 MG capsule Take 1 capsule by mouth 2 (Two) Times a Day. Takes  w/50mg dose for BID total of 250mg      pregabalin (LYRICA) 50 MG capsule Take 1 capsule by mouth 2 (Two) Times a Day. Takes w/200mg dose for BID total of 250mg      Prucalopride Succinate 1 MG tablet Take 2 mg by mouth Daily. 60 tablet 1    sacubitril-valsartan (ENTRESTO) 24-26 MG tablet Take 1 tablet by mouth 2 (Two) Times a Day.      traMADol (ULTRAM) 50 MG tablet Take 1 tablet by mouth Every 12 (Twelve) Hours.      rosuvastatin (CRESTOR) 10 MG tablet Take 1 tablet by mouth Every Night for 60 days. 30 tablet 1     No current facility-administered medications for this visit.       Gabapentin    Past Medical History:   Diagnosis Date    Allergy to alpha-gal     Arthritis     Depression     Diabetes     Environmental allergies     Falls 10/24/2018    GERD (gastroesophageal reflux disease)     Heart murmur     Hyperlipidemia     Hypertension     Hypothyroidism     Lyme disease     Migraine     Kelvin Mountain spotted fever     Vitamin B 12 deficiency        Social History     Socioeconomic History    Marital status:     Number of children: 2   Tobacco Use    Smoking status: Never    Smokeless tobacco: Never   Vaping Use    Vaping Use: Never used   Substance and Sexual Activity    Alcohol use: No    Drug use: No    Sexual activity: Defer     Partners: Male       Family History   Problem Relation Age of Onset    Hypertension Mother     COPD Mother     Heart attack Mother     Other Father         / of exhaust fumes    Cancer Sister     Heart attack Brother        Review of Systems   Constitutional: Positive for malaise/fatigue.   Cardiovascular:  Positive for leg swelling (mild, no worse). Negative for chest pain.   Respiratory:  Positive for shortness of breath.    Musculoskeletal:  Negative for falls.   Gastrointestinal:  Positive for bloating, abdominal pain, constipation and nausea. Negative for vomiting.   Genitourinary:  Negative for hematuria.   Neurological:  Positive for loss of balance  "and weakness.   Psychiatric/Behavioral:  The patient is nervous/anxious.       BP Readings from Last 5 Encounters:   08/16/23 126/70   07/28/23 115/90   07/06/23 112/84   05/04/23 114/60   04/05/23 136/76       Wt Readings from Last 5 Encounters:   08/16/23 93.9 kg (207 lb)   08/14/23 90.2 kg (198 lb 13.7 oz)   07/28/23 90.2 kg (198 lb 12.8 oz)   07/06/23 89.8 kg (198 lb)   05/04/23 101 kg (222 lb 6.4 oz)       Objective     Labs 7/25/2023: Glucose 102, BUN 4, creatinine 0.57, sodium 143, potassium 3.2, chloride 106, CO2 27, calcium 8, .8    Labs 3/4/2023: Glucose 103, BUN 4, creatinine 0.66, sodium 141 potassium 3.6, chloride 106, CO2 27, calcium 8.3, .7           /70 (BP Location: Left arm, Patient Position: Sitting)   Pulse 78   Ht 175 cm (68.9\")   Wt 93.9 kg (207 lb)   LMP  (LMP Unknown)   BMI 30.66 kg/mý     Vitals and nursing note reviewed.   Constitutional:       Appearance: Not in distress. Chronically ill-appearing.   Neck:      Vascular: No carotid bruit.   Pulmonary:      Effort: Pulmonary effort is normal.      Breath sounds: Normal breath sounds.   Cardiovascular:      Murmurs: There is no murmur.   Edema:     Ankle: bilateral trace edema of the ankle.     Feet: bilateral trace edema of the feet.  Abdominal:      General: Bowel sounds are decreased. There is distension (softly).      Tenderness: There is abdominal tenderness. There is no guarding.   Psychiatric:         Behavior: Behavior is cooperative.        Procedures: none today          Assessment & Plan   Diagnoses and all orders for this visit:    1. NICM (nonischemic cardiomyopathy) (Primary)    2. Chronic systolic CHF (congestive heart failure)    3. Primary hypertension    4. Mixed hyperlipidemia      NICM/chronic systolic CHF  -Recent echocardiogram results reviewed  -LVEF improved to around 51-55%  -Continue medical management: Entresto    Hypertension  -BP managed  -Recent labs showed normal renal function   -Due " to recent GI issues potassium had been low, continue close monitoring    Hyperlipidemia  -Continue statin in form of Crestor    Abdominal tenderness/constipation  -Given name, address, and office number for gastroenterologist in [place]  -Patient plans to call to establish hospital follow-up visit and inform of persistent constipation.     Currently patient appears stable from a cardiac standpoint.  6-month follow-up visit scheduled.

## 2023-08-18 ENCOUNTER — TELEPHONE (OUTPATIENT)
Dept: GASTROENTEROLOGY | Facility: CLINIC | Age: 53
End: 2023-08-18

## 2023-08-18 NOTE — TELEPHONE ENCOUNTER
Caller: Bryanna Schwartz    Relationship to patient: Self    Best call back number: [phone]    Patient is needing: PATIENT CALLED SAW DR. PÉREZ BARRAZA IN HOSPITAL WAS PRESCRIBED MEDICATION TO HELP HER GO TO THE BATHROOM PT HAS YET TO HAVE A BOWEL MOVEMENT AND NEEDS TO SPEAK WITH SOMEONE. PLEASE CALL PATIENT BACK ASAP

## 2023-08-21 NOTE — TELEPHONE ENCOUNTER
I SPOKE WITH FEMI. ELBERT JERNIGAN PUT HER ON MOTEGRITY TO HELP WITH CONSTIPATION. PATIENT STATED THAT SHE ONLY HAD 2 BOWEL MOVEMENTS SINCE COMING HOME FROM THE HOSPITAL. ALSO TAKEN 2 STOOL SOFTENER. NOT HELPING EITHER. STRAINING AND BLEEDING. PATIENT DOESN'T WANT TO SEE DR BATES; SHE'S NOT HAPPY WITH HIM. PATIENT WANTS TO SEE ONE OF OUR DOCTORS. TRANSFERRED CALL TO FRONT TO SCHEDULE.

## 2023-08-31 RX ORDER — SACUBITRIL AND VALSARTAN 24; 26 MG/1; MG/1
TABLET, FILM COATED ORAL
Qty: 60 TABLET | Refills: 5 | Status: SHIPPED | OUTPATIENT
Start: 2023-08-31

## 2023-09-01 ENCOUNTER — OFFICE VISIT (OUTPATIENT)
Dept: GASTROENTEROLOGY | Facility: CLINIC | Age: 53
End: 2023-09-01
Payer: MEDICARE

## 2023-09-01 VITALS
WEIGHT: 203 LBS | HEART RATE: 88 BPM | DIASTOLIC BLOOD PRESSURE: 78 MMHG | BODY MASS INDEX: 30.07 KG/M2 | TEMPERATURE: 97.1 F | HEIGHT: 69 IN | OXYGEN SATURATION: 99 % | SYSTOLIC BLOOD PRESSURE: 122 MMHG

## 2023-09-01 DIAGNOSIS — K31.84 GASTROPARESIS: Primary | ICD-10-CM

## 2023-09-01 DIAGNOSIS — K59.01 SLOW TRANSIT CONSTIPATION: ICD-10-CM

## 2023-09-01 PROBLEM — I73.9 CLAUDICATION: Status: ACTIVE | Noted: 2023-05-05

## 2023-09-01 PROBLEM — I73.9 PERIPHERAL VASCULAR DISEASE: Status: ACTIVE | Noted: 2023-05-05

## 2023-09-01 PROBLEM — G43.009 MIGRAINE WITHOUT AURA AND WITHOUT STATUS MIGRAINOSUS, NOT INTRACTABLE: Status: ACTIVE | Noted: 2019-08-01

## 2023-09-01 PROBLEM — R11.0 NAUSEA: Status: RESOLVED | Noted: 2023-09-01 | Resolved: 2023-09-01

## 2023-09-01 PROBLEM — M54.50 LOW BACK PAIN: Status: ACTIVE | Noted: 2023-05-05

## 2023-09-01 PROBLEM — J45.909 UNCOMPLICATED ASTHMA: Status: ACTIVE | Noted: 2023-05-05

## 2023-09-01 PROBLEM — E11.9 TYPE 2 DIABETES MELLITUS WITHOUT COMPLICATION: Status: ACTIVE | Noted: 2023-05-05

## 2023-09-01 PROBLEM — E66.9 OBESITY: Status: ACTIVE | Noted: 2023-05-05

## 2023-09-01 PROBLEM — R10.9 ABDOMINAL PAIN: Status: ACTIVE | Noted: 2023-05-05

## 2023-09-01 PROBLEM — Z86.16 HISTORY OF SEVERE ACUTE RESPIRATORY SYNDROME CORONAVIRUS 2 (SARS-COV-2) DISEASE: Status: ACTIVE | Noted: 2020-04-10

## 2023-09-01 PROBLEM — F32.A DEPRESSION: Status: ACTIVE | Noted: 2023-05-05

## 2023-09-01 PROBLEM — F39 MOOD DISORDER: Status: ACTIVE | Noted: 2023-09-01

## 2023-09-01 RX ORDER — ZONISAMIDE 25 MG/1
25 CAPSULE ORAL
COMMUNITY

## 2023-09-01 RX ORDER — LORATADINE 10 MG/1
1 TABLET ORAL DAILY
COMMUNITY
Start: 2023-08-11 | End: 2023-09-01

## 2023-09-01 RX ORDER — ONDANSETRON 4 MG/1
4 TABLET, FILM COATED ORAL
COMMUNITY

## 2023-09-01 RX ORDER — NAPROXEN 375 MG/1
750 TABLET ORAL
COMMUNITY
Start: 2023-05-05 | End: 2023-09-01

## 2023-09-01 RX ORDER — BUPROPION HYDROCHLORIDE 150 MG/1
150 TABLET ORAL EVERY MORNING
COMMUNITY
Start: 2023-08-01 | End: 2023-09-01

## 2023-09-01 RX ORDER — DOXYCYCLINE HYCLATE 100 MG/1
CAPSULE ORAL
COMMUNITY
Start: 2023-04-13 | End: 2023-09-01

## 2023-09-01 RX ORDER — POTASSIUM CHLORIDE 750 MG/1
TABLET, EXTENDED RELEASE ORAL DAILY
COMMUNITY
End: 2023-09-01

## 2023-09-01 RX ORDER — OMEPRAZOLE 40 MG/1
40 CAPSULE, DELAYED RELEASE ORAL DAILY
COMMUNITY
End: 2023-09-01

## 2023-09-01 RX ORDER — SEMAGLUTIDE 1.34 MG/ML
INJECTION, SOLUTION SUBCUTANEOUS
COMMUNITY
Start: 2023-04-13 | End: 2023-09-01

## 2023-09-01 RX ORDER — ERENUMAB-AOOE 140 MG/ML
INJECTION, SOLUTION SUBCUTANEOUS
COMMUNITY
Start: 2023-05-05 | End: 2023-09-01

## 2023-09-01 RX ORDER — RIMEGEPANT SULFATE 75 MG/75MG
75 TABLET, ORALLY DISINTEGRATING ORAL
COMMUNITY
Start: 2023-08-27

## 2023-09-01 RX ORDER — DULOXETIN HYDROCHLORIDE 60 MG/1
60 CAPSULE, DELAYED RELEASE ORAL DAILY
COMMUNITY

## 2023-09-01 RX ORDER — CELECOXIB 200 MG/1
200 CAPSULE ORAL DAILY
COMMUNITY
Start: 2023-05-05 | End: 2023-09-01

## 2023-09-01 RX ORDER — ALBUTEROL SULFATE 90 UG/1
AEROSOL, METERED RESPIRATORY (INHALATION)
COMMUNITY

## 2023-09-01 RX ORDER — HYDROCODONE BITARTRATE AND ACETAMINOPHEN 5; 325 MG/1; MG/1
TABLET ORAL
COMMUNITY
Start: 2023-05-08

## 2023-09-01 RX ORDER — ASPIRIN 81 MG/1
81 TABLET, CHEWABLE ORAL
COMMUNITY

## 2023-09-01 RX ORDER — FAMOTIDINE 40 MG/1
40 TABLET, FILM COATED ORAL
COMMUNITY
Start: 2023-03-16 | End: 2023-09-01

## 2023-09-01 RX ORDER — METHOCARBAMOL 750 MG/1
1 TABLET, FILM COATED ORAL EVERY 12 HOURS SCHEDULED
COMMUNITY
Start: 2023-08-01

## 2023-09-01 RX ORDER — CETIRIZINE HYDROCHLORIDE 10 MG/1
10 TABLET ORAL
COMMUNITY

## 2023-09-01 RX ORDER — TRIAMCINOLONE ACETONIDE OINTMENT USP, 0.05% 0.5 MG/G
OINTMENT TOPICAL
COMMUNITY
Start: 2023-05-17

## 2023-09-01 RX ORDER — ATORVASTATIN CALCIUM 10 MG/1
10 TABLET, FILM COATED ORAL
COMMUNITY

## 2023-09-01 RX ORDER — ATENOLOL 25 MG/1
1 TABLET ORAL DAILY
COMMUNITY
Start: 2023-05-05 | End: 2023-09-01

## 2023-09-01 RX ORDER — POTASSIUM CHLORIDE 1500 MG/1
TABLET, EXTENDED RELEASE ORAL
COMMUNITY
Start: 2023-08-02 | End: 2023-09-01

## 2023-09-01 RX ORDER — SODIUM CHLORIDE 0.65 %
AEROSOL, SPRAY (ML) NASAL
COMMUNITY
Start: 2023-05-05

## 2023-09-01 NOTE — PROGRESS NOTES
Follow Up      Patient Name: Bryanna Schwartz  : 1970   MRN: [id number]     Chief Complaint:    Chief Complaint   Patient presents with    Hospital Follow Up Visit       History of Present Illness: Bryanna Schwartz is a 53 y.o. female, PMH includes CHF, HL, HTN, T2DM, GERD, anxiety and depression, migraines who is here today for hospital follow up.  is with patient for visit today.     Pt was admitted to  [facility]  -  for evaluation of CHF, dehydration. GES  reveals delayed gastric emptying. EGD  with Dr. Murphy. Normal esophagusl. Mild antral gastritis. Normal duodenum. Bx negative for H Pylori, intestinal metaplasia or dysplasia. Pt was discharged with Motegrity 2mg daily, which she did not yet obtain.     She continues to take docusate 2 capsules BID. She reports no BM for >10 days. She endorses flatus. She is straining to evacuate with BMs and endorses bleeding when wiping occasionally.     She continues to do well with pantoprazole 40mg daily and geoff root 500mg TID.     Patient denies associated fever, chills, abdominal pain, indigestion, nausea, vomiting, diarrhea, hematemesis, dysphagia, hematochezia, melena, weight loss or gain, dysuria, jaundice or bruising.    Subjective      Review of Systems:   Review of Systems   Constitutional:  Negative for appetite change, chills, diaphoresis, fatigue, fever, unexpected weight gain and unexpected weight loss.   HENT:  Negative for drooling, facial swelling, mouth sores, rhinorrhea, sore throat, tinnitus, trouble swallowing and voice change.    Eyes: Negative.    Respiratory:  Negative for cough, chest tightness and shortness of breath.    Cardiovascular:  Negative for chest pain.   Gastrointestinal:  Positive for constipation. Negative for abdominal pain, blood in stool, diarrhea, nausea, vomiting, GERD and indigestion.   Genitourinary:  Negative for dysuria, flank pain, hematuria and pelvic pain.   Skin:  Negative for color change,  pallor and rash.   Neurological:  Negative for dizziness, tremors, syncope, weakness and numbness.   Psychiatric/Behavioral:  Negative for hallucinations and sleep disturbance. The patient is not nervous/anxious.    All other systems reviewed and are negative.    Medications:     Current Outpatient Medications:     Aimovig 140 MG/ML auto-injector, USE 1 INJECTION EVERY 30 DAYS, Disp: , Rfl:     atenolol (TENORMIN) 25 MG tablet, Take 1 tablet by mouth Daily., Disp: , Rfl:     buPROPion XL (WELLBUTRIN XL) 150 MG 24 hr tablet, Take 1 tablet by mouth Every Morning., Disp: , Rfl:     celecoxib (CeleBREX) 200 MG capsule, Take 1 capsule by mouth Daily. with food, Disp: , Rfl:     Deep Sea Nasal Spray 0.65 % nasal spray, USE 1 SPRAY IN EACH NOSTRIL 3 TIMES A DAY AS NEEDED, Disp: , Rfl:     famotidine (PEPCID) 40 MG tablet, 1 tablet., Disp: , Rfl:     HYDROcodone-acetaminophen (NORCO) 5-325 MG per tablet, take 1 tablet by mouth 3 times a day as needed for pain, Disp: , Rfl:     loratadine (CLARITIN) 10 MG tablet, Take 1 tablet by mouth Daily., Disp: , Rfl:     methocarbamol (ROBAXIN) 750 MG tablet, Take 1 tablet by mouth Every 12 (Twelve) Hours., Disp: , Rfl:     naproxen (NAPROSYN) 375 MG tablet, Take 2 tablets by mouth., Disp: , Rfl:     potassium chloride ER (K-TAB) 20 MEQ tablet controlled-release ER tablet, TAKE 1 TABLET BY MOUTH ONCE DAILY WITH FOOD FOR 10 DAYS, Disp: , Rfl:     Rimegepant Sulfate (Nurtec) 75 MG tablet dispersible tablet, Take 1 tablet by mouth., Disp: , Rfl:     Semaglutide,0.25 or 0.5MG/DOS, (Ozempic, 0.25 or 0.5 MG/DOSE,) 2 MG/1.5ML solution pen-injector, INJECT 0.25 MG ONCE A WEEK SUBCUTANEOUSLY, Disp: , Rfl:     Triamcinolone Acetonide 0.05 % ointment, apply to affected area twice a day, Disp: , Rfl:     albuterol sulfate  (90 Base) MCG/ACT inhaler, EVERY 4 HOURS, Disp: , Rfl:     aspirin 81 MG chewable tablet, 1 tablet., Disp: , Rfl:     atorvastatin (LIPITOR) 10 MG tablet, 1 tablet.,  Disp: , Rfl:     budesonide-formoterol (SYMBICORT) 80-4.5 MCG/ACT inhaler, Inhale 2 puffs 2 (Two) Times a Day., Disp: , Rfl:     cetirizine (zyrTEC) 10 MG tablet, 1 tablet., Disp: , Rfl:     Cholecalciferol (Vitamin D3) 50 MCG (2000 UT) tablet, Take 1 tablet by mouth Daily., Disp: , Rfl:     cyanocobalamin (VITAMIN B-12) 1000 MCG tablet, Take 1 tablet by mouth Daily., Disp: , Rfl:     doxycycline (VIBRAMYCIN) 100 MG capsule, TAKE 1 CAPSULE BY MOUTH TWICE A DAY UNTIL GONE (Patient not taking: Reported on 9/1/2023), Disp: , Rfl:     DULoxetine (CYMBALTA) 60 MG capsule, Take 1 capsule by mouth Daily., Disp: , Rfl:     Entresto 24-26 MG tablet, TAKE 1 TABLET BY MOUTH TWICE A DAY, Disp: 60 tablet, Rfl: 5    folic acid (FOLVITE) 1 MG tablet, Take 1 tablet by mouth Daily., Disp: , Rfl:     Ginger, Zingiber officinalis, (Ginger Root) 250 MG capsule, Take 2 capsules by mouth 3 (Three) Times a Day., Disp: 90 capsule, Rfl: 1    levothyroxine (SYNTHROID, LEVOTHROID) 25 MCG tablet, Take 1 tablet by mouth Every Morning., Disp: , Rfl:     linaclotide (LINZESS) 145 MCG capsule capsule, Daily. (Patient not taking: Reported on 9/1/2023), Disp: , Rfl:     montelukast (SINGULAIR) 10 MG tablet, Take 1 tablet by mouth Every Night., Disp: , Rfl:     nitroglycerin (NITROSTAT) 0.4 MG SL tablet, 1 under the tongue as needed for angina, may repeat q5mins for up three doses, Disp: 25 tablet, Rfl: 1    omeprazole (priLOSEC) 40 MG capsule, Take 1 capsule by mouth Daily., Disp: , Rfl:     ondansetron (ZOFRAN) 4 MG tablet, 1 tablet., Disp: , Rfl:     pantoprazole (Protonix) 40 MG EC tablet, Take 1 tablet by mouth Daily., Disp: 30 tablet, Rfl: 2    PHARMACY MEDS TO BED CONSULT, Daily., Disp: , Rfl:     potassium chloride (K-DUR,KLOR-CON) 10 MEQ CR tablet, Daily., Disp: , Rfl:     pregabalin (LYRICA) 200 MG capsule, Take 1 capsule by mouth 2 (Two) Times a Day. Takes w/50mg dose for BID total of 250mg, Disp: , Rfl:     pregabalin (LYRICA) 50 MG  capsule, Take 1 capsule by mouth 2 (Two) Times a Day. Takes w/200mg dose for BID total of 250mg, Disp: , Rfl:     Prucalopride Succinate 1 MG tablet, Take 2 mg by mouth Daily., Disp: 60 tablet, Rfl: 1    rosuvastatin (CRESTOR) 10 MG tablet, Take 1 tablet by mouth Every Night for 60 days., Disp: 30 tablet, Rfl: 1    zonisamide (ZONEGRAN) 25 MG capsule, 1 capsule., Disp: , Rfl:     Allergies:   Allergies   Allergen Reactions    Erenumab-Aooe Anaphylaxis    Gabapentin Hives       Social History:   Social History     Socioeconomic History    Marital status:     Number of children: 2   Tobacco Use    Smoking status: Never    Smokeless tobacco: Never   Vaping Use    Vaping Use: Never used   Substance and Sexual Activity    Alcohol use: No    Drug use: No    Sexual activity: Defer     Partners: Male        Surgical History:   Past Surgical History:   Procedure Laterality Date    ACHILLES TENDON SURGERY Right     BREAST CYST EXCISION Left     CARDIAC CATHETERIZATION  03/16/2023    Normal coronaries.  EF 45%    CARDIOVASCULAR STRESS TEST  03/04/2023    anteroseptal, anterior infarct with dana-infarct ischemia    CHOLECYSTECTOMY      ECHO - CONVERTED  03/02/2023    EF 41-45%, saline test negative, valves normal    ECHO - CONVERTED  08/14/2023    Limited - EF 55%. LA- 4.0. Trace PE    ENDOSCOPY N/A 10/16/2018    Procedure: ESOPHAGOGASTRODUODENOSCOPY;  Surgeon: Brunner, Mark I, MD;  Location:  [facility];  Service: Gastroenterology    ENDOSCOPY N/A 07/27/2023    Procedure: ESOPHAGOGASTRODUODENOSCOPY;  Surgeon: Yas Murphy MD;  Location:  [facility];  Service: Gastroenterology;  Laterality: N/A;    HYSTERECTOMY      TRANSESOPHAGEAL ECHOCARDIOGRAM (NESSA)  03/07/2023    Dr. Alonso- EF 56-60%, no mass or thrombus, mild plaque aorrti arch and descending aorta    UPPER GASTROINTESTINAL ENDOSCOPY      US CAROTID UNILATERAL  12/17/2022    < 50% Bilaterally        Medical History:   Past Medical History:   Diagnosis  "Date    Allergy to alpha-gal     Arthritis     Depression     Diabetes     Environmental allergies     Falls 10/24/2018    GERD (gastroesophageal reflux disease)     Heart murmur     Hyperlipidemia     Hypertension     Hypothyroidism     Lyme disease     Migraine     Kelvin Mountain spotted fever     Vitamin B 12 deficiency         Objective     Physical Exam:  Vital Signs:   Vitals:    09/01/23 0907   BP: 122/78   BP Location: Left arm   Patient Position: Sitting   Cuff Size: Adult   Pulse: 88   Temp: 97.1 øF (36.2 øC)   TempSrc: Temporal   SpO2: 99%   Weight: 92.1 kg (203 lb)   Height: 175 cm (68.9\")     Body mass index is 30.07 kg/mý.     Physical Exam  Vitals and nursing note reviewed.   Constitutional:       Appearance: Normal appearance. She is normal weight. She is not ill-appearing or diaphoretic.      Comments: Ambulates with rolling walker. BMI 30.07.   HENT:      Head: Normocephalic and atraumatic.      Right Ear: External ear normal.      Left Ear: External ear normal.      Nose: Nose normal.      Mouth/Throat:      Mouth: Mucous membranes are moist.      Pharynx: Oropharynx is clear.   Eyes:      Conjunctiva/sclera: Conjunctivae normal.      Pupils: Pupils are equal, round, and reactive to light.   Neck:      Thyroid: No thyromegaly.   Cardiovascular:      Rate and Rhythm: Normal rate and regular rhythm.      Pulses: Normal pulses.      Heart sounds: Normal heart sounds.   Pulmonary:      Effort: Pulmonary effort is normal.      Breath sounds: Normal breath sounds.   Abdominal:      General: Abdomen is flat. Bowel sounds are normal. There is no distension.      Tenderness: There is no abdominal tenderness.      Comments: Large stool burden throughout colon   Musculoskeletal:         General: Normal range of motion.      Cervical back: Normal range of motion and neck supple.   Skin:     General: Skin is warm and dry.   Neurological:      General: No focal deficit present.      Mental Status: She is " oriented to person, place, and time.   Psychiatric:         Mood and Affect: Mood normal.       Assessment / Plan      Assessment/Plan:   There are no diagnoses linked to this encounter.     Gastroparesis  Slow transit constipation   - continue docusate 2 capsules BID   - rx for Motegrity 2mg daily sent to pharmacy    - pt encouraged to start daily soluble fiber supplement   - pt given gastroparesis diet instructions   - previous labs, imaging, endoscopy and pathology reports reviewed   - schedule for CS[facility] once bowel regimen is better established   - follow up in clinic in 2mo, or after completion of above studies   - call clinic at any time for questions or new / worsened sx    Follow Up:   Return in about 2 months (around 11/1/2023).    Plan of care reviewed with the patient at the conclusion of today's visit.  Education was provided regarding diagnosis, management, and any prescribed or recommended OTC medications.  Patient verbalized understanding of and agreement with management plan.     NOTE TO PATIENT: The 21st Century Cures Act makes medical notes like these available to patients in the interest of transparency. However, be advised this is a medical document. It is intended as peer to peer communication. It is written in medical language and may contain abbreviations or verbiage that are unfamiliar. It may appear blunt or direct. Medical documents are intended to carry relevant information, facts as evident, and the clinical opinion of the practitioner.     Time Statement:   Discussed plan of care in detail with patient today. Patient verbally understands and agrees. I have spent 30 minutes reviewing available diagnostics, obtaining history, examining the patient, developing a treatment plan, and educating the patient on disease process and plan of care.     Rosa M Medrano PA-C   [facility]

## 2023-10-17 NOTE — PROGRESS NOTES
..     [facility] Cardiothoracic Surgery Office Follow Up Note    Date of Encounter: 10/18/2023     Name: Bryanna Schwartz  : 1970     Referred By: Baljeet Manley APRN  PCP: Baljeet Manley APRN    Chief Complaint:    Chief Complaint   Patient presents with    Follow-up     FU with PVR-Right Great and 2nd Toe Discoloration       Subjective      History of Present Illness:    It was nice to see Bryanna Schwartz in follow up.  She is a pleasant 53 y.o. female with PMH significant for chronic systolic heart failure, hypertension, embolic) for thyroidism, GERD with esophagitis, anxiety/depression, and gangrene.       Patient was originally seen on 3/1/2023 after being referred to Dr. Krause by Sury Manley, nurse practitioner in [facility] for onset of gangrenous changes of the right fourth toe at the distal tuft and right second toe.  Patient had a significant past history of having Lyme's disease as well as Kelvin [place] spotted fever from a tick bite over 5 years ago.  Patient could not walk after the tick bite became symptomatic and she was hospitalized at [facility] in 2018 for over 3 weeks.  She was eventually sent to [facility] for rehab in order to be able to walk again.  Her  reported she had mentally not been the same since the diagnosis of Lyme disease and Kelvin [place] spotted fever.  Necrosis of the toes according to the patient and her  began about a year prior but had been worsening over the past 2 months.  Due to mental acuity problems the patient underwent a carotid duplex on 2023 which was read and had no significant stenosis.  She also underwent an MRI of the brain that was done on 2023 and was read as no acute intracranial abnormality with mild atrophy.  Patient was at that time scheduled to see Dr. Peterson of cardiology at [facility] for vague symptoms of chest pain.  The patient also had been scheduled to see  neurology at  in April 2023 for mental acuity problems.     Patient was hospitalized at [facility] in March for gangrenous changes of the right fourth toe at the distal tuft and gangrenous changes to the right second toe at the distal tuft subungual area.  Infectious disease was consulted as well as cardiology.  Ultimately there was no need for surgical intervention of the ulcerations.    Ms. Schwartz was last seen in office by myself on 4/5/2023 at which time she was placed on an as needed basis as there was no surgical intervention needed.  She presents today after being referred back by her PCP for toe discoloration.  She is accompanied by her .     Review of Systems:  Review of Systems   Constitutional: Positive for chills. Negative for decreased appetite, diaphoresis, fever, malaise/fatigue, night sweats, weight gain and weight loss.   HENT:  Negative for hoarse voice.    Eyes:  Negative for blurred vision, double vision and visual disturbance.   Cardiovascular:  Positive for chest pain. Negative for claudication, dyspnea on exertion, irregular heartbeat, leg swelling, near-syncope, orthopnea, palpitations, paroxysmal nocturnal dyspnea and syncope.   Respiratory:  Negative for cough, hemoptysis, shortness of breath, sputum production and wheezing.    Hematologic/Lymphatic: Negative for adenopathy and bleeding problem. Does not bruise/bleed easily.   Skin:  Positive for color change. Negative for nail changes, poor wound healing and rash.   Musculoskeletal:  Positive for arthritis, back pain and joint pain. Negative for falls and muscle cramps.   Gastrointestinal:  Positive for heartburn. Negative for abdominal pain and dysphagia.   Genitourinary:  Negative for flank pain.   Neurological:  Positive for dizziness and light-headedness. Negative for brief paralysis, disturbances in coordination, focal weakness, headaches, loss of balance, numbness, paresthesias, sensory change, vertigo and  "weakness.   Psychiatric/Behavioral:  Negative for depression and suicidal ideas.    Allergic/Immunologic: Negative for persistent infections.     I have reviewed the following portions of the patient's history: problem list, current medications, allergies, past surgical history, past medical history, past social history, past family history, and ROS and confirm it's accurate.    Allergies:  Allergies   Allergen Reactions    Erenumab-Aooe Anaphylaxis    Other Shortness Of Breath     Nasal Spray- also caused rash    Antihistamines, Loratadine-Type Other (See Comments)     Made \"stuffy\"    Gabapentin Hives       Medications:      Current Outpatient Medications:     albuterol sulfate  (90 Base) MCG/ACT inhaler, EVERY 4 HOURS, Disp: , Rfl:     aspirin 81 MG chewable tablet, 1 tablet., Disp: , Rfl:     budesonide-formoterol (SYMBICORT) 80-4.5 MCG/ACT inhaler, Inhale 2 puffs 2 (Two) Times a Day., Disp: , Rfl:     buPROPion XL (WELLBUTRIN XL) 150 MG 24 hr tablet, Take 1 tablet by mouth Every Morning., Disp: , Rfl:     Cholecalciferol (Vitamin D3) 50 MCG (2000 UT) tablet, Take 1 tablet by mouth Daily., Disp: , Rfl:     cyanocobalamin (VITAMIN B-12) 1000 MCG tablet, Take 1 tablet by mouth Daily., Disp: , Rfl:     Deep Sea Nasal Spray 0.65 % nasal spray, USE 1 SPRAY IN EACH NOSTRIL 3 TIMES A DAY AS NEEDED, Disp: , Rfl:     Entresto 24-26 MG tablet, TAKE 1 TABLET BY MOUTH TWICE A DAY, Disp: 60 tablet, Rfl: 5    folic acid (FOLVITE) 1 MG tablet, Take 1 tablet by mouth Daily., Disp: , Rfl:     Ginger, Zingiber officinalis, (Ginger Root) 250 MG capsule, Take 2 capsules by mouth 3 (Three) Times a Day., Disp: 90 capsule, Rfl: 1    HYDROcodone-acetaminophen (NORCO) 7.5-325 MG per tablet, Take 1 tablet by mouth Every 6 (Six) Hours., Disp: , Rfl:     levothyroxine (SYNTHROID, LEVOTHROID) 25 MCG tablet, Take 2 tablets by mouth Every Morning., Disp: , Rfl:     methocarbamol (ROBAXIN) 750 MG tablet, Take 1 tablet by mouth Every 12 " (Twelve) Hours., Disp: , Rfl:     montelukast (SINGULAIR) 10 MG tablet, Take 1 tablet by mouth Every Night., Disp: , Rfl:     nitroglycerin (NITROSTAT) 0.4 MG SL tablet, 1 under the tongue as needed for angina, may repeat q5mins for up three doses, Disp: 25 tablet, Rfl: 1    ondansetron (ZOFRAN) 4 MG tablet, 1 tablet., Disp: , Rfl:     Ozempic, 0.25 or 0.5 MG/DOSE, 2 MG/3ML solution pen-injector, Inject 0.5 mg under the skin into the appropriate area as directed 1 (One) Time Per Week., Disp: , Rfl:     pantoprazole (Protonix) 40 MG EC tablet, Take 1 tablet by mouth Daily., Disp: 30 tablet, Rfl: 2    pregabalin (LYRICA) 300 MG capsule, Take 1 capsule by mouth Every 12 (Twelve) Hours., Disp: , Rfl:     Rimegepant Sulfate (Nurtec) 75 MG tablet dispersible tablet, Take 1 tablet by mouth., Disp: , Rfl:     rosuvastatin (CRESTOR) 10 MG tablet, Take 1 tablet by mouth Daily., Disp: , Rfl:     atorvastatin (LIPITOR) 10 MG tablet, 1 tablet. (Patient not taking: Reported on 10/18/2023), Disp: , Rfl:     cetirizine (zyrTEC) 10 MG tablet, 1 tablet. (Patient not taking: Reported on 10/18/2023), Disp: , Rfl:     DULoxetine (CYMBALTA) 60 MG capsule, Take 1 capsule by mouth Daily. (Patient not taking: Reported on 10/18/2023), Disp: , Rfl:     HYDROcodone-acetaminophen (NORCO) 5-325 MG per tablet, take 1 tablet by mouth 3 times a day as needed for pain (Patient not taking: Reported on 10/18/2023), Disp: , Rfl:     loratadine (CLARITIN) 10 MG tablet, Take 1 tablet by mouth Daily. (Patient not taking: Reported on 10/18/2023), Disp: , Rfl:     pregabalin (LYRICA) 200 MG capsule, Take 1 capsule by mouth 2 (Two) Times a Day. Takes w/50mg dose for BID total of 250mg (Patient not taking: Reported on 10/18/2023), Disp: , Rfl:     pregabalin (LYRICA) 50 MG capsule, Take 1 capsule by mouth 2 (Two) Times a Day. Takes w/200mg dose for BID total of 250mg, Disp: , Rfl:     Prucalopride Succinate 1 MG tablet, Take 2 mg by mouth Daily., Disp: 60  tablet, Rfl: 1    rosuvastatin (CRESTOR) 10 MG tablet, Take 1 tablet by mouth Every Night for 60 days., Disp: 30 tablet, Rfl: 1    Triamcinolone Acetonide 0.05 % ointment, apply to affected area twice a day, Disp: , Rfl:     zonisamide (ZONEGRAN) 25 MG capsule, 1 capsule., Disp: , Rfl:     History:   Past Medical History:   Diagnosis Date    Allergy to alpha-gal     Arthritis     Depression     Diabetes     Environmental allergies     Falls 10/24/2018    GERD (gastroesophageal reflux disease)     Heart murmur     Hyperlipidemia     Hypertension     Hypothyroidism     Lyme disease     Migraine     Peripheral vascular disease     Kelvin Mountain spotted fever     Vitamin B 12 deficiency        Past Surgical History:   Procedure Laterality Date    ACHILLES TENDON SURGERY Right     BREAST CYST EXCISION Left     CARDIAC CATHETERIZATION  03/16/2023    Normal coronaries.  EF 45%    CARDIOVASCULAR STRESS TEST  03/04/2023    anteroseptal, anterior infarct with dana-infarct ischemia    CHOLECYSTECTOMY      ECHO - CONVERTED  03/02/2023    EF 41-45%, saline test negative, valves normal    ECHO - CONVERTED  08/14/2023    Limited - EF 55%. LA- 4.0. Trace PE    ENDOSCOPY N/A 10/16/2018    Procedure: ESOPHAGOGASTRODUODENOSCOPY;  Surgeon: Brunner, Mark I, MD;  Location:  [facility];  Service: Gastroenterology    ENDOSCOPY N/A 07/27/2023    Procedure: ESOPHAGOGASTRODUODENOSCOPY;  Surgeon: Yas Murphy MD;  Location:  [facility];  Service: Gastroenterology;  Laterality: N/A;    HYSTERECTOMY      TRANSESOPHAGEAL ECHOCARDIOGRAM (NESSA)  03/07/2023    Dr. Alonso- EF 56-60%, no mass or thrombus, mild plaque aorrti arch and descending aorta    UPPER GASTROINTESTINAL ENDOSCOPY      US CAROTID UNILATERAL  12/17/2022    < 50% Bilaterally       Social History     Socioeconomic History    Marital status:     Number of children: 2   Tobacco Use    Smoking status: Never    Smokeless tobacco: Never   Vaping Use    Vaping Use: Never  "used   Substance and Sexual Activity    Alcohol use: No    Drug use: No    Sexual activity: Defer     Partners: Male        Family History   Problem Relation Age of Onset    Hypertension Mother     COPD Mother     Heart attack Mother     Other Father         / of exhaust fumes    Cancer Sister     Heart attack Brother     Colon cancer Neg Hx     Colon polyps Neg Hx     Esophageal cancer Neg Hx        Objective     Physical Exam:  Vitals:    10/18/23 1003 10/18/23 1004   BP: 118/70 118/70   BP Location: Left arm Right arm   Patient Position: Sitting Sitting   Pulse: 81    Temp: 97.1 °F (36.2 °C)    SpO2: 94%    Weight: 102 kg (224 lb)    Height: 175.3 cm (69\")  Comment: patient reported       Body mass index is 33.08 kg/m².    Physical Exam  Vitals reviewed.   Constitutional:       General: She is not in acute distress.     Appearance: She is not ill-appearing.   Pulmonary:      Effort: Pulmonary effort is normal.   Musculoskeletal:      Right lower leg: No edema.      Left lower leg: No edema.   Feet:      Right foot:      Skin integrity: Skin integrity normal.      Left foot:      Skin integrity: Skin integrity normal.      Comments: discoloration  Neurological:      General: No focal deficit present.      Mental Status: She is alert and oriented to person, place, and time.   Psychiatric:         Mood and Affect: Mood normal.         Behavior: Behavior normal.         Thought Content: Thought content normal.         Judgment: Judgment normal.     Imaging/Labs:         Assessment / Plan      Assessment / Plan:  PMH significant for chronic systolic heart failure, hypertension, embolic) for thyroidism, GERD with esophagitis, anxiety/depression, and gangrene.          1.  Gangrene of the right fourth toe at the distal tuft and right second toe.  Originally seen on 3/1/2023 by Dr. Krause after being referred for onset of gangrenous changes of the right fourth toe at the distal tuft and right second " toe.  Significant past history of having Lyme's disease as well as Kelvin [place] spotted fever from a tick bite over 5 years ago.  Extensive complicated medical history, please see above HPI for details.  Hospitalized at [facility] in March for gangrenous changes.  ID was consulted as well as cardiology.  Ultimately there was no need for surgical intervention of the ulcerations.   Last seen in office by myself on 4/5/2023.  Patient was placed on as-needed basis since there was no surgical intervention needed at that time.  Presents today after being referred back by her PCP for toe discoloration.   Reports symptoms of BLE claudication that subsides with rest.    Denies lower extremity ulceration or non-healing wounds.  Reports slow healing.  ROSA/PVR obtained.  Right Toe ROSA 0.63;  Left Toe ROSA 0.55 with monophasic wave forms.  Upon examination bilateral feet with discoloration, cool to touch, and abnormally thick toenails.   We will obtain a CTA with runoff to further assess vasculature.     Follow Up:   We will plan for follow up after imaging.    Or sooner for any further concerns or worsening sign and symptoms.  Patient encouraged to call the office with any questions or concerns.     Thank you for allowing me to participate in your care.  Best Regards,    CARRIE Inman  [facility] Cardiothoracic Surgery  10/18/23  10:17 EDT

## 2023-10-18 ENCOUNTER — OFFICE VISIT (OUTPATIENT)
Dept: CARDIAC SURGERY | Facility: CLINIC | Age: 53
End: 2023-10-18
Payer: MEDICARE

## 2023-10-18 VITALS
HEART RATE: 81 BPM | TEMPERATURE: 97.1 F | SYSTOLIC BLOOD PRESSURE: 118 MMHG | WEIGHT: 224 LBS | OXYGEN SATURATION: 94 % | DIASTOLIC BLOOD PRESSURE: 70 MMHG | HEIGHT: 69 IN | BODY MASS INDEX: 33.18 KG/M2

## 2023-10-18 DIAGNOSIS — I73.9 PAD (PERIPHERAL ARTERY DISEASE): Primary | ICD-10-CM

## 2023-10-18 DIAGNOSIS — I73.9 PERIPHERAL VASCULAR DISEASE: Primary | ICD-10-CM

## 2023-10-18 DIAGNOSIS — I70.213 ATHEROSCLEROSIS OF NATIVE ARTERIES OF EXTREMITIES WITH INTERMITTENT CLAUDICATION, BILATERAL LEGS: ICD-10-CM

## 2023-10-18 RX ORDER — BUPROPION HYDROCHLORIDE 150 MG/1
150 TABLET ORAL EVERY MORNING
COMMUNITY
Start: 2023-09-27

## 2023-10-18 RX ORDER — SEMAGLUTIDE 0.68 MG/ML
0.5 INJECTION, SOLUTION SUBCUTANEOUS WEEKLY
COMMUNITY
Start: 2023-09-06

## 2023-10-18 RX ORDER — ROSUVASTATIN CALCIUM 10 MG/1
10 TABLET, COATED ORAL DAILY
COMMUNITY

## 2023-10-18 RX ORDER — PREGABALIN 300 MG/1
1 CAPSULE ORAL EVERY 12 HOURS SCHEDULED
COMMUNITY
Start: 2023-10-09

## 2023-10-18 RX ORDER — HYDROCODONE BITARTRATE AND ACETAMINOPHEN 7.5; 325 MG/1; MG/1
1 TABLET ORAL EVERY 6 HOURS
COMMUNITY
Start: 2023-10-07

## 2023-10-18 RX ORDER — LORATADINE 10 MG/1
1 TABLET ORAL DAILY
COMMUNITY
Start: 2023-10-09 | End: 2023-10-18

## 2023-11-09 ENCOUNTER — HOSPITAL ENCOUNTER (OUTPATIENT)
Dept: CT IMAGING | Facility: HOSPITAL | Age: 53
Discharge: HOME | End: 2023-11-09
Admitting: REGISTERED NURSE
Payer: MEDICARE

## 2023-11-09 PROCEDURE — 75635 CT ANGIO ABDOMINAL ARTERIES: CPT

## 2023-11-09 PROCEDURE — 25510000001 IOPAMIDOL 61 % SOLUTION: Performed by: REGISTERED NURSE

## 2023-11-09 RX ADMIN — IOPAMIDOL 100 ML: 612 INJECTION, SOLUTION INTRAVENOUS at 14:08

## 2023-11-21 NOTE — PROGRESS NOTES
..     [facility] Cardiothoracic Surgery Office Follow Up Note    Date of Encounter: 2023     Name: Bryanna Schwartz  : 1970     Referred By: No ref. provider found  PCP: Baljeet Manley APRN    Chief Complaint:    Chief Complaint   Patient presents with    Follow-up     FU with CTA With Runoff for PVD       Subjective      History of Present Illness:    It was nice to see Bryanna Schwartz in follow up.  She is a pleasant 53 y.o. female with PMH significant for chronic systolic heart failure, hypertension, embolic infarction, hypthyroidism, GERD with esophagitis, anxiety/depression, and gangrene.       Patient was originally seen on 3/1/2023 after being referred to Dr. Krause by Sury Manley, nurse practitioner in [facility] for onset of gangrenous changes of the right fourth toe at the distal tuft and right second toe.  Patient had a significant past history of having Lyme's disease as well as [place]vin [place] spotted fever from a tick bite over 5 years ago.  Patient could not walk after the tick bite became symptomatic and she was hospitalized at [facility] in 2018 for over 3 weeks.  She was eventually sent to [facility] for rehab in order to be able to walk again.  Her  reported she had mentally not been the same since the diagnosis of Lyme disease and [place] spotted fever.  Necrosis of the toes according to the patient and her  began about a year prior but had been worsening over the past 2 months.  Due to mental acuity problems the patient underwent a carotid duplex on 2023 which was read and had no significant stenosis.  She also underwent an MRI of the brain that was done on 2023 and was read as no acute intracranial abnormality with mild atrophy.  Patient was at that time scheduled to see Dr. Peterson of cardiology at [facility] for vague symptoms of chest pain.  The patient also had been scheduled to see neurology at   in April 2023 for mental acuity problems.     Patient was hospitalized at [facility] in March for gangrenous changes of the right fourth toe at the distal tuft and gangrenous changes to the right second toe at the distal tuft subungual area.  Infectious disease was consulted as well as cardiology.  Ultimately there was no need for surgical intervention of the ulcerations.    Ms. Schwartz was last seen in office by myself on 10/18/2023 after being referred back by her PCP for toe discoloration.  ROSA/PVRs were obtained and resulted with right toe ROSA of 0.63 and left toe ROSA of 0.55 with monophasic waveforms.  Upon examination bilateral feet were with discoloration, cool to touch, and abnormally thick toenails.  Ms. Schwartz presents today for follow up with imaging.        Review of Systems:  Review of Systems   Constitutional: Negative for chills, decreased appetite, diaphoresis, fever, malaise/fatigue, night sweats, weight gain and weight loss.   HENT:  Negative for hoarse voice.    Eyes:  Negative for blurred vision, double vision and visual disturbance.   Cardiovascular:  Positive for chest pain, claudication and palpitations. Negative for dyspnea on exertion, irregular heartbeat, leg swelling, near-syncope, orthopnea, paroxysmal nocturnal dyspnea and syncope.   Respiratory:  Negative for cough, hemoptysis, shortness of breath, sputum production and wheezing.    Hematologic/Lymphatic: Negative for adenopathy and bleeding problem. Bruises/bleeds easily.   Skin:  Negative for color change, nail changes, poor wound healing and rash.   Musculoskeletal:  Positive for back pain and joint pain. Negative for falls and muscle cramps.   Gastrointestinal:  Negative for abdominal pain, dysphagia and heartburn.   Genitourinary:  Negative for flank pain.   Neurological:  Positive for dizziness and light-headedness. Negative for brief paralysis, disturbances in coordination, focal weakness, headaches, loss of  "balance, numbness, paresthesias, sensory change, vertigo and weakness.   Psychiatric/Behavioral:  Negative for depression and suicidal ideas.    Allergic/Immunologic: Negative for persistent infections.     I have reviewed the following portions of the patient's history: problem list, current medications, allergies, past surgical history, past medical history, past social history, past family history, and ROS and confirm it's accurate.    Allergies:  Allergies   Allergen Reactions    Erenumab-Aooe Anaphylaxis     Aimovig    Other Shortness Of Breath     Nasal Spray- also caused rash    Antihistamines, Loratadine-Type Other (See Comments)     Made \"stuffy\"    Tizanidine Other (See Comments)     Other    Gabapentin Hives       Medications:      Current Outpatient Medications:     albuterol sulfate  (90 Base) MCG/ACT inhaler, EVERY 4 HOURS, Disp: , Rfl:     aspirin 81 MG chewable tablet, 1 tablet., Disp: , Rfl:     budesonide-formoterol (SYMBICORT) 80-4.5 MCG/ACT inhaler, Inhale 2 puffs 2 (Two) Times a Day., Disp: , Rfl:     buPROPion XL (WELLBUTRIN XL) 150 MG 24 hr tablet, Take 1 tablet by mouth Every Morning., Disp: , Rfl:     Cholecalciferol (Vitamin D3) 50 MCG (2000 UT) tablet, Take 1 tablet by mouth Daily., Disp: , Rfl:     cyanocobalamin (VITAMIN B-12) 1000 MCG tablet, Take 1 tablet by mouth Daily., Disp: , Rfl:     Deep Sea Nasal Spray 0.65 % nasal spray, USE 1 SPRAY IN EACH NOSTRIL 3 TIMES A DAY AS NEEDED, Disp: , Rfl:     Entresto 24-26 MG tablet, TAKE 1 TABLET BY MOUTH TWICE A DAY, Disp: 60 tablet, Rfl: 5    folic acid (FOLVITE) 1 MG tablet, Take 1 tablet by mouth Daily., Disp: , Rfl:     Ginger, Zingiber officinalis, (Ginger Root) 250 MG capsule, Take 2 capsules by mouth 3 (Three) Times a Day., Disp: 90 capsule, Rfl: 1    HYDROcodone-acetaminophen (NORCO) 7.5-325 MG per tablet, Take 1 tablet by mouth Every 6 (Six) Hours., Disp: , Rfl:     levothyroxine (SYNTHROID, LEVOTHROID) 25 MCG tablet, Take 2 " tablets by mouth Every Morning., Disp: , Rfl:     methocarbamol (ROBAXIN) 750 MG tablet, Take 1 tablet by mouth Every 12 (Twelve) Hours., Disp: , Rfl:     montelukast (SINGULAIR) 10 MG tablet, Take 1 tablet by mouth Every Night., Disp: , Rfl:     nitroglycerin (NITROSTAT) 0.4 MG SL tablet, 1 under the tongue as needed for angina, may repeat q5mins for up three doses, Disp: 25 tablet, Rfl: 1    ondansetron (ZOFRAN) 4 MG tablet, 1 tablet., Disp: , Rfl:     Ozempic, 0.25 or 0.5 MG/DOSE, 2 MG/3ML solution pen-injector, Inject 0.5 mg under the skin into the appropriate area as directed 1 (One) Time Per Week., Disp: , Rfl:     pantoprazole (Protonix) 40 MG EC tablet, Take 1 tablet by mouth Daily., Disp: 30 tablet, Rfl: 2    pregabalin (LYRICA) 300 MG capsule, Take 1 capsule by mouth Every 12 (Twelve) Hours., Disp: , Rfl:     Prucalopride Succinate 1 MG tablet, Take 2 mg by mouth Daily., Disp: 60 tablet, Rfl: 1    Rimegepant Sulfate (Nurtec) 75 MG tablet dispersible tablet, Take 1 tablet by mouth., Disp: , Rfl:     rosuvastatin (CRESTOR) 10 MG tablet, Take 1 tablet by mouth Daily., Disp: , Rfl:     Triamcinolone Acetonide 0.05 % ointment, apply to affected area twice a day, Disp: , Rfl:     zonisamide (ZONEGRAN) 25 MG capsule, 1 capsule. (Patient not taking: Reported on 11/22/2023), Disp: , Rfl:     History:   Past Medical History:   Diagnosis Date    Allergy to alpha-gal     Arthritis     Depression     Diabetes     Environmental allergies     Falls 10/24/2018    GERD (gastroesophageal reflux disease)     Heart murmur     Hyperlipidemia     Hypertension     Hypothyroidism     Lyme disease     Migraine     Peripheral vascular disease     Kelvin Mountain spotted fever     Vitamin B 12 deficiency        Past Surgical History:   Procedure Laterality Date    ACHILLES TENDON SURGERY Right     BREAST CYST EXCISION Left     CARDIAC CATHETERIZATION  03/16/2023    Normal coronaries.  EF 45%    CARDIOVASCULAR STRESS TEST  03/04/2023  "   anteroseptal, anterior infarct with dana-infarct ischemia    CHOLECYSTECTOMY      ECHO - CONVERTED  2023    EF 41-45%, saline test negative, valves normal    ECHO - CONVERTED  2023    Limited - EF 55%. LA- 4.0. Trace PE    ENDOSCOPY N/A 10/16/2018    Procedure: ESOPHAGOGASTRODUODENOSCOPY;  Surgeon: Brunner, Mark I, MD;  Location:  [facility];  Service: Gastroenterology    ENDOSCOPY N/A 2023    Procedure: ESOPHAGOGASTRODUODENOSCOPY;  Surgeon: Yas Murphy MD;  Location:  [facility];  Service: Gastroenterology;  Laterality: N/A;    HYSTERECTOMY      TRANSESOPHAGEAL ECHOCARDIOGRAM (NESSA)  2023    Dr. Alonso- EF 56-60%, no mass or thrombus, mild plaque aorrti arch and descending aorta    UPPER GASTROINTESTINAL ENDOSCOPY      US CAROTID UNILATERAL  2022    < 50% Bilaterally       Social History     Socioeconomic History    Marital status:     Number of children: 2   Tobacco Use    Smoking status: Never    Smokeless tobacco: Never   Vaping Use    Vaping Use: Never used   Substance and Sexual Activity    Alcohol use: No    Drug use: No    Sexual activity: Defer     Partners: Male        Family History   Problem Relation Age of Onset    Hypertension Mother     COPD Mother     Heart attack Mother     Other Father         / of exhaust fumes    Cancer Sister     Heart attack Brother     Colon cancer Neg Hx     Colon polyps Neg Hx     Esophageal cancer Neg Hx        Objective     Physical Exam:  Vitals:    23 1006 23 1007   BP: 130/80 140/80   BP Location: Left arm Right arm   Patient Position: Sitting Sitting   Pulse: 91    Temp: 98.4 °F (36.9 °C)    SpO2: 96%    Weight: 107 kg (236 lb)    Height: 175.3 cm (69\")  Comment: patient reported       Body mass index is 34.85 kg/m².    Physical Exam  Vitals reviewed.   Constitutional:       General: She is not in acute distress.  Pulmonary:      Effort: Pulmonary effort is normal.   Neurological:      " General: No focal deficit present.      Mental Status: She is alert and oriented to person, place, and time.   Psychiatric:         Mood and Affect: Mood normal.         Behavior: Behavior normal.         Thought Content: Thought content normal.         Judgment: Judgment normal.         Imaging/Labs:  CT Angio Abdominal Aorta Bilateral Iliofem Runoff With & Without Contrast  Result Date: 11/9/2023  1.  Unremarkable bilateral lower extremity CTA runoff. Three-vessel runoff is noted to the level below the ankle of both extremities. 2.  No significant abdominal or pelvic arterial vascular disease noted. No aneurysm or dissection. 3.  Other incidental/nonacute findings detailed above.   This report was finalized on 11/9/2023 2:31 PM by Dr. Tal Andino MD.      CT Angio Abdominal Aorta Bilateral Iliofem Runoff With & Without Contrast (11/09/2023 14:06)     ..I personally reviewed this report and imaging.  Assessment / Plan      Assessment / Plan:  PMH significant for chronic systolic heart failure, hypertension, embolic) for thyroidism, GERD with esophagitis, anxiety/depression, and gangrene.          1.  Gangrene of the right fourth toe at the distal tuft and right second toe.  2.  Neuropathy, severe  Originally seen on 3/1/2023 by Dr. Krause after being referred for onset of gangrenous changes of the right fourth toe at the distal tuft and right second toe.  Significant past history of having Lyme's disease as well as Kelvin [place] spotted fever from a tick bite over 5 years ago.  Extensive complicated medical history, please see above HPI for details.  Hospitalized at [facility] in March for gangrenous changes.  ID was consulted as well as cardiology.  Ultimately there was no need for surgical intervention of the ulcerations.    Seen in office on 4/5/2023 and placed on as-needed basis.   Last seen on 10/18 after referral back from PCP for toe discoloration.    ROSA/PVR obtained.  Right Toe ROSA 0.63;   "Left Toe ROSA 0.55 with monophasic wave forms.   Upon examination bilateral feet with discoloration, cool to touch, and abnormally thick toenails.   Presents today for follow up with imaging.    CTA as resulted above under imaging/labs with unremarkable findings and three vessel runoff to the level below the ankle of both extremities.   Mention of small hemangioma of Liver on CTA.  PCP to follow.   Reports severe neuropathy currently being treated with Lyrica.  Scheduled with Neurology (transferred from  to here) in December.    Continues to report discoloration \"black\" underneath toe nails.     Follow Up:   Will review with MD for any further recommendations from our standpoint.    Or sooner for any further concerns or worsening sign and symptoms.  Patient encouraged to call the office with any questions or concerns.     Thank you for allowing me to participate in your care.  Best Regards,    CARRIE Inman  [facility] Cardiothoracic Surgery  11/22/23  10:15 EST    "

## 2023-11-22 ENCOUNTER — OFFICE VISIT (OUTPATIENT)
Dept: CARDIAC SURGERY | Facility: CLINIC | Age: 53
End: 2023-11-22
Payer: MEDICARE

## 2023-11-22 VITALS
HEART RATE: 91 BPM | BODY MASS INDEX: 34.96 KG/M2 | WEIGHT: 236 LBS | HEIGHT: 69 IN | TEMPERATURE: 98.4 F | OXYGEN SATURATION: 96 % | DIASTOLIC BLOOD PRESSURE: 80 MMHG | SYSTOLIC BLOOD PRESSURE: 140 MMHG

## 2023-11-22 DIAGNOSIS — G62.9 NEUROPATHY: Primary | ICD-10-CM

## 2023-12-29 ENCOUNTER — OFFICE VISIT (OUTPATIENT)
Dept: NEUROLOGY | Facility: CLINIC | Age: 53
End: 2023-12-29
Payer: MEDICARE

## 2023-12-29 ENCOUNTER — LAB (OUTPATIENT)
Dept: LAB | Facility: HOSPITAL | Age: 53
End: 2023-12-29
Payer: MEDICARE

## 2023-12-29 DIAGNOSIS — M62.81 MUSCLE WEAKNESS: ICD-10-CM

## 2023-12-29 DIAGNOSIS — G62.9 POLYNEUROPATHY: ICD-10-CM

## 2023-12-29 DIAGNOSIS — R25.1 TREMOR: ICD-10-CM

## 2023-12-29 DIAGNOSIS — R41.89 COGNITIVE CHANGES: ICD-10-CM

## 2023-12-29 DIAGNOSIS — R26.9 GAIT ABNORMALITY: ICD-10-CM

## 2023-12-29 DIAGNOSIS — E53.8 B12 DEFICIENCY: ICD-10-CM

## 2023-12-29 DIAGNOSIS — E53.8 B12 DEFICIENCY: Primary | ICD-10-CM

## 2023-12-29 LAB
BASOPHILS # BLD AUTO: 0.03 10*3/MM3 (ref 0–0.2)
BASOPHILS NFR BLD AUTO: 0.4 % (ref 0–1.5)
DEPRECATED RDW RBC AUTO: 37.9 FL (ref 37–54)
EOSINOPHIL # BLD AUTO: 0.23 10*3/MM3 (ref 0–0.4)
EOSINOPHIL NFR BLD AUTO: 2.8 % (ref 0.3–6.2)
ERYTHROCYTE [DISTWIDTH] IN BLOOD BY AUTOMATED COUNT: 12.7 % (ref 12.3–15.4)
HCT VFR BLD AUTO: 39 % (ref 34–46.6)
HGB BLD-MCNC: 12.9 G/DL (ref 12–15.9)
IMM GRANULOCYTES # BLD AUTO: 0.03 10*3/MM3 (ref 0–0.05)
IMM GRANULOCYTES NFR BLD AUTO: 0.4 % (ref 0–0.5)
LYMPHOCYTES # BLD AUTO: 1.71 10*3/MM3 (ref 0.7–3.1)
LYMPHOCYTES NFR BLD AUTO: 20.5 % (ref 19.6–45.3)
MCH RBC QN AUTO: 27.8 PG (ref 26.6–33)
MCHC RBC AUTO-ENTMCNC: 33.1 G/DL (ref 31.5–35.7)
MCV RBC AUTO: 84.1 FL (ref 79–97)
MONOCYTES # BLD AUTO: 0.52 10*3/MM3 (ref 0.1–0.9)
MONOCYTES NFR BLD AUTO: 6.2 % (ref 5–12)
NEUTROPHILS NFR BLD AUTO: 5.82 10*3/MM3 (ref 1.7–7)
NEUTROPHILS NFR BLD AUTO: 69.7 % (ref 42.7–76)
NRBC BLD AUTO-RTO: 0 /100 WBC (ref 0–0.2)
PLATELET # BLD AUTO: 302 10*3/MM3 (ref 140–450)
PMV BLD AUTO: 10.9 FL (ref 6–12)
RBC # BLD AUTO: 4.64 10*6/MM3 (ref 3.77–5.28)
VIT B12 BLD-MCNC: 811 PG/ML (ref 211–946)
WBC NRBC COR # BLD AUTO: 8.34 10*3/MM3 (ref 3.4–10.8)

## 2023-12-29 PROCEDURE — 82607 VITAMIN B-12: CPT

## 2023-12-29 PROCEDURE — 83921 ORGANIC ACID SINGLE QUANT: CPT

## 2023-12-29 PROCEDURE — 85025 COMPLETE CBC W/AUTO DIFF WBC: CPT

## 2023-12-29 PROCEDURE — 36415 COLL VENOUS BLD VENIPUNCTURE: CPT

## 2023-12-29 NOTE — PROGRESS NOTES
Neuro Office Visit      Encounter Date: 2023   Patient Name: Bryanna Schwartz  : 1970   MRN: [id number]   PCP:  Baljeet Manley APRN     Chief Complaint:    Chief Complaint   Patient presents with    Tremors       History of Present Illness: Bryanna Schwartz is a 53 y.o. female who is here today in Neurology for  tremors and neuropathy    PMH of GERD without esophagitis, anxiety and depression, hyperlipidemia, hypertension, migraines, hypothyroidism, embolic infarction, dehydration, electrolyte abnormality, chronic systolic heart failure, history of severe acute respiratory syndrome coronavirus 2, low back pain, obesity, peripheral vascular disease, type 2 diabetes, asthma, mood disorder, depression    [place] spotted fever.  Patient was seen by primary care on 2023 and at that visit reported pain in her hands and like to discuss increasing dose of Lyrica.    Per review of prior neurology notes at     Ruby Ferguson MD 2023:  Bryanna Schwartz presents to the [facility] as a returning patient today with/for Follow-up (Six month follow up). Follow up for sequela of severe Vitamin B12 deficiency with SCD, polyneuropathy and cognitive impairment.     HPI   Mrs. Schwartz is a 52 year-old right-handed woman who continues to recover from severe vitamin B12 deficiency in 2018 complicated by subacute combined degeneration and cognitive impairment. She presents to neurology (neuromuscular) telehealth clinic for follow-up of this. She was last seen in clinic on 2022. (Of note, she is also treated for headaches by Elizabeth Torrez PA-C).    At that last visit, she continued to complain of neuropathic pain in the distal extremities (fingers and toes) that had improved, but was still debilitating. I continued her on pregabalin 250mg po bid and ordered compounded analgesic cream consisting of Meloxicam, lidocaine, prilocaine, baclofen, lamotrigine, gabapentin (Pain cream  #4 [facility]). While the cream helped, she stopped it recently due to development of gangrene on her toes (see below). The pain is prominently in her fingers and toes, but intermittently she experiences shooting pains up her legs. The pregabalin still helps with her pain somewhat, but does not totally alleviate it. She continues to take vitamin B12 supplements, but 4-5 months ago she said her PCP discontinued injections and put her on oral vitamin B12.    She ambulates with a 4-pronged cane for gait imbalance due to neuropathy.    I ordered follow-up neuropsychological evaluation to assess for improvement of her cognition since SCD is being treated, but that is not scheduled until 11/15/2023. Her  thinks that her memory has improved, but patient thinks that she is not fully back to her baseline before the illness.     Since the last visit, she has had several illnesses, treated at [facility] and [facility] which I have reviewed in Care Everywhere. She has chronic systolic heart failure, hypertension, anxiety, depression and gangrene in the right fourth and right second toe. Per [facility] Cardiothoracic Surgery Note dated 4/5/2023, there was no need for surgical intervention and her toes improved with medical management and physical therapy. She continues to undergo physical therapy at [facility]. She says this is helping.    For diabetes and weight loss, she is now on Ozempic.. She said she has lost weight (about 20 lb in 3 months).     PLAN  Summary of plan:    Labs today - if B12 level is decreasing and/or MMA is elevated, will need to stop oral B12 and resum injection    Increase pregabalin to 300mg po bid to help with her paresthesias    Encouraged patient that her exam is showing improvements every visit     Elizabeth Torrez PA-C 8/18/2023:  Ms. Schwartz is a 54yo female who we follow for history of chronic migraine and who is followed in neuromuscluar clinic for poly neuropathy from B12  deficiency. She reports that after developing an allergy to Aimovig she discontinued this medication. I would be cautious to start any additional CGRP inhibitors. For now she will continue zonisamide 100mg qam and 200mg at bedtime for migraine preventative and naproxen 750mg PRN for acute headaches up to 3 times per week as needed.    Today Ms. Schwartz reports for follow up. She states that her headaches have been more severe. She is having about 4 headaches per month, but they do not respond to NSAIDs. She has new diagnoses of gastroporesis and constipation and cardiac diagnoses of primary HTN and nonischemic cardiomyopathy.     PLAN  Diagnoses and all orders for this visit:  Migraine without aura and without status migrainosus, not intractable  Other orders  - Rimegepant Sulfate (Nurtec) 75 MG tablet dispersible; Take 1 tablet (75 mg) by mouth if needed (Take one at onset of headache. Do not exceed 75mg in 24 hours.).    Ms. Schwartz is a 52yo female who we follow for chronic migraine. She reports that her headaches have been more severe recently and have not been responding to her acute medication as well. I would like to start Nurtec 75mg as needed for acute headaches. The overall frequency is about one per week, so I will continue her current preventative plan of zonisamide 100mg qam and 200mg at bedtime and Cymbalta 60mg daily. We will follow up in 3 months for medication management.     ___________________________________________________________________________________________________________________________  Initial visit with me 12/29/2023:  She is in clinic with her  to discuss tremors and neuropathy. I have reviewed neurology notes per Dr. Ferguson and Elizabeth Torrez PA-C as above, patient indicates that she will continue to see  for migraines but needed to be seen for neuropathy and tremors at [facility].     She reports severe neuropathy despite Lyrica 300 mg BID - bilateral hands and feet, no feeling  "in toes. She has allergy to Gabapentin. She denies sedation from Lyrica. She reports that she has not been as consistent with B12 injections lately and wonders if that may be contributing, she states that she is supposed to be getting weekly B12 injections per PCP but hasn't gone to have it yet this week. B12 was previously checked on 4/25/2023 and at that time was 365, methylmalonic acid 257.    She had lyme disease and [place] spotted fever about 5 years ago, she also has Alpha Gal. She also had severe B12 deficiency at the same time. States that her symptoms started at that time, she has seen some improvement but was sick earlier this year and feels that her progress experienced a setback this past summer. She has also been taking CBD oil which she believes is helping. She feels that her cognition and memory continue to be poor -  manages medications/finances, no longer driving. She was supposed to undergo neuropsych testing at  in November of this year but did not keep appointment due to feeling poorly. She is having cataract removal surgery next week.     She started having tremors of chest and legs - stress worsens this - tremors have been present off/on over the last 5 years and has started to worsen in the last 3-4 months. She denies jaw tremors or tremors of her legs. Sleep is off/on - often times sleep is interrupted by neuropathic pain. Hands in front of a cold fan helps with the neuropathic pain as well.     She also follows with cardiothoracic surgery and was seen in clinic on 11/22/2023 - per review of their documentation \"ROSA/PVR obtained.  Right Toe ROSA 0.63;  Left Toe ROSA 0.55 with monophasic wave forms.   Upon examination bilateral feet with discoloration, cool to touch, and abnormally thick toenails.   CTA as resulted above under imaging/labs with unremarkable findings and three vessel runoff to the level below the ankle of both extremities\".     She is in PT for muscle weakness " and gait abnormality and is requesting new order be sent to PT for continued therapy.      Subjective      Review of Systems   Constitutional:  Positive for fatigue.   Respiratory: Negative.     Cardiovascular:         Follows with CT surgery   Musculoskeletal:  Positive for gait problem.   Skin:  Positive for color change.        Cold feet   Allergic/Immunologic: Negative.    Neurological:  Positive for tremors, weakness, numbness and headaches.        Memory loss   Hematological: Negative.    Psychiatric/Behavioral:  Positive for sleep disturbance.           Past Medical History:   Past Medical History:   Diagnosis Date    Allergy to alpha-gal     Arthritis     Depression     Diabetes     Environmental allergies     Falls 10/24/2018    GERD (gastroesophageal reflux disease)     Heart murmur     Hyperlipidemia     Hypertension     Hypothyroidism     Lyme disease     Migraine     Peripheral vascular disease     [place] spotted fever     Vitamin B 12 deficiency        Past Surgical History:   Past Surgical History:   Procedure Laterality Date    ACHILLES TENDON SURGERY Right     BREAST CYST EXCISION Left     CARDIAC CATHETERIZATION  03/16/2023    Normal coronaries.  EF 45%    CARDIOVASCULAR STRESS TEST  03/04/2023    anteroseptal, anterior infarct with dana-infarct ischemia    CHOLECYSTECTOMY      ECHO - CONVERTED  03/02/2023    EF 41-45%, saline test negative, valves normal    ECHO - CONVERTED  08/14/2023    Limited - EF 55%. LA- 4.0. Trace PE    ENDOSCOPY N/A 10/16/2018    Procedure: ESOPHAGOGASTRODUODENOSCOPY;  Surgeon: Brunner, Mark I, MD;  Location:  [facility];  Service: Gastroenterology    ENDOSCOPY N/A 07/27/2023    Procedure: ESOPHAGOGASTRODUODENOSCOPY;  Surgeon: Yas Murphy MD;  Location:  [facility];  Service: Gastroenterology;  Laterality: N/A;    HYSTERECTOMY      TRANSESOPHAGEAL ECHOCARDIOGRAM (NESSA)  03/07/2023    Dr. Alonso- EF 56-60%, no mass or thrombus, mild plaque aorrti arch and  descending aorta    UPPER GASTROINTESTINAL ENDOSCOPY      US CAROTID UNILATERAL  2022    < 50% Bilaterally       Family History:   Family History   Problem Relation Age of Onset    Hypertension Mother     COPD Mother     Heart attack Mother     Other Father         / of exhaust fumes    Cancer Sister     Heart attack Brother     Colon cancer Neg Hx     Colon polyps Neg Hx     Esophageal cancer Neg Hx        Social History:   Social History     Socioeconomic History    Marital status:     Number of children: 2   Tobacco Use    Smoking status: Never     Passive exposure: Never    Smokeless tobacco: Never    Tobacco comments:     Ok   Vaping Use    Vaping Use: Never used   Substance and Sexual Activity    Alcohol use: No    Drug use: No    Sexual activity: Defer     Partners: Male       Medications:     Current Outpatient Medications:     albuterol sulfate  (90 Base) MCG/ACT inhaler, EVERY 4 HOURS, Disp: , Rfl:     aspirin 81 MG chewable tablet, 1 tablet., Disp: , Rfl:     budesonide-formoterol (SYMBICORT) 80-4.5 MCG/ACT inhaler, Inhale 2 puffs 2 (Two) Times a Day., Disp: , Rfl:     buPROPion XL (WELLBUTRIN XL) 150 MG 24 hr tablet, Take 1 tablet by mouth Every Morning., Disp: , Rfl:     Cholecalciferol (Vitamin D3) 50 MCG (2000 UT) tablet, Take 1 tablet by mouth Daily., Disp: , Rfl:     Deep Sea Nasal Spray 0.65 % nasal spray, USE 1 SPRAY IN EACH NOSTRIL 3 TIMES A DAY AS NEEDED, Disp: , Rfl:     Entresto 24-26 MG tablet, TAKE 1 TABLET BY MOUTH TWICE A DAY, Disp: 60 tablet, Rfl: 5    folic acid (FOLVITE) 1 MG tablet, Take 1 tablet by mouth Daily., Disp: , Rfl:     Ginger, Zingiber officinalis, (Ginger Root) 250 MG capsule, Take 2 capsules by mouth 3 (Three) Times a Day., Disp: 90 capsule, Rfl: 1    HYDROcodone-acetaminophen (NORCO) 7.5-325 MG per tablet, Take 1 tablet by mouth Every 6 (Six) Hours., Disp: , Rfl:     levothyroxine (SYNTHROID, LEVOTHROID) 25 MCG tablet, Take 2 tablets by  "mouth Every Morning., Disp: , Rfl:     methocarbamol (ROBAXIN) 750 MG tablet, Take 1 tablet by mouth Every 12 (Twelve) Hours., Disp: , Rfl:     montelukast (SINGULAIR) 10 MG tablet, Take 1 tablet by mouth Every Night., Disp: , Rfl:     nitroglycerin (NITROSTAT) 0.4 MG SL tablet, 1 under the tongue as needed for angina, may repeat q5mins for up three doses, Disp: 25 tablet, Rfl: 1    ondansetron (ZOFRAN) 4 MG tablet, 1 tablet., Disp: , Rfl:     Ozempic, 0.25 or 0.5 MG/DOSE, 2 MG/3ML solution pen-injector, Inject 0.5 mg under the skin into the appropriate area as directed 1 (One) Time Per Week., Disp: , Rfl:     pantoprazole (Protonix) 40 MG EC tablet, Take 1 tablet by mouth Daily., Disp: 30 tablet, Rfl: 2    pregabalin (LYRICA) 300 MG capsule, Take 1 capsule by mouth Every 12 (Twelve) Hours., Disp: , Rfl:     rosuvastatin (CRESTOR) 10 MG tablet, Take 1 tablet by mouth Daily., Disp: , Rfl:     zonisamide (ZONEGRAN) 25 MG capsule, 1 capsule., Disp: , Rfl:     Cyanocobalamin 1000 MCG/ML kit, Inject 1 mL as directed 1 (One) Time Per Week., Disp: , Rfl:     Prucalopride Succinate 1 MG tablet, Take 2 mg by mouth Daily., Disp: 60 tablet, Rfl: 1    Allergies:   Allergies   Allergen Reactions    Erenumab-Aooe Anaphylaxis     Aimovig    Other Shortness Of Breath     Nasal Spray- also caused rash    Antihistamines, Loratadine-Type Other (See Comments)     Made \"stuffy\"    Tizanidine Other (See Comments)     Other    Gabapentin Hives       Objective     Objective:    Physical Exam  HENT:      Head: Normocephalic and atraumatic.      Mouth/Throat:      Mouth: Mucous membranes are moist.      Pharynx: Oropharynx is clear.   Skin:     Comments: BLE (feet) cool to touch   Neurological:      Mental Status: She is alert.          Neurology Exam:    General apperance: NAD.     Mental status: Alert, awake and oriented to time place and person.    Fund of knowledge:  Normal.     Language and Speech: No aphasia or dysarthria.    Naming , " Repitition and Comprehension:  Can name objects, repeat a sentence and follow commands. Speech is clear and fluent with good repetition, comprehension, and naming.    Cranial Nerves:   CN II: Visual fields are full. Intact.  Pupils - PERRLA  CN III, IV and VI: Extraocular movements are intact. Normal saccades.   CN V: Facial sensation is intact.   CN VII: Muscles of facial expression reveal no asymmetry. Intact.   CN VIII: Hearing is intact.   CN IX and X: Palate elevates symmetrically. Intact  CN XI: Shoulder shrug is intact.   CN XII: Tongue is midline without evidence of atrophy or fasciculation.     Motor:  Right UE muscle strength 5/5. Normal tone.     Left UE muscle strength 5/5. Normal tone.      Right LE muscle strength 5/5. Normal tone.     Left LE muscle strength 5/5. Normal tone.    No resting or action tremor observed today    Sensory: Decreased sensation to light touch BLE (feet) and BUE (hands)  Proprioception not intact  Diminished vibratory sense BLE    DTRs: 1+ bilaterally in upper and lower extremities.    Babinski: Negative bilaterally.    Coordination: Normal finger-to-nose, heel to shin B/L.    Gait: Ambulates with assistance of cane, slow cautious gait.        Results:   Imaging:   CT Angio Abdominal Aorta Bilateral Iliofem Runoff With & Without Contrast    Result Date: 11/9/2023  1.  Unremarkable bilateral lower extremity CTA runoff. Three-vessel runoff is noted to the level below the ankle of both extremities. 2.  No significant abdominal or pelvic arterial vascular disease noted. No aneurysm or dissection. 3.  Other incidental/nonacute findings detailed above.   This report was finalized on 11/9/2023 2:31 PM by Dr. Tal Andino MD.         Labs:   Lab Results   Component Value Date    GLUCOSE 102 (H) 07/27/2023    BUN 4 (L) 07/27/2023    CREATININE 0.57 07/27/2023    EGFR 108.8 07/27/2023    BCR 7.0 07/27/2023    K 3.2 (L) 07/27/2023    CO2 27.0 07/27/2023    CALCIUM 8.0 (L) 07/27/2023     ALBUMIN 3.0 (L) 07/25/2023    BILITOT 0.7 07/25/2023    AST 23 07/25/2023    ALT 18 07/25/2023         Assessment / Plan      Assessment/Plan:   Diagnoses and all orders for this visit:    1. B12 deficiency (Primary)  -     Vitamin B12; Future  -     Methylmalonic Acid, Serum; Future  -     CBC & Differential; Future  -     EMG & Nerve Conduction Test; Future    2. Muscle weakness  -     Ambulatory Referral to Physical Therapy Evaluate and treat    3. Gait abnormality  -     Ambulatory Referral to Physical Therapy Evaluate and treat    4. Cognitive changes  -     NeuroPsych Testing; Future    5. Polyneuropathy  -     EMG & Nerve Conduction Test; Future    6. Tremor  -     EMG & Nerve Conduction Test; Future       Bryanna Schwartz is in neurology clinic to establish care for tremors and neuropathy. She was previously followed by Dr. Ferguson at  for severe vitamin B12 deficiency in 2018 complicated by subacute combined degeneration and cognitive impairmentand is currently following with Elizabeth Torrez PA-C for her migraine care.  She reports that per primary care she has been prescribed vitamin B12 weekly 1000 mcg injections but that she does not always take the injections.  She has noted that since July of this year when she became ill again that she is experienced decline in her neuropathy control and cognition.  As her symptoms initially began with B12 deficiency will recheck B12 and methylmalonic acid today, she may need to increase frequency of B12 injections.  I have also ordered PT to continue for gait abnormality and muscle weakness.  I have also reordered neuropsych testing for continued cognitive changes.  She reports that she is currently receiving Lyrica 300 mg twice daily per primary care, I have instructed her that if this prescription needs to be transition to neurology to please let me know, I am concerned that if B12 is low that may also be why her neuropathy symptoms have worsened recently.  I have also  ordered EMG due to concern for tremors -patient reports tremors but description is somewhat unclear, I also want to perform EMG for concern of potential muscle fasciculations, no tremors or fasciculations observed on today's exam.  Will plan to see Bryanna back in clinic in approximately 8 weeks, I have asked that she please schedule with me in 8 weeks and also schedule with Dr. Adrian ross available as I would like for her to follow-up with him as well.    Patient Education:       Reviewed medications, potential side effects and signs and symptoms to report. Discussed risk versus benefits of treatment plan with patient and/or family-including medications, labs and radiology that may be ordered. Addressed questions and concerns during visit. Patient and/or family verbalized understanding and agree with plan. Instructed to call the office with any questions and report to ER with any life-threatening symptoms.     Follow Up:   Return in about 8 weeks (around 2/23/2024).    I spent  83  minutes in the care of this patient. I personally spent 50 percent of this time counseling and discussing diagnosis, diagnostic testing, evaluation, treatment options, and management .       During this visit the following were done:  Labs Reviewed [x]    Labs Ordered [x]    Radiology Reports Reviewed []    Radiology Ordered []    PCP Records Reviewed [x]    Referring Provider Records Reviewed []    ER Records Reviewed []    Hospital Records Reviewed []    History Obtained From Family []    Radiology Images Reviewed []    Other Reviewed [x] neurology notes from   Records Requested []      CARRIE Florentino  [facility] NE[facility]  [address] [facility] [address]  [facility] [id number] [phone]

## 2024-01-02 ENCOUNTER — TELEPHONE (OUTPATIENT)
Dept: NEUROLOGY | Facility: CLINIC | Age: 54
End: 2024-01-02
Payer: MEDICARE

## 2024-01-02 NOTE — TELEPHONE ENCOUNTER
----- Message from CARRIE Florentino sent at 1/2/2024  8:08 AM EST -----  Please notify Bryanna that her vitamin B12 level is good - it is 811 - I would recommend continuing current B12 injection schedule per her PCP.

## 2024-01-03 ENCOUNTER — TELEPHONE (OUTPATIENT)
Dept: NEUROLOGY | Facility: CLINIC | Age: 54
End: 2024-01-03
Payer: MEDICARE

## 2024-01-03 LAB — METHYLMALONATE SERPL-SCNC: 125 NMOL/L (ref 0–378)

## 2024-01-03 NOTE — TELEPHONE ENCOUNTER
----- Message from CARRIE Florentino sent at 1/3/2024  8:08 AM EST -----  Methylmalonic acid level is normal.

## 2024-01-15 ENCOUNTER — DOCUMENTATION (OUTPATIENT)
Dept: NEUROLOGY | Facility: CLINIC | Age: 54
End: 2024-01-15
Payer: MEDICARE

## 2024-01-18 ENCOUNTER — DOCUMENTATION (OUTPATIENT)
Dept: NEUROLOGY | Facility: CLINIC | Age: 54
End: 2024-01-18
Payer: MEDICARE

## 2024-01-30 ENCOUNTER — TELEPHONE (OUTPATIENT)
Dept: NEUROLOGY | Facility: CLINIC | Age: 54
End: 2024-01-30
Payer: MEDICARE

## 2024-01-30 NOTE — TELEPHONE ENCOUNTER
Provider: DEEPIKA LINDSAY    Caller: PATIENT    Relationship to Patient: SELF    Phone Number: [phone]    Reason for Call: PT WAS TOLD TO CALL WHEN SHE CHANGED INSURANCE TO SCHEDULE EMG NCV.  PT HAS CHANGED TO [employer] THAT WILL START ON 2/1/24.      PLEASE REVIEW AND ADVISE     THANK YOU

## 2024-02-07 ENCOUNTER — DOCUMENTATION (OUTPATIENT)
Dept: NEUROLOGY | Facility: CLINIC | Age: 54
End: 2024-02-07
Payer: MEDICARE

## 2024-02-19 RX ORDER — SACUBITRIL AND VALSARTAN 24; 26 MG/1; MG/1
TABLET, FILM COATED ORAL
Qty: 60 TABLET | Refills: 5 | Status: SHIPPED | OUTPATIENT
Start: 2024-02-19

## 2024-02-26 ENCOUNTER — OFFICE VISIT (OUTPATIENT)
Dept: NEUROLOGY | Facility: CLINIC | Age: 54
End: 2024-02-26
Payer: MEDICARE

## 2024-02-26 VITALS
OXYGEN SATURATION: 96 % | WEIGHT: 236 LBS | HEIGHT: 69 IN | BODY MASS INDEX: 34.96 KG/M2 | SYSTOLIC BLOOD PRESSURE: 122 MMHG | DIASTOLIC BLOOD PRESSURE: 82 MMHG | TEMPERATURE: 96 F

## 2024-02-26 DIAGNOSIS — E53.8 B12 DEFICIENCY: ICD-10-CM

## 2024-02-26 DIAGNOSIS — R41.89 COGNITIVE CHANGES: ICD-10-CM

## 2024-02-26 DIAGNOSIS — R25.1 TREMOR: ICD-10-CM

## 2024-02-26 DIAGNOSIS — G62.9 POLYNEUROPATHY: Primary | ICD-10-CM

## 2024-02-26 DIAGNOSIS — R26.9 GAIT ABNORMALITY: ICD-10-CM

## 2024-02-26 RX ORDER — LORATADINE 10 MG/1
TABLET ORAL
COMMUNITY
Start: 2024-01-28

## 2024-02-26 RX ORDER — DULOXETIN HYDROCHLORIDE 20 MG/1
20 CAPSULE, DELAYED RELEASE ORAL DAILY
Qty: 30 CAPSULE | Refills: 2 | Status: SHIPPED | OUTPATIENT
Start: 2024-02-26 | End: 2024-02-29 | Stop reason: ALTCHOICE

## 2024-02-26 RX ORDER — PREGABALIN 300 MG/1
300 CAPSULE ORAL 2 TIMES DAILY
Qty: 60 CAPSULE | Refills: 2 | Status: SHIPPED | OUTPATIENT
Start: 2024-02-26 | End: 2024-05-26

## 2024-02-26 RX ORDER — FLUTICASONE PROPIONATE 50 MCG
1 SPRAY, SUSPENSION (ML) NASAL 2 TIMES DAILY
COMMUNITY
Start: 2024-01-26 | End: 2024-02-29 | Stop reason: ALTCHOICE

## 2024-02-26 NOTE — PROGRESS NOTES
Neuro Office Visit      Encounter Date: 2024   Patient Name: Bryanna Schwartz  : 1970   MRN: [id number]   PCP:  Baljeet Manley APRN     Chief Complaint:    Chief Complaint   Patient presents with    Follow-up       History of Present Illness: Bryanna Schwartz is a 53 y.o. female who is here today in Neurology for tremors and neuropathy     Bryanna Schwartz is a 53 y.o. female who is here today in Neurology for  tremors and neuropathy     PMH of GERD without esophagitis, anxiety and depression, hyperlipidemia, hypertension, migraines, hypothyroidism, embolic infarction, dehydration, electrolyte abnormality, chronic systolic heart failure, history of severe acute respiratory syndrome coronavirus 2, low back pain, obesity, peripheral vascular disease, type 2 diabetes, asthma, mood disorder, depression     [place] spotted fever.  Patient was seen by primary care on 2023 and at that visit reported pain in her hands and like to discuss increasing dose of Lyrica.     Per review of prior neurology notes at      Ruby Ferguson MD 2023:  Bryanna Schwartz presents to the [facility] as a returning patient today with/for Follow-up (Six month follow up). Follow up for sequela of severe Vitamin B12 deficiency with SCD, polyneuropathy and cognitive impairment.     HPI   Mrs. Schwartz is a 52 year-old right-handed woman who continues to recover from severe vitamin B12 deficiency in 2018 complicated by subacute combined degeneration and cognitive impairment. She presents to neurology (neuromuscular) telehealth clinic for follow-up of this. She was last seen in clinic on 2022. (Of note, she is also treated for headaches by Elizabeth Torrez PA-C).    At that last visit, she continued to complain of neuropathic pain in the distal extremities (fingers and toes) that had improved, but was still debilitating. I continued her on pregabalin 250mg po bid and ordered compounded  analgesic cream consisting of Meloxicam, lidocaine, prilocaine, baclofen, lamotrigine, gabapentin (Pain cream #4 [facility]). While the cream helped, she stopped it recently due to development of gangrene on her toes (see below). The pain is prominently in her fingers and toes, but intermittently she experiences shooting pains up her legs. The pregabalin still helps with her pain somewhat, but does not totally alleviate it. She continues to take vitamin B12 supplements, but 4-5 months ago she said her PCP discontinued injections and put her on oral vitamin B12.    She ambulates with a 4-pronged cane for gait imbalance due to neuropathy.    I ordered follow-up neuropsychological evaluation to assess for improvement of her cognition since SCD is being treated, but that is not scheduled until 11/15/2023. Her  thinks that her memory has improved, but patient thinks that she is not fully back to her baseline before the illness.     Since the last visit, she has had several illnesses, treated at [facility] and [facility] which I have reviewed in Care Everywhere. She has chronic systolic heart failure, hypertension, anxiety, depression and gangrene in the right fourth and right second toe. Per [facility] Cardiothoracic Surgery Note dated 4/5/2023, there was no need for surgical intervention and her toes improved with medical management and physical therapy. She continues to undergo physical therapy at [facility]. She says this is helping.    For diabetes and weight loss, she is now on Ozempic.. She said she has lost weight (about 20 lb in 3 months).      PLAN  Summary of plan:    Labs today - if B12 level is decreasing and/or MMA is elevated, will need to stop oral B12 and resum injection    Increase pregabalin to 300mg po bid to help with her paresthesias    Encouraged patient that her exam is showing improvements every visit      Elizabeth Torrez PA-C 8/18/2023:  Ms. Schwartz is a 52yo female who we  follow for history of chronic migraine and who is followed in neuromuscluar clinic for poly neuropathy from B12 deficiency. She reports that after developing an allergy to Aimovig she discontinued this medication. I would be cautious to start any additional CGRP inhibitors. For now she will continue zonisamide 100mg qam and 200mg at bedtime for migraine preventative and naproxen 750mg PRN for acute headaches up to 3 times per week as needed.    Today Ms. Schwartz reports for follow up. She states that her headaches have been more severe. She is having about 4 headaches per month, but they do not respond to NSAIDs. She has new diagnoses of gastroporesis and constipation and cardiac diagnoses of primary HTN and nonischemic cardiomyopathy.      PLAN  Diagnoses and all orders for this visit:  Migraine without aura and without status migrainosus, not intractable  Other orders  - Rimegepant Sulfate (Nurtec) 75 MG tablet dispersible; Take 1 tablet (75 mg) by mouth if needed (Take one at onset of headache. Do not exceed 75mg in 24 hours.).    Ms. Schwartz is a 54yo female who we follow for chronic migraine. She reports that her headaches have been more severe recently and have not been responding to her acute medication as well. I would like to start Nurtec 75mg as needed for acute headaches. The overall frequency is about one per week, so I will continue her current preventative plan of zonisamide 100mg qam and 200mg at bedtime and Cymbalta 60mg daily. We will follow up in 3 months for medication management.      ___________________________________________________________________________________________________________________________  Initial visit with me 12/29/2023:  She is in clinic with her  to discuss tremors and neuropathy. I have reviewed neurology notes per Dr. Ferguson and Elizabeth Torrez PA-C as above, patient indicates that she will continue to see [facility] for migraines but needed to be seen for neuropathy and  "tremors at [facility].      She reports severe neuropathy despite Lyrica 300 mg BID - bilateral hands and feet, no feeling in toes. She has allergy to Gabapentin. She denies sedation from Lyrica. She reports that she has not been as consistent with B12 injections lately and wonders if that may be contributing, she states that she is supposed to be getting weekly B12 injections per PCP but hasn't gone to have it yet this week. B12 was previously checked on 4/25/2023 and at that time was 365, methylmalonic acid 257.     She had lyme disease and [place] spotted fever about 5 years ago, she also has Alpha Gal. She also had severe B12 deficiency at the same time. States that her symptoms started at that time, she has seen some improvement but was sick earlier this year and feels that her progress experienced a setback this past summer. She has also been taking CBD oil which she believes is helping. She feels that her cognition and memory continue to be poor -  manages medications/finances, no longer driving. She was supposed to undergo neuropsych testing at  in November of this year but did not keep appointment due to feeling poorly. She is having cataract removal surgery next week.      She started having tremors of chest and legs - stress worsens this - tremors have been present off/on over the last 5 years and has started to worsen in the last 3-4 months. She denies jaw tremors or tremors of her legs. Sleep is off/on - often times sleep is interrupted by neuropathic pain. Hands in front of a cold fan helps with the neuropathic pain as well.      She also follows with cardiothoracic surgery and was seen in clinic on 11/22/2023 - per review of their documentation \"ROSA/PVR obtained.  Right Toe ROSA 0.63;  Left Toe ROSA 0.55 with monophasic wave forms.   Upon examination bilateral feet with discoloration, cool to touch, and abnormally thick toenails.   CTA as resulted above under imaging/labs with unremarkable " "findings and three vessel runoff to the level below the ankle of both extremities\".      She is in PT for muscle weakness and gait abnormality and is requesting new order be sent to PT for continued therapy.     Current visit 2/26/2024:  Bryanna Schwartz returns to neurology clinic for continued evaluation of tremors and neuropathy. As above the previously followed with Dr. Ferguson at  and still follows with headache clinic - Elizabeth ASHLEY at . Vitamin B12 was 811, methylmalonic acid level was also normal. She has been receiving weekly B12 injections per her PCP. EMG and neuropsych pending. For her neuropathy she is currently taking Lyrica 300 mg BID from PCP - she reports that her hands and feet feel hot, numb to wrist level and ankles bilaterally. Gabapentin caused hives historically. It also appears that she may have been taking Cymbalta previously for migraines but she is unsure when she stopped taking this medication. She feels that neuropathy symptoms have worsened over time. She uses a cold water bottle to try to help with the symptoms. She also notes tremors in arms, legs, chest - feels that these are worsening. Some word finding difficulty.   She has been going to PT and does feel that her walking has improved - no longer using a cane/walker.       Subjective      Review of Systems   Eyes: Negative.    Respiratory: Negative.     Cardiovascular: Negative.    Gastrointestinal: Negative.    Endocrine: Negative.    Genitourinary: Negative.    Musculoskeletal:  Positive for gait problem.   Skin: Negative.    Allergic/Immunologic: Negative.    Neurological:  Positive for tremors, weakness, numbness and headaches.   Hematological: Negative.    Psychiatric/Behavioral:  The patient is nervous/anxious.           Past Medical History:   Past Medical History:   Diagnosis Date    Allergy to alpha-gal     Arthritis     Depression     Diabetes     Environmental allergies     Falls 10/24/2018    GERD (gastroesophageal " reflux disease)     Heart murmur     Hyperlipidemia     Hypertension     Hypothyroidism     Lyme disease     Migraine     Peripheral vascular disease     [place] spotted fever     Vitamin B 12 deficiency        Past Surgical History:   Past Surgical History:   Procedure Laterality Date    ACHILLES TENDON SURGERY Right     BREAST CYST EXCISION Left     CARDIAC CATHETERIZATION  2023    Normal coronaries.  EF 45%    CARDIOVASCULAR STRESS TEST  2023    anteroseptal, anterior infarct with dana-infarct ischemia    CHOLECYSTECTOMY      ECHO - CONVERTED  2023    EF 41-45%, saline test negative, valves normal    ECHO - CONVERTED  2023    Limited - EF 55%. LA- 4.0. Trace PE    ENDOSCOPY N/A 10/16/2018    Procedure: ESOPHAGOGASTRODUODENOSCOPY;  Surgeon: Brunner, Mark I, MD;  Location:  [facility];  Service: Gastroenterology    ENDOSCOPY N/A 2023    Procedure: ESOPHAGOGASTRODUODENOSCOPY;  Surgeon: Yas Murphy MD;  Location:  [facility];  Service: Gastroenterology;  Laterality: N/A;    HYSTERECTOMY      TRANSESOPHAGEAL ECHOCARDIOGRAM (NESSA)  2023    Dr. Alonso- EF 56-60%, no mass or thrombus, mild plaque aorrti arch and descending aorta    UPPER GASTROINTESTINAL ENDOSCOPY      US CAROTID UNILATERAL  2022    < 50% Bilaterally       Family History:   Family History   Problem Relation Age of Onset    Hypertension Mother     COPD Mother     Heart attack Mother     Other Father         / of exhaust fumes    Cancer Sister     Heart attack Brother     Colon cancer Neg Hx     Colon polyps Neg Hx     Esophageal cancer Neg Hx        Social History:   Social History     Socioeconomic History    Marital status:     Number of children: 2   Tobacco Use    Smoking status: Never     Passive exposure: Never    Smokeless tobacco: Never    Tobacco comments:     Ok   Vaping Use    Vaping Use: Never used   Substance and Sexual Activity    Alcohol use: No    Drug use:  No    Sexual activity: Defer     Partners: Male       Medications:     Current Outpatient Medications:     albuterol sulfate  (90 Base) MCG/ACT inhaler, EVERY 4 HOURS, Disp: , Rfl:     aspirin 81 MG chewable tablet, 1 tablet., Disp: , Rfl:     budesonide-formoterol (SYMBICORT) 80-4.5 MCG/ACT inhaler, Inhale 2 puffs 2 (Two) Times a Day., Disp: , Rfl:     buPROPion XL (WELLBUTRIN XL) 150 MG 24 hr tablet, Take 1 tablet by mouth Every Morning., Disp: , Rfl:     Cholecalciferol (Vitamin D3) 50 MCG (2000 UT) tablet, Take 1 tablet by mouth Daily., Disp: , Rfl:     Cyanocobalamin 1000 MCG/ML kit, Inject 1 mL as directed 1 (One) Time Per Week., Disp: , Rfl:     Deep Sea Nasal Spray 0.65 % nasal spray, USE 1 SPRAY IN EACH NOSTRIL 3 TIMES A DAY AS NEEDED, Disp: , Rfl:     Entresto 24-26 MG tablet, TAKE 1 TABLET BY MOUTH TWICE A DAY, Disp: 60 tablet, Rfl: 5    fluticasone (FLONASE) 50 MCG/ACT nasal spray, 1 spray by Each Nare route 2 (Two) Times a Day., Disp: , Rfl:     folic acid (FOLVITE) 1 MG tablet, Take 1 tablet by mouth Daily., Disp: , Rfl:     Ginger, Zingiber officinalis, (Ginger Root) 250 MG capsule, Take 2 capsules by mouth 3 (Three) Times a Day., Disp: 90 capsule, Rfl: 1    HYDROcodone-acetaminophen (NORCO) 7.5-325 MG per tablet, Take 1 tablet by mouth Every 6 (Six) Hours., Disp: , Rfl:     levothyroxine (SYNTHROID, LEVOTHROID) 25 MCG tablet, Take 2 tablets by mouth Every Morning., Disp: , Rfl:     loratadine (CLARITIN) 10 MG tablet, , Disp: , Rfl:     methocarbamol (ROBAXIN) 750 MG tablet, Take 1 tablet by mouth Every 12 (Twelve) Hours., Disp: , Rfl:     montelukast (SINGULAIR) 10 MG tablet, Take 1 tablet by mouth Every Night., Disp: , Rfl:     nitroglycerin (NITROSTAT) 0.4 MG SL tablet, 1 under the tongue as needed for angina, may repeat q5mins for up three doses, Disp: 25 tablet, Rfl: 1    ondansetron (ZOFRAN) 4 MG tablet, 1 tablet., Disp: , Rfl:     Ozempic, 0.25 or 0.5 MG/DOSE, 2 MG/3ML solution  "pen-injector, Inject 0.5 mg under the skin into the appropriate area as directed 1 (One) Time Per Week., Disp: , Rfl:     pantoprazole (Protonix) 40 MG EC tablet, Take 1 tablet by mouth Daily., Disp: 30 tablet, Rfl: 2    pregabalin (LYRICA) 300 MG capsule, Take 1 capsule by mouth 2 (Two) Times a Day for 90 days., Disp: 60 capsule, Rfl: 2    Prucalopride Succinate 1 MG tablet, Take 2 mg by mouth Daily., Disp: 60 tablet, Rfl: 1    rosuvastatin (CRESTOR) 10 MG tablet, Take 1 tablet by mouth Daily., Disp: , Rfl:     zonisamide (ZONEGRAN) 25 MG capsule, 1 capsule., Disp: , Rfl:     DULoxetine (Cymbalta) 20 MG capsule, Take 1 capsule by mouth Daily., Disp: 30 capsule, Rfl: 2    Allergies:   Allergies   Allergen Reactions    Erenumab-Aooe Anaphylaxis     Aimovig    Other Shortness Of Breath     Nasal Spray- also caused rash    Antihistamines, Loratadine-Type Other (See Comments)     Made \"stuffy\"    Tizanidine Other (See Comments)     Other    Gabapentin Hives       Objective     Objective:    /82   Temp 96 °F (35.6 °C)   Ht 175.3 cm (69.02\")   Wt 107 kg (236 lb)   LMP  (LMP Unknown)   SpO2 96%   BMI 34.83 kg/m²   Body mass index is 34.83 kg/m².    Physical Exam  Vitals reviewed.   Constitutional:       Appearance: Normal appearance.   HENT:      Head: Normocephalic and atraumatic.      Mouth/Throat:      Mouth: Mucous membranes are moist.      Pharynx: Oropharynx is clear.   Pulmonary:      Effort: Pulmonary effort is normal. No respiratory distress.   Musculoskeletal:      Right lower leg: No edema.      Left lower leg: No edema.   Skin:     General: Skin is warm and dry.   Neurological:      Mental Status: She is alert.          Neurology Exam:    General apperance: NAD.     Mental status: Alert, awake and oriented to time place and person.    Fund of knowledge:  Normal.     Language and Speech: No aphasia or dysarthria.    Naming , Repitition and Comprehension:  Can name objects, repeat a sentence and " follow commands. Speech is clear and fluent with good repetition, comprehension, and naming.    Cranial Nerves:   CN II: Visual fields are full. Intact.  Pupils - PERRLA  CN III, IV and VI: Extraocular movements are intact. Normal saccades.   CN V: Facial sensation is intact.   CN VII: Muscles of facial expression reveal no asymmetry. Intact.   CN VIII: Hearing is intact.  CN IX and X: Palate elevates symmetrically. Intact  CN XI: Shoulder shrug is intact.   CN XII: Tongue is midline without evidence of atrophy or fasciculation.     Motor:  Right UE muscle strength 5/5. Normal tone.     Left UE muscle strength 5/5. Normal tone.      Right LE muscle strength 5/5. Normal tone.     Left LE muscle strength 5/5. Normal tone.      No resting or action tremor appreciated on today's exam.      Sensory: Decreased sensation to light touch in bilateral hands up to wrists and BLE up to ankles    DTRs: 2+ bilaterally in upper and lower extremities.    Coordination: Normal finger-to-nose    Gait: Ambulating without assistive device - gait speed has improved compared to prior assessment, still demonstrates a slow/cautious gait.        Results:   Imaging:   CT Angio Abdominal Aorta Bilateral Iliofem Runoff With & Without Contrast    Result Date: 11/9/2023  1.  Unremarkable bilateral lower extremity CTA runoff. Three-vessel runoff is noted to the level below the ankle of both extremities. 2.  No significant abdominal or pelvic arterial vascular disease noted. No aneurysm or dissection. 3.  Other incidental/nonacute findings detailed above.   This report was finalized on 11/9/2023 2:31 PM by Dr. Tal Andino MD.         Labs:   WBC   Date Value Ref Range Status   12/29/2023 8.34 3.40 - 10.80 10*3/mm3 Final     RBC   Date Value Ref Range Status   12/29/2023 4.64 3.77 - 5.28 10*6/mm3 Final     Hemoglobin   Date Value Ref Range Status   12/29/2023 12.9 12.0 - 15.9 g/dL Final     Hematocrit   Date Value Ref Range Status   12/29/2023  39.0 34.0 - 46.6 % Final     MCV   Date Value Ref Range Status   12/29/2023 84.1 79.0 - 97.0 fL Final     MCH   Date Value Ref Range Status   12/29/2023 27.8 26.6 - 33.0 pg Final     MCHC   Date Value Ref Range Status   12/29/2023 33.1 31.5 - 35.7 g/dL Final     RDW   Date Value Ref Range Status   12/29/2023 12.7 12.3 - 15.4 % Final     RDW-SD   Date Value Ref Range Status   12/29/2023 37.9 37.0 - 54.0 fl Final     MPV   Date Value Ref Range Status   12/29/2023 10.9 6.0 - 12.0 fL Final     Platelets   Date Value Ref Range Status   12/29/2023 302 140 - 450 10*3/mm3 Final     Neutrophil %   Date Value Ref Range Status   12/29/2023 69.7 42.7 - 76.0 % Final     Lymphocyte %   Date Value Ref Range Status   12/29/2023 20.5 19.6 - 45.3 % Final     Monocyte %   Date Value Ref Range Status   12/29/2023 6.2 5.0 - 12.0 % Final     Eosinophil %   Date Value Ref Range Status   12/29/2023 2.8 0.3 - 6.2 % Final     Basophil %   Date Value Ref Range Status   12/29/2023 0.4 0.0 - 1.5 % Final     Immature Grans %   Date Value Ref Range Status   12/29/2023 0.4 0.0 - 0.5 % Final     Neutrophils, Absolute   Date Value Ref Range Status   12/29/2023 5.82 1.70 - 7.00 10*3/mm3 Final     Lymphocytes, Absolute   Date Value Ref Range Status   12/29/2023 1.71 0.70 - 3.10 10*3/mm3 Final     Monocytes, Absolute   Date Value Ref Range Status   12/29/2023 0.52 0.10 - 0.90 10*3/mm3 Final     Eosinophils, Absolute   Date Value Ref Range Status   12/29/2023 0.23 0.00 - 0.40 10*3/mm3 Final     Basophils, Absolute   Date Value Ref Range Status   12/29/2023 0.03 0.00 - 0.20 10*3/mm3 Final     Immature Grans, Absolute   Date Value Ref Range Status   12/29/2023 0.03 0.00 - 0.05 10*3/mm3 Final     [facility]   Date Value Ref Range Status   12/29/2023 0.0 0.0 - 0.2 /100 WBC Final         Assessment / Plan      Assessment/Plan:   Diagnoses and all orders for this visit:    1. Polyneuropathy (Primary)  -     DULoxetine (Cymbalta) 20 MG capsule; Take 1 capsule by  mouth Daily.  Dispense: 30 capsule; Refill: 2  -     pregabalin (LYRICA) 300 MG capsule; Take 1 capsule by mouth 2 (Two) Times a Day for 90 days.  Dispense: 60 capsule; Refill: 2    2. Tremor    3. Gait abnormality    4. B12 deficiency    5. Cognitive changes       Bryanna Schwartz returns to neurology clinic for continued evaluation of neuropathy and tremors.  She has been receiving weekly B12 injections per primary care, vitamin B12 was 811, methylmalonic acid was 125.  I have advised her to continue per primary care.  She reports that primary care would like to transition to Lyrica from their practice to neurology, I have continued prior dosing of Lyrica 300 mg twice daily. As part of this patient's treatment plan I am prescribing controlled substances. The patient has been made aware of appropriate use of such medications, including potential risk of somnolence, limited ability to drive and/or work safely, and potential for dependence or overdose. It has also been made clear that these medications are for use by the patient only, without concomitant use of alcohol or other substances unless prescribed. Keep out of reach of children.  Manual Mendez report has been reviewed. If this is going to be prescribed long term, [facility] [place] has also been read and signed by patient and myself.    I would like to consider decreasing Lyrica in the future given that she is on very high-dose Lyrica, this may also be contributing to her tremors, I have initiated Cymbalta for her today, if she notes improvement with Cymbalta we may be able to slowly decrease Lyrica dose some.  Will also continue physical therapy as she has noted significant improvement in her gait and overall mobility, she also notes improvement in her handwriting since working with physical therapy.  She does have upcoming EMG for further assessment of the tremor and neuropathy, I have also previously ordered neuropsych testing for  further assessment of cognitive changes to be performed at .  She continues to receive her migraine care from  at this time.  I have reviewed HPI/plan with Dr. Campbell today. Will plan to see Bryanna back in clinic with Dr. Campbell in May.       Patient Education:       Reviewed medications, potential side effects and signs and symptoms to report. Discussed risk versus benefits of treatment plan with patient and/or family-including medications, labs and radiology that may be ordered. Addressed questions and concerns during visit. Patient and/or family verbalized understanding and agree with plan. Instructed to call the office with any questions and report to ER with any life-threatening symptoms.     Follow Up:   Return in 3 months (on 5/21/2024).    I spent 45 minutes in the care of this patient. I personally spent 50 percent of this time counseling and discussing diagnosis, diagnostic testing, evaluation, treatment options, and management .       During this visit the following were done:  Labs Reviewed [x]    Labs Ordered []    Radiology Reports Reviewed []    Radiology Ordered []    PCP Records Reviewed []    Referring Provider Records Reviewed []    ER Records Reviewed []    Hospital Records Reviewed []    History Obtained From Family []    Radiology Images Reviewed []    Other Reviewed []    Records Requested []      CARRIE Florentino  [facility]  [address] [address] [phone]

## 2024-02-29 ENCOUNTER — OFFICE VISIT (OUTPATIENT)
Dept: CARDIOLOGY | Facility: CLINIC | Age: 54
End: 2024-02-29
Payer: MEDICARE

## 2024-02-29 VITALS
DIASTOLIC BLOOD PRESSURE: 64 MMHG | HEIGHT: 69 IN | WEIGHT: 235.2 LBS | SYSTOLIC BLOOD PRESSURE: 122 MMHG | HEART RATE: 74 BPM | BODY MASS INDEX: 34.83 KG/M2

## 2024-02-29 DIAGNOSIS — I50.22 CHRONIC SYSTOLIC CHF (CONGESTIVE HEART FAILURE): Chronic | ICD-10-CM

## 2024-02-29 DIAGNOSIS — E78.2 MIXED HYPERLIPIDEMIA: ICD-10-CM

## 2024-02-29 DIAGNOSIS — R07.89 OTHER CHEST PAIN: ICD-10-CM

## 2024-02-29 DIAGNOSIS — I42.8 NICM (NONISCHEMIC CARDIOMYOPATHY): Primary | ICD-10-CM

## 2024-02-29 DIAGNOSIS — R94.31 ABNORMAL EKG: ICD-10-CM

## 2024-02-29 DIAGNOSIS — R94.39 ABNORMAL NUCLEAR STRESS TEST: ICD-10-CM

## 2024-02-29 DIAGNOSIS — I10 PRIMARY HYPERTENSION: ICD-10-CM

## 2024-02-29 RX ORDER — FAMOTIDINE 40 MG/1
40 TABLET, FILM COATED ORAL EVERY MORNING
COMMUNITY

## 2024-02-29 RX ORDER — NITROGLYCERIN 0.4 MG/1
TABLET SUBLINGUAL
Qty: 25 TABLET | Refills: 1 | Status: SHIPPED | OUTPATIENT
Start: 2024-02-29

## 2024-02-29 RX ORDER — OMEPRAZOLE 40 MG/1
40 CAPSULE, DELAYED RELEASE ORAL NIGHTLY
COMMUNITY

## 2024-02-29 NOTE — PROGRESS NOTES
Chief Complaint   Patient presents with    Follow-up     Cardiac management. Patient reports having occasional episodes of chest discomfort , is usually on right side of chest.    LABS     December 2023 results on chart    Med Refill     Request new prescription for Nitro SL tabs to [facility]       Subjective       Bryanna Schwartz is a 53 y.o. female seen in March 2023 for initial cardiac consultation.  Her past medical history is complex including: gangrenous changes to the right foot with CTA negative for stenosis, severe neuropathy diagnosed ; tick bite requiring referral to ID and diagnosis included Lyme's disease and RMSF.  She was significantly debilitated and underwent rehab at [facility].  Secondary to mental acuity problems carotid ultrasound and MRI brain done in February 2023 and results showing mild atrophy.  During hospitalization she underwent cardiac work-up.  Echocardiogram with saline test was negative, LV function mildly diminished.  Stress test showed anterior septal infarct with dana-infarct ischemia.  NESSA was negative for mass or thrombus. She follows closely with neurology at .     Due to persistent symptoms it was decided to proceed with cardiac catheterization.  On 3/17/2023 cardiac cath revealed normal coronaries with nonischemic cardiomyopathy.  Addition of Entresto advised.     In July 2023 she was hospitalized at [facility] secondary to persistent nausea, vomiting, and hypokalemia. GI work-up revealed significant gastroparesis. On 8/14/2023 echocardiogram showed LVEF around 51-55%.     Today she returns to the office for follow-up visit.  She is no longer using walker or cane routinely after undergoing more physical therapy.  She admits to 1 episode of chest pain but felt to be gastric reflux as she has not had a recurrence.  Palpitations denied.  Vital signs remained stable.  She admits to some changes in neurological medication for better management of neuropathy and  migraines.    Cardiac History:    Past Surgical History:   Procedure Laterality Date    ACHILLES TENDON SURGERY Right     BREAST CYST EXCISION Left     CARDIAC CATHETERIZATION  03/16/2023    Normal coronaries.  EF 45%    CARDIOVASCULAR STRESS TEST  03/04/2023    anteroseptal, anterior infarct with dana-infarct ischemia    CHOLECYSTECTOMY      ECHO - CONVERTED  03/02/2023    EF 41-45%, saline test negative, valves normal    ECHO - CONVERTED  08/14/2023    Limited - EF 55%. LA- 4.0. Trace PE    ENDOSCOPY N/A 10/16/2018    Procedure: ESOPHAGOGASTRODUODENOSCOPY;  Surgeon: Brunner, Mark I, MD;  Location:  [facility];  Service: Gastroenterology    ENDOSCOPY N/A 07/27/2023    Procedure: ESOPHAGOGASTRODUODENOSCOPY;  Surgeon: Yas Murphy MD;  Location:  [facility];  Service: Gastroenterology;  Laterality: N/A;    HYSTERECTOMY      TRANSESOPHAGEAL ECHOCARDIOGRAM (NESSA)  03/07/2023    Dr. Alonso- EF 56-60%, no mass or thrombus, mild plaque aorrti arch and descending aorta    UPPER GASTROINTESTINAL ENDOSCOPY      US CAROTID UNILATERAL  12/17/2022    < 50% Bilaterally       Current Outpatient Medications   Medication Sig Dispense Refill    albuterol sulfate  (90 Base) MCG/ACT inhaler EVERY 4 HOURS      aspirin 81 MG chewable tablet 1 tablet.      budesonide-formoterol (SYMBICORT) 80-4.5 MCG/ACT inhaler Inhale 2 puffs 2 (Two) Times a Day.      buPROPion XL (WELLBUTRIN XL) 150 MG 24 hr tablet Take 1 tablet by mouth Every Morning.      Cholecalciferol (Vitamin D3) 50 MCG (2000 UT) tablet Take 1 tablet by mouth Daily.      Cyanocobalamin 1000 MCG/ML kit Inject 1 mL as directed 1 (One) Time Per Week.      Deep Sea Nasal Spray 0.65 % nasal spray USE 1 SPRAY IN EACH NOSTRIL 3 TIMES A DAY AS NEEDED      Entresto 24-26 MG tablet TAKE 1 TABLET BY MOUTH TWICE A DAY 60 tablet 5    famotidine (PEPCID) 40 MG tablet Take 1 tablet by mouth Every Morning.      folic acid (FOLVITE) 1 MG tablet Take 1 tablet by mouth Daily.       Ginger, Zingiber officinalis, (Ginger Root) 250 MG capsule Take 2 capsules by mouth 3 (Three) Times a Day. 90 capsule 1    HYDROcodone-acetaminophen (NORCO) 7.5-325 MG per tablet Take 1 tablet by mouth Every 6 (Six) Hours.      levothyroxine (SYNTHROID, LEVOTHROID) 50 MCG tablet Take 1 tablet by mouth Every Morning.      loratadine (CLARITIN) 10 MG tablet       methocarbamol (ROBAXIN) 750 MG tablet Take 1 tablet by mouth Every 12 (Twelve) Hours.      montelukast (SINGULAIR) 10 MG tablet Take 1 tablet by mouth Every Night.      nitroglycerin (NITROSTAT) 0.4 MG SL tablet 1 under the tongue as needed for angina, may repeat q5mins for up three doses 25 tablet 1    omeprazole (priLOSEC) 40 MG capsule Take 1 capsule by mouth Every Night.      ondansetron (ZOFRAN) 4 MG tablet Take 1 tablet by mouth As Needed for Nausea.      pantoprazole (Protonix) 40 MG EC tablet Take 1 tablet by mouth Daily. 30 tablet 2    pregabalin (LYRICA) 300 MG capsule Take 1 capsule by mouth 2 (Two) Times a Day for 90 days. 60 capsule 2    Rimegepant Sulfate (NURTEC) 75 MG tablet dispersible tablet Take  by mouth As Needed.      rosuvastatin (CRESTOR) 10 MG tablet Take 1 tablet by mouth Daily.      zonisamide (ZONEGRAN) 25 MG capsule 1 capsule.      Prucalopride Succinate 1 MG tablet Take 2 mg by mouth Daily. 60 tablet 1     No current facility-administered medications for this visit.       Erenumab-aooe; Other; Antihistamines, loratadine-type; Tizanidine; and Gabapentin    Past Medical History:   Diagnosis Date    Allergy to alpha-gal     Arthritis     Depression     Diabetes     Environmental allergies     Falls 10/24/2018    GERD (gastroesophageal reflux disease)     Heart murmur     Hyperlipidemia     Hypertension     Hypothyroidism     Lyme disease     Migraine     Peripheral vascular disease     [place] spotted fever     Vitamin B 12 deficiency        Social History     Socioeconomic History    Marital status:     Number of  "children: 2   Tobacco Use    Smoking status: Never     Passive exposure: Never    Smokeless tobacco: Never    Tobacco comments:     Ok   Vaping Use    Vaping Use: Never used   Substance and Sexual Activity    Alcohol use: No    Drug use: No    Sexual activity: Defer     Partners: Male       Family History   Problem Relation Age of Onset    Hypertension Mother     COPD Mother     Heart attack Mother     Other Father         / of exhaust fumes    Cancer Sister     Heart attack Brother     Colon cancer Neg Hx     Colon polyps Neg Hx     Esophageal cancer Neg Hx        Review of Systems   Cardiovascular:  Positive for dyspnea on exertion. Negative for chest pain, near-syncope and palpitations.   Respiratory:  Positive for shortness of breath.    Hematologic/Lymphatic: Negative for bleeding problem.   Musculoskeletal:  Positive for joint pain and muscle weakness.   Neurological:  Positive for disturbances in coordination and weakness.   Psychiatric/Behavioral:  Negative for memory loss.         BP Readings from Last 5 Encounters:   24 122/64   24 122/82   23 140/80   10/18/23 118/70   23 122/78       Wt Readings from Last 5 Encounters:   24 107 kg (235 lb 3.2 oz)   24 107 kg (236 lb)   23 107 kg (236 lb)   10/18/23 102 kg (224 lb)   23 92.1 kg (203 lb)       Objective     /64 (BP Location: Left arm, Patient Position: Sitting)   Pulse 74   Ht 175.3 cm (69\")   Wt 107 kg (235 lb 3.2 oz)   LMP  (LMP Unknown)   BMI 34.73 kg/m²     Vitals and nursing note reviewed.   Constitutional:       Appearance: Healthy appearance. Not in distress.   Eyes:      Conjunctiva/sclera: Conjunctivae normal.      Pupils: Pupils are equal, round, and reactive to light.   HENT:      Head: Normocephalic.   Pulmonary:      Effort: Pulmonary effort is normal.      Breath sounds: Normal breath sounds.   Cardiovascular:      PMI at left midclavicular line. Normal rate. Regular " rhythm.   Edema:     Ankle: trace edema of the left ankle.     Feet: trace edema of the right foot.  Abdominal:      General: Bowel sounds are normal.      Palpations: Abdomen is soft.   Musculoskeletal: Normal range of motion.      Cervical back: Normal range of motion and neck supple. Skin:     General: Skin is warm and dry.   Neurological:      Mental Status: Alert, oriented to person, place, and time and oriented to person, place and time.          Procedures: none today          Assessment & Plan   Diagnoses and all orders for this visit:    1. NICM (nonischemic cardiomyopathy) (Primary)    2. Chronic systolic CHF (congestive heart failure)    3. Primary hypertension    4. Mixed hyperlipidemia    5. Abnormal nuclear stress test  -     nitroglycerin (NITROSTAT) 0.4 MG SL tablet; 1 under the tongue as needed for angina, may repeat q5mins for up three doses  Dispense: 25 tablet; Refill: 1    6. Abnormal EKG  -     nitroglycerin (NITROSTAT) 0.4 MG SL tablet; 1 under the tongue as needed for angina, may repeat q5mins for up three doses  Dispense: 25 tablet; Refill: 1    7. Other chest pain  -     nitroglycerin (NITROSTAT) 0.4 MG SL tablet; 1 under the tongue as needed for angina, may repeat q5mins for up three doses  Dispense: 25 tablet; Refill: 1      NICM/chronic systolic CHF  -Echo 8/2023: LVEF improved to around 51-55%  -Continue medical management: Entresto     Hypertension  -BP controlled  -Recent labs showed normal renal function   -continue Entresto     Hyperlipidemia  -Continue statin in form of Crestor     Nitrostat updated due to expiration date.  Currently denies anginal symptoms.    Neurological issues followed closely at   GI issues followed by specialist.     Currently patient appears stable from a cardiac standpoint. No change in cardiac plan of care.  6-month follow-up visit scheduled.                 Electronically signed by CARRIE Zuniga,  March 1, 2024 15:14 EST    Dictated Utilizing  Dragon Dictation: Part of this note may be an electronic transcription/translation of spoken language to printed text using the Dragon Dictation System.

## 2024-03-07 DIAGNOSIS — R94.39 ABNORMAL NUCLEAR STRESS TEST: ICD-10-CM

## 2024-03-07 DIAGNOSIS — R07.89 OTHER CHEST PAIN: ICD-10-CM

## 2024-03-07 DIAGNOSIS — R94.31 ABNORMAL EKG: ICD-10-CM

## 2024-04-10 ENCOUNTER — TELEPHONE (OUTPATIENT)
Dept: NEUROLOGY | Facility: CLINIC | Age: 54
End: 2024-04-10
Payer: MEDICARE

## 2024-04-10 DIAGNOSIS — R26.9 GAIT ABNORMALITY: Primary | ICD-10-CM

## 2024-04-10 DIAGNOSIS — G62.9 POLYNEUROPATHY: ICD-10-CM

## 2024-04-10 DIAGNOSIS — M62.81 MUSCLE WEAKNESS: ICD-10-CM

## 2024-04-10 DIAGNOSIS — E53.8 B12 DEFICIENCY: ICD-10-CM

## 2024-04-10 NOTE — TELEPHONE ENCOUNTER
Provider: NEFTALI    Caller: [facility]    Relationship to Patient: [facility]    Phone Number: [phone]    Reason for Call: PHYSICAL THERAPY IS REQUESTING A NEW ORDER TO BE FAXED TO THEM. FAX: [phone].      PLEASE REVIEW    THANK YOU

## 2024-04-23 ENCOUNTER — TELEPHONE (OUTPATIENT)
Dept: NEUROLOGY | Facility: CLINIC | Age: 54
End: 2024-04-23
Payer: MEDICARE

## 2024-04-23 ENCOUNTER — HOSPITAL ENCOUNTER (OUTPATIENT)
Dept: NEUROLOGY | Facility: HOSPITAL | Age: 54
Discharge: HOME | End: 2024-04-23
Payer: MEDICARE

## 2024-04-23 DIAGNOSIS — G62.9 POLYNEUROPATHY: ICD-10-CM

## 2024-04-23 DIAGNOSIS — E53.8 B12 DEFICIENCY: ICD-10-CM

## 2024-04-23 DIAGNOSIS — R25.1 TREMOR: ICD-10-CM

## 2024-04-23 PROCEDURE — 95912 NRV CNDJ TEST 11-12 STUDIES: CPT

## 2024-04-23 PROCEDURE — 95912 NRV CNDJ TEST 11-12 STUDIES: CPT | Performed by: PSYCHIATRY & NEUROLOGY

## 2024-04-23 PROCEDURE — 95886 MUSC TEST DONE W/N TEST COMP: CPT

## 2024-04-23 PROCEDURE — 95886 MUSC TEST DONE W/N TEST COMP: CPT | Performed by: PSYCHIATRY & NEUROLOGY

## 2024-04-23 NOTE — TELEPHONE ENCOUNTER
----- Message from CARRIE Florentino sent at 4/23/2024 12:11 PM EDT -----  Please notify Bryanna that her EMG showed mild peripheral neuropathy. This can be further reviewed at her follow up appointment with Dr. Campbell on May 21, please let me know if she has any questions or concerns prior to that appt. Thank you!

## 2024-05-14 DIAGNOSIS — G62.9 POLYNEUROPATHY: ICD-10-CM

## 2024-05-14 RX ORDER — DULOXETIN HYDROCHLORIDE 20 MG/1
20 CAPSULE, DELAYED RELEASE ORAL DAILY
Qty: 30 CAPSULE | Refills: 2 | OUTPATIENT
Start: 2024-05-14

## 2024-05-14 NOTE — TELEPHONE ENCOUNTER
Rx Refill Note  Requested Prescriptions     Pending Prescriptions Disp Refills    DULoxetine (CYMBALTA) 20 MG capsule [Pharmacy Med Name: duloxetine 20 mg capsule,delayed release] 30 capsule 2     Sig: TAKE 1 CAPSULE BY MOUTH ONCE DAILY      Last filled:  Last office visit with prescribing clinician: 2/26/2024      Next office visit with prescribing clinician: Visit date not found     KELLY LANE  05/14/24, 08:29 EDT    The original prescription was discontinued on 2/29/2024 by Padma Kumari APRN for the following reason: Discontinued by another clinician. Renewing this prescription may not be appropriate.

## 2024-05-21 ENCOUNTER — OFFICE VISIT (OUTPATIENT)
Dept: NEUROLOGY | Facility: CLINIC | Age: 54
End: 2024-05-21
Payer: MEDICARE

## 2024-05-21 VITALS
BODY MASS INDEX: 34.72 KG/M2 | HEIGHT: 69 IN | SYSTOLIC BLOOD PRESSURE: 112 MMHG | HEART RATE: 104 BPM | DIASTOLIC BLOOD PRESSURE: 74 MMHG | OXYGEN SATURATION: 96 %

## 2024-05-21 DIAGNOSIS — R25.1 TREMOR: Primary | ICD-10-CM

## 2024-05-21 DIAGNOSIS — G62.9 POLYNEUROPATHY: ICD-10-CM

## 2024-05-21 PROCEDURE — 3078F DIAST BP <80 MM HG: CPT | Performed by: PSYCHIATRY & NEUROLOGY

## 2024-05-21 PROCEDURE — 3074F SYST BP LT 130 MM HG: CPT | Performed by: PSYCHIATRY & NEUROLOGY

## 2024-05-21 PROCEDURE — 99214 OFFICE O/P EST MOD 30 MIN: CPT | Performed by: PSYCHIATRY & NEUROLOGY

## 2024-05-21 RX ORDER — PREGABALIN 100 MG/1
200 CAPSULE ORAL 2 TIMES DAILY
Qty: 56 CAPSULE | Refills: 0 | Status: SHIPPED | OUTPATIENT
Start: 2024-05-21 | End: 2024-06-04

## 2024-05-21 RX ORDER — PREGABALIN 150 MG/1
150 CAPSULE ORAL 2 TIMES DAILY
Qty: 60 CAPSULE | Refills: 6 | Status: SHIPPED | OUTPATIENT
Start: 2024-05-21

## 2024-05-21 RX ORDER — DULOXETIN HYDROCHLORIDE 20 MG/1
20 CAPSULE, DELAYED RELEASE ORAL DAILY
COMMUNITY
End: 2024-05-21 | Stop reason: SDUPTHER

## 2024-05-21 RX ORDER — DULOXETIN HYDROCHLORIDE 20 MG/1
40 CAPSULE, DELAYED RELEASE ORAL DAILY
Qty: 60 CAPSULE | Refills: 6 | Status: SHIPPED | OUTPATIENT
Start: 2024-05-21 | End: 2024-06-20

## 2024-05-21 NOTE — PROGRESS NOTES
Subjective:    CC: Bryanna Schwartz is in clinic today for follow up for history of neuropathy.    HPI:  Bryanna Schwartz is a 53 y.o. female who is here today in Neurology for tremors and neuropathy     Bryanna Schwartz is a 53 y.o. female who is here today in Neurology for  tremors and neuropathy     PMH of GERD without esophagitis, anxiety and depression, hyperlipidemia, hypertension, migraines, hypothyroidism, embolic infarction, dehydration, electrolyte abnormality, chronic systolic heart failure, history of severe acute respiratory syndrome coronavirus 2, low back pain, obesity, peripheral vascular disease, type 2 diabetes, asthma, mood disorder, depression     [place] spotted fever.  Patient was seen by primary care on 8/29/2023 and at that visit reported pain in her hands and like to discuss increasing dose of Lyrica.     Per review of prior neurology notes at      Ruby Ferguson MD 4/25/2023:  Bryanna Schwartz presents to the [facility] as a returning patient today with/for Follow-up (Six month follow up). Follow up for sequela of severe Vitamin B12 deficiency with SCD, polyneuropathy and cognitive impairment.     HPI   Mrs. Schwartz is a 52 year-old right-handed woman who continues to recover from severe vitamin B12 deficiency in 2018 complicated by subacute combined degeneration and cognitive impairment. She presents to neurology (neuromuscular) telehealth clinic for follow-up of this. She was last seen in clinic on 6/21/2022. (Of note, she is also treated for headaches by Elizabeth Torrez PA-C).    At that last visit, she continued to complain of neuropathic pain in the distal extremities (fingers and toes) that had improved, but was still debilitating. I continued her on pregabalin 250mg po bid and ordered compounded analgesic cream consisting of Meloxicam, lidocaine, prilocaine, baclofen, lamotrigine, gabapentin (Pain cream #4 [facility]). While the cream  helped, she stopped it recently due to development of gangrene on her toes (see below). The pain is prominently in her fingers and toes, but intermittently she experiences shooting pains up her legs. The pregabalin still helps with her pain somewhat, but does not totally alleviate it. She continues to take vitamin B12 supplements, but 4-5 months ago she said her PCP discontinued injections and put her on oral vitamin B12.    She ambulates with a 4-pronged cane for gait imbalance due to neuropathy.    I ordered follow-up neuropsychological evaluation to assess for improvement of her cognition since SCD is being treated, but that is not scheduled until 11/15/2023. Her  thinks that her memory has improved, but patient thinks that she is not fully back to her baseline before the illness.     Since the last visit, she has had several illnesses, treated at [facility] and [facility] which I have reviewed in Care Everywhere. She has chronic systolic heart failure, hypertension, anxiety, depression and gangrene in the right fourth and right second toe. Per [facility] Cardiothoracic Surgery Note dated 4/5/2023, there was no need for surgical intervention and her toes improved with medical management and physical therapy. She continues to undergo [facility]. She says this is helping.    For diabetes and weight loss, she is now on Ozempic.. She said she has lost weight (about 20 lb in 3 months).      PLAN  Summary of plan:    Labs today - if B12 level is decreasing and/or MMA is elevated, will need to stop oral B12 and resum injection    Increase pregabalin to 300mg po bid to help with her paresthesias    Encouraged patient that her exam is showing improvements every visit      Elizabeth Torrez PA-C 8/18/2023:  Ms. Schwartz is a 52yo female who we follow for history of chronic migraine and who is followed in neuromuscluar clinic for poly neuropathy from B12 deficiency. She reports that after developing an  allergy to Aimovig she discontinued this medication. I would be cautious to start any additional CGRP inhibitors. For now she will continue zonisamide 100mg qam and 200mg at bedtime for migraine preventative and naproxen 750mg PRN for acute headaches up to 3 times per week as needed.    Today Ms. Schwartz reports for follow up. She states that her headaches have been more severe. She is having about 4 headaches per month, but they do not respond to NSAIDs. She has new diagnoses of gastroporesis and constipation and cardiac diagnoses of primary HTN and nonischemic cardiomyopathy.      PLAN  Diagnoses and all orders for this visit:  Migraine without aura and without status migrainosus, not intractable  Other orders  - Rimegepant Sulfate (Nurtec) 75 MG tablet dispersible; Take 1 tablet (75 mg) by mouth if needed (Take one at onset of headache. Do not exceed 75mg in 24 hours.).    Ms. Schwartz is a 54yo female who we follow for chronic migraine. She reports that her headaches have been more severe recently and have not been responding to her acute medication as well. I would like to start Nurtec 75mg as needed for acute headaches. The overall frequency is about one per week, so I will continue her current preventative plan of zonisamide 100mg qam and 200mg at bedtime and Cymbalta 60mg daily. We will follow up in 3 months for medication management.      ___________________________________________________________________________________________________________________________  Initial visit with me 12/29/2023:  She is in clinic with her  to discuss tremors and neuropathy. I have reviewed neurology notes per Dr. Ferguson and Elizabeth Torrez PA-C as above, patient indicates that she will continue to see  for migraines but needed to be seen for neuropathy and tremors at [facility].      She reports severe neuropathy despite Lyrica 300 mg BID - bilateral hands and feet, no feeling in toes. She has allergy to Gabapentin. She  "denies sedation from Lyrica. She reports that she has not been as consistent with B12 injections lately and wonders if that may be contributing, she states that she is supposed to be getting weekly B12 injections per PCP but hasn't gone to have it yet this week. B12 was previously checked on 4/25/2023 and at that time was 365, methylmalonic acid 257.     She had lyme disease and [place] spotted fever about 5 years ago, she also has Alpha Gal. She also had severe B12 deficiency at the same time. States that her symptoms started at that time, she has seen some improvement but was sick earlier this year and feels that her progress experienced a setback this past summer. She has also been taking CBD oil which she believes is helping. She feels that her cognition and memory continue to be poor -  manages medications/finances, no longer driving. She was supposed to undergo neuropsych testing at  in November of this year but did not keep appointment due to feeling poorly. She is having cataract removal surgery next week.      She started having tremors of chest and legs - stress worsens this - tremors have been present off/on over the last 5 years and has started to worsen in the last 3-4 months. She denies jaw tremors or tremors of her legs. Sleep is off/on - often times sleep is interrupted by neuropathic pain. Hands in front of a cold fan helps with the neuropathic pain as well.      She also follows with cardiothoracic surgery and was seen in clinic on 11/22/2023 - per review of their documentation \"ROSA/PVR obtained.  Right Toe ROSA 0.63;  Left Toe ROSA 0.55 with monophasic wave forms.   Upon examination bilateral feet with discoloration, cool to touch, and abnormally thick toenails.   CTA as resulted above under imaging/labs with unremarkable findings and three vessel runoff to the level below the ankle of both extremities\".      She is in PT for muscle weakness and gait abnormality and is requesting new " order be sent to PT for continued therapy.     Current visit 2/26/2024:  Bryanna Schwartz returns to neurology clinic for continued evaluation of tremors and neuropathy. As above the previously followed with Dr. Ferguson at  and still follows with headache clinic - Elizabeth ASHLEY at . Vitamin B12 was 811, methylmalonic acid level was also normal. She has been receiving weekly B12 injections per her PCP. EMG and neuropsych pending. For her neuropathy she is currently taking Lyrica 300 mg BID from PCP - she reports that her hands and feet feel hot, numb to wrist level and ankles bilaterally. Gabapentin caused hives historically. It also appears that she may have been taking Cymbalta previously for migraines but she is unsure when she stopped taking this medication. She feels that neuropathy symptoms have worsened over time. She uses a cold water bottle to try to help with the symptoms. She also notes tremors in arms, legs, chest - feels that these are worsening. Some word finding difficulty.   She has been going to PT and does feel that her walking has improved - no longer using a cane/walker.    Initial visit with me: 5/21/2024: She is in clinic for regular follow-up.  Since her last visit in February 2024, she has been taking Cymbalta 20 mg daily and reports excellent response with 60 to 70% reduction in symptoms of neuropathy.  EMG/nerve conduction study was completed as well which showed evidence of mild primarily sensory neuropathy.  She continues to take Lyrica 300 mg twice daily.  She continues to have action and postural tremors bilaterally-worse on the right than on the left.    The following portions of the patient's history were reviewed and updated as of 05/21/2024: allergies, social history, and problem list.       Current Outpatient Medications:     albuterol sulfate  (90 Base) MCG/ACT inhaler, EVERY 4 HOURS, Disp: , Rfl:     aspirin 81 MG chewable tablet, Chew 1 tablet Daily., Disp: , Rfl:      budesonide-formoterol (SYMBICORT) 80-4.5 MCG/ACT inhaler, Inhale 2 puffs 2 (Two) Times a Day., Disp: , Rfl:     buPROPion XL (WELLBUTRIN XL) 150 MG 24 hr tablet, Take 1 tablet by mouth Every Morning., Disp: , Rfl:     Cholecalciferol (Vitamin D3) 50 MCG (2000 UT) tablet, Take 1 tablet by mouth Daily., Disp: , Rfl:     Cyanocobalamin 1000 MCG/ML kit, Inject 1 mL as directed 1 (One) Time Per Week., Disp: , Rfl:     Deep Sea Nasal Spray 0.65 % nasal spray, USE 1 SPRAY IN EACH NOSTRIL 3 TIMES A DAY AS NEEDED, Disp: , Rfl:     DULoxetine (CYMBALTA) 20 MG capsule, Take 2 capsules by mouth Daily for 30 days., Disp: 60 capsule, Rfl: 6    Entresto 24-26 MG tablet, TAKE 1 TABLET BY MOUTH TWICE A DAY, Disp: 60 tablet, Rfl: 5    famotidine (PEPCID) 40 MG tablet, Take 1 tablet by mouth Every Morning., Disp: , Rfl:     folic acid (FOLVITE) 1 MG tablet, Take 1 tablet by mouth Daily., Disp: , Rfl:     Ginger, Zingiber officinalis, (Ginger Root) 250 MG capsule, Take 2 capsules by mouth 3 (Three) Times a Day., Disp: 90 capsule, Rfl: 1    HYDROcodone-acetaminophen (NORCO) 7.5-325 MG per tablet, Take 1 tablet by mouth 4 (Four) Times a Day., Disp: , Rfl:     levothyroxine (SYNTHROID, LEVOTHROID) 50 MCG tablet, Take 1 tablet by mouth Every Morning., Disp: , Rfl:     loratadine (CLARITIN) 10 MG tablet, , Disp: , Rfl:     methocarbamol (ROBAXIN) 750 MG tablet, Take 1 tablet by mouth Every 12 (Twelve) Hours., Disp: , Rfl:     montelukast (SINGULAIR) 10 MG tablet, Take 1 tablet by mouth Every Night., Disp: , Rfl:     nitroglycerin (NITROSTAT) 0.4 MG SL tablet, 1 under the tongue as needed for angina, may repeat q5mins for up three doses, Disp: 25 tablet, Rfl: 1    omeprazole (priLOSEC) 40 MG capsule, Take 1 capsule by mouth Every Night., Disp: , Rfl:     ondansetron (ZOFRAN) 4 MG tablet, Take 1 tablet by mouth As Needed for Nausea., Disp: , Rfl:     pantoprazole (Protonix) 40 MG EC tablet, Take 1 tablet by mouth Daily., Disp: 30 tablet, Rfl:  2    Prucalopride Succinate 1 MG tablet, Take 2 mg by mouth Daily., Disp: 60 tablet, Rfl: 1    Rimegepant Sulfate (NURTEC) 75 MG tablet dispersible tablet, Take  by mouth As Needed., Disp: , Rfl:     rosuvastatin (CRESTOR) 10 MG tablet, Take 1 tablet by mouth Daily., Disp: , Rfl:     zonisamide (ZONEGRAN) 25 MG capsule, Take 1 capsule by mouth Daily. 25mg in the AM and 50mg in the PM, Disp: , Rfl:     pregabalin (Lyrica) 100 MG capsule, Take 2 capsules by mouth 2 (Two) Times a Day for 14 days., Disp: 56 capsule, Rfl: 0    pregabalin (Lyrica) 150 MG capsule, Take 1 capsule by mouth 2 (Two) Times a Day., Disp: 60 capsule, Rfl: 6   Past Medical History:   Diagnosis Date    Allergy to alpha-gal     Arthritis     Cluster headache 1984    Depression     Diabetes     Difficulty walking     Environmental allergies     Falls 10/24/2018    GERD (gastroesophageal reflux disease)     Headache, tension-type 1984    Heart murmur     Hyperlipidemia     Hypertension     Hypothyroidism     Lyme disease     Migraine     Peripheral vascular disease     [place] spotted fever     Trigeminal neuralgia     Vitamin B 12 deficiency       Past Surgical History:   Procedure Laterality Date    ACHILLES TENDON SURGERY Right     BREAST CYST EXCISION Left     CARDIAC CATHETERIZATION  03/16/2023    Normal coronaries.  EF 45%    CARDIOVASCULAR STRESS TEST  03/04/2023    anteroseptal, anterior infarct with dana-infarct ischemia    CHOLECYSTECTOMY      ECHO - CONVERTED  03/02/2023    EF 41-45%, saline test negative, valves normal    ECHO - CONVERTED  08/14/2023    Limited - EF 55%. LA- 4.0. Trace PE    ENDOSCOPY N/A 10/16/2018    Procedure: ESOPHAGOGASTRODUODENOSCOPY;  Surgeon: Brunner, Mark I, MD;  Location:  [facility];  Service: Gastroenterology    ENDOSCOPY N/A 07/27/2023    Procedure: ESOPHAGOGASTRODUODENOSCOPY;  Surgeon: Yas Murphy MD;  Location:  [facility];  Service: Gastroenterology;  Laterality: N/A;    HYSTERECTOMY       "TRANSESOPHAGEAL ECHOCARDIOGRAM (NESSA)  2023    Dr. Alonso- EF 56-60%, no mass or thrombus, mild plaque aorrti arch and descending aorta    UPPER GASTROINTESTINAL ENDOSCOPY      US CAROTID UNILATERAL  2022    < 50% Bilaterally      Family History   Problem Relation Age of Onset    Hypertension Mother     COPD Mother     Heart attack Mother     Other Father         / of exhaust fumes    Cancer Sister     Heart attack Brother     Colon cancer Neg Hx     Colon polyps Neg Hx     Esophageal cancer Neg Hx         Review of Systems  Objective:    /74   Pulse 104   Ht 175.3 cm (69.02\")   LMP  (LMP Unknown)   SpO2 96%   BMI 34.72 kg/m²     Neurology Exam:  General apperance: NAD.     Mental status: Alert, awake and oriented to time place and person.    Language and Speech: No aphasia or dysarthria.    CN II to XII: Intact.    Opthalmoscopic Exam: No papilledema.    Motor:  Right UE muscle strength 5/5. Normal tone.     Left UE muscle strength 5/5. Normal tone.      Right LE muscle strength 5/5. Normal tone.     Left LE muscle strength 5/5. Normal tone.      Sensory: Normal light touch, vibration and pinprick sensation bilaterally.    DTRs: 1+ bilaterally.    Babinski: Negative bilaterally.    Co-ordination: Normal finger-to-nose, heel to shin B/L.    Mild to moderate intensity action and postural tremors bilaterally-worse on the right than on the left.    Rhomberg: Negative.    Gait: Somewhat unstable gait.    Cardiovascular: Regular rate and rhythm without murmur, gallop or rub.    Assessment and Plan:  1. Tremor  2. Polyneuropathy  She has had excellent response to low-dose Cymbalta after her last visit in February.  She is reporting 6 to 70% reduction in neuropathy symptoms which is quite reassuring.  She is on high-dose of Lyrica 300 mg twice daily and she is likely experiencing side effects with high-dose of Lyrica including difficulty with gait, balance and tremors.  I am going to " reduce the dose to 150 mg twice daily and next 2 weeks and will increase the dose of Cymbalta to 20 mg twice daily.  If she has better response with high dose of Cymbalta then Lyrica can be decreased further to 100 mg twice daily eventually.  Otherwise I will see her back in clinic in 6 months for follow-up.  - pregabalin (Lyrica) 100 MG capsule; Take 2 capsules by mouth 2 (Two) Times a Day for 14 days.  Dispense: 56 capsule; Refill: 0  - pregabalin (Lyrica) 150 MG capsule; Take 1 capsule by mouth 2 (Two) Times a Day.  Dispense: 60 capsule; Refill: 6       I spent 30 minutes in patient care: Reviewing records prior to the visit, entering orders and documentation and spent more than lopes 50% of this time face-to-face in management, instructions and education regarding above mentioned diagnosis and also on counseling and discussing about taking medication regularly, possible side effects with medication use, importance of good sleep hygiene, good hydration and regular exercise.    Return in about 6 months (around 11/21/2024).       Note to patient: The 21st Century Cures Act makes medical notes like these available to patients in the interest of transparency. However, be advised this is a medical document. It is intended as peer to peer communication. It is written in medical language and may contain abbreviations or verbiage that are unfamiliar. It may appear blunt or direct. Medical documents are intended to carry relevant information, facts as evident, and the clinical opinion of the physician.

## 2024-06-07 DIAGNOSIS — G62.9 POLYNEUROPATHY: ICD-10-CM

## 2024-06-07 RX ORDER — PREGABALIN 100 MG/1
CAPSULE ORAL
Qty: 56 CAPSULE | Refills: 0 | OUTPATIENT
Start: 2024-06-07

## 2024-08-22 RX ORDER — SACUBITRIL AND VALSARTAN 24; 26 MG/1; MG/1
TABLET, FILM COATED ORAL
Qty: 60 TABLET | Refills: 0 | Status: SHIPPED | OUTPATIENT
Start: 2024-08-22

## 2024-09-17 ENCOUNTER — OFFICE VISIT (OUTPATIENT)
Dept: CARDIOLOGY | Facility: CLINIC | Age: 54
End: 2024-09-17
Payer: MEDICARE

## 2024-09-17 VITALS
SYSTOLIC BLOOD PRESSURE: 132 MMHG | BODY MASS INDEX: 37.03 KG/M2 | DIASTOLIC BLOOD PRESSURE: 76 MMHG | WEIGHT: 250 LBS | HEART RATE: 82 BPM | HEIGHT: 69 IN

## 2024-09-17 DIAGNOSIS — R00.2 PALPITATIONS: ICD-10-CM

## 2024-09-17 DIAGNOSIS — I10 PRIMARY HYPERTENSION: ICD-10-CM

## 2024-09-17 DIAGNOSIS — I42.8 NICM (NONISCHEMIC CARDIOMYOPATHY): Primary | ICD-10-CM

## 2024-09-17 DIAGNOSIS — R06.09 DYSPNEA ON EXERTION: ICD-10-CM

## 2024-09-17 DIAGNOSIS — E78.2 MIXED HYPERLIPIDEMIA: ICD-10-CM

## 2024-09-17 PROCEDURE — 1159F MED LIST DOCD IN RCRD: CPT | Performed by: NURSE PRACTITIONER

## 2024-09-17 PROCEDURE — 1160F RVW MEDS BY RX/DR IN RCRD: CPT | Performed by: NURSE PRACTITIONER

## 2024-09-17 PROCEDURE — 3078F DIAST BP <80 MM HG: CPT | Performed by: NURSE PRACTITIONER

## 2024-09-17 PROCEDURE — 99214 OFFICE O/P EST MOD 30 MIN: CPT | Performed by: NURSE PRACTITIONER

## 2024-09-17 PROCEDURE — 3075F SYST BP GE 130 - 139MM HG: CPT | Performed by: NURSE PRACTITIONER

## 2024-09-17 RX ORDER — SACUBITRIL AND VALSARTAN 24; 26 MG/1; MG/1
TABLET, FILM COATED ORAL
Qty: 60 TABLET | Refills: 0 | OUTPATIENT
Start: 2024-09-17

## 2024-09-17 RX ORDER — SACUBITRIL AND VALSARTAN 24; 26 MG/1; MG/1
1 TABLET, FILM COATED ORAL 2 TIMES DAILY
Qty: 60 TABLET | Refills: 8 | Status: SHIPPED | OUTPATIENT
Start: 2024-09-17

## 2024-11-20 ENCOUNTER — TELEPHONE (OUTPATIENT)
Dept: NEUROLOGY | Facility: CLINIC | Age: 54
End: 2024-11-20

## 2024-11-20 NOTE — TELEPHONE ENCOUNTER
PATIENT SPOUSE, JOSEPH, CALLING TO REQUEST NEXT OV 11/21/24 [facility] [employer] - HE SAYS PT HAS RECENTLY BROKEN HER FOOT/TOE AND HAS RODS AND OTHER IMPLANTS THAT MAKE IT HARD FOR HER TO GET AROUND.    PLEASE ADVISE JOSEPH OR PT

## 2024-11-21 ENCOUNTER — TELEMEDICINE (OUTPATIENT)
Dept: NEUROLOGY | Facility: CLINIC | Age: 54
End: 2024-11-21
Payer: MEDICARE

## 2024-11-21 VITALS — HEIGHT: 69 IN | WEIGHT: 250 LBS | BODY MASS INDEX: 37.03 KG/M2

## 2024-11-21 DIAGNOSIS — E53.8 B12 DEFICIENCY: ICD-10-CM

## 2024-11-21 DIAGNOSIS — R25.1 TREMOR: Primary | ICD-10-CM

## 2024-11-21 DIAGNOSIS — G62.9 POLYNEUROPATHY: ICD-10-CM

## 2024-11-21 PROCEDURE — 1160F RVW MEDS BY RX/DR IN RCRD: CPT

## 2024-11-21 PROCEDURE — 99213 OFFICE O/P EST LOW 20 MIN: CPT

## 2024-11-21 PROCEDURE — 1159F MED LIST DOCD IN RCRD: CPT

## 2024-11-21 RX ORDER — OXYCODONE AND ACETAMINOPHEN 10; 325 MG/1; MG/1
1 TABLET ORAL EVERY 6 HOURS PRN
COMMUNITY
Start: 2024-11-06

## 2024-11-21 RX ORDER — APIXABAN 2.5 MG/1
1 TABLET, FILM COATED ORAL EVERY 12 HOURS SCHEDULED
COMMUNITY
Start: 2024-11-06

## 2024-11-21 RX ORDER — SEMAGLUTIDE 2.68 MG/ML
INJECTION, SOLUTION SUBCUTANEOUS
COMMUNITY
Start: 2024-11-18

## 2024-11-21 RX ORDER — CELECOXIB 200 MG/1
1 CAPSULE ORAL DAILY
COMMUNITY

## 2024-11-21 RX ORDER — ACETAMINOPHEN 160 MG
TABLET,DISINTEGRATING ORAL
COMMUNITY
Start: 2024-11-18

## 2024-11-21 RX ORDER — FAMOTIDINE 40 MG/1
TABLET, FILM COATED ORAL
COMMUNITY
Start: 2024-11-18

## 2024-11-21 RX ORDER — PREGABALIN 100 MG/1
100 CAPSULE ORAL 2 TIMES DAILY
Qty: 60 CAPSULE | Refills: 2 | Status: SHIPPED | OUTPATIENT
Start: 2024-11-21 | End: 2025-02-19

## 2024-11-21 RX ORDER — CETIRIZINE HYDROCHLORIDE 10 MG/1
1 TABLET ORAL DAILY
COMMUNITY

## 2024-11-21 RX ORDER — ASPIRIN 81 MG/1
TABLET, COATED ORAL
COMMUNITY
Start: 2024-11-18

## 2024-11-21 NOTE — PROGRESS NOTES
Neuro Office Visit      Encounter Date: 2024   Patient Name: Bryanna Schwartz  : 1970   MRN: [id number]   PCP:  Baljeet Manley APRN     Chief Complaint:    Chief Complaint   Patient presents with    polyneuropathy       History of Present Illness: Bryanna Schwartz is a 54 y.o. female who is here today in Neurology for tremors and neuropathy     Bryanna Schwartz is a 53 y.o. female who is here today in Neurology for  tremors and neuropathy     PMH of GERD without esophagitis, anxiety and depression, hyperlipidemia, hypertension, migraines, hypothyroidism, embolic infarction, dehydration, electrolyte abnormality, chronic systolic heart failure, history of severe acute respiratory syndrome coronavirus 2, low back pain, obesity, peripheral vascular disease, type 2 diabetes, asthma, mood disorder, depression     [place] spotted fever.  Patient was seen by primary care on 2023 and at that visit reported pain in her hands and like to discuss increasing dose of Lyrica.     Per review of prior neurology notes at      Ruby Ferguson MD 2023:  Bryanna Schwartz presents to the [facility] as a returning patient today with/for Follow-up (Six month follow up). Follow up for sequela of severe Vitamin B12 deficiency with SCD, polyneuropathy and cognitive impairment.     HPI   Mrs. Schwartz is a 52 year-old right-handed woman who continues to recover from severe vitamin B12 deficiency in 2018 complicated by subacute combined degeneration and cognitive impairment. She presents to neurology (neuromuscular) telehealth clinic for follow-up of this. She was last seen in clinic on 2022. (Of note, she is also treated for headaches by Elizabeth Torrez PA-C).    At that last visit, she continued to complain of neuropathic pain in the distal extremities (fingers and toes) that had improved, but was still debilitating. I continued her on pregabalin 250mg po bid and ordered compounded  analgesic cream consisting of Meloxicam, lidocaine, prilocaine, baclofen, lamotrigine, gabapentin (Pain cream #4 [facility]). While the cream helped, she stopped it recently due to development of gangrene on her toes (see below). The pain is prominently in her fingers and toes, but intermittently she experiences shooting pains up her legs. The pregabalin still helps with her pain somewhat, but does not totally alleviate it. She continues to take vitamin B12 supplements, but 4-5 months ago she said her PCP discontinued injections and put her on oral vitamin B12.    She ambulates with a 4-pronged cane for gait imbalance due to neuropathy.    I ordered follow-up neuropsychological evaluation to assess for improvement of her cognition since SCD is being treated, but that is not scheduled until 11/15/2023. Her  thinks that her memory has improved, but patient thinks that she is not fully back to her baseline before the illness.     Since the last visit, she has had several illnesses, treated at [facility] and [facility] which I have reviewed in Care Everywhere. She has chronic systolic heart failure, hypertension, anxiety, depression and gangrene in the right fourth and right second toe. Per [facility] Cardiothoracic Surgery Note dated 4/5/2023, there was no need for surgical intervention and her toes improved with medical management and physical therapy. She continues to undergo physical therapy at [facility]. She says this is helping.    For diabetes and weight loss, she is now on Ozempic.. She said she has lost weight (about 20 lb in 3 months).      PLAN  Summary of plan:    Labs today - if B12 level is decreasing and/or MMA is elevated, will need to stop oral B12 and resum injection    Increase pregabalin to 300mg po bid to help with her paresthesias    Encouraged patient that her exam is showing improvements every visit      Elizabeth Torrez PA-C 8/18/2023:  Ms. Schwartz is a 52yo female who we  follow for history of chronic migraine and who is followed in neuromuscluar clinic for poly neuropathy from B12 deficiency. She reports that after developing an allergy to Aimovig she discontinued this medication. I would be cautious to start any additional CGRP inhibitors. For now she will continue zonisamide 100mg qam and 200mg at bedtime for migraine preventative and naproxen 750mg PRN for acute headaches up to 3 times per week as needed.    Today Ms. Schwartz reports for follow up. She states that her headaches have been more severe. She is having about 4 headaches per month, but they do not respond to NSAIDs. She has new diagnoses of gastroporesis and constipation and cardiac diagnoses of primary HTN and nonischemic cardiomyopathy.      PLAN  Diagnoses and all orders for this visit:  Migraine without aura and without status migrainosus, not intractable  Other orders  - Rimegepant Sulfate (Nurtec) 75 MG tablet dispersible; Take 1 tablet (75 mg) by mouth if needed (Take one at onset of headache. Do not exceed 75mg in 24 hours.).    Ms. Schwartz is a 52yo female who we follow for chronic migraine. She reports that her headaches have been more severe recently and have not been responding to her acute medication as well. I would like to start Nurtec 75mg as needed for acute headaches. The overall frequency is about one per week, so I will continue her current preventative plan of zonisamide 100mg qam and 200mg at bedtime and Cymbalta 60mg daily. We will follow up in 3 months for medication management.      ___________________________________________________________________________________________________________________________  Initial visit with me 12/29/2023:  She is in clinic with her  to discuss tremors and neuropathy. I have reviewed neurology notes per Dr. Ferguson and Elizabeth Torrez PA-C as above, patient indicates that she will continue to see [facility] for migraines but needed to be seen for neuropathy and  "tremors at [facility].      She reports severe neuropathy despite Lyrica 300 mg BID - bilateral hands and feet, no feeling in toes. She has allergy to Gabapentin. She denies sedation from Lyrica. She reports that she has not been as consistent with B12 injections lately and wonders if that may be contributing, she states that she is supposed to be getting weekly B12 injections per PCP but hasn't gone to have it yet this week. B12 was previously checked on 4/25/2023 and at that time was 365, methylmalonic acid 257.     She had lyme disease and [place] spotted fever about 5 years ago, she also has Alpha Gal. She also had severe B12 deficiency at the same time. States that her symptoms started at that time, she has seen some improvement but was sick earlier this year and feels that her progress experienced a setback this past summer. She has also been taking CBD oil which she believes is helping. She feels that her cognition and memory continue to be poor -  manages medications/finances, no longer driving. She was supposed to undergo neuropsych testing at  in November of this year but did not keep appointment due to feeling poorly. She is having cataract removal surgery next week.      She started having tremors of chest and legs - stress worsens this - tremors have been present off/on over the last 5 years and has started to worsen in the last 3-4 months. She denies jaw tremors or tremors of her legs. Sleep is off/on - often times sleep is interrupted by neuropathic pain. Hands in front of a cold fan helps with the neuropathic pain as well.      She also follows with cardiothoracic surgery and was seen in clinic on 11/22/2023 - per review of their documentation \"ROSA/PVR obtained.  Right Toe ROSA 0.63;  Left Toe ROSA 0.55 with monophasic wave forms.   Upon examination bilateral feet with discoloration, cool to touch, and abnormally thick toenails.   CTA as resulted above under imaging/labs with unremarkable " "findings and three vessel runoff to the level below the ankle of both extremities\".      She is in PT for muscle weakness and gait abnormality and is requesting new order be sent to PT for continued therapy.     Follow up visit 2/26/2024:  Bryanna Schwartz returns to neurology clinic for continued evaluation of tremors and neuropathy. As above the previously followed with Dr. Ferguson at  and still follows with headache clinic - Elizabeth ASHLEY at . Vitamin B12 was 811, methylmalonic acid level was also normal. She has been receiving weekly B12 injections per her PCP. EMG and neuropsych pending. For her neuropathy she is currently taking Lyrica 300 mg BID from PCP - she reports that her hands and feet feel hot, numb to wrist level and ankles bilaterally. Gabapentin caused hives historically. It also appears that she may have been taking Cymbalta previously for migraines but she is unsure when she stopped taking this medication. She feels that neuropathy symptoms have worsened over time. She uses a cold water bottle to try to help with the symptoms. She also notes tremors in arms, legs, chest - feels that these are worsening. Some word finding difficulty.   She has been going to PT and does feel that her walking has improved - no longer using a cane/walker.     Dr. Campbell visit 5/21/2024:  She is in clinic for regular follow-up. Since her last visit in February 2024, she has been taking Cymbalta 20 mg daily and reports excellent response with 60 to 70% reduction in symptoms of neuropathy. EMG/nerve conduction study was completed as well which showed evidence of mild primarily sensory neuropathy. She continues to take Lyrica 300 mg twice daily. She continues to have action and postural tremors bilaterally-worse on the right than on the left.      Current visit 11/21/2024:  Mode of Visit: Video  Location of patient: _home  Location of provider: _[facility]_  You have chosen to receive care through a telehealth visit.  The " patient has signed the video visit consent form.  The visit included audio and video interaction. No technical issues occurred during this visit.     Bryanna Caruso is seen today via telehealth for continued assessment of tremors and polyneuropathy. She was last seen in clinic with Dr. Campbell on 5/21/2024 and at that visit Cymbalta dose was increased to 40 mg daily and Lyrica was weaned to 150 mg BID. She reports that her neuropathy has responded well to Cymbalta and she has not noted any worsening in neuropathy since decreasing the Lyrica dose. She unfortunately has not seen much improvement in her tremors since adjusting the Lyrica dose. She reports that she can have sensation of whole body tremors and that intensity of tremors can vary. She is sleeping well at night. She reports that she is still getting B12 injections once/month. She was seen via telehealth today due to recent ankle fracture, she is non-weight bearing.     Subjective      Review of Systems   Eyes: Negative.    Respiratory: Negative.     Cardiovascular: Negative.    Gastrointestinal: Negative.    Endocrine: Negative.    Genitourinary: Negative.    Musculoskeletal:  Positive for gait problem.   Skin: Negative.    Allergic/Immunologic: Negative.    Neurological:  Positive for tremors, weakness, numbness and headaches.   Hematological: Negative.    Psychiatric/Behavioral:  The patient is nervous/anxious.           Past Medical History:   Past Medical History:   Diagnosis Date    Allergy to alpha-gal     Arthritis     Cluster headache 1984    Depression     Diabetes     Difficulty walking     Environmental allergies     Falls 10/24/2018    GERD (gastroesophageal reflux disease)     Headache, tension-type 1984    Heart murmur     Hyperlipidemia     Hypertension     Hypothyroidism     Lyme disease     Migraine     Peripheral vascular disease     [place] spotted fever     Trigeminal neuralgia     Vitamin B 12 deficiency        Past Surgical History:    Past Surgical History:   Procedure Laterality Date    ACHILLES TENDON SURGERY Right     BREAST CYST EXCISION Left     CARDIAC CATHETERIZATION  2023    Normal coronaries.  EF 45%    CARDIOVASCULAR STRESS TEST  2023    anteroseptal, anterior infarct with dana-infarct ischemia    CHOLECYSTECTOMY      ECHO - CONVERTED  2023    EF 41-45%, saline test negative, valves normal    ECHO - CONVERTED  2023    Limited - EF 55%. LA- 4.0. Trace PE    ENDOSCOPY N/A 10/16/2018    Procedure: ESOPHAGOGASTRODUODENOSCOPY;  Surgeon: Brunner, Mark I, MD;  Location:  [facility];  Service: Gastroenterology    ENDOSCOPY N/A 2023    Procedure: ESOPHAGOGASTRODUODENOSCOPY;  Surgeon: Yas Murphy MD;  Location:  [facility];  Service: Gastroenterology;  Laterality: N/A;    HYSTERECTOMY      TRANSESOPHAGEAL ECHOCARDIOGRAM (NESSA)  2023    Dr. Alonso- EF 56-60%, no mass or thrombus, mild plaque aorrti arch and descending aorta    UPPER GASTROINTESTINAL ENDOSCOPY      US CAROTID UNILATERAL  2022    < 50% Bilaterally       Family History:   Family History   Problem Relation Age of Onset    Hypertension Mother     COPD Mother     Heart attack Mother     Other Father         / of exhaust fumes    Cancer Sister     Heart attack Brother     Colon cancer Neg Hx     Colon polyps Neg Hx     Esophageal cancer Neg Hx        Social History:   Social History     Socioeconomic History    Marital status:     Number of children: 2   Tobacco Use    Smoking status: Never     Passive exposure: Never    Smokeless tobacco: Never    Tobacco comments:     Ok   Vaping Use    Vaping status: Never Used   Substance and Sexual Activity    Alcohol use: Never    Drug use: No    Sexual activity: Defer     Partners: Male       Medications:     Current Outpatient Medications:     albuterol sulfate  (90 Base) MCG/ACT inhaler, EVERY 4 HOURS, Disp: , Rfl:     Aspirin Low Dose 81 MG EC tablet, , Disp: ,  Rfl:     budesonide-formoterol (SYMBICORT) 80-4.5 MCG/ACT inhaler, Inhale 2 puffs As Needed., Disp: , Rfl:     buPROPion XL (WELLBUTRIN XL) 150 MG 24 hr tablet, Take 1 tablet by mouth Every Morning., Disp: , Rfl:     celecoxib (CeleBREX) 200 MG capsule, Take 1 capsule by mouth Daily., Disp: , Rfl:     cetirizine (zyrTEC) 10 MG tablet, Take 1 tablet by mouth Daily., Disp: , Rfl:     Cholecalciferol (Vitamin D3) 50 MCG (2000 UT) capsule, , Disp: , Rfl:     Cyanocobalamin 1000 MCG/ML kit, Inject 1 mL as directed 1 (One) Time Per Week., Disp: , Rfl:     DULoxetine (CYMBALTA) 20 MG capsule, Take 2 capsules by mouth Daily for 30 days., Disp: 60 capsule, Rfl: 6    Eliquis 2.5 MG tablet tablet, Take 1 tablet by mouth Every 12 (Twelve) Hours., Disp: , Rfl:     famotidine (PEPCID) 40 MG tablet, , Disp: , Rfl:     folic acid (FOLVITE) 1 MG tablet, Take 1 tablet by mouth Daily., Disp: , Rfl:     Ginger, Zingiber officinalis, (Ginger Root) 250 MG capsule, Take 2 capsules by mouth 3 (Three) Times a Day., Disp: 90 capsule, Rfl: 1    levothyroxine (SYNTHROID, LEVOTHROID) 50 MCG tablet, Take 1 tablet by mouth Every Morning., Disp: , Rfl:     loratadine (CLARITIN) 10 MG tablet, , Disp: , Rfl:     methocarbamol (ROBAXIN) 750 MG tablet, Take 1 tablet by mouth Every 12 (Twelve) Hours., Disp: , Rfl:     montelukast (SINGULAIR) 10 MG tablet, Take 1 tablet by mouth Every Night., Disp: , Rfl:     nitroglycerin (NITROSTAT) 0.4 MG SL tablet, 1 under the tongue as needed for angina, may repeat q5mins for up three doses, Disp: 25 tablet, Rfl: 1    ondansetron (ZOFRAN) 4 MG tablet, Take 1 tablet by mouth As Needed for Nausea., Disp: , Rfl:     oxyCODONE-acetaminophen (PERCOCET)  MG per tablet, Take 1 tablet by mouth Every 6 (Six) Hours As Needed. for pain, Disp: , Rfl:     Ozempic, 2 MG/DOSE, 8 MG/3ML solution pen-injector, , Disp: , Rfl:     pantoprazole (Protonix) 40 MG EC tablet, Take 1 tablet by mouth Daily., Disp: 30 tablet, Rfl: 2     "pregabalin (Lyrica) 100 MG capsule, Take 1 capsule by mouth 2 (Two) Times a Day for 90 days., Disp: 60 capsule, Rfl: 2    Rimegepant Sulfate (NURTEC) 75 MG tablet dispersible tablet, Take  by mouth As Needed., Disp: , Rfl:     rosuvastatin (CRESTOR) 10 MG tablet, Take 1 tablet by mouth Daily., Disp: , Rfl:     sacubitril-valsartan (Entresto) 24-26 MG tablet, Take 1 tablet by mouth 2 (Two) Times a Day., Disp: 60 tablet, Rfl: 8    zonisamide (ZONEGRAN) 25 MG capsule, Take 1 capsule by mouth Daily. 25mg in the AM and 50mg in the PM, Disp: , Rfl:     Deep Sea Nasal Spray 0.65 % nasal spray, USE 1 SPRAY IN EACH NOSTRIL 3 TIMES A DAY AS NEEDED (Patient not taking: Reported on 11/21/2024), Disp: , Rfl:     HYDROcodone-acetaminophen (NORCO)  MG per tablet, Take 1 tablet by mouth Every 12 (Twelve) Hours. (Patient not taking: Reported on 11/21/2024), Disp: , Rfl:     Prucalopride Succinate 1 MG tablet, Take 2 mg by mouth Daily. (Patient not taking: Reported on 11/21/2024), Disp: 60 tablet, Rfl: 1    Allergies:   Allergies   Allergen Reactions    Erenumab-Aooe Anaphylaxis     Aimovig    Other Shortness Of Breath     Nasal Spray- also caused rash    Antihistamines, Loratadine-Type Other (See Comments)     Made \"stuffy\"    Tizanidine Other (See Comments)     Other    Gabapentin Hives       Objective     Objective: Limited physical exam due telehealth    Ht 175.3 cm (69.02\")   Wt 113 kg (250 lb)   LMP  (LMP Unknown)   BMI 36.90 kg/m²   Body mass index is 36.9 kg/m².    Physical Exam  Constitutional:       Appearance: Normal appearance.   Neurological:      Mental Status: She is alert.          Neurology Exam:    General apperance: NAD.     Mental status: Alert, awake and oriented to time place and person.    Fund of knowledge:  Normal.     Language and Speech: No aphasia or dysarthria.    Naming , Repitition and Comprehension:  Can name objects, repeat a sentence and follow commands. Speech is clear and fluent with good " repetition, comprehension, and naming.      Results:   Imaging:   CT Angio Abdominal Aorta Bilateral Iliofem Runoff With & Without Contrast    Result Date: 11/9/2023  1.  Unremarkable bilateral lower extremity CTA runoff. Three-vessel runoff is noted to the level below the ankle of both extremities. 2.  No significant abdominal or pelvic arterial vascular disease noted. No aneurysm or dissection. 3.  Other incidental/nonacute findings detailed above.   This report was finalized on 11/9/2023 2:31 PM by Dr. Tal Andino MD.         Labs        Assessment / Plan      Assessment/Plan:   Diagnoses and all orders for this visit:    1. Tremor (Primary)    2. Polyneuropathy  -     Vitamin B12; Future  -     Methylmalonic Acid, Serum; Future  -     pregabalin (Lyrica) 100 MG capsule; Take 1 capsule by mouth 2 (Two) Times a Day for 90 days.  Dispense: 60 capsule; Refill: 2    3. B12 deficiency      Bryanna Schwartz is seen via telehealth today for continued evaluation of tremors and polyneuropathy.  At last visit with Dr. Campbell in May her Lyrica dose was decreased to 150 mg twice daily and Cymbalta dose was increased to 40 mg daily, she reports that she has been tolerating both of these adjustments well and notes that neuropathic discomfort hsd actually been reduced with Cymbalta.  I reviewed her case with collaborating neurologist, Dr. Campbell, again today and we have elected to further reduce her Lyrica to 100 mg twice daily along with continuing Cymbalta 40 mg daily.  She is to notify clinic should she have any intolerance to this adjusted dose of Lyrica, we are hopeful that further reducing Lyrica dose may help decrease her tremors.  I have also ordered follow-up lab work for her vitamin B12 methylmalonic acid, she reports that she is receiving monthly B12 injections through primary care.  Will plan to see Bryanna back in clinic in 3 months or sooner for any questions or concerns.       Patient Education:       Reviewed  medications, potential side effects and signs and symptoms to report. Discussed risk versus benefits of treatment plan with patient and/or family-including medications, labs and radiology that may be ordered. Addressed questions and concerns during visit. Patient and/or family verbalized understanding and agree with plan. Instructed to call the office with any questions and report to ER with any life-threatening symptoms.     Follow Up:   Return in about 3 months (around 2/21/2025).    I spent 43 minutes in the care of this patient. I personally spent 50 percent of this time counseling and discussing diagnosis, diagnostic testing, evaluation, treatment options, and management .       During this visit the following were done:  Labs Reviewed []    Labs Ordered [x]    Radiology Reports Reviewed []    Radiology Ordered []    PCP Records Reviewed []    Referring Provider Records Reviewed []    ER Records Reviewed []    Hospital Records Reviewed []    History Obtained From Family []    Radiology Images Reviewed []    Other Reviewed [x]  Dr. Campbell's neurology note from May 2024  Records Requested []      CARRIE Florentino  [facility]  [address] [address] [phone]

## 2024-11-21 NOTE — Clinical Note
Johann Rae, will you please call [facility] to schedule 3 month follow up with me in clinic? Thank you!

## 2024-12-16 RX ORDER — DULOXETIN HYDROCHLORIDE 20 MG/1
40 CAPSULE, DELAYED RELEASE ORAL DAILY
Qty: 60 CAPSULE | Refills: 2 | Status: SHIPPED | OUTPATIENT
Start: 2024-12-16

## 2024-12-16 NOTE — TELEPHONE ENCOUNTER
Rx Refill Note  Requested Prescriptions     Pending Prescriptions Disp Refills    DULoxetine (CYMBALTA) 20 MG capsule [Pharmacy Med Name: duloxetine 20 mg capsule,delayed release] 60 capsule 6     Sig: TAKE 2 CAPSULES BY MOUTH ONCE DAILY      Last filled: 5/21/24 for 7 mos total. Last sent in by Dr Campbell but sees Bronwyn regularly.     Last office visit with prescribing clinician: 5/21/2024 Dr Campbell, 11/21/24 with Bronwyn     Next office visit with prescribing clinician: 3/10/2024 with Bronwyn Quinonez MA  12/16/24, 09:08 EST

## 2025-03-10 ENCOUNTER — OFFICE VISIT (OUTPATIENT)
Dept: NEUROLOGY | Facility: CLINIC | Age: 55
End: 2025-03-10
Payer: MEDICARE

## 2025-03-10 VITALS
HEART RATE: 124 BPM | OXYGEN SATURATION: 94 % | WEIGHT: 250 LBS | HEIGHT: 69 IN | SYSTOLIC BLOOD PRESSURE: 130 MMHG | BODY MASS INDEX: 37.03 KG/M2 | DIASTOLIC BLOOD PRESSURE: 86 MMHG

## 2025-03-10 DIAGNOSIS — G62.9 POLYNEUROPATHY: Primary | ICD-10-CM

## 2025-03-10 DIAGNOSIS — E53.8 B12 DEFICIENCY: ICD-10-CM

## 2025-03-10 RX ORDER — PREGABALIN 50 MG/1
50 CAPSULE ORAL NIGHTLY
Qty: 30 CAPSULE | Refills: 2 | Status: SHIPPED | OUTPATIENT
Start: 2025-03-10

## 2025-03-10 RX ORDER — FLUTICASONE PROPIONATE 50 MCG
1 SPRAY, SUSPENSION (ML) NASAL 2 TIMES DAILY
COMMUNITY
Start: 2025-02-14 | End: 2025-03-10

## 2025-03-10 RX ORDER — POLYETHYLENE GLYCOL 3350 17 G/17G
POWDER, FOR SOLUTION ORAL
COMMUNITY
Start: 2025-02-14

## 2025-03-10 RX ORDER — HYDROCODONE BITARTRATE AND ACETAMINOPHEN 10; 325 MG/1; MG/1
1 TABLET ORAL 3 TIMES DAILY
COMMUNITY
Start: 2025-02-18

## 2025-03-10 RX ORDER — CIPROFLOXACIN 500 MG/1
TABLET, FILM COATED ORAL
COMMUNITY
Start: 2025-03-04

## 2025-03-10 RX ORDER — FERROUS SULFATE 325(65) MG
TABLET ORAL
COMMUNITY
Start: 2025-02-14 | End: 2025-03-10

## 2025-03-10 RX ORDER — ONDANSETRON 4 MG/1
TABLET, ORALLY DISINTEGRATING ORAL
COMMUNITY
Start: 2025-01-15

## 2025-03-10 NOTE — PROGRESS NOTES
Neuro Office Visit      Encounter Date: 03/10/2025   Patient Name: Bryanna Schwartz  : 1970   MRN: [id number]   PCP:  Baljeet Manley APRN     Chief Complaint:    Chief Complaint   Patient presents with    Follow-up       History of Present Illness: Bryanna Schwartz is a 54 y.o. female who is here today in Neurology for tremors and neuropathy     Bryanna Schwartz is a 53 y.o. female who is here today in Neurology for  tremors and neuropathy     PMH of GERD without esophagitis, anxiety and depression, hyperlipidemia, hypertension, migraines, hypothyroidism, embolic infarction, dehydration, electrolyte abnormality, chronic systolic heart failure, history of severe acute respiratory syndrome coronavirus 2, low back pain, obesity, peripheral vascular disease, type 2 diabetes, asthma, mood disorder, depression     [place] spotted fever.  Patient was seen by primary care on 2023 and at that visit reported pain in her hands and like to discuss increasing dose of Lyrica.     Per review of prior neurology notes at      Ruby Ferguson MD 2023:  Bryanna Schwartz presents to the [facility] as a returning patient today with/for Follow-up (Six month follow up). Follow up for sequela of severe Vitamin B12 deficiency with SCD, polyneuropathy and cognitive impairment.     HPI   Mrs. Schwartz is a 52 year-old right-handed woman who continues to recover from severe vitamin B12 deficiency in 2018 complicated by subacute combined degeneration and cognitive impairment. She presents to neurology (neuromuscular) telehealth clinic for follow-up of this. She was last seen in clinic on 2022. (Of note, she is also treated for headaches by Elizabeth Torrez PA-C).    At that last visit, she continued to complain of neuropathic pain in the distal extremities (fingers and toes) that had improved, but was still debilitating. I continued her on pregabalin 250mg po bid and ordered compounded  analgesic cream consisting of Meloxicam, lidocaine, prilocaine, baclofen, lamotrigine, gabapentin (Pain cream #4 [facility]). While the cream helped, she stopped it recently due to development of gangrene on her toes (see below). The pain is prominently in her fingers and toes, but intermittently she experiences shooting pains up her legs. The pregabalin still helps with her pain somewhat, but does not totally alleviate it. She continues to take vitamin B12 supplements, but 4-5 months ago she said her PCP discontinued injections and put her on oral vitamin B12.    She ambulates with a 4-pronged cane for gait imbalance due to neuropathy.    I ordered follow-up neuropsychological evaluation to assess for improvement of her cognition since SCD is being treated, but that is not scheduled until 11/15/2023. Her  thinks that her memory has improved, but patient thinks that she is not fully back to her baseline before the illness.     Since the last visit, she has had several illnesses, treated at [facility] and [facility] which I have reviewed in Care Everywhere. She has chronic systolic heart failure, hypertension, anxiety, depression and gangrene in the right fourth and right second toe. Per [facility] Cardiothoracic Surgery Note dated 4/5/2023, there was no need for surgical intervention and her toes improved with medical management and physical therapy. She continues to undergo physical therapy at [facility]. She says this is helping.    For diabetes and weight loss, she is now on Ozempic.. She said she has lost weight (about 20 lb in 3 months).      PLAN  Summary of plan:    Labs today - if B12 level is decreasing and/or MMA is elevated, will need to stop oral B12 and resum injection    Increase pregabalin to 300mg po bid to help with her paresthesias    Encouraged patient that her exam is showing improvements every visit      Elizabeth Torrez PA-C 8/18/2023:  Ms. Schwartz is a 54yo female who we  follow for history of chronic migraine and who is followed in neuromuscluar clinic for poly neuropathy from B12 deficiency. She reports that after developing an allergy to Aimovig she discontinued this medication. I would be cautious to start any additional CGRP inhibitors. For now she will continue zonisamide 100mg qam and 200mg at bedtime for migraine preventative and naproxen 750mg PRN for acute headaches up to 3 times per week as needed.    Today Ms. Schwartz reports for follow up. She states that her headaches have been more severe. She is having about 4 headaches per month, but they do not respond to NSAIDs. She has new diagnoses of gastroporesis and constipation and cardiac diagnoses of primary HTN and nonischemic cardiomyopathy.      PLAN  Diagnoses and all orders for this visit:  Migraine without aura and without status migrainosus, not intractable  Other orders  - Rimegepant Sulfate (Nurtec) 75 MG tablet dispersible; Take 1 tablet (75 mg) by mouth if needed (Take one at onset of headache. Do not exceed 75mg in 24 hours.).    Ms. Schwartz is a 54yo female who we follow for chronic migraine. She reports that her headaches have been more severe recently and have not been responding to her acute medication as well. I would like to start Nurtec 75mg as needed for acute headaches. The overall frequency is about one per week, so I will continue her current preventative plan of zonisamide 100mg qam and 200mg at bedtime and Cymbalta 60mg daily. We will follow up in 3 months for medication management.      ___________________________________________________________________________________________________________________________  Initial visit with me 12/29/2023:  She is in clinic with her  to discuss tremors and neuropathy. I have reviewed neurology notes per Dr. Ferguson and Elizabeth Torrez PA-C as above, patient indicates that she will continue to see [facility] for migraines but needed to be seen for neuropathy and  "tremors at [facility].      She reports severe neuropathy despite Lyrica 300 mg BID - bilateral hands and feet, no feeling in toes. She has allergy to Gabapentin. She denies sedation from Lyrica. She reports that she has not been as consistent with B12 injections lately and wonders if that may be contributing, she states that she is supposed to be getting weekly B12 injections per PCP but hasn't gone to have it yet this week. B12 was previously checked on 4/25/2023 and at that time was 365, methylmalonic acid 257.     She had lyme disease and [place] spotted fever about 5 years ago, she also has Alpha Gal. She also had severe B12 deficiency at the same time. States that her symptoms started at that time, she has seen some improvement but was sick earlier this year and feels that her progress experienced a setback this past summer. She has also been taking CBD oil which she believes is helping. She feels that her cognition and memory continue to be poor -  manages medications/finances, no longer driving. She was supposed to undergo neuropsych testing at  in November of this year but did not keep appointment due to feeling poorly. She is having cataract removal surgery next week.      She started having tremors of chest and legs - stress worsens this - tremors have been present off/on over the last 5 years and has started to worsen in the last 3-4 months. She denies jaw tremors or tremors of her legs. Sleep is off/on - often times sleep is interrupted by neuropathic pain. Hands in front of a cold fan helps with the neuropathic pain as well.      She also follows with cardiothoracic surgery and was seen in clinic on 11/22/2023 - per review of their documentation \"ROSA/PVR obtained.  Right Toe ROSA 0.63;  Left Toe ROSA 0.55 with monophasic wave forms.   Upon examination bilateral feet with discoloration, cool to touch, and abnormally thick toenails.   CTA as resulted above under imaging/labs with unremarkable " "findings and three vessel runoff to the level below the ankle of both extremities\".      She is in PT for muscle weakness and gait abnormality and is requesting new order be sent to PT for continued therapy.     Follow up visit 2/26/2024:  Bryanna Schwartz returns to neurology clinic for continued evaluation of tremors and neuropathy. As above the previously followed with Dr. Ferguson at  and still follows with headache clinic - Elizabeth ASHLEY at . Vitamin B12 was 811, methylmalonic acid level was also normal. She has been receiving weekly B12 injections per her PCP. EMG and neuropsych pending. For her neuropathy she is currently taking Lyrica 300 mg BID from PCP - she reports that her hands and feet feel hot, numb to wrist level and ankles bilaterally. Gabapentin caused hives historically. It also appears that she may have been taking Cymbalta previously for migraines but she is unsure when she stopped taking this medication. She feels that neuropathy symptoms have worsened over time. She uses a cold water bottle to try to help with the symptoms. She also notes tremors in arms, legs, chest - feels that these are worsening. Some word finding difficulty.   She has been going to PT and does feel that her walking has improved - no longer using a cane/walker.     Dr. Campbell visit 5/21/2024:  She is in clinic for regular follow-up. Since her last visit in February 2024, she has been taking Cymbalta 20 mg daily and reports excellent response with 60 to 70% reduction in symptoms of neuropathy. EMG/nerve conduction study was completed as well which showed evidence of mild primarily sensory neuropathy. She continues to take Lyrica 300 mg twice daily. She continues to have action and postural tremors bilaterally-worse on the right than on the left.      Follow up visit 11/21/2024:  Mode of Visit: Video  Location of patient: _home  Location of provider: _[facility]_  You have chosen to receive care through a telehealth visit.  The " patient has signed the video visit consent form.  The visit included audio and video interaction. No technical issues occurred during this visit.     Bryanna Caruso is seen today via telehealth for continued assessment of tremors and polyneuropathy. She was last seen in clinic with Dr. Campbell on 5/21/2024 and at that visit Cymbalta dose was increased to 40 mg daily and Lyrica was weaned to 150 mg BID. She reports that her neuropathy has responded well to Cymbalta and she has not noted any worsening in neuropathy since decreasing the Lyrica dose. She unfortunately has not seen much improvement in her tremors since adjusting the Lyrica dose. She reports that she can have sensation of whole body tremors and that intensity of tremors can vary. She is sleeping well at night. She reports that she is still getting B12 injections once/month. She was seen via telehealth today due to recent ankle fracture, she is non-weight bearing.        Current visit 3/10/2025:  Bryanna Schwartz returns to neurology clinic for continued evaluation of tremors and polyneuropathy. She reports that PCP continues to monitor B12 and gives her B12 injections monthly. Prior lab of B12 and methylmalonic acid were not completed at clinic.   She has been taking Lyrica 100 mg BID, Cymbalta 40 mg daily. Tremors have been pretty well controlled lately, she did see further improvement in tremors with decrease in Lyrica however notes that neuropathic pain has increased with the adjusted lyrica dose. Gait has improved. When the house is cool she does better, she notes that heat worsens the pain. No side effects from Lyrica. Neuropathic pain can keep her awake at night. She has seen worsening in headaches recently.     She takes Nurtec for her migraines. When she was at  for her migraines she appeared to have been prescribed Zonisamide 100 mg AM and 200 mg PM. However on our medication reconciliation it shows 25 mg am/50 mg PM. She is to check on dosing and  update clinic.     Taking 1 in AM and 2 in PM but unsure of dose (Zonisamide)  - she has been on this long term. She previously took Topamax but stopped due to side effects. She try botox before with allergic reaction but did work well. She gets relief with Nurtec but only takes it at night as it makes her sleepy.     Subjective      Review of Systems   Eyes: Negative.    Respiratory: Negative.     Cardiovascular: Negative.    Gastrointestinal: Negative.    Endocrine: Negative.    Genitourinary: Negative.    Musculoskeletal:  Positive for gait problem.   Skin: Negative.    Allergic/Immunologic: Negative.    Neurological:  Positive for tremors, weakness, numbness and headaches.   Hematological: Negative.    Psychiatric/Behavioral:  The patient is nervous/anxious.           Past Medical History:   Past Medical History:   Diagnosis Date    Allergy to alpha-gal     Arthritis     Cluster headache 1984    Depression     Diabetes     Difficulty walking     Environmental allergies     Falls 10/24/2018    GERD (gastroesophageal reflux disease)     Headache, tension-type 1984    Heart murmur     Hyperlipidemia     Hypertension     Hypothyroidism     Lyme disease     Migraine     Peripheral vascular disease     [place] spotted fever     Trigeminal neuralgia     Vitamin B 12 deficiency        Past Surgical History:   Past Surgical History:   Procedure Laterality Date    ACHILLES TENDON SURGERY Right     BREAST CYST EXCISION Left     CARDIAC CATHETERIZATION  03/16/2023    Normal coronaries.  EF 45%    CARDIOVASCULAR STRESS TEST  03/04/2023    anteroseptal, anterior infarct with dana-infarct ischemia    CHOLECYSTECTOMY      ECHO - CONVERTED  03/02/2023    EF 41-45%, saline test negative, valves normal    ECHO - CONVERTED  08/14/2023    Limited - EF 55%. LA- 4.0. Trace PE    ENDOSCOPY N/A 10/16/2018    Procedure: ESOPHAGOGASTRODUODENOSCOPY;  Surgeon: Brunner, Mark I, MD;  Location: [facility] ENDOSCOPY;  Service:  Gastroenterology    ENDOSCOPY N/A 2023    Procedure: ESOPHAGOGASTRODUODENOSCOPY;  Surgeon: Yas Murphy MD;  Location: [facility] ENDOSCOPY;  Service: Gastroenterology;  Laterality: N/A;    HYSTERECTOMY      TRANSESOPHAGEAL ECHOCARDIOGRAM (NESSA)  2023    Dr. Alonso- EF 56-60%, no mass or thrombus, mild plaque aorrti arch and descending aorta    UPPER GASTROINTESTINAL ENDOSCOPY      US CAROTID UNILATERAL  2022    < 50% Bilaterally       Family History:   Family History   Problem Relation Age of Onset    Hypertension Mother     COPD Mother     Heart attack Mother     Other Father         / of exhaust fumes    Cancer Sister     Heart attack Brother     Colon cancer Neg Hx     Colon polyps Neg Hx     Esophageal cancer Neg Hx        Social History:   Social History     Socioeconomic History    Marital status:     Number of children: 2   Tobacco Use    Smoking status: Never     Passive exposure: Never    Smokeless tobacco: Never    Tobacco comments:     Ok   Vaping Use    Vaping status: Never Used   Substance and Sexual Activity    Alcohol use: Never    Drug use: No    Sexual activity: Defer     Partners: Male       Medications:     Current Outpatient Medications:     albuterol sulfate  (90 Base) MCG/ACT inhaler, EVERY 4 HOURS, Disp: , Rfl:     Aspirin Low Dose 81 MG EC tablet, , Disp: , Rfl:     buPROPion XL (WELLBUTRIN XL) 150 MG 24 hr tablet, Take 1 tablet by mouth Every Morning., Disp: , Rfl:     celecoxib (CeleBREX) 200 MG capsule, Take 1 capsule by mouth Daily., Disp: , Rfl:     cetirizine (zyrTEC) 10 MG tablet, Take 1 tablet by mouth Daily., Disp: , Rfl:     Cholecalciferol (Vitamin D3) 50 MCG (2000 UT) capsule, , Disp: , Rfl:     ciprofloxacin (CIPRO) 500 MG tablet, TAKE 1 TABLET BY MOUTH EVERY 12 HOURS UNTIL GONE, Disp: , Rfl:     Cyanocobalamin 1000 MCG/ML kit, Inject 1 mL as directed 1 (One) Time Per Week., Disp: , Rfl:     DULoxetine (CYMBALTA) 20 MG capsule, TAKE 2  CAPSULES BY MOUTH ONCE DAILY, Disp: 60 capsule, Rfl: 2    Eliquis 2.5 MG tablet tablet, Take 1 tablet by mouth Every 12 (Twelve) Hours., Disp: , Rfl:     famotidine (PEPCID) 40 MG tablet, , Disp: , Rfl:     folic acid (FOLVITE) 1 MG tablet, Take 1 tablet by mouth Daily., Disp: , Rfl:     Ginger, Zingiber officinalis, (Ginger Root) 250 MG capsule, Take 2 capsules by mouth 3 (Three) Times a Day., Disp: 90 capsule, Rfl: 1    HYDROcodone-acetaminophen (NORCO)  MG per tablet, Take 1 tablet by mouth 3 times a day., Disp: , Rfl:     levothyroxine (SYNTHROID, LEVOTHROID) 50 MCG tablet, Take 1 tablet by mouth Every Morning., Disp: , Rfl:     loratadine (CLARITIN) 10 MG tablet, , Disp: , Rfl:     methocarbamol (ROBAXIN) 750 MG tablet, Take 1 tablet by mouth Every 12 (Twelve) Hours., Disp: , Rfl:     montelukast (SINGULAIR) 10 MG tablet, Take 1 tablet by mouth Every Night., Disp: , Rfl:     nitroglycerin (NITROSTAT) 0.4 MG SL tablet, 1 under the tongue as needed for angina, may repeat q5mins for up three doses, Disp: 25 tablet, Rfl: 1    ondansetron ODT (ZOFRAN-ODT) 4 MG disintegrating tablet, DISSOLVE 1 TABLET ON THE TONGUE EVERY 8 HOURS AS NEEDED FOR NAUSEA AND VOMITING, Disp: , Rfl:     Ozempic, 2 MG/DOSE, 8 MG/3ML solution pen-injector, , Disp: , Rfl:     pantoprazole (Protonix) 40 MG EC tablet, Take 1 tablet by mouth Daily., Disp: 30 tablet, Rfl: 2    polyethylene glycol (MIRALAX) 17 GM/SCOOP powder, MIX 1 DOSE (17 GRAMS) IN 8 OUNCES OF BEVERAGE & DRINK ONCE DAILY, Disp: , Rfl:     pregabalin (Lyrica) 100 MG capsule, Take 1 capsule by mouth 2 (Two) Times a Day for 90 days., Disp: 60 capsule, Rfl: 2    Rimegepant Sulfate (NURTEC) 75 MG tablet dispersible tablet, Take  by mouth As Needed., Disp: , Rfl:     rosuvastatin (CRESTOR) 10 MG tablet, Take 1 tablet by mouth Daily., Disp: , Rfl:     sacubitril-valsartan (Entresto) 24-26 MG tablet, Take 1 tablet by mouth 2 (Two) Times a Day., Disp: 60 tablet, Rfl: 8     "zonisamide (ZONEGRAN) 25 MG capsule, Take 1 capsule by mouth Daily. 25mg in the AM and 50mg in the PM, Disp: , Rfl:     budesonide-formoterol (SYMBICORT) 80-4.5 MCG/ACT inhaler, Inhale 2 puffs As Needed. (Patient not taking: Reported on 3/10/2025), Disp: , Rfl:     pregabalin (Lyrica) 50 MG capsule, Take 1 capsule by mouth Every Night., Disp: 30 capsule, Rfl: 2    Allergies:   Allergies   Allergen Reactions    Botox [Onabotulinumtoxina] Hives    Erenumab-Aooe Anaphylaxis     Aimovig    Other Shortness Of Breath     Nasal Spray- also caused rash    Antihistamines, Loratadine-Type Other (See Comments)     Made \"stuffy\"    Tizanidine Other (See Comments)     Other    Gabapentin Hives       Objective     Objective: Limited physical exam due telehealth    /86   Pulse (!) 124   Ht 175.3 cm (69.02\")   Wt 113 kg (250 lb)   LMP  (LMP Unknown)   SpO2 94%   Breastfeeding No   BMI 36.90 kg/m²   Body mass index is 36.9 kg/m².    Physical Exam  Constitutional:       Appearance: Normal appearance.   Neurological:      Mental Status: She is alert.          Neurology Exam:    General apperance: NAD.      Mental status: Alert, awake and oriented to time place and person.     Language and Speech: No aphasia or dysarthria.     CN II to XII: Intact.     Motor:  Right UE muscle strength 5/5. Normal tone.      Left UE muscle strength 5/5. Normal tone.      Right LE muscle strength 5/5. Normal tone.      Left LE muscle strength 5/5. Normal tone.       Sensory: Normal light touch sensation bilaterally.     DTRs: 1+ bilaterally.     Co-ordination: Normal finger-to-nose, heel to shin B/L.     Mild intensity action and postural tremors bilaterally-worse on the right than on the left. Overall better than prior clinic evaluation     Gait: Somewhat unstable gait but improved from prior.     Results:   Imaging:   CT Angio Abdominal Aorta Bilateral Iliofem Runoff With & Without Contrast    Result Date: 11/9/2023  1.  Unremarkable " bilateral lower extremity CTA runoff. Three-vessel runoff is noted to the level below the ankle of both extremities. 2.  No significant abdominal or pelvic arterial vascular disease noted. No aneurysm or dissection. 3.  Other incidental/nonacute findings detailed above.   This report was finalized on 11/9/2023 2:31 PM by Dr. Tal Andino MD.         Labs        Assessment / Plan      Assessment/Plan:   Diagnoses and all orders for this visit:    1. Polyneuropathy (Primary)  -     pregabalin (Lyrica) 50 MG capsule; Take 1 capsule by mouth Every Night.  Dispense: 30 capsule; Refill: 2    2. B12 deficiency      Bryanna Schwartz returns to neurology clinic for continued evaluation of polyneuropathy, tremors, and B12 deficiency. She was following at  for migraines however may be transitioning this towards [employer] as well, she is currently unsure what zonisamide dose she is taking, I have asked her to please clarify this as this has been part of her migraine prevention plan both for work, so we will need to know what dose she has been taking in order to adjust the medication.  She has been taking Lyrica 100 mg twice daily along with Cymbalta 40 mg daily.  At last clinic visit we had further weaning Lyrica to 100 mg twice daily and she did note that with this lower dose that the neuropathic pain has increased some, however she did see improvement in her tremors, we have elected today to try to find a middle ground of tremor control and neuropathic pain, as such I have continued Lyrica 100 mg a.m. and adjusted evening dose to 150 mg.  She is to notify clinic with her response to this dose adjustment.  She is continuing to follow with her primary care for B12 deficiency and reports that she is receiving B12 injections monthly and that primary care is also monitoring her B12 level, we did discuss that I previously ordered B12 labs that have not yet been completed at our clinic but she has been getting this done with primary  care.    As part of this patient's treatment plan I am prescribing controlled substances. The patient has been made aware of appropriate use of such medications, including potential risk of somnolence, limited ability to drive and/or work safely, and potential for dependence or overdose. It has also been made clear that these medications are for use by the patient only, without concomitant use of alcohol or other substances unless prescribed. Keep out of reach of children.  Mendez report has been reviewed. If this is going to be prescribed long term, [facility] [place] Contract has also been read and signed by patient and myself.      Follow Up:   Return in about 3 months (around 6/10/2025).    I spent 30 minutes in the care of this patient. I personally spent 50 percent of this time counseling and discussing diagnosis, diagnostic testing, evaluation, treatment options, and management .       During this visit the following were done:  Labs Reviewed []    Labs Ordered []    Radiology Reports Reviewed []    Radiology Ordered []    PCP Records Reviewed []    Referring Provider Records Reviewed []    ER Records Reviewed []    Hospital Records Reviewed []    History Obtained From Family []    Radiology Images Reviewed []    Other Reviewed []   Records Requested []      CARRIE Florentino  [facility]  [address] [address] [phone]

## 2025-03-13 RX ORDER — DULOXETIN HYDROCHLORIDE 20 MG/1
40 CAPSULE, DELAYED RELEASE ORAL DAILY
Qty: 60 CAPSULE | Refills: 2 | Status: SHIPPED | OUTPATIENT
Start: 2025-03-13

## 2025-03-13 NOTE — TELEPHONE ENCOUNTER
Rx Refill Note  Requested Prescriptions     Pending Prescriptions Disp Refills    DULoxetine (CYMBALTA) 20 MG capsule [Pharmacy Med Name: duloxetine 20 mg capsule,delayed release] 60 capsule 2     Sig: TAKE 2 CAPSULES BY MOUTH ONCE DAILY      Last filled:12/16/24 2 ref  Last office visit with prescribing clinician: 3/10/2025      Next office visit with prescribing clinician: 6/11/2025     Nilda Almanza MA  03/13/25, 13:04 EDT    Sent to pharmacy w/2 ref

## 2025-03-17 ENCOUNTER — TELEPHONE (OUTPATIENT)
Dept: NEUROLOGY | Facility: CLINIC | Age: 55
End: 2025-03-17
Payer: MEDICARE

## 2025-03-17 DIAGNOSIS — G62.9 POLYNEUROPATHY: ICD-10-CM

## 2025-03-17 NOTE — TELEPHONE ENCOUNTER
Caller: [facility] - [place], [place] - [address] [facility] [address] - [phone]  - [phone] FX    Relationship: Pharmacy    What was the call regarding: PAT WITH THE PHARMACY CALLED REGARDING THE PREGABALIN RX. THERE WAS A NOTE IN THE LAST RX STATED THE PATIENT WAS THE WEAN FROM 150 MG TO  MG 2X A DAY. THEY FILLED THE PT'S 50 MG RX BUT THEY DON'T HAVE ANYTHING FOR  MG.     THE PT IS OVER DUE TO FOR THE RX OF  MG.     PLEASE ADVISE  THANK YOU

## 2025-03-17 NOTE — TELEPHONE ENCOUNTER
PATIENT IS CALLING TO ADVISE, SHE SPOKE TO RX TODAY AND THEY TELL HER THEY HAVE NEVER RECEIVED THE REFILL REQUEST DESPITE AN EMAIL CONFIRMATION IN CHART THAT IT WAS RECEIVED 3/10/25.    RX REQUESTING REFILL REQUEST BE SENT AGAIN.

## 2025-03-18 RX ORDER — PREGABALIN 100 MG/1
100 CAPSULE ORAL 2 TIMES DAILY
Qty: 60 CAPSULE | Refills: 2 | Status: SHIPPED | OUTPATIENT
Start: 2025-03-18 | End: 2025-06-16

## 2025-03-18 NOTE — TELEPHONE ENCOUNTER
Called to let her know it has been sent and confirm she is taking 100 mg in morning, and 150 mg in evening.

## 2025-03-18 NOTE — TELEPHONE ENCOUNTER
As we had discussed at our last visit she is to be taking Lyrica 100 mg a.m. and adjusted evening dose to 150 mg. She has 2 Lyrica scripts, one is the 50 mg nightly and the other is Lyrica 100 mg BID. I have sent refill of Lyrica 100 mg BID, it doesn't look like we received any refill requests for the Lyrica 100 mg dose. I have reviewed PDMP and she is appropriate for refill so I have sent it. Please let me know if there are any other questions about her script, thanks!

## 2025-04-16 ENCOUNTER — OFFICE VISIT (OUTPATIENT)
Dept: CARDIOLOGY | Facility: CLINIC | Age: 55
End: 2025-04-16
Payer: MEDICARE

## 2025-04-16 VITALS
BODY MASS INDEX: 37.12 KG/M2 | SYSTOLIC BLOOD PRESSURE: 120 MMHG | HEART RATE: 82 BPM | DIASTOLIC BLOOD PRESSURE: 74 MMHG | HEIGHT: 69 IN | WEIGHT: 250.6 LBS

## 2025-04-16 DIAGNOSIS — R94.39 ABNORMAL NUCLEAR STRESS TEST: ICD-10-CM

## 2025-04-16 DIAGNOSIS — E78.2 MIXED HYPERLIPIDEMIA: ICD-10-CM

## 2025-04-16 DIAGNOSIS — I10 PRIMARY HYPERTENSION: ICD-10-CM

## 2025-04-16 DIAGNOSIS — R07.89 OTHER CHEST PAIN: ICD-10-CM

## 2025-04-16 DIAGNOSIS — I50.22 CHRONIC SYSTOLIC CHF (CONGESTIVE HEART FAILURE): Chronic | ICD-10-CM

## 2025-04-16 DIAGNOSIS — I42.8 NICM (NONISCHEMIC CARDIOMYOPATHY): Primary | ICD-10-CM

## 2025-04-16 RX ORDER — NITROGLYCERIN 0.4 MG/1
TABLET SUBLINGUAL
Qty: 25 TABLET | Refills: 1 | OUTPATIENT
Start: 2025-04-16

## 2025-04-16 RX ORDER — NITROGLYCERIN 0.4 MG/1
TABLET SUBLINGUAL
Qty: 25 TABLET | Refills: 1 | Status: SHIPPED | OUTPATIENT
Start: 2025-04-16

## 2025-04-16 RX ORDER — SACUBITRIL AND VALSARTAN 24; 26 MG/1; MG/1
1 TABLET, FILM COATED ORAL 2 TIMES DAILY
Qty: 60 TABLET | Refills: 8 | Status: SHIPPED | OUTPATIENT
Start: 2025-04-16

## 2025-04-16 NOTE — PROGRESS NOTES
Chief Complaint   Patient presents with    Follow-up     Cardiac management.  Patient reports her HR goes up at times when she is  having pain in hands and legs  . Reports BP goes up at times but is normal today    LABS     PCP obtains routine lab    Med Refill     Needs refills on Entresto 30 day supply and Nitro SL tab to [facility]       Subjective       Bryanna Schwartz is a 55 y.o. female seen in March 2023 for initial cardiac consultation.  Her PMH is complex including: gangrenous changes to the right foot with CTA negative for stenosis, severe neuropathy diagnosed ; tick bite requiring referral to ID, dx included Lyme's disease and RMSF.  She was significantly debilitated and underwent rehab at [facility].  Secondary to mental acuity problems carotid ultrasound and MRI brain done in February 2023 and results showing mild atrophy.  During hospitalization she underwent cardiac work-up.  Echocardiogram with saline test was negative, LV function mildly diminished.  Stress test showed anterior septal infarct with dana-infarct ischemia.  NESSA was negative for mass or thrombus. She follows closely with neurology at .     Due to persistent symptoms it was decided to proceed with cardiac catheterization.  On 3/17/2023 cardiac cath revealed normal coronaries with nonischemic cardiomyopathy.  Addition of Entresto advised.      In July 2023 she was hospitalized at [facility] secondary to persistent nausea, vomiting, and hypokalemia. GI work-up revealed significant gastroparesis. On 8/14/2023 echocardiogram showed LVEF around 51-55%.     Today she returns to the office for a follow up visit.  She denies cardiac symptoms or concerns.  Shortness of breath has improved since last visit.  In the winter she experienced a leg fracture for which she underwent surgery and now walking very well without issues.  Currently she is on lower dose Lyrica and being managed by neurologist.    Cardiac History:    Past  Surgical History:   Procedure Laterality Date    ACHILLES TENDON SURGERY Right     BREAST CYST EXCISION Left     CARDIAC CATHETERIZATION  03/16/2023    Normal coronaries.  EF 45%    CARDIOVASCULAR STRESS TEST  03/04/2023    anteroseptal, anterior infarct with dana-infarct ischemia    CHOLECYSTECTOMY      ECHO - CONVERTED  03/02/2023    EF 41-45%, saline test negative, valves normal    ECHO - CONVERTED  08/14/2023    Limited - EF 55%. LA- 4.0. Trace PE    ENDOSCOPY N/A 10/16/2018    Procedure: ESOPHAGOGASTRODUODENOSCOPY;  Surgeon: Brunner, Mark I, MD;  Location:  [facility];  Service: Gastroenterology    ENDOSCOPY N/A 07/27/2023    Procedure: ESOPHAGOGASTRODUODENOSCOPY;  Surgeon: Yas Murphy MD;  Location:  [facility];  Service: Gastroenterology;  Laterality: N/A;    HYSTERECTOMY      TRANSESOPHAGEAL ECHOCARDIOGRAM (NESSA)  03/07/2023    Dr. Alonso- EF 56-60%, no mass or thrombus, mild plaque aorrti arch and descending aorta    UPPER GASTROINTESTINAL ENDOSCOPY      US CAROTID UNILATERAL  12/17/2022    < 50% Bilaterally       Current Outpatient Medications   Medication Sig Dispense Refill    albuterol sulfate  (90 Base) MCG/ACT inhaler EVERY 4 HOURS      Aspirin Low Dose 81 MG EC tablet       buPROPion XL (WELLBUTRIN XL) 150 MG 24 hr tablet Take 1 tablet by mouth Every Morning.      cetirizine (zyrTEC) 10 MG tablet Take 1 tablet by mouth Daily.      Cholecalciferol (Vitamin D3) 50 MCG (2000 UT) capsule       Cyanocobalamin 1000 MCG/ML kit Inject 1 mL as directed 1 (One) Time Per Week.      DULoxetine (CYMBALTA) 20 MG capsule TAKE 2 CAPSULES BY MOUTH ONCE DAILY 60 capsule 2    Eliquis 2.5 MG tablet tablet Take 1 tablet by mouth Every 12 (Twelve) Hours.      famotidine (PEPCID) 40 MG tablet       folic acid (FOLVITE) 1 MG tablet Take 1 tablet by mouth Daily.      Padma, Zingiber officinalis, (Padma Root) 250 MG capsule Take 2 capsules by mouth 3 (Three) Times a Day. 90 capsule 1     HYDROcodone-acetaminophen (NORCO)  MG per tablet Take 1 tablet by mouth 3 times a day.      levothyroxine (SYNTHROID, LEVOTHROID) 50 MCG tablet Take 1 tablet by mouth Every Morning.      loratadine (CLARITIN) 10 MG tablet       methocarbamol (ROBAXIN) 750 MG tablet Take 1 tablet by mouth Every 12 (Twelve) Hours.      montelukast (SINGULAIR) 10 MG tablet Take 1 tablet by mouth Every Night.      nitroglycerin (NITROSTAT) 0.4 MG SL tablet 1 under the tongue as needed for angina, may repeat q5mins for up three doses 25 tablet 1    ondansetron ODT (ZOFRAN-ODT) 4 MG disintegrating tablet DISSOLVE 1 TABLET ON THE TONGUE EVERY 8 HOURS AS NEEDED FOR NAUSEA AND VOMITING      Ozempic, 2 MG/DOSE, 8 MG/3ML solution pen-injector       pantoprazole (Protonix) 40 MG EC tablet Take 1 tablet by mouth Daily. 30 tablet 2    polyethylene glycol (MIRALAX) 17 GM/SCOOP powder MIX 1 DOSE (17 GRAMS) IN 8 OUNCES OF BEVERAGE & DRINK ONCE DAILY      pregabalin (Lyrica) 100 MG capsule Take 1 capsule by mouth 2 (Two) Times a Day for 90 days. 60 capsule 2    pregabalin (Lyrica) 50 MG capsule Take 1 capsule by mouth Every Night. 30 capsule 2    Rimegepant Sulfate (NURTEC) 75 MG tablet dispersible tablet Take  by mouth As Needed.      rosuvastatin (CRESTOR) 10 MG tablet Take 1 tablet by mouth Daily.      sacubitril-valsartan (Entresto) 24-26 MG tablet Take 1 tablet by mouth 2 (Two) Times a Day. 60 tablet 8    zonisamide (ZONEGRAN) 25 MG capsule Take 1 capsule by mouth Daily. 25mg in the AM and 50mg in the PM       No current facility-administered medications for this visit.       Botox [onabotulinumtoxina]; Erenumab-aooe; Other; Antihistamines, loratadine-type; Tizanidine; and Gabapentin    Past Medical History:   Diagnosis Date    Allergy to alpha-gal     Arthritis     Cluster headache 1984    Depression     Diabetes     Difficulty walking     Environmental allergies     Falls 10/24/2018    GERD (gastroesophageal reflux disease)     Headache,  "tension-type 1984    Heart murmur     Hyperlipidemia     Hypertension     Hypothyroidism     Lyme disease     Migraine     Peripheral vascular disease     Kelvin [place] spotted fever     Trigeminal neuralgia     Vitamin B 12 deficiency        Social History     Socioeconomic History    Marital status:     Number of children: 2   Tobacco Use    Smoking status: Never     Passive exposure: Never    Smokeless tobacco: Never    Tobacco comments:     Ok   Vaping Use    Vaping status: Never Used   Substance and Sexual Activity    Alcohol use: Never    Drug use: No    Sexual activity: Defer     Partners: Male       Family History   Problem Relation Age of Onset    Hypertension Mother     COPD Mother     Heart attack Mother     Other Father         / of exhaust fumes    Cancer Sister     Heart attack Brother     Colon cancer Neg Hx     Colon polyps Neg Hx     Esophageal cancer Neg Hx        Review of Systems   Cardiovascular:  Negative for chest pain, dyspnea on exertion, leg swelling, near-syncope and palpitations.   Respiratory:  Positive for shortness of breath (Admits improvement since last visit).    Hematologic/Lymphatic: Negative for bleeding problem.   Neurological:  Positive for headaches and numbness. Negative for dizziness.        BP Readings from Last 5 Encounters:   25 120/74   03/10/25 130/86   24 132/76   24 112/74   24 122/64       Wt Readings from Last 5 Encounters:   25 114 kg (250 lb 9.6 oz)   03/10/25 113 kg (250 lb)   24 113 kg (250 lb)   24 113 kg (250 lb)   24 107 kg (235 lb 3.2 oz)       Objective     /74 (BP Location: Left arm, Patient Position: Sitting, Cuff Size: Adult)   Pulse 82   Ht 175.3 cm (69.02\")   Wt 114 kg (250 lb 9.6 oz)   LMP  (LMP Unknown)   BMI 36.99 kg/m²     Vitals and nursing note reviewed.   Constitutional:       Appearance: Not in distress.   HENT:      Head: Normocephalic.   Neck:      Vascular: No " carotid bruit.   Pulmonary:      Effort: Pulmonary effort is normal.      Breath sounds: Normal breath sounds.   Cardiovascular:      PMI at left midclavicular line. Normal rate. Regular rhythm.   Edema:     Peripheral edema absent.   Abdominal:      General: Bowel sounds are normal.      Palpations: Abdomen is soft.   Musculoskeletal:      Cervical back: Neck supple. Skin:     General: Skin is warm and dry.   Neurological:      Mental Status: Alert, oriented to person, place, and time and oriented to person, place and time.          Procedures: None today         Assessment & Plan   Diagnoses and all orders for this visit:    1. NICM (nonischemic cardiomyopathy) (Primary)  -     sacubitril-valsartan (Entresto) 24-26 MG tablet; Take 1 tablet by mouth 2 (Two) Times a Day.  Dispense: 60 tablet; Refill: 8    2. Chronic systolic CHF (congestive heart failure)    3. Primary hypertension  -     sacubitril-valsartan (Entresto) 24-26 MG tablet; Take 1 tablet by mouth 2 (Two) Times a Day.  Dispense: 60 tablet; Refill: 8    4. Mixed hyperlipidemia    5. Abnormal nuclear stress test  -     nitroglycerin (NITROSTAT) 0.4 MG SL tablet; 1 under the tongue as needed for angina, may repeat q5mins for up three doses  Dispense: 25 tablet; Refill: 1    6. Other chest pain  -     nitroglycerin (NITROSTAT) 0.4 MG SL tablet; 1 under the tongue as needed for angina, may repeat q5mins for up three doses  Dispense: 25 tablet; Refill: 1      NICM/chronic systolic CHF  -Echo 8/2023: LVEF improved to around 51-55%  -Did not proceed with echocardiogram ordered last year due to improvement of shortness of breath.  -Continue medical management: Entresto     Hypertension  -BP stable  -continue current dose Entresto     Palpitations  -Denies recurrence of palpitations  -Heart rate and rhythm normal  -Avoid caffeine     Hyperlipidemia  -Continue Crestor  -Labs managed with PCP office     BMI 37  -Encouraged diet, exercise for weight loss    Other  chest pain  -Denies recent chest pain or anginal symptoms  -Nitrostat updated due to expiration date.      Neurological issues now followed by Bronwyn BUTLER.     Currently patient appears stable from a cardiac standpoint.     6-month follow-up visit scheduled.  Please call sooner for cardiac concerns.                 Electronically signed by CARRIE Zuniga,  April 16, 2025 14:22 EDT    Dictated Utilizing Dragon Dictation: Part of this note may be an electronic transcription/translation of spoken language to printed text using the Dragon Dictation System.

## 2025-05-14 ENCOUNTER — TELEPHONE (OUTPATIENT)
Dept: NEUROLOGY | Facility: CLINIC | Age: 55
End: 2025-05-14
Payer: MEDICARE

## 2025-05-14 NOTE — TELEPHONE ENCOUNTER
Spoke with Bobbi at the pharmacy and verified patient has 2 Lyrica scripts  100mg to be be taken BID and 50mg to be taken at night along with 100mg.  She was in good understanding

## 2025-05-14 NOTE — TELEPHONE ENCOUNTER
Provider: DEEPIKA LINDSAY    Caller: [facility] - [place], [place] - [address] [facility] [address] - [phone]  - [phone] FX    Relationship to Patient: Pharmacy    Phone Number: [phone]    Reason for Call: CALLED REQUESTING TO CLARIFY STRENGTH NEEDED FOR PREGABALIN. PLEASE ADVISE, THANK YOU.

## 2025-06-03 DIAGNOSIS — G62.9 POLYNEUROPATHY: ICD-10-CM

## 2025-06-03 NOTE — TELEPHONE ENCOUNTER
Caller: Michael Schwartz    Relationship: Emergency Contact    Best call back number:   Telephone Information:   Mobile [phone]         Requested Prescriptions:   Requested Prescriptions     Pending Prescriptions Disp Refills    pregabalin (Lyrica) 50 MG capsule 30 capsule 2     Sig: Take 1 capsule by mouth Every Night.    pregabalin (Lyrica) 100 MG capsule 60 capsule 2     Sig: Take 1 capsule by mouth 2 (Two) Times a Day for 90 days.        Pharmacy where request should be sent: [facility] [address] - [phone]  - [phone] FX     Last office visit with prescribing clinician: 3/10/2025   Last telemedicine visit with prescribing clinician: Visit date not found   Next office visit with prescribing clinician: 10/7/2025     Additional details provided by patient: PT HAS ABOUT A WEEKS WORTH, HE STATES HE WAS NOT AWARE THE PT CX'D THE APPOINTMENT, IT HAS BEEN R/S'D    Does the patient have less than a 3 day supply:  [] Yes  [x] No    Would you like a call back once the refill request has been completed: [] Yes [x] No    If the office needs to give you a call back, can they leave a voicemail: [] Yes [x] No    Barrera Beasley Rep   06/03/25 09:29 EDT

## 2025-06-04 RX ORDER — PREGABALIN 50 MG/1
50 CAPSULE ORAL NIGHTLY
Qty: 30 CAPSULE | Refills: 2 | Status: SHIPPED | OUTPATIENT
Start: 2025-06-04

## 2025-06-04 RX ORDER — PREGABALIN 100 MG/1
100 CAPSULE ORAL 2 TIMES DAILY
Qty: 60 CAPSULE | Refills: 2 | Status: SHIPPED | OUTPATIENT
Start: 2025-06-04 | End: 2025-09-02

## 2025-06-04 NOTE — TELEPHONE ENCOUNTER
Rx Refill Note  Requested Prescriptions     Pending Prescriptions Disp Refills    pregabalin (Lyrica) 50 MG capsule 30 capsule 2     Sig: Take 1 capsule by mouth Every Night.    pregabalin (Lyrica) 100 MG capsule 60 capsule 2     Sig: Take 1 capsule by mouth 2 (Two) Times a Day for 90 days.      Last filled:3/10/25 2 ref  Last office visit with prescribing clinician: 3/10/2025      Next office visit with prescribing clinician: 10/7/2025     Last filled:3/18/25 2 rf  Last office visit with prescribing clinician: 3/10/2025      Next office visit with prescribing clinician: 10/7/2025     Nilda Almanza MA  06/04/25, 08:14 EDT    Pending to provider

## 2025-07-02 DIAGNOSIS — G62.9 POLYNEUROPATHY: ICD-10-CM

## 2025-07-03 RX ORDER — DULOXETIN HYDROCHLORIDE 20 MG/1
40 CAPSULE, DELAYED RELEASE ORAL DAILY
Qty: 60 CAPSULE | Refills: 2 | Status: SHIPPED | OUTPATIENT
Start: 2025-07-03

## 2025-07-03 RX ORDER — PREGABALIN 100 MG/1
100 CAPSULE ORAL 2 TIMES DAILY
Qty: 60 CAPSULE | Refills: 2 | Status: SHIPPED | OUTPATIENT
Start: 2025-07-03 | End: 2025-10-01

## 2025-07-03 RX ORDER — PREGABALIN 50 MG/1
50 CAPSULE ORAL NIGHTLY
Qty: 30 CAPSULE | Refills: 2 | Status: SHIPPED | OUTPATIENT
Start: 2025-07-03

## 2025-07-03 NOTE — TELEPHONE ENCOUNTER
Rx Refill Note  Requested Prescriptions     Pending Prescriptions Disp Refills    DULoxetine (CYMBALTA) 20 MG capsule 60 capsule 2     Sig: Take 2 capsules by mouth Daily.    pregabalin (Lyrica) 50 MG capsule 30 capsule 2     Sig: Take 1 capsule by mouth Every Night.    pregabalin (Lyrica) 100 MG capsule 60 capsule 2     Sig: Take 1 capsule by mouth 2 (Two) Times a Day for 90 days.      Last filled:3/12/25  2 ref  Last office visit with prescribing clinician: 3/10/2025      Next office visit with prescribing clinician: 10/7/2025       Last filled:6/4/25 2 ref  Last office visit with prescribing clinician: 3/10/2025      Next office visit with prescribing clinician: 10/7/2025     Last filled:6/4/25 2 ref  Last office visit with prescribing clinician: 3/10/2025      Next office visit with prescribing clinician: 10/7/2025       Nilda Almanza MA  07/03/25, 08:31 EDT    Pending to provider

## (undated) DEVICE — TUBING,OXYGEN,CRUSH RES,7',CLEAR,UC: Brand: MEDLINE INDUSTRIES, INC.

## (undated) DEVICE — KT ORCA ORCAPOD DISP STRL

## (undated) DEVICE — TUBING, SUCTION, 1/4" X 10', STRAIGHT: Brand: MEDLINE

## (undated) DEVICE — SINGLE-USE BIOPSY FORCEPS: Brand: RADIAL JAW 4

## (undated) DEVICE — CONTN GRAD MEAS TRIANG 32OZ BLK

## (undated) DEVICE — HYBRID CO2 TUBING/CAP SET FOR OLYMPUS® SCOPES & CO2 SOURCE: Brand: ERBE

## (undated) DEVICE — INTRO ACCSR BLNT TP

## (undated) DEVICE — LUBE JELLY FOIL PACK 1.4 OZ: Brand: MEDLINE INDUSTRIES, INC.

## (undated) DEVICE — ADAPT CLN LUM OLYMP AIR/H20

## (undated) DEVICE — THE BITE BLOCK MAXI, LATEX FREE STRAP IS USED TO PROTECT THE ENDOSCOPE INSERTION TUBE FROM BEING BITTEN BY THE PATIENT.

## (undated) DEVICE — SAFELINER SUCTION CANISTER 1000CC: Brand: DEROYAL

## (undated) DEVICE — SOLIDIFIER LIQ PREMISORB 1500CC

## (undated) DEVICE — SYR LUERLOK 50ML

## (undated) DEVICE — ENDOGATOR HYBRID TUBING KIT FOR USE WITH ENDOGATOR IRRIGATION PUMP, OLYMPUS PUMP, GI4000 ESU, AND TORRENT IRRIGATION PUMP.: Brand: ENDOGATOR KIT

## (undated) DEVICE — FIRST STEP BEDSIDE ADD WATER KIT - RESEALABLE STAND-UP POUCH, ENDOSCOPIC CLEANING PAD - 1 POUCH: Brand: FIRST STEP BEDSIDE ADD WATER KIT - RESEALABLE STAND-UP POUCH, ENDOSCOPIC CLEANIN

## (undated) DEVICE — SOL IRR H2O BTL 1000ML STRL